# Patient Record
Sex: MALE | Race: WHITE | Employment: OTHER | URBAN - METROPOLITAN AREA
[De-identification: names, ages, dates, MRNs, and addresses within clinical notes are randomized per-mention and may not be internally consistent; named-entity substitution may affect disease eponyms.]

---

## 2017-07-26 ENCOUNTER — OFFICE VISIT (OUTPATIENT)
Dept: NEUROLOGY | Facility: CLINIC | Age: 62
End: 2017-07-26

## 2017-07-26 VITALS
BODY MASS INDEX: 28.88 KG/M2 | DIASTOLIC BLOOD PRESSURE: 74 MMHG | HEIGHT: 69 IN | WEIGHT: 195 LBS | SYSTOLIC BLOOD PRESSURE: 120 MMHG | HEART RATE: 59 BPM

## 2017-07-26 DIAGNOSIS — G40.309 GENERALIZED CONVULSIVE EPILEPSY (H): Primary | ICD-10-CM

## 2017-07-26 RX ORDER — LAMOTRIGINE 100 MG/1
TABLET ORAL
Qty: 360 TABLET | Refills: 3 | Status: SHIPPED | OUTPATIENT
Start: 2017-07-26 | End: 2018-07-18

## 2017-07-26 NOTE — PROGRESS NOTES
[facility]  Epilepsy Clinic:  RETURN VISIT     HISTORY:  Mr. Chad Olivares is a 62-year-old right-handed man who returned for followup of grand mal seizures.  He came to the visit today alone.       The patient reported that following his most recent visit to this clinic on 07/20/2016, he has not had any seizures.  After the last visit he completed a crossover from levetiracetam monotherapy to lamotrigine monotherapy.  He experienced complete resolution of chronic mild irritability and episodic imbalance after tapering off levetiracetam.  He feels quite well on lamotrigine.      The patient reported that he has continued to obtain a marijuana derivative, cannabidiol oil, from a supplier in [place].  This seems to have been quite useful in treating joint pains.  He uses 2 drops every night, which he thinks is a low dose.  He does not think that he had adverse effects of cannabidiol oil.     Ictal semiology-history:    The patient confirmed previously presented history in detail today. He again noted that he had the first seizure of his lifetime on 07/31/2014, and a second grand mal seizure on August 20, 2014.  He was asleep at home in bed at seizure onset; after going to bed the preceding evening, his next memory is of awakening in the [facility] Emergency Department.  He then was very tired and diffusely sore, with pain on the right side of his tongue which had been bitten during the seizure.  His significant other witnessed the seizure from onset.  He was lying stiffly in bed with legs and trunk extended with generalized jerking movements for what seemed to be several minutes.  Afterwards he was very lethargic with stertorous respirations and then he became agitated.  When paramedics arrived, they gave him 2 doses of midazolam which decreased the agitation.  Subsequently, he became gradually more responsive over several hours.  He did not feel that he had baseline alertness and cognitive function for nearly another 2  days, however.  He did not have urinary incontinence with the event.       The patient and his significant other reported that he has not been known to have any sudden brief staring spells with unresponsiveness, sudden brief periods of confusion or global memory deficits or involuntary movements, except for hypnic jerks.      Epilepsy-seizure predispositions:   The patient has no family history of epilepsy or seizures.  He has no history of gestational or  injury, febrile convulsions, developmental delay, stroke, meningitis, encephalitis, significant head injury, or other epileptic predispositions.  He denied a history of physical or sexual abuse by an adult during his childhood or adulthood.      Laboratory evaluations:   In the Emergency Department on the morning after the first seizure, he had a head CT scan which was normal.  A brain MRI scan was ordered at that time and was completed on 2014 and this also was normal.  He had a brief routine electroencephalogram on 2014 which showed normal waking and drowsy EEG activities, although sleep patterns were not recorded.     Epilepsy therapeutics:   The patient reported that he had never been treated with anti-seizure medications for any reason until the second seizure occurred, when levetiracetam was started.  He then crossed over to lamotrigine monotherapy, with resolution of levetiracetam-associated irritability, and later he added  OTC  cannabidiol oil to this.     PAST MEDICAL HISTORY:    1.  History of two generalized tonic-clonic seizures () of uncertain etiology.   2.  Mild obstructive sleep apnea.   3.  History of dyslexia.     PERSONAL AND SOCIAL HISTORY:  The patient grew up in [place].  He had difficulty with reading in school and was told that testing showed dyslexia, but he ultimately graduated from high school in regular classes.  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   He has 2 adult children.  He lives with his significant other.  He does not use illicit recreational drugs.  He drinks, at most, 1-2 alcoholic beverages, perhaps 3 times per month.  He quit smoking in 2005.     REVIEW OF SYSTEMS:  The patient has very chronic excessive daytime somnolence.  Last year this was more severe, in that he often felt forced to take a nap in the afternoon, despite sleeping about 8 hours per night.  He thinks that he lost 5-10 pounds over the last year and now he notes that he mainly naps on weekend days when he is not working but is consistently tired by the end of the work day.  His significant other notes that he often snores at night and sometimes seems to hold his breath.  He occasionally has had rapid leg jerks or movements in his sleep.  He occasionally has had vivid dreams which he seems to act out, rarely having dreams of pushing against something and then actually partially awakening subsequently and pushing on his significant other.         He also notes that he tends to forget details of things he reads or things people tell him and this has slowly worsened over many years.  Now he needs to keep lists of tasks and projects.         He denied history of weakness, tremors or other abnormal involuntary movements, numbness or tingling, incoordination, falling or difficulty in walking, urinary or fecal incontinence, headache and other pain, except as described above.  The patient denied any history of severe depression or suicidal ideation, severe anxiety, panic attacks, hallucinations, delusions, psychosis, substance abuse, or psychiatric hospitalization.  He denied rashes and easy bruising.  The remainder of a 14-system symptom review was negative.    MEDICATIONS:    1.  Lamotrigine 200 mg b.i.d.   2.  Aspirin 81 mg daily.   3.  Ibuprofen 200 mg as needed for joint pain.     ALLERGIES:  The patient denied any known medication allergies.      PHYSICAL EXAMINATION:    On physical  examination the patient appeared to be in no acute distress.  Vital signs were as per the electronic medical record.  Skull was normocephalic and atraumatic.  Neck was supple, without signs of meningeal irritation.      On neurological examination the patient appeared alert and was fully oriented to person, place, time, and reason for visit.  Speech showed grossly normal articulation, fluency, repetitions, naming, syntax and comprehension.  No apraxias or atavistic signs were elicited.  Cranial nerves III through XII were normal.  Muscle masses, tones and strengths were normal throughout.  There was no pronator drift.  No tremors, myoclonus, or other abnormal movements were observed.  Sensations of light touch, pin prick, vibration and proprioception were reportedly normal throughout.  The rapid alternating movements, and finger-nose-finger and heel-shin maneuvers were performed normally bilaterally.  Deep tendon reflexes were normal and symmetric throughout.  Toes were downgoing bilaterally.  Romberg maneuver was negative.  Regular, tandem and reverse tandem walking were normal.      IMPRESSION:    The patient continues to do very well with seizure control.  He crossed over to lamotrigine monotherapy with resolution of levetiracetam adverse effects and with no new problems.      The patient completed neuropsychological testing in September 2016.  This showed impairments consistent with the previously recognized diagnosis of dyslexia, with additional left frontoparietal dysfunction.  He did not have evidence of global deficits of dementia, which came as a relief to him, and he decided not to pursue other evaluations of left hemispheric dysfunction.    I again reviewed [place] regulations on seizures and driving with the patient.  He appeared to clearly understand that he would  prohibited from operating a motor vehicle within 3 months following any future seizure or other episode with sudden unconsciousness or  inability to sit up, and that he is required to report any future such seizure to the DMV within 30 days after the event.  I also recommended that he should review all of his other activities, and avoid any activities that might lead to self-injury or injury of others, within 3 months following any seizure with impaired awareness or impaired motor control.  Such activities include but are not limited to tub bathing, operating power cutting or other tools, handling firearms, exposure to heights from which she might fall, exposure to vessels with hot cooking oil or water, and swimming alone.      PLAN:    1.  Continue lamotrigine at 200 mg b.i.d.   2.  Check serum lamotrigine level today.   3.  Return visit in approximately 11 months.   I spent 25 minutes in this patient care, more than 50% of which consisted of counseling and coordinating care.       Dandre Martin M.D.   Professor of Neurology

## 2017-07-26 NOTE — LETTER
2017       RE: Chad Olivares  : 1955   MRN: [id number]      Dear Colleague,    Thank you for referring your patient, Chad Olivares, to the [facility] EPILEPSY CARE at [facility]. Please see a copy of my visit note below.        [facility]  Epilepsy Clinic: RETURN VISIT      I spent 25 minutes in this patient care, more than 50% of which consisted of counseling and coordinating care.   Dandre Martin M.D.

## 2017-07-26 NOTE — MR AVS SNAPSHOT
After Visit Summary   7/26/2017    Chad Olivares    MRN: [id number]           Patient Information     Date Of Birth          1955        Visit Information        Provider Department      7/26/2017 11:30 AM Dandre Martin MD [facility] Epilepsy Care        Today's Diagnoses     Generalized convulsive epilepsy (H)    -  1       Follow-ups after your visit        Follow-up notes from your care team     Return in about 11 months (around 6/26/2018).      Your next 10 appointments already scheduled     Aug 16, 2017  1:00 PM CDT   (Arrive by 12:45 PM)   Kelle Lee with Esther Glover MD PhD   [facility] ([facility])    [address]  [address] [facility] [address] [phone]              Who to contact     Please call your clinic at [phone] to:    Ask questions about your health    Make or cancel appointments    Discuss your medicines    Learn about your test results    Speak to your doctor   If you have compliments or concerns about an experience at your clinic, or if you wish to file a complaint, please contact [facility] Patient Relations at [phone] or email us at [email or web address]         Additional Information About Your Visit        MyCharmendez Information     [employer] gives you secure access to your electronic health record. If you see a primary care provider, you can also send messages to your care team and make appointments. If you have questions, please call your primary care clinic.  If you do not have a primary care provider, please call [phone] and they will assist you.      [employer] is an electronic gateway that provides easy, online access to your medical records. With [employer], you can request a clinic appointment, read your test results, renew a prescription or communicate with your care team.     To access your existing account, please contact your [facility] or call [phone] for assistance.        Care EveryWhere ID     This is your Care EveryWhere ID. This could be used by other organizations to access your [facility] medical records  [id number]        Your Vitals Were     Pulse Height BMI (Body Mass Index)             59 5' 8.5\" (174 cm) 29.22 kg/m2          Blood Pressure from Last 3 Encounters:   07/26/17 120/74   07/20/16 118/66   05/27/16 114/82    Weight from Last 3 Encounters:   07/26/17 195 lb (88.5 kg)   07/20/16 188 lb 3.2 oz (85.4 kg)   05/27/16 190 lb 14.4 oz (86.6 kg)              We Performed the Following     Lamotrigine Level          Where to get your medicines      These medications were sent to [facility] [address] [place] [address] AT [address] [address]    Hours:  24-hours Phone:  [phone]     lamoTRIgine 100 MG tablet          Primary Care Provider    None Specified       No primary provider on file.        Equal Access to Services     [facility]: Hadii tracey domingo Sodenae, waaxda lumiguel, qaybta kaalmada ricky, laly haq . So [facility] [phone].    ATENCIÓN: Si habla español, tiene a etienne disposición servicios gratuitos de asistencia lingüística. Llame al [phone].    We comply with applicable federal civil rights laws and [place] laws. We do not discriminate on the basis of race, color, national origin, age, disability sex, sexual orientation or gender identity.            Thank you!     Thank you for choosing [facility] EPILEPSY [facility]  for your care. Our goal is always to provide you with excellent care. Hearing back from our patients is one way we can continue to improve our services. Please take a few minutes to complete the written survey that you may receive in the mail after your visit with us. Thank you!             Your Updated Medication List - Protect others around you: Learn how to safely " use, store and throw away your medicines at www.disposemymeds.org.          This list is accurate as of: 7/26/17  3:42 PM.  Always use your most recent med list.                   Brand Name Dispense Instructions for use Diagnosis    lamoTRIgine 100 MG tablet    LaMICtal    360 tablet    Take 2 tabs (200mg) by mouth twice daily    Generalized convulsive epilepsy (H)       medical cannabis liquid      (This is NOT a prescription, and does not certify that the patient has a qualifying medical condition for medical cannabis.  The purpose of this order is  to document that the patient reports taking medical cannabis.)

## 2017-07-27 LAB — LAMOTRIGINE SERPL-MCNC: 2.7 UG/ML

## 2017-09-20 ENCOUNTER — OFFICE VISIT (OUTPATIENT)
Dept: FAMILY MEDICINE | Facility: CLINIC | Age: 62
End: 2017-09-20

## 2017-09-20 VITALS
DIASTOLIC BLOOD PRESSURE: 77 MMHG | WEIGHT: 193.7 LBS | HEIGHT: 69 IN | BODY MASS INDEX: 28.69 KG/M2 | SYSTOLIC BLOOD PRESSURE: 113 MMHG | RESPIRATION RATE: 16 BRPM | HEART RATE: 64 BPM

## 2017-09-20 DIAGNOSIS — G40.309 GENERALIZED CONVULSIVE EPILEPSY (H): ICD-10-CM

## 2017-09-20 DIAGNOSIS — Z00.00 ROUTINE GENERAL MEDICAL EXAMINATION AT A HEALTH CARE FACILITY: Primary | ICD-10-CM

## 2017-09-20 ASSESSMENT — PAIN SCALES - GENERAL: PAINLEVEL: NO PAIN (0)

## 2017-09-20 NOTE — PATIENT INSTRUCTIONS
Primary Care Center Phone Number [phone]  Primary Care Center Medication Refill Request Information:  * Please contact your pharmacy regarding ANY request for medication refills.  ** [facility] Prescription Fax = [phone]  * Please allow 3 business days for routine medication refills.  * Please allow 5 business days for controlled substance medication refills.     Primary Care Center Test Result notification information:  *You will be notified with in 7-10 days of your appointment day regarding the results of your test.  If you are on MyChart you will be notified as soon as the provider has reviewed the results and signed off on them.      Get a shingles shot at [facility]  Get a flu shot  Return fasting for blood work  Colonoscopy is due in 2020  Eye appt set up     Please schedule the following appointments:  Ophthalmology (Eye) [phone]  ([facility] 4th floor)      Nutritious diet  Eat 1200 mg calcium total every day.  Many people achieve this by a diet containing calcium (milk, yogurt, cheese, and other dairy products, supplemented food) and 1 calcium pill. If you don't eat dairy, you should take a calcium supplement 2 times a day.  Eat 1000 IU of vitamin D on daily basis.    Eat a variety of fruits and vegetables and eat whole grains.  Try to choose foods with a high NuVal score, if your grocery store shows this number. High numbers, like 80 or 90, are nutritious foods, and low scores, like 10 are less nutritious foods.          Men should drink no more than 2 alcoholic beverages daily on average; women should drink no more than 1 alcoholic beverage daily on average.    Everyone should avoid all tobacco and nicotine-containing products.    Exercise: Aim for:  Moderate activity (where you can still talk while doing it, such as walking about 100 steps per minute, or 3,000 steps in 30 minutes) for 30 minutes at least 5 days a week  Or  Vigorous exercise (you are working so hard you are breathing hard and fast  and your heart rate has gone up, such as playing basketball or soccer) at least 75 minutes weekly    If you have trouble doing 30 minutes of activity, all at once, try small bursts of activity. Go to www."[employer],".[employer] for suggestions on how you can do this at home or work.     All adults should try to do muscle strengthening exercise at least 2 days a week    Safety  Always wear bike/motorcycle helmet and seatbelt in a vehicle  Make sure your home has smoke and carbon monoxide detectors.  Wear broad spectrum sunscreen of at least SPF 15 on sun-exposed skin.    Preventing disease  See your dentist at least once a year  Have your eyes checked every 1-2 years.  Get a flu shot each year.

## 2017-09-20 NOTE — NURSING NOTE
Chief Complaint   Patient presents with     Physical     pt is here for a medication follow up        Lupe Teixeira CMA at 12:55 PM on 9/20/2017

## 2017-09-20 NOTE — PROGRESS NOTES
.SUBJECTIVE:  Chad Olivares is a 62 year old male with pmh of   Past Medical History:   Diagnosis Date     Cholesteatoma, unspecified      Colon polyps      Complex sleep apnea syndrome     does not use CPAP     Dyslexia      Generalized tonic-clonic seizure (H) 2014    uncertain etiology     who comes in for preventive care examination today.     He has the following concerns to address today    He  urinates frequently, has 1-5 nocturia, no blood - does drink a lot of water, drinking 1-4 cups coffee/day,  Minimal alcohol, nonsmoker.      Gets sleepy early afternoon - does tend to nap    Hx of seizures - on meds - last seizure was  3-4 yrs.      Con't to use marjuana and took it for arthritis - and has  helped inflammation       Current Outpatient Prescriptions   Medication Sig Dispense Refill     lamoTRIgine (LAMICTAL) 100 MG tablet Take 2 tabs (200mg) by mouth twice daily 360 tablet 3     medical cannabis oral liquid  (Patient's own supply.  Not a prescription) (This is NOT a prescription, and does not certify that the patient has a qualifying medical condition for medical cannabis.  The purpose of this order is  to document that the patient reports taking medical cannabis.)         Family History   Problem Relation Age of Onset     DIABETES Mother      diet controlled, Htn     Heart Murmur Mother      TAVR     DIABETES Maternal Grandmother      Coronary Artery Disease Brother        Social History   Substance Use Topics     Smoking status: Former Smoker     Quit date: 8/26/2005     Smokeless tobacco: Never Used     Alcohol use Yes      Comment: 1 drink every 2 wks     Social History     Social History     Marital status: Single     Spouse name: N/A     Number of children: 2     Years of education: 14     Occupational History           Self Employed     contractor Self     Social History Main Topics     Smoking status: Former Smoker     Quit date: 8/26/2005     Smokeless tobacco: Never Used      "Alcohol use Yes      Comment: 1 drink every 2 wks     Drug use: No      Comment: uses CBD      Sexual activity: Yes     Partners: Female     Other Topics Concern     Parent/Sibling W/ Cabg, Mi Or Angioplasty Before 65f 55m? No     Caffeine Concern Not Asked     3 cups of coffee a day     Exercise Not Asked     Exercise 2 to 3 times a week     Social History Narrative    Balanced Diet - Yes    Osteoporosis Preventative measures-  Dairy servings per day: no servings daily takes soy milk and almond milk - 2 glasses/day     Regular Exercise -  Yes Describe dance 3 times weekly (salsa), bike ride, and paula    Dental Exam up - YES - Date: 2016    Eye Exam - YES - Date: 2007    Self Testicular Exam -  sometimes    Do you have any concerns about STD's -  Partner has hx of genital herpes    Abuse: Current or Past (Physical, Sexual or Emotional)- No    Do you feel safe in your environment - Yes    Guns stored in the home - Yes, locked away    Sunscreen used - No using coconut     Seatbelts used - Yes    Lipids - YES - Date: 4-28-06    Glucose -  NO    Colon Cancer Screening - Colonoscopy 2002(date completed)    Hemoccults - NO    PSA - YES - Date: 4-28-06    Digital Rectal Exam - YES - Date: 4-28-06    Immunizations reviewed and up to date - Yes, last TD was 11-5-2001 2008, [place]    Review Of Systems  General: negative  Skin: negative  Eyes: negative  Ears/Nose/Throat: negative  Respiratory: No shortness of breath, dyspnea on exertion, cough, or hemoptysis  Cardiovascular: negative  Gastrointestinal: negative  Genitourinary: negative  Musculoskeletal: negative  Neurologic: negative  Psychiatric: negative  Hematologic/Lymphatic/Immunologic: negative  Endocrine: negative      OBJECTIVE:  /77  Pulse 64  Resp 16  Ht 1.74 m (5' 8.5\")  Wt 87.9 kg (193 lb 11.2 oz)  BMI 29.02 kg/m2  Gen: healthy older male  SKIN: tan - no lesions    HEENT:  PERRL - pupils small, arterioles narrow  " OP fair dentition, ears good light reflex,   Neck supple no LAD, no nodes, no palpable thyroid  CVS exam: S1, S2 normal, no murmur, click, rub or gallop, regular rate and rhythm, chest is clear without rales or wheezing, no pedal edema, no JVD, no hepatosplenomegaly.  Abd: soft NT/ND bs present  no masses or hepatosplenomegaly  Ext: warm.  no edema. Pulses 1+ and equal in feet  Genital: circumscribed, no testicular masses  Rectal: no hemorrhoids, prostate generous , no nodules  Neuro: cran nerves intact, motor 5/5 and = in UE and LE, reflexes 2/4 and equal; gait normal      ASSESSMENT/PLAN:  Assessment: Pt is a 62 year old male here for preventive examination    Plan:  1. Annual exam:  Colorectal screening indicated and in 2020    Osteoporosis screening  Discussed calcium - 1000mg/day .   Immunizations reviewed and recommended shingles vaccine at [facility] and also flu shot   Labs ordered PSA and lipid when fasting  Counseling was provided in the following areas:  regular exercise  weight management  healthy diet/nutrition  aspirin prophylaxis  osteoporosis prevention/bone health   Hep C screening was negative  Eye exam recommended       2. Seizures: stable on meds - continue lamictal     3. Urinary frequency:  Likely BPH.  He is not wanting to take any med; discussed cutting back on fluids after 6PM, cutting back on caffiene.  Follow     Dr Estehr Glover

## 2017-09-20 NOTE — MR AVS SNAPSHOT
After Visit Summary   9/20/2017    Chad Olivares    MRN: [id number]           Patient Information     Date Of Birth          1955        Visit Information        Provider Department      9/20/2017 1:00 PM Esther Glover MD PhD [facility]        Today's Diagnoses     Routine general medical examination at a health care facility    -  1    Generalized convulsive epilepsy (H)          Care Instructions    Primary Care Center Phone Number [phone]  Primary Care Center Medication Refill Request Information:  * Please contact your pharmacy regarding ANY request for medication refills.  ** [facility] Prescription Fax = [phone]  * Please allow 3 business days for routine medication refills.  * Please allow 5 business days for controlled substance medication refills.     Primary Care Center Test Result notification information:  *You will be notified with in 7-10 days of your appointment day regarding the results of your test.  If you are on MyChart you will be notified as soon as the provider has reviewed the results and signed off on them.      Get a shingles shot at [facility]  Get a flu shot  Return fasting for blood work  Colonoscopy is due in 2020  Eye appt set up     Please schedule the following appointments:  Ophthalmology (Eye) [phone]  ([facility] 4th floor)      Nutritious diet  Eat 1200 mg calcium total every day.  Many people achieve this by a diet containing calcium (milk, yogurt, cheese, and other dairy products, supplemented food) and 1 calcium pill. If you don't eat dairy, you should take a calcium supplement 2 times a day.  Eat 1000 IU of vitamin D on daily basis.    Eat a variety of fruits and vegetables and eat whole grains.  Try to choose foods with a high NuVal score, if your grocery store shows this number. High numbers, like 80 or 90, are nutritious foods, and low scores, like 10 are less nutritious foods.          Men should drink no more than 2 alcoholic  beverages daily on average; women should drink no more than 1 alcoholic beverage daily on average.    Everyone should avoid all tobacco and nicotine-containing products.    Exercise: Aim for:  Moderate activity (where you can still talk while doing it, such as walking about 100 steps per minute, or 3,000 steps in 30 minutes) for 30 minutes at least 5 days a week  Or  Vigorous exercise (you are working so hard you are breathing hard and fast and your heart rate has gone up, such as playing basketball or soccer) at least 75 minutes weekly    If you have trouble doing 30 minutes of activity, all at once, try small bursts of activity. Go to www.abefitness.com for suggestions on how you can do this at home or work.     All adults should try to do muscle strengthening exercise at least 2 days a week    Safety  Always wear bike/motorcycle helmet and seatbelt in a vehicle  Make sure your home has smoke and carbon monoxide detectors.  Wear broad spectrum sunscreen of at least SPF 15 on sun-exposed skin.    Preventing disease  See your dentist at least once a year  Have your eyes checked every 1-2 years.  Get a flu shot each year.              Follow-ups after your visit        Additional Services     OPHTHALMOLOGY ADULT REFERRAL       Your provider has referred you to: [facility]: [facility] [phone]   [email or web address]    Please be aware that coverage of these services is subject to the terms and limitations of your health insurance plan.  Call member services at your health plan with any benefit or coverage questions.      Please bring the following with you to your appointment:    (1) Any X-Rays, CTs or MRIs which have been performed.  Contact the facility where they were done to arrange for  prior to your scheduled appointment.    (2) List of current medications  (3) This referral request   (4) Any documents/labs given to you for this referral                  Follow-up  notes from your care team     Return in about 1 year (around 9/20/2018).      Your next 10 appointments already scheduled     Sep 28, 2017  8:15 AM CDT   LAB with UC LAB   [facility] ([facility])    [address] [place] [address]  [address] [facility] [address] [phone]           Patient must bring picture ID. Patient should be prepared to give a urine specimen  Please do not eat 10-12 hours before your appointment if you are coming in fasting for labs on lipids, cholesterol, or glucose (sugar). Pregnant women should follow their Care Team instructions. Water with medications is okay. Do not drink coffee or other fluids. If you have concerns about taking  your medications, please ask at office or if scheduling via [employer], send a message by clicking on Secure Messaging, Message Your Care Team.            Sep 28, 2017  9:00 AM CDT   (Arrive by 8:45 AM)   NEW GENERAL with Mihaela Denny OD   [facility] Ophthalmology ([facility])    [address] [place] [address] [phone]              Future tests that were ordered for you today     Open Future Orders        Priority Expected Expires Ordered    OPHTHALMOLOGY ADULT REFERRAL Routine 9/21/2017 9/20/2018 9/20/2017    PSA screen Routine 9/20/2017 9/20/2018 9/20/2017    Lipid Profile FASTING Routine 9/20/2017 9/20/2018 9/20/2017            Who to contact     Please call your clinic at [phone] to:    Ask questions about your health    Make or cancel appointments    Discuss your medicines    Learn about your test results    Speak to your doctor   If you have compliments or concerns about an experience at your clinic, or if you wish to file a complaint, please contact [facility] Patient Relations at [phone] or email us at [email or web address]         Additional Information About Your Visit        Everything ClubhariOmando Information     [employer] gives you  "secure access to your electronic health record. If you see a primary care provider, you can also send messages to your care team and make appointments. If you have questions, please call your primary care clinic.  If you do not have a primary care provider, please call [phone] and they will assist you.      [employer] is an electronic gateway that provides easy, online access to your medical records. With [employer], you can request a clinic appointment, read your test results, renew a prescription or communicate with your care team.     To access your existing account, please contact your [facility] or call [phone] for assistance.        Care EveryWhere ID     This is your Care EveryWhere ID. This could be used by other organizations to access your [place] medical records  [id number]        Your Vitals Were     Pulse Respirations Height BMI (Body Mass Index)          64 16 1.74 m (5' 8.5\") 29.02 kg/m2         Blood Pressure from Last 3 Encounters:   09/20/17 113/77   07/26/17 120/74   07/20/16 118/66    Weight from Last 3 Encounters:   09/20/17 87.9 kg (193 lb 11.2 oz)   07/26/17 88.5 kg (195 lb)   07/20/16 85.4 kg (188 lb 3.2 oz)               Primary Care Provider Office Phone # Fax #    Asaf Craig [phone]       [address] [place] [address]        Equal Access to Services     TEMI GALVAN : Hadii aad ku hadasho Sodanaali, waaxda luqadaha, qaybta kaalmada adeegyada, laly jensen. So [facility] [phone].    ATENCIÓN: Si habla español, tiene a etienne disposición servicios gratuitos de asistencia lingüística. Llame al [phone].    We comply with applicable federal civil rights laws and [place] laws. We do not discriminate on the basis of race, color, national origin, age, disability sex, sexual orientation or gender identity.            Thank you!     Thank you for choosing [facility] " CLINIC  for your care. Our goal is always to provide you with excellent care. Hearing back from our patients is one way we can continue to improve our services. Please take a few minutes to complete the written survey that you may receive in the mail after your visit with us. Thank you!             Your Updated Medication List - Protect others around you: Learn how to safely use, store and throw away your medicines at www.disposemymeds.org.          This list is accurate as of: 9/20/17  2:02 PM.  Always use your most recent med list.                   Brand Name Dispense Instructions for use Diagnosis    lamoTRIgine 100 MG tablet    LaMICtal    360 tablet    Take 2 tabs (200mg) by mouth twice daily    Generalized convulsive epilepsy (H)       medical cannabis liquid      (This is NOT a prescription, and does not certify that the patient has a qualifying medical condition for medical cannabis.  The purpose of this order is  to document that the patient reports taking medical cannabis.)        NONFORMULARY      Medication name: Breathe Again. Use prn.

## 2017-10-04 ENCOUNTER — TELEPHONE (OUTPATIENT)
Dept: NEUROLOGY | Facility: CLINIC | Age: 62
End: 2017-10-04

## 2017-10-04 NOTE — TELEPHONE ENCOUNTER
DMV form received, via fax on 10/04/2015. Form placed in DMV forms folder for RN to complete.  Melissa Gómez, CMA

## 2017-10-19 NOTE — TELEPHONE ENCOUNTER
DMV form signed, faxed to [employer] on 10/19/17, sent to scanning, and copy mailed to patient.     Savi Headley CMA

## 2018-04-27 ENCOUNTER — TELEPHONE (OUTPATIENT)
Dept: INTERNAL MEDICINE | Facility: CLINIC | Age: 63
End: 2018-04-27

## 2018-04-27 DIAGNOSIS — H91.93 DECREASED HEARING OF BOTH EARS: Primary | ICD-10-CM

## 2018-04-27 NOTE — TELEPHONE ENCOUNTER
[facility] Call Center    Phone Message    May a detailed message be left on voicemail: yes    Reason for Call: Other: Pt needs an order for Hearing Evaluation.  Please follow up with pt.     Action Taken: Other: Routed: P Adult Primary Care [facility]

## 2018-05-01 NOTE — TELEPHONE ENCOUNTER
I spoke to Chad today. He reported he has had gradual hearing loss over the years. Reports that he has worsened hearing in his left ear compared to right, though overall decreased in both ears. Patient does not use hearing aids currently. No trauma. 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 Normal TM appearance during last evaluations.    Madison Dutton RN

## 2018-05-17 NOTE — TELEPHONE ENCOUNTER
FUTURE VISIT INFORMATION      FUTURE VISIT INFORMATION:    Date: 05/25/2018    Time: 3:00    [place] [facility] [place] INFORMATION:    Referring provider:  SELF    Referring providers clinic:  N/A    Reason for visit/diagnosis  HEARING LOSS    RECORDS REQUESTED FROM:       Clinic name Comments Records Status Imaging Status   PRIMARY CARE TELEPHONE ENCOUNTER 04/27/2018 INTERNAL NONE                                   RECORDS STATUS

## 2018-05-21 ENCOUNTER — OFFICE VISIT (OUTPATIENT)
Dept: OPHTHALMOLOGY | Facility: CLINIC | Age: 63
End: 2018-05-21
Payer: COMMERCIAL

## 2018-05-21 ENCOUNTER — OFFICE VISIT (OUTPATIENT)
Dept: INTERNAL MEDICINE | Facility: CLINIC | Age: 63
End: 2018-05-21
Payer: COMMERCIAL

## 2018-05-21 VITALS
WEIGHT: 195.7 LBS | HEART RATE: 61 BPM | DIASTOLIC BLOOD PRESSURE: 78 MMHG | BODY MASS INDEX: 29.32 KG/M2 | RESPIRATION RATE: 12 BRPM | SYSTOLIC BLOOD PRESSURE: 129 MMHG

## 2018-05-21 DIAGNOSIS — G40.309 GENERALIZED CONVULSIVE EPILEPSY (H): ICD-10-CM

## 2018-05-21 DIAGNOSIS — R39.9 LOWER URINARY TRACT SYMPTOMS (LUTS): ICD-10-CM

## 2018-05-21 DIAGNOSIS — R48.0 DYSLEXIA: ICD-10-CM

## 2018-05-21 DIAGNOSIS — R41.3 MEMORY LOSS: Primary | ICD-10-CM

## 2018-05-21 DIAGNOSIS — H25.13 SENILE NUCLEAR SCLEROSIS, BILATERAL: Primary | ICD-10-CM

## 2018-05-21 DIAGNOSIS — H52.4 PRESBYOPIA: ICD-10-CM

## 2018-05-21 DIAGNOSIS — R41.3 MEMORY LOSS: ICD-10-CM

## 2018-05-21 LAB
ANION GAP SERPL CALCULATED.3IONS-SCNC: 7 MMOL/L (ref 3–14)
BUN SERPL-MCNC: 13 MG/DL (ref 7–30)
CALCIUM SERPL-MCNC: 9.1 MG/DL (ref 8.5–10.1)
CHLORIDE SERPL-SCNC: 105 MMOL/L (ref 94–109)
CHOLEST SERPL-MCNC: 227 MG/DL
CO2 SERPL-SCNC: 28 MMOL/L (ref 20–32)
CREAT SERPL-MCNC: 1.11 MG/DL (ref 0.66–1.25)
GFR SERPL CREATININE-BSD FRML MDRD: 67 ML/MIN/1.7M2
GLUCOSE SERPL-MCNC: 90 MG/DL (ref 70–99)
HDLC SERPL-MCNC: 59 MG/DL
HIV 1+2 AB+HIV1 P24 AG SERPL QL IA: NONREACTIVE
LDLC SERPL CALC-MCNC: 146 MG/DL
NONHDLC SERPL-MCNC: 168 MG/DL
POTASSIUM SERPL-SCNC: 3.9 MMOL/L (ref 3.4–5.3)
PSA SERPL-ACNC: 2.42 UG/L (ref 0–4)
SODIUM SERPL-SCNC: 139 MMOL/L (ref 133–144)
TRIGL SERPL-MCNC: 110 MG/DL
TSH SERPL DL<=0.005 MIU/L-ACNC: 2.03 MU/L (ref 0.4–4)

## 2018-05-21 ASSESSMENT — TONOMETRY
IOP_METHOD: ICARE
OD_IOP_MMHG: 13
OS_IOP_MMHG: 15

## 2018-05-21 ASSESSMENT — ENCOUNTER SYMPTOMS
RECTAL PAIN: 0
SKIN CHANGES: 0
HOARSE VOICE: 0
EYE WATERING: 0
TROUBLE SWALLOWING: 0
BOWEL INCONTINENCE: 0
EYE PAIN: 0
EYE REDNESS: 0
SMELL DISTURBANCE: 0
NAUSEA: 0
TASTE DISTURBANCE: 0
DIFFICULTY URINATING: 0
NECK MASS: 0
SINUS CONGESTION: 0
DIARRHEA: 0
DYSURIA: 0
DOUBLE VISION: 1
EYE IRRITATION: 1
BLOATING: 0
SINUS PAIN: 0
HEARTBURN: 1
HEMATURIA: 0
BLOOD IN STOOL: 0
VOMITING: 0
POOR WOUND HEALING: 0
NAIL CHANGES: 0
SORE THROAT: 0
ABDOMINAL PAIN: 0
JAUNDICE: 0
CONSTIPATION: 1
FLANK PAIN: 0

## 2018-05-21 ASSESSMENT — PAIN SCALES - GENERAL: PAINLEVEL: NO PAIN (0)

## 2018-05-21 ASSESSMENT — VISUAL ACUITY
METHOD: SNELLEN - LINEAR
METHOD_MR_RETINOSCOPY: 1
OD_CC+: -2
OD_CC: 20/30
OS_CC: 20/20
OS_CC+: -1

## 2018-05-21 ASSESSMENT — CUP TO DISC RATIO
OD_RATIO: 0.2
OS_RATIO: 0.2

## 2018-05-21 ASSESSMENT — REFRACTION_MANIFEST
OD_SPHERE: +1.00
OS_SPHERE: +0.25
OS_CYLINDER: SPHERE
OD_ADD: +2.00
OD_CYLINDER: SPHERE
OS_ADD: +2.00

## 2018-05-21 ASSESSMENT — EXTERNAL EXAM - LEFT EYE: OS_EXAM: NORMAL

## 2018-05-21 ASSESSMENT — CONF VISUAL FIELD
OS_NORMAL: 1
OD_NORMAL: 1
METHOD: COUNTING FINGERS

## 2018-05-21 ASSESSMENT — EXTERNAL EXAM - RIGHT EYE: OD_EXAM: NORMAL

## 2018-05-21 ASSESSMENT — SLIT LAMP EXAM - LIDS
COMMENTS: NORMAL
COMMENTS: NORMAL

## 2018-05-21 NOTE — PATIENT INSTRUCTIONS
[facility]: [phone]     [facility] Medication Refill Request Information:  * Please contact your pharmacy regarding ANY request for medication refills.  ** [facility] Prescription Fax = [phone]  * Please allow 3 business days for routine medication refills.  * Please allow 5 business days for controlled substance medication refills.     [facility] Test Result notification information:  *You will be notified with in 7-10 days of your appointment day regarding the results of your test.  If you are on MyChart you will be notified as soon as the provider has reviewed the results and signed off on them.

## 2018-05-21 NOTE — MR AVS SNAPSHOT
After Visit Summary   5/21/2018    Chad Olivares    MRN: [id number]           Patient Information     Date Of Birth          1955        Visit Information        Provider Department      5/21/2018 11:40 AM Kevin Odell OD [facility] Ophthalmology        Today's Diagnoses     Senile nuclear sclerosis, bilateral    -  1    Presbyopia           Follow-ups after your visit        Follow-up notes from your care team     Return in about 1 year (around 5/21/2019) for Comprehensive Exam.      Your next 10 appointments already scheduled     May 21, 2018  1:30 PM CDT   LAB with  LAB   [facility] ([facility])    [address] [place] [id number] [phone]           Please do not eat 10-12 hours before your appointment if you are coming in fasting for labs on lipids, cholesterol, or glucose (sugar). This does not apply to pregnant women. Water, hot tea and black coffee (with nothing added) are okay. Do not drink other fluids, diet soda or chew gum.            May 25, 2018  2:00 PM CDT   Walk In From ENT with Gretta Torres   [facility] Audiology ([facility])    [address] [place] [id number] [phone]            May 25, 2018  3:00 PM CDT   (Arrive by 2:45 PM)   New Patient Visit with Son Reynoso MD   [facility] Ear Nose and Throat ([facility])    [address] [place] [id number] [phone]            Jul 18, 2018  3:30 PM CDT   Return Visit with Dandre Martin MD   [facility] Epilepsy [facility] ([facility])    [address], [address]  [facility] [id number] [phone]              Future tests that were ordered for you today     Open Future Orders        Priority Expected Expires Ordered    PSA screen Routine 5/21/2018 5/21/2019 5/21/2018    HIV Antigen Antibody Combo Routine 5/21/2018 5/21/2019  5/21/2018    Lipid Profile Routine  5/21/2019 5/21/2018    Basic metabolic panel Routine  5/21/2019 5/21/2018    TSH with free T4 reflex Routine  5/21/2019 5/21/2018            Who to contact     Please call your clinic at [phone] to:    Ask questions about your health    Make or cancel appointments    Discuss your medicines    Learn about your test results    Speak to your doctor            Additional Information About Your Visit        CONEXANCE MDharGarena Information     Moglue gives you secure access to your electronic health record. If you see a primary care provider, you can also send messages to your care team and make appointments. If you have questions, please call your primary care clinic.  If you do not have a primary care provider, please call [phone] and they will assist you.      [employer] is an electronic gateway that provides easy, online access to your medical records. With [employer], you can request a clinic appointment, read your test results, renew a prescription or communicate with your care team.     To access your existing account, please contact your [facility] or call [phone] for assistance.        Care EveryWhere ID     This is your Care EveryWhere ID. This could be used by other organizations to access your [place] medical records  [id number]         Blood Pressure from Last 3 Encounters:   05/21/18 129/78   09/20/17 113/77   07/26/17 120/74    Weight from Last 3 Encounters:   05/21/18 88.8 kg (195 lb 11.2 oz)   09/20/17 87.9 kg (193 lb 11.2 oz)   07/26/17 88.5 kg (195 lb)              We Performed the Following     REFRACTION [72479]        Primary Care Provider Office Phone # Fax #    Asaf Craig [phone]       [address] [facility] [address]  [facility] [address]        Equal Access to Services     TEMI GALVAN : Damon Zheng, rhona england, laly holley.  So [facility] [phone].    ATENCIÓN: Si ronni simpson, tiene a etienne disposición servicios gratuitos de asistencia lingüística. Kaitlin curiel [phone].    We comply with applicable federal civil rights laws and [place] laws. We do not discriminate on the basis of race, color, national origin, age, disability, sex, sexual orientation, or gender identity.            Thank you!     Thank you for choosing [facility]  for your care. Our goal is always to provide you with excellent care. Hearing back from our patients is one way we can continue to improve our services. Please take a few minutes to complete the written survey that you may receive in the mail after your visit with us. Thank you!             Your Updated Medication List - Protect others around you: Learn how to safely use, store and throw away your medicines at www.disposemymeds.org.          This list is accurate as of 5/21/18 12:03 PM.  Always use your most recent med list.                   Brand Name Dispense Instructions for use Diagnosis    lamoTRIgine 100 MG tablet    LaMICtal    360 tablet    Take 2 tabs (200mg) by mouth twice daily    Generalized convulsive epilepsy (H)       medical cannabis liquid      (This is NOT a prescription, and does not certify that the patient has a qualifying medical condition for medical cannabis.  The purpose of this order is  to document that the patient reports taking medical cannabis.)        NONFORMULARY      Medication name: Breathe Again. Use prn.

## 2018-05-21 NOTE — PROGRESS NOTES
Assessment/Plan  (H25.13) Senile nuclear sclerosis, bilateral  (primary encounter diagnosis)  Comment: Minimal cataract. Still excellent BCVA  Plan: Monitor annually for changes. RTC sooner with new/worsening blurred vision.     (H52.4) Presbyopia  Plan: REFRACTION [67971]        SRx updated and released. Advised patient regarding adaptation to specs. Glasses should help with mid-range blur.       Complete documentation of historical and exam elements from today's encounter can  be found in the full encounter summary report (not reduplicated in this progress  note). I personally obtained the chief complaint(s) and history of present illness. I  confirmed and edited as necessary the review of systems, past medical/surgical  history, family history, social history, and examination findings as documented by  others; and I examined the patient myself. I personally reviewed the relevant tests,  images, and reports as documented above. I formulated and edited as necessary the  assessment and plan and discussed the findings and management plan with the  patient and family.    Kevin Odell, OD

## 2018-05-21 NOTE — MR AVS SNAPSHOT
After Visit Summary   5/21/2018    Chad Olivares    MRN: [id number]           Patient Information     Date Of Birth          1955        Visit Information        Provider Department      5/21/2018 7:00 AM Asaf Craig MD [facility]        Today's Diagnoses     Memory loss    -  1    Dyslexia        Generalized convulsive epilepsy (H)        Lower urinary tract symptoms (LUTS)          Care Instructions    Primary Care Center: [phone]     Primary Care Center Medication Refill Request Information:  * Please contact your pharmacy regarding ANY request for medication refills.  ** [facility] Prescription Fax = [phone]  * Please allow 3 business days for routine medication refills.  * Please allow 5 business days for controlled substance medication refills.     Primary Care Center Test Result notification information:  *You will be notified with in 7-10 days of your appointment day regarding the results of your test.  If you are on MyChart you will be notified as soon as the provider has reviewed the results and signed off on them.            Follow-ups after your visit        Follow-up notes from your care team     Return in about 1 year (around 5/21/2019).      Your next 10 appointments already scheduled     May 21, 2018 11:40 AM CDT   (Arrive by 11:25 AM)   NEW GENERAL with Kevin Odell OD   [facility] Ophthalmology ([facility])    [address] [place] [id number] [phone]            May 25, 2018  2:00 PM CDT   Walk In From ENT with Gretta Torres   [facility] Audiology ([facility])    [address] [place] [address] [id number] [phone]            May 25, 2018  3:00 PM CDT   (Arrive by 2:45 PM)   New Patient Visit with Son Reynoso MD   [facility] Ear Nose and Throat ([facility])    [address] [place] [address]  Floor  [facility] [address] [phone]            Jul 18, 2018  3:30 PM CDT   Return Visit with Dandre Martin MD   [facility] ([facility])    [address], [address]  [facility] [address] [phone]              Future tests that were ordered for you today     Open Future Orders        Priority Expected Expires Ordered    PSA screen Routine 5/21/2018 5/21/2019 5/21/2018    HIV Antigen Antibody Combo Routine 5/21/2018 5/21/2019 5/21/2018    Lipid Profile Routine  5/21/2019 5/21/2018    Basic metabolic panel Routine  5/21/2019 5/21/2018    TSH with free T4 reflex Routine  5/21/2019 5/21/2018            Who to contact     Please call your clinic at [phone] to:    Ask questions about your health    Make or cancel appointments    Discuss your medicines    Learn about your test results    Speak to your doctor            Additional Information About Your Visit        Zentila Information     Zentila gives you secure access to your electronic health record. If you see a primary care provider, you can also send messages to your care team and make appointments. If you have questions, please call your primary care clinic.  If you do not have a primary care provider, please call [phone] and they will assist you.      Zentila is an electronic gateway that provides easy, online access to your medical records. With Zentila, you can request a clinic appointment, read your test results, renew a prescription or communicate with your care team.     To access your existing account, please contact your [facility] or call [phone] for assistance.        Care EveryWhere ID     This is your Care EveryWhere ID. This could be used by other organizations to access your [facility] medical records  [id number]        Your Vitals Were     Pulse Respirations BMI (Body Mass Index)             61 12 29.32 kg/m2          Blood Pressure from Last 3  Encounters:   05/21/18 129/78   09/20/17 113/77   07/26/17 120/74    Weight from Last 3 Encounters:   05/21/18 88.8 kg (195 lb 11.2 oz)   09/20/17 87.9 kg (193 lb 11.2 oz)   07/26/17 88.5 kg (195 lb)              We Performed the Following     Neuropsychological testing        Primary Care Provider Office Phone # Fax #    Asaf Craig [phone]       [address] [place] [address]        Equal Access to Services     [facility]: Hadii aad ku hadasho Sodenae, waaxda luqadaha, qaybta kaalmada adeamieyalee, laly haq . So [facility] [phone].    ATENCIÓN: Si habla español, tiene a etienne disposición servicios gratuitos de asistencia lingüística. [facility] of [phone].    We comply with applicable federal civil rights laws and [place] laws. We do not discriminate on the basis of race, color, national origin, age, disability, sex, sexual orientation, or gender identity.            Thank you!     Thank you for choosing [facility]  for your care. Our goal is always to provide you with excellent care. Hearing back from our patients is one way we can continue to improve our services. Please take a few minutes to complete the written survey that you may receive in the mail after your visit with us. Thank you!             Your Updated Medication List - Protect others around you: Learn how to safely use, store and throw away your medicines at www.disposemymeds.org.          This list is accurate as of 5/21/18  7:42 AM.  Always use your most recent med list.                   Brand Name Dispense Instructions for use Diagnosis    lamoTRIgine 100 MG tablet    LaMICtal    360 tablet    Take 2 tabs (200mg) by mouth twice daily    Generalized convulsive epilepsy (H)       medical cannabis liquid      (This is NOT a prescription, and does not certify that the patient has a qualifying medical condition for medical cannabis.  The purpose of this  order is  to document that the patient reports taking medical cannabis.)        NONFORMULARY      Medication name: Breathe Again. Use prn.

## 2018-05-21 NOTE — NURSING NOTE
Chief Complaints and History of Present Illnesses   Patient presents with     COMPREHENSIVE EYE EXAM     HPI    Affected eye(s):  Both   Symptoms:     Blurred vision   Floaters   Flashes   No tearing   No Dryness         Do you have eye pain now?:  No      Comments:    Patient notes distance vision is getting worse gradually over time    Wendy Mclean May 21, 2018 11:26 AM

## 2018-05-21 NOTE — PROGRESS NOTES
HPI  62-year-old presents today for physical examination.  He has continued to notice some cognitive issues but has not really noticed any decline.  He has trouble with names predominantly but is not forgetting other issues getting lost or having more significant problems in this regard he did have neuropsych testing done in September 2016 which was consistent with his dyslexia but recommended follow-up evaluation.  He had a sleep study done he has sleep apnea but is not using CPAP.  We discussed how improved sleep could potentially improve his cognition and memory.  Otherwise he is exercising regularly doing a combination of salsa dancing, paula and yoga.  He is having no cardiac symptoms no chest pain dyspnea or exercise intolerance.  He is having more urinary frequency and nocturia but does not desire any treatment or intervention for this.  He is current on his colonoscopy.  He has had no recent seizures and follows with neurology regarding his seizure disorder which appears to be well-controlled on his present medications.  Past and Family hx reviewed and updated    Past Medical History:   Diagnosis Date     Cholesteatoma, unspecified      Colon polyps      Complex sleep apnea syndrome     does not use CPAP     Dyslexia      Generalized tonic-clonic seizure (H) 2014    uncertain etiology     Past Surgical History:   Procedure Laterality Date     cholesteatoma surgery Left      COLONOSCOPY  10/09/2002; 2015    polyps - needs f/u 5 yrs      HC REMOVAL OF TONSILS,<13 Y/O  1962     Family History   Problem Relation Age of Onset     DIABETES Mother      diet controlled, Htn     Heart Murmur Mother      TAVR     DIABETES Maternal Grandmother      Coronary Artery Disease Brother      Social History     Social History     Marital status: Single     Spouse name: N/A     Number of children: 2     Years of education: 14     Occupational History           Self Employed     contractor Self     Social  History Main Topics     Smoking status: Former Smoker     Quit date: 8/26/2005     Smokeless tobacco: Never Used     Alcohol use Yes      Comment: 1 drink every 2 wks     Drug use: No      Comment: uses CBD      Sexual activity: Yes     Partners: Female     Other Topics Concern     Parent/Sibling W/ Cabg, Mi Or Angioplasty Before 65f 55m? No     Caffeine Concern Not Asked     3 cups of coffee a day     Exercise Not Asked     Exercise 2 to 3 times a week     Social History Narrative    Balanced Diet - Yes    Osteoporosis Preventative measures-  Dairy servings per day: no servings daily takes soy milk and almond milk - 2 glasses/day     Regular Exercise -  Yes Describe dance 3 times weekly (salsa), bike ride, and paula    Dental Exam up - YES - Date: 2016    Eye Exam - YES - Date: 2007    Self Testicular Exam -  sometimes    Do you have any concerns about STD's -  Partner has hx of genital herpes    Abuse: Current or Past (Physical, Sexual or Emotional)- No    Do you feel safe in your environment - Yes    Guns stored in the home - Yes, locked away    Sunscreen used - No using coconut     Seatbelts used - Yes    Lipids - YES - Date: 4-28-06    Glucose -  NO    Colon Cancer Screening - Colonoscopy 2002(date completed)    Hemoccults - NO    PSA - YES - Date: 4-28-06    Digital Rectal Exam - YES - Date: 4-28-06    Immunizations reviewed and up to date - Yes, last TD was 11-5-2001 2008, Dania Miller MA               Answers for HPI/ROS submitted by the patient on 5/21/2018   General Symptoms: No  Skin Symptoms: Yes  HENT Symptoms: Yes  EYE SYMPTOMS: Yes  HEART SYMPTOMS: No  LUNG SYMPTOMS: No  INTESTINAL SYMPTOMS: Yes  URINARY SYMPTOMS: Yes  REPRODUCTIVE SYMPTOMS: No  SKELETAL SYMPTOMS: No  BLOOD SYMPTOMS: No  NERVOUS SYSTEM SYMPTOMS: No  MENTAL HEALTH SYMPTOMS: No  Changes in hair: No  Changes in moles/birth marks: No  Itching: No  Rashes: No  Changes in nails: No  Acne: No  Change in facial hair: No  Warts:  No  Non-healing sores: No  Scarring: No  Flaking of skin: No  Color changes of hands/feet in cold : No  Sun sensitivity: No  Skin thickening: No  Ear pain: No  Ear discharge: No  Hearing loss: Yes  Tinnitus: No  Nosebleeds: No  Congestion: No  Sinus pain: No  Trouble swallowing: No   Voice hoarseness: No  Mouth sores: No  Sore throat: No  Tooth pain: No  Gum tenderness: No  Bleeding gums: No  Change in taste: No  Change in sense of smell: No  Dry mouth: No  Hearing aid used: No  Neck lump: No  Eye pain: No  Vision loss: Yes  Dry eyes: No  Watery eyes: No  Eye bulging: No  Double vision: Yes  Flashing of lights: No  Spots: No  Floaters: No  Redness: No  Crossed eyes: No  Tunnel Vision: No  Yellowing of eyes: No  Eye irritation: Yes  Heart burn or indigestion: Yes  Nausea: No  Vomiting: No  Abdominal pain: No  Bloating: No  Constipation: Yes  Diarrhea: No  Blood in stool: No  Black stools: No  Rectal or Anal pain: No  Fecal incontinence: No  Yellowing of skin or eyes: No  Vomit with blood: No  Change in stools: No  Trouble holding urine or incontinence: No  Pain or burning: No  Trouble starting or stopping: No  Increased frequency of urination: Yes  Blood in urine: No  Decreased frequency of urination: No  Frequent nighttime urination: No  Flank pain: No  Difficulty emptying bladder: No  Complete review of symptoms negative except as noted above.    Exam:  /78  Pulse 61  Resp 12  Wt 88.8 kg (195 lb 11.2 oz)  BMI 29.32 kg/m2  195 lbs 11.2 oz  Physical Exam   The patient is alert, oriented with a clear sensorium.   Skin shows no lesions or rashes and good turgor.   Head is normocephalic and atraumatic.   Eyes show PERRLA with benign optic fundi.   Ears show normal rt TM and cerumen on lt.   Mouth shows clear oral mucosa.   Neck shows no nodes, thyromegaly or bruits.   Back is non tender.  Lungs are clear to percussion and auscultation.   Heart shows normal S1 and S2 without murmur or gallop.   Abdomen is soft  and nontender without masses or organomegaly.   Genitalia show tender testes. No evidence of inguinal hernia.  Rectal shows large irregular smooth prostate without nodules or masses.  Extremities show no edema and no evidence of active synovitis.   Neurologic examination shows cranial nerves II-XII intact. Motor shows 5/5 strength. Reflexes are symmetric. Cerebellar testing shows normal tandem gait.  Romberg negative.  The 10-year ASCVD risk score ([place] MARIE Jr, et al., 2013) is: 10.2%    Values used to calculate the score:      Age: 62 years      Sex: Male      Is Non- : No      Diabetic: No      Tobacco smoker: No      Systolic Blood Pressure: 129 mmHg      Is BP treated: No      HDL Cholesterol: 59 mg/dL      Total Cholesterol: 227 mg/dL    ASSESSMENT  1 seizure disorder well-controlled  2 history of colon polyps current on colonoscopy  3 history of dyslexia  4 subjective cognitive complaints will reevaluate with neuropsych testing  5 lower urinary tract symptoms with irregular BPH  6 Hyperlipidemia should be on atorvastatin 20 mg daily    Plan  He is here to schedule neuropsych testing later this year we will reevaluate his labs including his lipids which have not been done recently and will check his PSA because of his prostate exam have him reassessed routinely in a year otherwise sooner if any increased symptoms or problems    This note was completed using Dragon voice recognition software.  Although reviewed after completion, some word and grammatical errors may occur.    Asaf Craig MD  General Internal Medicine  Primary Care Center  [phone]

## 2018-05-21 NOTE — NURSING NOTE
Chief Complaint   Patient presents with     Physical     patient is here for a physical     JONATHAN PELAEZ at 7:00 AM on 5/21/2018.

## 2018-05-25 ENCOUNTER — OFFICE VISIT (OUTPATIENT)
Dept: AUDIOLOGY | Facility: CLINIC | Age: 63
End: 2018-05-25
Payer: COMMERCIAL

## 2018-05-25 ENCOUNTER — PRE VISIT (OUTPATIENT)
Dept: OTOLARYNGOLOGY | Facility: CLINIC | Age: 63
End: 2018-05-25

## 2018-05-25 ENCOUNTER — OFFICE VISIT (OUTPATIENT)
Dept: OTOLARYNGOLOGY | Facility: CLINIC | Age: 63
End: 2018-05-25
Payer: COMMERCIAL

## 2018-05-25 VITALS — HEIGHT: 68 IN | WEIGHT: 195 LBS | BODY MASS INDEX: 29.55 KG/M2

## 2018-05-25 DIAGNOSIS — H90.8 MIXED HEARING LOSS: Primary | ICD-10-CM

## 2018-05-25 DIAGNOSIS — H61.23 BILATERAL IMPACTED CERUMEN: Primary | ICD-10-CM

## 2018-05-25 DIAGNOSIS — H90.6 MIXED CONDUCTIVE AND SENSORINEURAL HEARING LOSS OF BOTH EARS: ICD-10-CM

## 2018-05-25 DIAGNOSIS — H90.12 CONDUCTIVE HEARING LOSS OF LEFT EAR WITH UNRESTRICTED HEARING OF RIGHT EAR: Primary | ICD-10-CM

## 2018-05-25 ASSESSMENT — PAIN SCALES - GENERAL: PAINLEVEL: NO PAIN (0)

## 2018-05-25 NOTE — LETTER
5/25/2018       RE: Chad Olivares  [address]  [facility] [address]     Dear Colleague,    Thank you for referring your patient, Chad Olivares, to the [facility] at [facility]. Please see a copy of my visit note below.    The patient presents with a history of cerumen impaction in the ears and hearing aids. He reports that he had a cholesteatoma removed many years ago in the left ear.  The patient denies sinusitis, rhinitis, facial pain, nasal obstruction or purulent nasal discharge. The patient denies chronic or recurrent tonsillitis, chronic or recurrent pharyngitis. The patient denies otalgia, otorrhea, eustachian tube dysfunction, ear infections, dizziness or tinnitus. He is having difficulty with his hearing. His Audiogram and Tympanogram are reviewed with him and they demonstrate very good hearing in the right ear with a moderate to severe conductive hearing loss in the left ear and 100% word recognition scores bilaterally. Her tympanogram on the right is normal and on the left is flat.      This patient is seen in consultation at the request of Dr. Asaf Craig.     All other systems were reviewed and they are either negative or they are not directly pertinent to this Otolaryngology examination.    Past Medical History:    Past Medical History:   Diagnosis Date     Cholesteatoma, unspecified      Colon polyps      Complex sleep apnea syndrome     does not use CPAP     Dyslexia      Generalized tonic-clonic seizure (H) 2014    uncertain etiology       Past Surgical History:    Past Surgical History:   Procedure Laterality Date     cholesteatoma surgery Left      COLONOSCOPY  10/09/2002; 2015    polyps - needs f/u 5 yrs      HC REMOVAL OF TONSILS,<11 Y/O  1962       Medications:      Current Outpatient Prescriptions:      lamoTRIgine (LAMICTAL) 100 MG tablet, Take 2 tabs (200mg) by mouth twice daily, Disp: 360 tablet, Rfl: 3     medical cannabis oral  liquid  (Patient's own supply.  Not a prescription), (This is NOT a prescription, and does not certify that the patient has a qualifying medical condition for medical cannabis.  The purpose of this order is  to document that the patient reports taking medical cannabis.), Disp: , Rfl:      NONFORMULARY, Medication name: Breathe Again. Use prn., Disp: , Rfl:     Allergies:    Seasonal allergies    Physical Examination:    The patient is a well developed, well nourished male in no apparent distress.  He is normocephalic, atraumatic with pupils equally round and reactive to light.    Oral Cavity Examination:  Normal mucosa with no masses or lesions  Nasal Examination: Normal mucosa with no masses or lesions  Ear Examination: Ear canals impacted with cerumen bilaterally.  The ear canals and the left canal wall down mastoid cavity are cleaned today using an alligator forceps, a currette, and a suction using a binocular microscope for visualization. The tympanic membranes and middle ear spaces normal bilaterally.  Neurological Examination: Facial nerve function intact and symmetric  Integumentary Examination: No lesions on the skin of the head and neck  Neck Examination: No masses or lesions, no lymphadenopathy  Endocrine Examination: Normal thyroid examination    Assessment and Plan:    The patient presents with a history of cerumen impaction and hearing loss. The patient's ears and left mastoid bowl are cleaned today. Based upon his Audiogram and Tympanogram, the patient will be referred to neurotologist Dr. Daniella Ramos for consideration for hearing amplification versus a Bone Anchored Hearing Aid.     CC: Dr. Asaf Craig    Again, thank you for allowing me to participate in the care of your patient.      Sincerely,    Son Reynoso MD

## 2018-05-25 NOTE — PROGRESS NOTES
The patient presents with a history of cerumen impaction in the ears and hearing aids. He reports that he had a cholesteatoma removed many years ago in the left ear.  The patient denies sinusitis, rhinitis, facial pain, nasal obstruction or purulent nasal discharge. The patient denies chronic or recurrent tonsillitis, chronic or recurrent pharyngitis. The patient denies otalgia, otorrhea, eustachian tube dysfunction, ear infections, dizziness or tinnitus. He is having difficulty with his hearing. His Audiogram and Tympanogram are reviewed with him and they demonstrate very good hearing in the right ear with a moderate to severe conductive hearing loss in the left ear and 100% word recognition scores bilaterally. Her tympanogram on the right is normal and on the left is flat.      This patient is seen in consultation at the request of Dr. Asaf Craig.     All other systems were reviewed and they are either negative or they are not directly pertinent to this Otolaryngology examination.    Past Medical History:    Past Medical History:   Diagnosis Date     Cholesteatoma, unspecified      Colon polyps      Complex sleep apnea syndrome     does not use CPAP     Dyslexia      Generalized tonic-clonic seizure (H) 2014    uncertain etiology       Past Surgical History:    Past Surgical History:   Procedure Laterality Date     cholesteatoma surgery Left      COLONOSCOPY  10/09/2002; 2015    polyps - needs f/u 5 yrs      HC REMOVAL OF TONSILS,<11 Y/O  1962       Medications:      Current Outpatient Prescriptions:      lamoTRIgine (LAMICTAL) 100 MG tablet, Take 2 tabs (200mg) by mouth twice daily, Disp: 360 tablet, Rfl: 3     medical cannabis oral liquid  (Patient's own supply.  Not a prescription), (This is NOT a prescription, and does not certify that the patient has a qualifying medical condition for medical cannabis.  The purpose of this order is  to document that the patient reports taking medical cannabis.), Disp: ,  Rfl:      NONFORMULARY, Medication name: Breathe Again. Use prn., Disp: , Rfl:     Allergies:    Seasonal allergies    Physical Examination:    The patient is a well developed, well nourished male in no apparent distress.  He is normocephalic, atraumatic with pupils equally round and reactive to light.    Oral Cavity Examination:  Normal mucosa with no masses or lesions  Nasal Examination: Normal mucosa with no masses or lesions  Ear Examination: Ear canals impacted with cerumen bilaterally.  The ear canals and the left canal wall down mastoid cavity are cleaned today using an alligator forceps, a currette, and a suction using a binocular microscope for visualization. The tympanic membranes and middle ear spaces normal bilaterally.  Neurological Examination: Facial nerve function intact and symmetric  Integumentary Examination: No lesions on the skin of the head and neck  Neck Examination: No masses or lesions, no lymphadenopathy  Endocrine Examination: Normal thyroid examination    Assessment and Plan:    The patient presents with a history of cerumen impaction and hearing loss. The patient's ears and left mastoid bowl are cleaned today. Based upon his Audiogram and Tympanogram, the patient will be referred to neurotologist Dr. Daniella Ramos for consideration for hearing amplification versus a Bone Anchored Hearing Aid.     CC: Dr. Asaf Craig

## 2018-05-25 NOTE — MR AVS SNAPSHOT
After Visit Summary   5/25/2018    Chad Olivares    MRN: [id number]           Patient Information     Date Of Birth          1955        Visit Information        Provider Department      5/25/2018 2:00 PM Aicha Tomlin AuD [facility] Audiology        Today's Diagnoses     Conductive hearing loss of left ear with unrestricted hearing of right ear    -  1       Follow-ups after your visit        Your next 10 appointments already scheduled     Jun 25, 2018  3:45 PM CDT   (Arrive by 3:30 PM)   NEW NEUROTOLOGY VISIT with Daniella Ramos MD   [facility] Ear Nose and Throat ([facility] [place])    [address]  4th Floor  [facility] [address] [phone]            Jul 18, 2018  3:30 PM CDT   Return Visit with Dandre Martin MD   [facility] Epilepsy [facility] ([facility])    [address], [address]  [place] [facility] [address] [phone]              Who to contact     Please call your clinic at [phone] to:    Ask questions about your health    Make or cancel appointments    Discuss your medicines    Learn about your test results    Speak to your doctor            Additional Information About Your Visit        [employer] Information     [facility] gives you secure access to your electronic health record. If you see a primary care provider, you can also send messages to your care team and make appointments. If you have questions, please call your primary care clinic.  If you do not have a primary care provider, please call [phone] and they will assist you.      [facility] is an electronic gateway that provides easy, online access to your medical records. With [facility], you can request a clinic appointment, read your test results, renew a prescription or communicate with your care team.     To access your existing account, please contact your [facility] or call [phone] for assistance.        Care EveryWhere ID     This  is your Care EveryWhere ID. This could be used by other organizations to access your [place] medical records  [id number]         Blood Pressure from Last 3 Encounters:   05/21/18 129/78   09/20/17 113/77   07/26/17 120/74    Weight from Last 3 Encounters:   05/25/18 88.5 kg (195 lb)   05/21/18 88.8 kg (195 lb 11.2 oz)   09/20/17 87.9 kg (193 lb 11.2 oz)              We Performed the Following     AUDIOGRAM/TYMPANOGRAM - INTERFACE     [facility] Audiometry Thrshld Eval & Speech Recog (01507)     Reduced 52 - Tymps / Reflex   (33372)        Primary Care Provider Office Phone # Fax #    Asaf Craig [phone]       [address] [place] [address]        Equal Access to Services     TOBIN [facility] : Hadii aad ku hadasho Soomaali, waaxda luqadaha, qaybta kaalmada adeegyada, waxay idiin hayaan jordon kharabrian haq . So [facility] [place] [phone].    ATENCIÓN: Si habla español, tiene a etienne disposición servicios gratuitos de asistencia lingüística. Llame al [phone].    We comply with applicable federal civil rights laws and [place] laws. We do not discriminate on the basis of race, color, national origin, age, disability, sex, sexual orientation, or gender identity.            Thank you!     Thank you for choosing [facility] AUDIOLOGY  for your care. Our goal is always to provide you with excellent care. Hearing back from our patients is one way we can continue to improve our services. Please take a few minutes to complete the written survey that you may receive in the mail after your visit with us. Thank you!             Your Updated Medication List - Protect others around you: Learn how to safely use, store and throw away your medicines at www.disposemymeds.org.          This list is accurate as of 5/25/18  3:32 PM.  Always use your most recent med list.                   Brand Name Dispense Instructions for use Diagnosis    lamoTRIgine 100 MG tablet    LaMICtal    360 tablet    Take  2 tabs (200mg) by mouth twice daily    Generalized convulsive epilepsy (H)       medical cannabis liquid      (This is NOT a prescription, and does not certify that the patient has a qualifying medical condition for medical cannabis.  The purpose of this order is  to document that the patient reports taking medical cannabis.)        NONFORMULARY      Medication name: Breathe Again. Use prn.

## 2018-05-25 NOTE — NURSING NOTE
"Chief Complaint   Patient presents with     Consult     hearing loss      Height 1.73 m (5' 8.11\"), weight 88.5 kg (195 lb).    Tushar Pavon LPN    "

## 2018-05-25 NOTE — PROGRESS NOTES
AUDIOLOGY REPORT    SUMMARY: Audiology visit completed. See audiogram for results.      RECOMMENDATIONS: Follow-up with ENT.      Gretta Torres  Audiologist  [place] License  #[id number]

## 2018-05-25 NOTE — MR AVS SNAPSHOT
After Visit Summary   5/25/2018    Chad Olivares    MRN: [id number]           Patient Information     Date Of Birth          1955        Visit Information        Provider Department      5/25/2018 3:00 PM Son Reynoso MD [facility] Ear Nose and Throat        Today's Diagnoses     Bilateral impacted cerumen    -  1    Mixed conductive and sensorineural hearing loss of both ears          Care Instructions    The patient presents with a history of cerumen impaction and hearing loss. The patient's ears and left mastoid bowl are cleaned today. Based upon his Audiogram and Tympanogram, the patient will be referred to neurotologist Dr. Daniella Ramos for consideration for hearing amplification versus a Bone Anchored Hearing Aid.           Follow-ups after your visit        Your next 10 appointments already scheduled     Jun 25, 2018  3:45 PM CDT   (Arrive by 3:30 PM)   NEW NEUROTOLOGY VISIT with Daniella Ramos MD   [facility] Ear Nose and Throat ([facility] [place])    [address]  4th Floor  [place] [address] [phone]            Jul 18, 2018  3:30 PM CDT   Return Visit with Dandre Martin MD   [facility]psy [facility] ([facility])    [address], [address]  [place] [facility] [address] [phone]              Who to contact     Please call your clinic at [phone] to:    Ask questions about your health    Make or cancel appointments    Discuss your medicines    Learn about your test results    Speak to your doctor            Additional Information About Your Visit        MyChart Information     [employer] gives you secure access to your electronic health record. If you see a primary care provider, you can also send messages to your care team and make appointments. If you have questions, please call your primary care clinic.  If you do not have a primary care provider, please call [phone] and they will assist you.      [facility] is an  "electronic gateway that provides easy, online access to your medical records. With [employer], you can request a clinic appointment, read your test results, renew a prescription or communicate with your care team.     To access your existing account, please contact your [facility] or call [phone] for assistance.        Care EveryWhere ID     This is your Care EveryWhere ID. This could be used by other organizations to access your [place] medical records  [id number]        Your Vitals Were     Height BMI (Body Mass Index)                1.73 m (5' 8.11\") 29.55 kg/m2           Blood Pressure from Last 3 Encounters:   05/21/18 129/78   09/20/17 113/77   07/26/17 120/74    Weight from Last 3 Encounters:   05/25/18 88.5 kg (195 lb)   05/21/18 88.8 kg (195 lb 11.2 oz)   09/20/17 87.9 kg (193 lb 11.2 oz)              We Performed the Following     DEBRIDMENT MASTOID CAVITY, SIMPLE     REMOVE IMPACTED CERUMEN        Primary Care Provider Office Phone # Fax #    Asaf Craig [phone]       [address] [place] [address]        Equal Access to Services     TEMI GALVAN AH: Hadii tracey ledbettero Sodenae, waaxda luqadaha, qaybta kaalmada adeegyada, laly jensen. So [facility] [phone].    ATENCIÓN: Si habla español, tiene a etienne disposición servicios gratuitos de asistencia lingüística. Llame al [phone].    We comply with applicable federal civil rights laws and [place] laws. We do not discriminate on the basis of race, color, national origin, age, disability, sex, sexual orientation, or gender identity.            Thank you!     [facility]  for your care. Our goal is always to provide you with excellent care. Hearing back from our patients is one way we can continue to improve our services. Please take a few minutes to complete the written survey that you may receive in the " mail after your visit with us. Thank you!             Your Updated Medication List - Protect others around you: Learn how to safely use, store and throw away your medicines at www.disposemymeds.org.          This list is accurate as of 5/25/18  3:18 PM.  Always use your most recent med list.                   Brand Name Dispense Instructions for use Diagnosis    lamoTRIgine 100 MG tablet    LaMICtal    360 tablet    Take 2 tabs (200mg) by mouth twice daily    Generalized convulsive epilepsy (H)       medical cannabis liquid      (This is NOT a prescription, and does not certify that the patient has a qualifying medical condition for medical cannabis.  The purpose of this order is  to document that the patient reports taking medical cannabis.)        NONFORMULARY      Medication name: Breathe Again. Use prn.

## 2018-05-25 NOTE — PATIENT INSTRUCTIONS
The patient presents with a history of cerumen impaction and hearing loss. The patient's ears and left mastoid bowl are cleaned today. Based upon his Audiogram and Tympanogram, the patient will be referred to neurotologist Dr. Daniella Ramos for consideration for hearing amplification versus a Bone Anchored Hearing Aid.

## 2018-06-13 ENCOUNTER — OFFICE VISIT (OUTPATIENT)
Dept: AUDIOLOGY | Facility: CLINIC | Age: 63
End: 2018-06-13
Payer: COMMERCIAL

## 2018-06-13 DIAGNOSIS — H90.12 CONDUCTIVE HEARING LOSS OF LEFT EAR WITH UNRESTRICTED HEARING OF RIGHT EAR: Primary | ICD-10-CM

## 2018-06-13 NOTE — PROGRESS NOTES
AUDIOLOGY REPORT    SUBJECTIVE: Chad Olivares is a 63 year old male who was seen  in Audiology at the [facility] [place] on June 13, 2018 for an osseointegrated hearing implant consultation and hearing aid consultation. Chad has been seen in this clinic for a hearing evaluation and results indicated a conductive hearing loss in the left ear and normal hearing in the right ear.  He has a history of a previous left ear surgery to remove a left cholesteatoma.  The patient was medically evaluated by Dr. Son Reynoso and determined to be tentatively cleared for an osseointegrated device or hearing aids, and so returns for a consultation today to be counseled on options to help aid in communication.     He reports difficulty listening in background noise environments. He reports he is very active, he salsa dances and reports difficulty hearing in that environment.  He is interested in learning both about he osseointegrated and traditional amplification options.      It was reviewed with him that the insurance team found no insurance coverage for both types of amplification options and so he would likely be self-pay.  It was discussed that he should also check with his insurance to verify the findings.  If he wanted to pursue the osseointegrated option he would follow-up with ENT and their team would help him with securing finances and be better suited to help him understand the costs of that option.  He expressed understanding.    OBJECTIVE: A consultation was conducted in which the patient was presented with osseointegrated device options from Cochlear and [employer]. The appointment was spent outlining and comparing the options available. Chad Olivares was provided a trial of the Cochlear BAHA 4 device on a headband.  The trial was accomplished by connecting the outer processor to a headband.  When the device is placed on the mastoid bone (located behind the ear) of the poorer  "ear, bone vibration is used to stimulate the better cochlea.      Warranty information, life expectancy, and billing of the Ponto/Cochlear BAHA were discussed.  For the O[employer]on Ponto/Cochlear BAHA, if approved by insurance, they will most likely cover the surgery and the cost of the processor.  This does not include Audiology charges.  Audiology will charge the patient a fitting fee and follow-up charges. The patient expressed understanding.  He expressed that he did not like the look of the osseointegrated device option as he felt it was \"rather large\" and \"very noticeable\".  He reported that he would like to talk to his significant other about the option, but he expressed that he preferred the look of the traditional hearing aid.  He had questions regarding the surgery aspect and it was discussed that he would follow-up with Dr. Ramos regarding that, an appointment is currently scheduled for 6/25/18.  If he did decide to pursue the osseointegrated option, he chose the Oticon Ponto SP3 in silver.    Patient is a also a traditional hearing aid candidate. Patient would like to move forward with a hearing aid evaluation today. Therefore, the patient was presented with different options for amplification to help aid in communication. Discussed styles, levels of technology and monaural vs. binaural fitting. He has an iPhone but stated that he does not necessarily need the hearing aid to connect to the phone as he \"doesn't need the tech stuff\".  He expressed that he would like options to be able to discuss with his significant other.    The hearing aid quoted were:  Left: Phonak Audeo B50  COLOR: P5  BATTERY SIZE: 312  EARMOLD/TIPS: Large closed dome  CANAL/ LENGTH: 2    Left: Widex Evoke 330  COLOR: 072  BATTERY SIZE: 312  EARMOLD/TIPS: Large closed dome  CANAL/ LENGTH: 2    It was reviewed with him that given his hearing loss, a non-custom earpiece would be tried first but that if target gain could not " be reached, a custom earmold will be considered.  He expressed understanding.      ASSESSMENT: An osseointegrated hearing implant consultation and traditional hearing aid evaluation was conducted today, pending medical clearance for surgery and use of the implant devices.  Options were presented from [employer], Cochlear, and hearing aid manufacturers.  At this point the patient is interested in both the implant and traditional device options. Patient will explore insurance coverage, to confirm what the coverage is for BAHA and hearing aids. The patient did not wish to trial the Cochlear BAHA 4 with headband at home. The patient will follow-up with Dr.Tina Ramos for medical management should he decide to pursue the osseointegrated option and return to this clinic for audiologic care.    Reviewed purchase information and warranty information with patient. The 45 day trial period was explained to patient. The patient was given a copy of the [place] Department of Health consumer brochure on purchasing hearing instruments. Patient risk factors have been provided to the patient in writing prior to the sale of the hearing aid per FDA regulation. The risk factors are also available in the User Instructional Booklet to be presented on the day of the hearing aid fitting. Patient will contact the clinic should he decide to pursue hearing aids. Hearing aid evaluation completed.      PLAN: Chad expressed that he would like to share information with his significant other before making a decision.  He was given options for both traditional hearing aids and osseointegrated hearing implant options.  Should he decide to pursue the osseointegrated implant option he will follow-up with Dr. Daniella Ramos to discuss surgical candidacy, he chose the [employer] Ponto SP 3 device. He will contact the audiology clinic should he decide to pursue a trial with the traditional hearing aid option first and let the clinic know which  hearing aid he would like to try, either the Phonak or the Widex.  Please contact the clinic with questions regarding today's appointment.     Gretta Torres  Audiologist  [place] License  #[id number]

## 2018-06-13 NOTE — MR AVS SNAPSHOT
After Visit Summary   6/13/2018    Chad Olivares    MRN: [id number]           Patient Information     Date Of Birth          1955        Visit Information        Provider Department      6/13/2018 8:30 AM Aicha Tomlin AuD [facility] Audiology        Today's Diagnoses     Conductive hearing loss of left ear with unrestricted hearing of right ear    -  1       Follow-ups after your visit        Your next 10 appointments already scheduled     Jun 25, 2018  1:00 PM CDT   (Arrive by 12:45 PM)   NEW NEUROTOLOGY VISIT with Daniella Ramos MD   [facility] Ear Nose and Throat ([facility] [place])    [address]  4th Floor  [facility] [address] [phone]            Jul 18, 2018  3:30 PM CDT   Return Visit with Dandre Martin MD   [facility] Epilepsy [facility] ([facility])    [address], [address]  [facility] [address] [phone]              Who to contact     Please call your clinic at [phone] to:    Ask questions about your health    Make or cancel appointments    Discuss your medicines    Learn about your test results    Speak to your doctor            Additional Information About Your Visit        Know[employer]Current Information     [employer] gives you secure access to your electronic health record. If you see a primary care provider, you can also send messages to your care team and make appointments. If you have questions, please call your primary care clinic.  If you do not have a primary care provider, please call [phone] and they will assist you.      [employer] is an electronic gateway that provides easy, online access to your medical records. With [employer], you can request a clinic appointment, read your test results, renew a prescription or communicate with your care team.     To access your existing account, please contact your [facility] or call [phone] for assistance.        Care EveryWhere ID     This  is your Care EveryWhere ID. This could be used by other organizations to access your [place] medical records  [id number]         Blood Pressure from Last 3 Encounters:   05/21/18 129/78   09/20/17 113/77   07/26/17 120/74    Weight from Last 3 Encounters:   05/25/18 88.5 kg (195 lb)   05/21/18 88.8 kg (195 lb 11.2 oz)   09/20/17 87.9 kg (193 lb 11.2 oz)              We Performed the Following     Hearing Aid Exam, Monaural ([address])        Primary Care Provider Office Phone # Fax #    Asaf Craig [phone]       [address] [place] [address]        Equal Access to Services     TEMI GALVAN : Hadii aad ku hadasho Soomaali, waaxda luqadaha, qaybta kaalmada adeegyada, waxcamilo jensen. So [facility] [phone].    ATENCIÓN: Si habla español, tiene a etienne disposición servicios gratuitos de asistencia lingüística. [facility] [phone].    We comply with applicable federal civil rights laws and [place] laws. We do not discriminate on the basis of race, color, national origin, age, disability, sex, sexual orientation, or gender identity.            Thank you!     Thank you for choosing [facility] AUDIOLOGY  for your care. Our goal is always to provide you with excellent care. Hearing back from our patients is one way we can continue to improve our services. Please take a few minutes to complete the written survey that you may receive in the mail after your visit with us. Thank you!             Your Updated Medication List - Protect others around you: Learn how to safely use, store and throw away your medicines at www.disposemymeds.org.          This list is accurate as of 6/13/18 10:35 AM.  Always use your most recent med list.                   Brand Name Dispense Instructions for use Diagnosis    lamoTRIgine 100 MG tablet    LaMICtal    360 tablet    Take 2 tabs (200mg) by mouth twice daily    Generalized convulsive epilepsy (H)       medical cannabis  liquid      (This is NOT a prescription, and does not certify that the patient has a qualifying medical condition for medical cannabis.  The purpose of this order is  to document that the patient reports taking medical cannabis.)        NONFORMULARY      Medication name: Breathe Again. Use prn.

## 2018-07-18 ENCOUNTER — OFFICE VISIT (OUTPATIENT)
Dept: NEUROLOGY | Facility: CLINIC | Age: 63
End: 2018-07-18
Payer: COMMERCIAL

## 2018-07-18 VITALS
WEIGHT: 195.2 LBS | SYSTOLIC BLOOD PRESSURE: 106 MMHG | TEMPERATURE: 97.1 F | BODY MASS INDEX: 29.58 KG/M2 | DIASTOLIC BLOOD PRESSURE: 61 MMHG | HEART RATE: 66 BPM

## 2018-07-18 DIAGNOSIS — R41.3 MEMORY LOSS: ICD-10-CM

## 2018-07-18 DIAGNOSIS — G40.309 GENERALIZED CONVULSIVE EPILEPSY (H): Primary | ICD-10-CM

## 2018-07-18 RX ORDER — LAMOTRIGINE 100 MG/1
TABLET ORAL
Qty: 360 TABLET | Refills: 3 | Status: SHIPPED | OUTPATIENT
Start: 2018-07-18 | End: 2019-08-14

## 2018-07-18 ASSESSMENT — PAIN SCALES - GENERAL: PAINLEVEL: NO PAIN (0)

## 2018-07-18 NOTE — PROGRESS NOTES
[facility]  Epilepsy Clinic:  RETURN VISIT     HISTORY:  Mr. Chad Olivares is a 63-year-old right-handed man who returned for follow-up of grand mal seizures.  He came alone to the visit today.       The patient reported that he has not had any seizures following his most recent visit to this clinic on 07/26/2017.  He completed a crossover from levetiracetam monotherapy to lamotrigine monotherapy in 2015.  He experienced complete resolution of chronic mild irritability and episodic imbalance after tapering off levetiracetam.  He feels quite well on lamotrigine.      The patient reported that he has continued to obtain a marijuana derivative, cannabidiol oil, from a supplier in [place].  This seems to have been quite useful in treating joint pains.  He uses 2 drops every night, which he thinks is a low dose.  He does not think that he had adverse effects of cannabidiol oil.     Ictal semiology-history:    The patient confirmed previously presented history in detail today. He again noted that he had the first seizure of his lifetime on 07/31/2014, and a second grand mal seizure on August 20, 2014.  He was asleep at home in bed at seizure onset; after going to bed the preceding evening, his next memory is of awakening in the [facility] Emergency Department.  He then was very tired and diffusely sore, with pain on the right side of his tongue which had been bitten during the seizure.  His significant other witnessed the seizure from onset.  He was lying stiffly in bed with legs and trunk extended with generalized jerking movements for what seemed to be several minutes.  Afterwards he was very lethargic with stertorous respirations and then he became agitated.  When paramedics arrived, they gave him 2 doses of midazolam which decreased the agitation.  Subsequently, he became gradually more responsive over several hours.  He did not feel that he had baseline alertness and cognitive function for nearly another 2 days, however.   He did not have urinary incontinence with the event.       The patient and his significant other reported that he has not been known to have any sudden brief staring spells with unresponsiveness, sudden brief periods of confusion or global memory deficits or involuntary movements, except for hypnic jerks.      Epilepsy-seizure predispositions:   The patient has no family history of epilepsy or seizures.  He has no history of gestational or  injury, febrile convulsions, developmental delay, stroke, meningitis, encephalitis, significant head injury, or other epileptic predispositions.  He denied a history of physical or sexual abuse by an adult during his childhood or adulthood.      Laboratory evaluations:   In the Emergency Department on the morning after the first seizure, he had a head CT scan which was normal.  A brain MRI scan was ordered at that time and was completed on 2014 and this also was normal.  He had a brief routine electroencephalogram on 2014 which showed normal waking and drowsy EEG activities, although sleep patterns were not recorded.     Epilepsy therapeutics:   The patient reported that he had never been treated with anti-seizure medications for any reason until the second seizure occurred, when levetiracetam was started.  He completed a crossover from levetiracetam monotherapy to lamotrigine monotherapy in .  He experienced complete resolution of chronic mild irritability and episodic imbalance after tapering off levetiracetam.  He later he added  OTC  cannabidiol oil to this.     PAST MEDICAL HISTORY:    1.  History of two generalized tonic-clonic seizures () of uncertain etiology.   2.  Mild obstructive sleep apnea.   3.  History of dyslexia.     PERSONAL AND SOCIAL HISTORY:  The patient grew up in [place].  He had difficulty with reading in school and was told that testing showed dyslexia, but he ultimately graduated from high school in regular classes.  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  He has 2 adult children.  He lives with his significant other.  He does not use illicit recreational drugs.  He drinks, at most, 1-2 alcoholic beverages, perhaps 3 times per month.  He quit smoking in 2005.     REVIEW OF SYSTEMS:  The patient has very chronic excessive daytime somnolence.  Last year this was more severe, in that he often felt forced to take a nap in the afternoon, despite sleeping about 8 hours per night.  He thinks that he lost 5-10 pounds over the last year and now he notes that he mainly naps on weekend days when he is not working but is consistently tired by the end of the work day.  His significant other notes that he often snores at night and sometimes seems to hold his breath.  He occasionally has had rapid leg jerks or movements in his sleep.  He occasionally has had vivid dreams which he seems to act out, rarely having dreams of pushing against something and then actually partially awakening subsequently and pushing on his significant other.         He also notes that he tends to forget details of things he reads or things people tell him and this has slowly worsened over many years.  Now he needs to keep lists of tasks and projects.         He denied history of weakness, tremors or other abnormal involuntary movements, numbness or tingling, incoordination, falling or difficulty in walking, urinary or fecal incontinence, headache and other pain, except as described above.  The patient denied any history of severe depression or suicidal ideation, severe anxiety, panic attacks, hallucinations, delusions, psychosis, substance abuse, or psychiatric hospitalization.  He denied rashes and easy bruising.  The remainder of a 14-system symptom review was negative.    MEDICATIONS:    1.  Lamotrigine 200 mg b.i.d.   2.  Aspirin 81 mg daily.   3.  Ibuprofen 200 mg as needed for joint pain.     ALLERGIES:  The  patient denied any known medication allergies.      PHYSICAL EXAMINATION:    On physical examination the patient appeared to be in no acute distress.  Vital signs were as per the electronic medical record.  Skull was normocephalic and atraumatic.  Neck was supple, without signs of meningeal irritation.      On neurological examination the patient appeared alert and was fully oriented to person, place, time, and reason for visit.  Speech showed grossly normal articulation, fluency, repetitions, naming, syntax and comprehension.  No apraxias or atavistic signs were elicited.  Cranial nerves III through XII were normal.  Muscle masses, tones and strengths were normal throughout.  There was no pronator drift.  No tremors, myoclonus, or other abnormal movements were observed.  Sensations of light touch, pin prick, vibration and proprioception were reportedly normal throughout.  The rapid alternating movements, and finger-nose-finger and heel-shin maneuvers were performed normally bilaterally.  Deep tendon reflexes were normal and symmetric throughout.  Toes were downgoing bilaterally.  Romberg maneuver was negative.  Regular, tandem and reverse tandem walking were normal.      IMPRESSION:    The patient continues to do very well with seizure control.  He crossed over to lamotrigine monotherapy with resolution of levetiracetam adverse effects and with no new problems.      The patient feels that his memory problems have definitely increased over the last 2 years.  He completed neuropsychological testing in September 2016.  This showed impairments consistent with the previously recognized diagnosis of dyslexia, with additional left frontoparietal dysfunction.  He did not have evidence of global deficits of dementia, which came as a relief to him, and he decided not to pursue other evaluations of left hemispheric dysfunction at that time.    I again reviewed [place] regulations on seizures and driving with the patient.   He appeared to clearly understand that he would  prohibited from operating a motor vehicle within 3 months following any future seizure or other episode with sudden unconsciousness or inability to sit up, and that he is required to report any future such seizure to the DMV within 30 days after the event.  I also recommended that he should review all of his other activities, and avoid any activities that might lead to self-injury or injury of others, within 3 months following any seizure with impaired awareness or impaired motor control.  Such activities include but are not limited to tub bathing, operating power cutting or other tools, handling firearms, exposure to heights from which she might fall, exposure to vessels with hot cooking oil or water, and swimming alone.      PLAN:    1.  Continue lamotrigine at 200 mg b.i.d.   2.  Repeat neuropsychological testing.   3.  Return visit in approximately 11 months.   I spent 25 minutes in this patient care, more than 50% of which consisted of counseling and coordinating care.       Dandre Martin M.D.   Professor of Neurology

## 2018-07-18 NOTE — LETTER
2018       RE: Chad Olivares  : 1955   MRN: [id number]      Dear Colleague,    Thank you for referring your patient, Chad Olivares, to the [facility] EPILEPSY CARE at [facility]. Please see a copy of my visit note below.      [facility] Epilepsy Clinic: RETURN VISIT      I spent 25 minutes in this patient care, more than 50% of which consisted of counseling and coordinating care.     Again, thank you for allowing me to participate in the care of your patient.      Sincerely,    Dandre Martin MD

## 2018-07-18 NOTE — MR AVS SNAPSHOT
After Visit Summary   7/18/2018    Chad Olivares    MRN: [id number]           Patient Information     Date Of Birth          1955        Visit Information        Provider Department      7/18/2018 3:30 PM Dandre Martin MD [facility] Epilepsy Care        Today's Diagnoses     Generalized convulsive epilepsy (H)    -  1    Memory loss           Follow-ups after your visit        Additional Services     Neuropsych Testing Referral - [facility]       Please schedule a visit to complete Neuropsychological Testing at [facility].                  Follow-up notes from your care team     Return in about 11 months (around 6/18/2019).      Your next 10 appointments already scheduled     Oct 31, 2018  1:30 PM CDT   New Patient Visit with Dandre Martni MD   [facility] Epilepsy Care ([facility])    [address], [address]  [facility] [address] [phone]              Who to contact     Please call your clinic at [phone] to:    Ask questions about your health    Make or cancel appointments    Discuss your medicines    Learn about your test results    Speak to your doctor            Additional Information About Your Visit        [employer] Information     [email or web address] gives you secure access to your electronic health record. If you see a primary care provider, you can also send messages to your care team and make appointments. If you have questions, please call your primary care clinic.  If you do not have a primary care provider, please call [phone] and they will assist you.      [email or web address] is an electronic gateway that provides easy, online access to your medical records. With [email or web address], you can request a clinic appointment, read your test results, renew a prescription or communicate with your care team.     To access your existing account, please contact your [facility] or call [phone] for assistance.        Care EveryWhere ID     This is your Care  EveryWhere ID. This could be used by other organizations to access your [place] medical records  [id number]        Your Vitals Were     Pulse Temperature BMI (Body Mass Index)             66 97.1  F (36.2  C) 29.58 kg/m2          Blood Pressure from Last 3 Encounters:   07/18/18 106/61   05/21/18 129/78   09/20/17 113/77    Weight from Last 3 Encounters:   07/18/18 88.5 kg (195 lb 3.2 oz)   05/25/18 88.5 kg (195 lb)   05/21/18 88.8 kg (195 lb 11.2 oz)              We Performed the Following     Neuropsych Testing Referral - [facility]          Where to get your medicines      These medications were sent to [facility] [address] [place] [address] AT [address] [address]    Hours:  24-hours Phone:  [phone]     lamoTRIgine 100 MG tablet          Primary Care Provider Office Phone # Fax #    Asaf Craig [phone]       [address] [place] [address]  [facility] [address]        Equal Access to Services     TEMI GALVAN AH: Hadii tracey ledbettero Sodenae, waaxda luqadaha, qaybta kaalmada adeegyada, laly jensen. So [facility] [phone].    ATENCIÓN: Si habla español, tiene a etienne disposición servicios gratuitos de asistencia lingüística. [facility] [phone].    We comply with applicable federal civil rights laws and [place] laws. We do not discriminate on the basis of race, color, national origin, age, disability, sex, sexual orientation, or gender identity.            Thank you!     Thank you for choosing [facility] EPILEPSY CARE  for your care. Our goal is always to provide you with excellent care. Hearing back from our patients is one way we can continue to improve our services. Please take a few minutes to complete the written survey that you may receive in the mail after your visit with us. Thank you!             Your Updated Medication List - Protect others around you: Learn how to  safely use, store and throw away your medicines at www.disposemymeds.org.          This list is accurate as of 7/18/18  5:17 PM.  Always use your most recent med list.                   Brand Name Dispense Instructions for use Diagnosis    lamoTRIgine 100 MG tablet    LaMICtal    360 tablet    Take 2 tabs (200mg) by mouth twice daily    Generalized convulsive epilepsy (H)       medical cannabis liquid      (This is NOT a prescription, and does not certify that the patient has a qualifying medical condition for medical cannabis.  The purpose of this order is  to document that the patient reports taking medical cannabis.)        NONFORMULARY      Medication name: Breathe Again. Use prn.

## 2018-10-31 ENCOUNTER — OFFICE VISIT (OUTPATIENT)
Dept: NEUROLOGY | Facility: CLINIC | Age: 63
End: 2018-10-31
Payer: COMMERCIAL

## 2018-10-31 DIAGNOSIS — R41.841 COGNITIVE COMMUNICATION DEFICIT: ICD-10-CM

## 2018-10-31 DIAGNOSIS — F09 MENTAL DISORDER DUE TO GENERAL MEDICAL CONDITION: ICD-10-CM

## 2018-10-31 DIAGNOSIS — G40.919 EPILEPSY WITH ALTERED CONSCIOUSNESS WITH INTRACTABLE EPILEPSY (H): Primary | ICD-10-CM

## 2018-10-31 NOTE — LETTER
10/31/2018     RE: Chad Olivares  : 1955   MRN: [id number]      Dear Colleague,    Thank you for referring your patient, Chad Olivares, to the [facility] EPILEPSY CARE at [facility]. Please see a copy of my visit note below.    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 He was unaccompanied to the evaluation.    BACKGROUND INFORMATION / INTERVIEW FINDINGS    Records indicate that Mr. Olivares suffered from two generalized tonic-clonic seizures in July and . Work up at that time, including outpatient EEG and MRI were unrevealing. His other medical history includes mild obstructive sleep apnea, plantar fascial fibromatosis, Achilles bursitis or tendinitis, and dyslexia. He had expressed concerns about cognition, and particularly so for attention and memory. I saw him in this context for a neuropsychology exam on 2016. My exam documented relative weaknesses that were felt to be consistent with the lateral left frontal and parietal regions. Some of the weaknesses were felt to be attributable to his diagnosis of dyslexia, although I was concerned that there may be additional dysfunction that is acquired. Weaknesses were identified in reading, reading speed, speeded letter coding, phonemic verbal fluency, auditory attention, working memory, some aspects of higher-level visual processing, working memory, and executive functioning. Please see my note for more details. More recent notes indicate that he has again expressed concerns about troubles with his cognition. The current follow-up exam was  requested by Dr. Dandre Martin, in this context.    On interview, Mr. Olivares confirmed the above history. He initially reported that nothing much is different with his thinking, but then noted that he has troubles remembering a lot of things from the past. He described difficulties with memory, such as forgetting events that take place, and forgetting names of people that he has recently met. He reported that his thinking is slow. He stated that his thinking has always been slow, but it seems to be slower now. He reported that he goes to [employer], and he always seems to be a step behind what others are doing. He also reported that dyslexia seems to be a driving force behind his cognitive difficulties. He reported that it seems like his dyslexia is worsening. He noted that he is not sure if there has been a change in his thinking in the last two years.    Regarding mental health, Mr. Olivares reported that his mood is good. He denied mental health diagnoses or treatments. He did note that he saw a therapist around the time of his divorce in the past. He denied suicidal ideation.    With respect to other medical background, Mr. Olivares denied interval TBI, stroke, or seizure. He stated that he has sleep apnea, but does not use a CPAP. He stated that he did not sleep well the night before the exam, as he was worried about the testing. He reported that he averages seven or eight hours of sleep per night, but got only six or seven hours of sleep the night before the exam. He denied pain. Per records, his current medications include lamotrigine and cannabidiol oil. He reported that he may consume an alcoholic drink twice per month, but denied other tobacco or illicit drug use. He reported that he takes CBD oil on a daily basis, and consumes 3 to 4 drops per day. He stated that he has been using CBD oil for the last three or four years. He noted that the CBD oil aids his arthritis pain, and he also takes this medicine for his  seizures.    Mr. Olivares lives at home with his girlfriend. He manages his own basic and instrumental daily activities. He drives. By way of background, he was  twice in the past. He has been in a relationship for 10 years with his girlfriend. He has two adult children. He denied a change in his living situation, educational background, or employment in the last two years.    BEHAVIORAL OBSERVATIONS  Mr. Olivares was polite and cooperative with the exam. He worked slowly, particularly on timed tasks. His speech was notable for subtle difficulties with word finding, but was otherwise normal. Comprehension was normal. His thought processes were notable for variability in focus. His mood was neutral with congruent affect. His effort was rated as adequate to good, as he appeared to work slowly on timed tasks. He was also noted to be quick to give up and hesitant to give guesses. The current results are felt to be a broadly accurate representation of his cognitive functioning.    RESULTS OF EXAM  His performances on measures of neuropsychological functioning were as follows:      He was unable to state the name of the clinic, but was otherwise oriented to place. He was oriented to time and various aspects of personal information. Performance on a measure of single word reading was low average. Auditory attention for digits was low average. Mental calculations were low average. Learning of words in a list format was normal. Short delayed recall of list words was average. 30 minute delayed recall of list words was performed in the average to high average range. Delayed recognition of list words was performed in the high average to superior range, with two false positive errors. Immediate memory for simple geometric figures was high average. His drawing of a complicated geometric figure was borderline impaired, and notable for a hurried approach with inattention to the figure s details. Short delayed recall of the figure  was low average. 30 minute delayed recall of the figure was low average. Delayed recognition of the figure s elements was impaired. Visuospatial judgments for variably oriented lines were superior. Visual perceptual matching of faces was performed within normal limits. Visual problem solving with blocks was average. Nonverbal reasoning for incomplete matrices was superior. Verbal associative fluency was impaired. Semantic verbal fluency was borderline impaired. Naming to confrontation was performed in the borderline impaired to low average range. Verbal abstract reasoning was average. Speeded reading comprehension of sentences was impaired. Speeded visual sequencing under focused attention was average. A similar measure with a divided attention component was average. Speeded word reading was impaired. Speeded color naming was borderline impaired. Speeded inhibition of an overlearned response was low average. Speeded visuomotor coding was low average. Speeded fine motor dexterity was low average, bilaterally.    He endorsed items consistent with minimal symptoms of depression, and minimal symptoms of anxiety on self-report measures.    IMPRESSIONS  Mr. Olivares again demonstrated deficits that are consistent with his history of dyslexia and dysfunction of left lateral frontal, temporal, and parietal brain regions. Relative to his neuropsychology exam in September, 2016, his cognition is largely stable to somewhat improved. It is worth noting that there are subtle declines on a few measures that require word retrieval. These lower test scores are of unclear clinical significance, and is not clear to me if this change reflects a true decline in brain function. There most certainly is not a decline in his memory, nor is there a decline in his cognitive speed or reading abilities. In this exam, weaknesses were again identified in word retrieval, reading, cognitive speed, and psychomotor speed. Other cognitive abilities were  normal and performed in keeping with his likely low average range cognitive baseline. He has considerable strengths in aspects of nonverbal reasoning, visuospatial judgments, and nonverbal working memory. He is not reporting elevated symptoms of depression or anxiety.    RECOMMENDATIONS  Preliminary results and feedback were provided to the patient on the date of the appointment, and all questions were answered.    1. If medically indicated, consideration could be given to an updated MRI of his brain, to aid in diagnostic clarification.    2. Given the declines on a few test measures, follow-up neuropsychological evaluation is recommended in one year in order to assess and update recommendations as appropriate. However, I would be pleased to evaluate sooner, if changes are noted or if clinically indicated.     Marco A Fontanez, Ph.D., L.P., ABPP-CN   / Licensed Psychologist [id number]  Department of Rehabilitation Medicine  Division of Adult Neuropsychology  [facility]    Time spent:  three hours professional time, including record review, data integration, and report writing (CPT 00037); two hours of testing administered by a psychometrist and interpreted by a neuropsychologist (CPT 40332). Diagnoses: G40.919, R41.841, F06.8.

## 2018-10-31 NOTE — MR AVS SNAPSHOT
After Visit Summary   10/31/2018    Chad Olivares    MRN: [id number]           Patient Information     Date Of Birth          1955        Visit Information        Provider Department      10/31/2018 8:00 AM Marco A Fontanez, PhD LP 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 If you see a primary care provider, you can also send messages to your care team and make appointments. If you have questions, please call your primary care clinic.  If you do not have a primary care provider, please call [phone] and they will assist you.      [employer] is an electronic gateway that provides easy, online access to your medical records. With [employer], you can request a clinic appointment, read your test results, renew a prescription or communicate with your care team.     To access your existing account, please contact your [facility] or call [phone] for assistance.        Care EveryWhere ID     This is your Care EveryWhere ID. This could be used by other organizations to access your [place] medical records  [id number]         Blood Pressure from Last 3 Encounters:   07/18/18 106/61   05/21/18 129/78   09/20/17 113/77    Weight from Last 3 Encounters:   07/18/18 88.5 kg (195 lb 3.2 oz)   05/25/18 88.5 kg (195 lb)   05/21/18 88.8 kg (195 lb 11.2 oz)              We Performed the Following     [id number]  TESTING, PER HR/PSYCHOLOGIST     NEUROPSYCH TESTING BY [facility]        Primary Care Provider Office Phone # Fax #    Asaf Craig [phone]       [address] [place] [address]        Equal Access to Services     TEMI GALVAN : Damon michelle anatolyo Soomaali, waaxda luqadaha, qaybta kaalmada adeegyada, laly jacquelinein hayaabertha stoveramie malone eun jensen. So [facility] [phone].    ATENCIÓN: Si habla español, tiene a etienne disposición servicios gratuitos de asistencia lingüística. [facility] [phone].    We comply with applicable federal civil rights laws and [place] laws. We do not discriminate on the basis of race, color, national origin, age, disability, sex, sexual orientation, or gender identity.            Thank you!     Thank you for choosing [facility]  for your care. Our goal is always to provide you with excellent care. Hearing back from our patients is one way we can continue to improve our services. Please take a few minutes to complete the written survey that you may receive in the mail after your visit with us. Thank you!             Your Updated Medication List - Protect others around you: Learn how to safely use, store and throw away your medicines at www.disposemymeds.org.          This list is accurate as of 10/31/18 11:59 PM.  Always use your most recent med list.                   Brand Name Dispense Instructions for use Diagnosis    lamoTRIgine 100 MG tablet    LaMICtal    360 tablet    Take 2 tabs (200mg) by mouth twice daily    Generalized convulsive epilepsy (H)       medical cannabis liquid      (This is NOT a prescription, and does not certify that the patient has a qualifying medical condition for medical cannabis.  The purpose of this order is  to document that the patient reports taking medical cannabis.)        NONFORMULARY      Medication name: Breathe Again. Use prn.

## 2018-11-02 NOTE — PROGRESS NOTES
__ Orientation            Time          __-1 ____        Place        ___1____        Personal Info.     ___4____    __WAIS-IV   FSIQ____VCI_____PRI_____ WMI___PSI___     Raw  Age SS  __Similarities  __22__               ___8__  __Vocabulary  _______ _______  __Information  ______          ______  __Comprehension _______ _______  __Block Design  _34___  _10___  __Matrix Reas.  _21___  _14___  __Visual Puzzles _______ _______  __Picture Comp. _______ _______  __Figure Weights _______ _______  __Digit Span  __19___ __6___  __Arithmetic  __10___             __7___  __L-N Sequencing _______ _______  __Symbol Search _____  _____  __Coding  __46___ __7_  __Cancellation  _______ _______    __AVLT  Trial   1         2          3           4           5          B         6            _7_    _8_     _12_     _14_    _12_   _5_     _9_      Raw  %ile  Learning   _53__      Short Delay   _9_   53-65  30 min. delayed recall  _10__  71-80  Recognition hits   _15__        Recognition false positives __2__              -    __Redean-Chrisrrnj/Grace Complex Figure Test    Raw  T-score   %ile  Copy   _29_         -                2-5  Imm. Recall _12_  ___40___ _16_  30  Delay _12.5  ___41___ _18__  Recognition 16_       ___25___ _1_  Time               137 __                                  >16__    __BVRT  (form D)  Copy E-10 rating ____/4     Raw    Zscore  Correct  ___8____ ___.76___  Incorrect ___2____ ___.99___    __WRAT-4   Reading SS = 81   %ile = 10.7    GE = 7.5    __COWAT   Raw__14__ Tscore__24___   __Semantic Fluency/Animals   Raw = 12     T-score = 34      [facility] Speed of Reading Test    Time= 150  Correct= 7 %ile= -3.56  __BNT   Raw = 51/60 %ile= 8-21    __Trail Making Test   A =   35         Errors = 0    %ile = 70-75   B =  91        Errors = 0    %ile = 50-55  __Stroop                       Raw     %ile        Word = _58_      __ <5__        Color =  _46       _    5__        C/W   =  _24_     _     15__    __JoLO   Raw = 28          %ile= 93      Galvan Facial Recognition   Raw= 50  Interp: WNL    __Grooved Pegboard   RH = 98          Tscore = 38     Drops = 1   LH =  98          TScore = 41      Drops = 0    __BDI-II   Raw_6_ Interp.__ Minimal___   __BAI     Raw_2_ Interp. __Minimal___

## 2018-11-02 NOTE — PROGRESS NOTES
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 He was unaccompanied to the evaluation.    BACKGROUND INFORMATION / INTERVIEW FINDINGS    Records indicate that Mr. Olivares suffered from two generalized tonic-clonic seizures in July and August, 2014. Work up at that time, including outpatient EEG and MRI were unrevealing. His other medical history includes mild obstructive sleep apnea, plantar fascial fibromatosis, Achilles bursitis or tendinitis, and dyslexia. He had expressed concerns about cognition, and particularly so for attention and memory. I saw him in this context for a neuropsychology exam on September 14, 2016. My exam documented relative weaknesses that were felt to be consistent with the lateral left frontal and parietal regions. Some of the weaknesses were felt to be attributable to his diagnosis of dyslexia, although I was concerned that there may be additional dysfunction that is acquired. Weaknesses were identified in reading, reading speed, speeded letter coding, phonemic verbal fluency, auditory attention, working memory, some aspects of higher-level visual processing, working memory, and executive functioning. Please see my note for more details. More recent notes indicate that he has again expressed concerns about troubles with his cognition. The current follow-up exam was requested by Dr. Dandre Martin, in this context.    On interview, Mr. Olivares confirmed the above history. He initially reported that nothing much is different with his thinking, but then noted that he has troubles remembering a lot of things from the past. He  described difficulties with memory, such as forgetting events that take place, and forgetting names of people that he has recently met. He reported that his thinking is slow. He stated that his thinking has always been slow, but it seems to be slower now. He reported that he goes to [employer] classes, and he always seems to be a step behind what others are doing. He also reported that dyslexia seems to be a driving force behind his cognitive difficulties. He reported that it seems like his dyslexia is worsening. He noted that he is not sure if there has been a change in his thinking in the last two years.    Regarding mental health, Mr. Olivares reported that his mood is good. He denied mental health diagnoses or treatments. He did note that he saw a therapist around the time of his divorce in the past. He denied suicidal ideation.    With respect to other medical background, Mr. Olivares denied interval TBI, stroke, or seizure. He stated that he has sleep apnea, but does not use a CPAP. He stated that he did not sleep well the night before the exam, as he was worried about the testing. He reported that he averages seven or eight hours of sleep per night, but got only six or seven hours of sleep the night before the exam. He denied pain. Per records, his current medications include lamotrigine and cannabidiol oil. He reported that he may consume an alcoholic drink twice per month, but denied other tobacco or illicit drug use. He reported that he takes CBD oil on a daily basis, and consumes 3 to 4 drops per day. He stated that he has been using CBD oil for the last three or four years. He noted that the CBD oil aids his arthritis pain, and he also takes this medicine for his seizures.    Mr. Olivares lives at home with his girlfriend. He manages his own basic and instrumental daily activities. He drives. By way of background, he was  twice in the past. He has been in a relationship for 10 years with his girlfriend. He has two  adult children. He denied a change in his living situation, educational background, or employment in the last two years.    BEHAVIORAL OBSERVATIONS  Mr. Olivares was polite and cooperative with the exam. He worked slowly, particularly on timed tasks. His speech was notable for subtle difficulties with word finding, but was otherwise normal. Comprehension was normal. His thought processes were notable for variability in focus. His mood was neutral with congruent affect. His effort was rated as adequate to good, as he appeared to work slowly on timed tasks. He was also noted to be quick to give up and hesitant to give guesses. The current results are felt to be a broadly accurate representation of his cognitive functioning.    RESULTS OF EXAM  His performances on measures of neuropsychological functioning were as follows:      He was unable to state the name of the clinic, but was otherwise oriented to place. He was oriented to time and various aspects of personal information. Performance on a measure of single word reading was low average. Auditory attention for digits was low average. Mental calculations were low average. Learning of words in a list format was normal. Short delayed recall of list words was average. 30 minute delayed recall of list words was performed in the average to high average range. Delayed recognition of list words was performed in the high average to superior range, with two false positive errors. Immediate memory for simple geometric figures was high average. His drawing of a complicated geometric figure was borderline impaired, and notable for a hurried approach with inattention to the figure s details. Short delayed recall of the figure was low average. 30 minute delayed recall of the figure was low average. Delayed recognition of the figure s elements was impaired. Visuospatial judgments for variably oriented lines were superior. Visual perceptual matching of faces was performed within normal  limits. Visual problem solving with blocks was average. Nonverbal reasoning for incomplete matrices was superior. Verbal associative fluency was impaired. Semantic verbal fluency was borderline impaired. Naming to confrontation was performed in the borderline impaired to low average range. Verbal abstract reasoning was average. Speeded reading comprehension of sentences was impaired. Speeded visual sequencing under focused attention was average. A similar measure with a divided attention component was average. Speeded word reading was impaired. Speeded color naming was borderline impaired. Speeded inhibition of an overlearned response was low average. Speeded visuomotor coding was low average. Speeded fine motor dexterity was low average, bilaterally.    He endorsed items consistent with minimal symptoms of depression, and minimal symptoms of anxiety on self-report measures.    IMPRESSIONS  Mr. Olivares again demonstrated deficits that are consistent with his history of dyslexia and dysfunction of left lateral frontal, temporal, and parietal brain regions. Relative to his neuropsychology exam in September, 2016, his cognition is largely stable to somewhat improved. It is worth noting that there are subtle declines on a few measures that require word retrieval. These lower test scores are of unclear clinical significance, and is not clear to me if this change reflects a true decline in brain function. There most certainly is not a decline in his memory, nor is there a decline in his cognitive speed or reading abilities. In this exam, weaknesses were again identified in word retrieval, reading, cognitive speed, and psychomotor speed. Other cognitive abilities were normal and performed in keeping with his likely low average range cognitive baseline. He has considerable strengths in aspects of nonverbal reasoning, visuospatial judgments, and nonverbal working memory. He is not reporting elevated symptoms of depression or  anxiety.    RECOMMENDATIONS  Preliminary results and feedback were provided to the patient on the date of the appointment, and all questions were answered.    1. If medically indicated, consideration could be given to an updated MRI of his brain, to aid in diagnostic clarification.    2. Given the declines on a few test measures, follow-up neuropsychological evaluation is recommended in one year in order to assess and update recommendations as appropriate. However, I would be pleased to evaluate sooner, if changes are noted or if clinically indicated.     Marco A Fontanez, Ph.D., L.P., ABPP-CN   / Licensed Psychologist [id number]  Department of Rehabilitation Medicine  Division of Adult Neuropsychology  [facility]    Time spent:  three hours professional time, including record review, data integration, and report writing (CPT 37320); two hours of testing administered by a psychometrist and interpreted by a neuropsychologist (CPT 23644). Diagnoses: G40.919, R41.841, F06.8.

## 2019-02-22 ENCOUNTER — TELEPHONE (OUTPATIENT)
Dept: NEUROLOGY | Facility: CLINIC | Age: 64
End: 2019-02-22

## 2019-08-14 ENCOUNTER — OFFICE VISIT (OUTPATIENT)
Dept: NEUROLOGY | Facility: CLINIC | Age: 64
End: 2019-08-14
Payer: COMMERCIAL

## 2019-08-14 VITALS
RESPIRATION RATE: 16 BRPM | DIASTOLIC BLOOD PRESSURE: 82 MMHG | BODY MASS INDEX: 29.52 KG/M2 | HEART RATE: 79 BPM | SYSTOLIC BLOOD PRESSURE: 123 MMHG | WEIGHT: 194.8 LBS

## 2019-08-14 DIAGNOSIS — G40.309 GENERALIZED CONVULSIVE EPILEPSY (H): Primary | ICD-10-CM

## 2019-08-14 RX ORDER — LAMOTRIGINE 100 MG/1
TABLET ORAL
Qty: 360 TABLET | Refills: 3 | Status: SHIPPED | OUTPATIENT
Start: 2019-08-14 | End: 2020-06-10

## 2019-08-14 ASSESSMENT — PAIN SCALES - GENERAL: PAINLEVEL: NO PAIN (0)

## 2019-08-14 NOTE — LETTER
RE: Chad Olivares  : 1955   MRN: [id number]      Dear Colleague,    Thank you for referring your patient, Chad Olivares, to the [facility] EPILEPSY CARE at [facility]. Please see a copy of my visit note below.    [facility] [place] [facility] [place]  [place] VIS[place]          Service Date: 2019     HISTORY:  Mr. Chad Olivares is a 64-year-old right-handed man who returned for follow-up of grand mal seizures.  He came with his significant other to the visit today.       The patient reported that he has not had any seizures following his most recent visit to this clinic on 2018.  He denied having adverse effects of lamotrigine.      He noted that he has continued to obtain a marijuana derivative, cannabidiol oil, from a supplier in [place].  This seems to have been quite useful in treating joint pains.  He uses 2 drops every night, which he thinks is a low dose.  He does not think that he had adverse effects of cannabidiol oil.     Ictal semiology-history:    The patient confirmed previously presented history in detail today. He again noted that he had the first seizure of his lifetime on 2014, and a second grand mal seizure on 2014.  He was asleep at home in bed at seizure onset; after going to bed the preceding evening, his next memory is of awakening in the [facility] Emergency Department.  He then was very tired and diffusely sore, with pain on the right side of his tongue which had been bitten during the seizure.  His significant other witnessed the seizure from onset.  He was lying stiffly in bed with legs and trunk extended with generalized jerking movements for what seemed to be several minutes.  Afterwards he was very lethargic with stertorous respirations and then he became agitated.  When paramedics arrived, they gave him 2 doses of midazolam which decreased the agitation.  Subsequently, he became gradually more responsive over several hours.  He did not  feel that he had baseline alertness and cognitive function for nearly another 2 days, however.  He did not have urinary incontinence with the event.       The patient and his significant other reported that he has not been known to have any sudden brief staring spells with unresponsiveness, sudden brief periods of confusion or global memory deficits or involuntary movements, except for hypnic jerks.      Epilepsy-seizure predispositions:   The patient has no family history of epilepsy or seizures.  He has no history of gestational or  injury, febrile convulsions, developmental delay, stroke, meningitis, encephalitis, significant head injury, or other epileptic predispositions.  He denied a history of physical or sexual abuse by an adult during his childhood or adulthood.      Laboratory evaluations:   In the Emergency Department on the morning after the first seizure, he had a head CT scan which was normal.  A brain MRI scan was ordered at that time and was completed on 2014 and this also was normal.  He had a brief routine electroencephalogram on 2014 which showed normal waking and drowsy EEG activities, although sleep patterns were not recorded.     Epilepsy therapeutics:   The patient reported that he had never been treated with anti-seizure medications for any reason until the second seizure occurred, when levetiracetam was started.  He completed a crossover from levetiracetam monotherapy to lamotrigine monotherapy in .  He experienced complete resolution of chronic mild irritability and episodic imbalance after tapering off levetiracetam.  He later he added  OTC  cannabidiol oil to this, mainly for treating joint pains.      PAST MEDICAL HISTORY:    1.  History of two generalized tonic-clonic seizures () of uncertain etiology.   2.  Mild obstructive sleep apnea.   3.  History of dyslexia.     PERSONAL AND SOCIAL HISTORY:  The patient grew up in [place].  He had difficulty with  reading in school and was told that testing showed dyslexia, but he ultimately graduated from high school in regular classes.  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  He has 2 adult children.  He lives with his significant other.  He does not use illicit recreational drugs.  He drinks, at most, 1-2 alcoholic beverages, perhaps 3 times per month.  He quit smoking in 2005.     REVIEW OF SYSTEMS:  The patient has very chronic excessive daytime somnolence.  Last year this was more severe, in that he often felt forced to take a nap in the afternoon, despite sleeping about 8 hours per night.  He thinks that he lost 5-10 pounds over the last year and now he notes that he mainly naps on weekend days when he is not working but is consistently tired by the end of the work day.  His significant other notes that he often snores at night and sometimes seems to hold his breath.  He occasionally has had rapid leg jerks or movements in his sleep.  He occasionally has had vivid dreams which he seems to act out, rarely having dreams of pushing against something and then actually partially awakening subsequently and pushing on his significant other.         He also notes that he tends to forget details of things he reads or things people tell him and this has slowly worsened over many years.  Now he needs to keep lists of tasks and projects.         He denied history of weakness, tremors or other abnormal involuntary movements, numbness or tingling, incoordination, falling or difficulty in walking, urinary or fecal incontinence, headache and other pain, except as described above.  The patient denied any history of severe depression or suicidal ideation, severe anxiety, panic attacks, hallucinations, delusions, psychosis, substance abuse, or psychiatric hospitalization.  He denied rashes and easy bruising.  The remainder of a 14-system symptom review was negative.    MEDICATIONS:     1.  Lamotrigine 200 mg b.i.d.   2.  Aspirin 81 mg daily.   3.  Ibuprofen 200 mg as needed for joint pain.     ALLERGIES:  The patient denied any known medication allergies.      PHYSICAL EXAMINATION:    On physical examination the patient appeared to be in no acute distress.  Vital signs were as per the electronic medical record.  Skull was normocephalic and atraumatic.  Neck was supple, without signs of meningeal irritation.      On neurological examination the patient appeared alert and was fully oriented to person, place, time, and reason for visit.  Speech showed grossly normal articulation, fluency, repetitions, naming, syntax and comprehension.  No apraxias or atavistic signs were elicited.  Cranial nerves III through XII were normal.  Muscle masses, tones and strengths were normal throughout.  There was no pronator drift.  No tremors, myoclonus, or other abnormal movements were observed.  Sensations of light touch, pin prick, vibration and proprioception were reportedly normal throughout.  The rapid alternating movements, and finger-nose-finger and heel-shin maneuvers were performed normally bilaterally.  Deep tendon reflexes were normal and symmetric throughout.  Toes were downgoing bilaterally.  Romberg maneuver was negative.  Regular, tandem and reverse tandem walking were normal.      IMPRESSION:    The patient continues to do very well with seizure control.  He crossed over to lamotrigine monotherapy with resolution of levetiracetam adverse effects and with no new problems.      The patient previously reported that his memory problems have definitely increased over the prior 2 years.  He completed neuropsychological testing in September 2016, and again on 10/31/2019.  Both examinations showed impairments consistent with the previously recognized diagnosis of dyslexia, with additional left frontoparietal dysfunction.  He clearly did not have evidence of global deficits of dementia, which was his major  concern.  I reviewed these findings with him in detail today.     I once again reviewed [place] regulations on seizures and driving with the patient.  He appeared to clearly understand that he would  prohibited from operating a motor vehicle within 3 months following any future seizure or other episode with sudden unconsciousness or inability to sit up, and that he is required to report any future such seizure to the DMV within 30 days after the event.  I also recommended that he should review all of his other activities, and avoid any activities that might lead to self-injury or injury of others, within 3 months following any seizure with impaired awareness or impaired motor control.  Such activities include but are not limited to tub bathing, operating power cutting or other tools, handling firearms, exposure to heights from which she might fall, exposure to vessels with hot cooking oil or water, and swimming alone.      PLAN:    1.  Continue lamotrigine at 200 mg b.i.d.   2.  Return visit in approximately 11 months.   I spent 25 minutes in this patient care, more than 50% of which consisted of counseling and coordinating care.     Dandre Martin M.D.   Professor of Neurology

## 2019-08-15 NOTE — PROGRESS NOTES
[facility]  Epilepsy Clinic:  RETURN VISIT          Service Date: 08/14/2019     HISTORY:  Mr. Chad Olivares is a 64-year-old right-handed man who returned for follow-up of grand mal seizures.  He came with his significant other to the visit today.       The patient reported that he has not had any seizures following his most recent visit to this clinic on 07/18/2018.  He denied having adverse effects of lamotrigine.      He noted that he has continued to obtain a marijuana derivative, cannabidiol oil, from a supplier in [place].  This seems to have been quite useful in treating joint pains.  He uses 2 drops every night, which he thinks is a low dose.  He does not think that he had adverse effects of cannabidiol oil.     Ictal semiology-history:    The patient confirmed previously presented history in detail today. He again noted that he had the first seizure of his lifetime on 07/31/2014, and a second grand mal seizure on August 20, 2014.  He was asleep at home in bed at seizure onset; after going to bed the preceding evening, his next memory is of awakening in the [facility] Emergency Department.  He then was very tired and diffusely sore, with pain on the right side of his tongue which had been bitten during the seizure.  His significant other witnessed the seizure from onset.  He was lying stiffly in bed with legs and trunk extended with generalized jerking movements for what seemed to be several minutes.  Afterwards he was very lethargic with stertorous respirations and then he became agitated.  When paramedics arrived, they gave him 2 doses of midazolam which decreased the agitation.  Subsequently, he became gradually more responsive over several hours.  He did not feel that he had baseline alertness and cognitive function for nearly another 2 days, however.  He did not have urinary incontinence with the event.       The patient and his significant other reported that he has not been known to have any sudden brief  staring spells with unresponsiveness, sudden brief periods of confusion or global memory deficits or involuntary movements, except for hypnic jerks.      Epilepsy-seizure predispositions:   The patient has no family history of epilepsy or seizures.  He has no history of gestational or  injury, febrile convulsions, developmental delay, stroke, meningitis, encephalitis, significant head injury, or other epileptic predispositions.  He denied a history of physical or sexual abuse by an adult during his childhood or adulthood.      Laboratory evaluations:   In the Emergency Department on the morning after the first seizure, he had a head CT scan which was normal.  A brain MRI scan was ordered at that time and was completed on 2014 and this also was normal.  He had a brief routine electroencephalogram on 2014 which showed normal waking and drowsy EEG activities, although sleep patterns were not recorded.     Epilepsy therapeutics:   The patient reported that he had never been treated with anti-seizure medications for any reason until the second seizure occurred, when levetiracetam was started.  He completed a crossover from levetiracetam monotherapy to lamotrigine monotherapy in .  He experienced complete resolution of chronic mild irritability and episodic imbalance after tapering off levetiracetam.  He later he added  OTC  cannabidiol oil to this, mainly for treating joint pains.      PAST MEDICAL HISTORY:    1.  History of two generalized tonic-clonic seizures () of uncertain etiology.   2.  Mild obstructive sleep apnea.   3.  History of dyslexia.     PERSONAL AND SOCIAL HISTORY:  The patient grew up in [place].  He had difficulty with reading in school and was told that testing showed dyslexia, but he ultimately graduated from high school in regular classes.  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  He has 2 adult children.  He  lives with his significant other.  He does not use illicit recreational drugs.  He drinks, at most, 1-2 alcoholic beverages, perhaps 3 times per month.  He quit smoking in 2005.     REVIEW OF SYSTEMS:  The patient has very chronic excessive daytime somnolence.  Last year this was more severe, in that he often felt forced to take a nap in the afternoon, despite sleeping about 8 hours per night.  He thinks that he lost 5-10 pounds over the last year and now he notes that he mainly naps on weekend days when he is not working but is consistently tired by the end of the work day.  His significant other notes that he often snores at night and sometimes seems to hold his breath.  He occasionally has had rapid leg jerks or movements in his sleep.  He occasionally has had vivid dreams which he seems to act out, rarely having dreams of pushing against something and then actually partially awakening subsequently and pushing on his significant other.         He also notes that he tends to forget details of things he reads or things people tell him and this has slowly worsened over many years.  Now he needs to keep lists of tasks and projects.         He denied history of weakness, tremors or other abnormal involuntary movements, numbness or tingling, incoordination, falling or difficulty in walking, urinary or fecal incontinence, headache and other pain, except as described above.  The patient denied any history of severe depression or suicidal ideation, severe anxiety, panic attacks, hallucinations, delusions, psychosis, substance abuse, or psychiatric hospitalization.  He denied rashes and easy bruising.  The remainder of a 14-system symptom review was negative.    MEDICATIONS:    1.  Lamotrigine 200 mg b.i.d.   2.  Aspirin 81 mg daily.   3.  Ibuprofen 200 mg as needed for joint pain.     ALLERGIES:  The patient denied any known medication allergies.      PHYSICAL EXAMINATION:    On physical examination the patient appeared  to be in no acute distress.  Vital signs were as per the electronic medical record.  Skull was normocephalic and atraumatic.  Neck was supple, without signs of meningeal irritation.      On neurological examination the patient appeared alert and was fully oriented to person, place, time, and reason for visit.  Speech showed grossly normal articulation, fluency, repetitions, naming, syntax and comprehension.  No apraxias or atavistic signs were elicited.  Cranial nerves III through XII were normal.  Muscle masses, tones and strengths were normal throughout.  There was no pronator drift.  No tremors, myoclonus, or other abnormal movements were observed.  Sensations of light touch, pin prick, vibration and proprioception were reportedly normal throughout.  The rapid alternating movements, and finger-nose-finger and heel-shin maneuvers were performed normally bilaterally.  Deep tendon reflexes were normal and symmetric throughout.  Toes were downgoing bilaterally.  Romberg maneuver was negative.  Regular, tandem and reverse tandem walking were normal.      IMPRESSION:    The patient continues to do very well with seizure control.  He crossed over to lamotrigine monotherapy with resolution of levetiracetam adverse effects and with no new problems.      The patient previously reported that his memory problems have definitely increased over the prior 2 years.  He completed neuropsychological testing in September 2016, and again on 10/31/2019.  Both examinations showed impairments consistent with the previously recognized diagnosis of dyslexia, with additional left frontoparietal dysfunction.  He clearly did not have evidence of global deficits of dementia, which was his major concern.  I reviewed these findings with him in detail today.     I once again reviewed [place] regulations on seizures and driving with the patient.  He appeared to clearly understand that he would  prohibited from operating a motor vehicle within  3 months following any future seizure or other episode with sudden unconsciousness or inability to sit up, and that he is required to report any future such seizure to the DMV within 30 days after the event.  I also recommended that he should review all of his other activities, and avoid any activities that might lead to self-injury or injury of others, within 3 months following any seizure with impaired awareness or impaired motor control.  Such activities include but are not limited to tub bathing, operating power cutting or other tools, handling firearms, exposure to heights from which she might fall, exposure to vessels with hot cooking oil or water, and swimming alone.      PLAN:    1.  Continue lamotrigine at 200 mg b.i.d.   2.  Return visit in approximately 11 months.   I spent 25 minutes in this patient care, more than 50% of which consisted of counseling and coordinating care.       Dandre Martin M.D.   Professor of Neurology

## 2019-11-05 ENCOUNTER — HEALTH MAINTENANCE LETTER (OUTPATIENT)
Age: 64
End: 2019-11-05

## 2019-11-22 ENCOUNTER — OFFICE VISIT (OUTPATIENT)
Dept: OPHTHALMOLOGY | Facility: CLINIC | Age: 64
End: 2019-11-22
Payer: COMMERCIAL

## 2019-11-22 ENCOUNTER — OFFICE VISIT (OUTPATIENT)
Dept: INTERNAL MEDICINE | Facility: CLINIC | Age: 64
End: 2019-11-22
Payer: COMMERCIAL

## 2019-11-22 VITALS
SYSTOLIC BLOOD PRESSURE: 117 MMHG | TEMPERATURE: 97.9 F | DIASTOLIC BLOOD PRESSURE: 77 MMHG | HEIGHT: 69 IN | BODY MASS INDEX: 29.03 KG/M2 | WEIGHT: 196 LBS | HEART RATE: 66 BPM | OXYGEN SATURATION: 94 % | RESPIRATION RATE: 18 BRPM

## 2019-11-22 DIAGNOSIS — R41.3 MEMORY LOSS: ICD-10-CM

## 2019-11-22 DIAGNOSIS — R48.0 DYSLEXIA: ICD-10-CM

## 2019-11-22 DIAGNOSIS — N28.9 RENAL INSUFFICIENCY: ICD-10-CM

## 2019-11-22 DIAGNOSIS — N40.0 NODULAR HYPERPLASIA OF PROSTATE GLAND: ICD-10-CM

## 2019-11-22 DIAGNOSIS — K63.5 POLYP OF COLON, UNSPECIFIED PART OF COLON, UNSPECIFIED TYPE: ICD-10-CM

## 2019-11-22 DIAGNOSIS — Z13.6 CARDIOVASCULAR SCREENING; LDL GOAL LESS THAN 160: ICD-10-CM

## 2019-11-22 DIAGNOSIS — G40.309 GENERALIZED CONVULSIVE EPILEPSY (H): ICD-10-CM

## 2019-11-22 DIAGNOSIS — H25.13 SENILE NUCLEAR SCLEROSIS, BILATERAL: Primary | ICD-10-CM

## 2019-11-22 DIAGNOSIS — H52.4 PRESBYOPIA: ICD-10-CM

## 2019-11-22 DIAGNOSIS — M72.0 DUPUYTREN CONTRACTURE: Primary | ICD-10-CM

## 2019-11-22 ASSESSMENT — VISUAL ACUITY
OD_CC: 20/20
METHOD: SNELLEN - LINEAR
CORRECTION_TYPE: GLASSES
OS_CC: 20/20

## 2019-11-22 ASSESSMENT — REFRACTION_WEARINGRX
OD_ADD: +2.00
OS_SPHERE: +0.25
OD_CYLINDER: SPHERE
OS_ADD: +2.00
OS_CYLINDER: SPHERE
SPECS_TYPE: LAST MRX
OD_SPHERE: +1.00

## 2019-11-22 ASSESSMENT — TONOMETRY
OS_IOP_MMHG: 13
OD_IOP_MMHG: 14
IOP_METHOD: ICARE

## 2019-11-22 ASSESSMENT — REFRACTION_MANIFEST
OD_CYLINDER: +0.25
OD_ADD: +2.25
OS_SPHERE: +0.50
OD_SPHERE: +1.00
OS_CYLINDER: SPHERE
OS_ADD: +2.25
OD_AXIS: 058

## 2019-11-22 ASSESSMENT — PAIN SCALES - GENERAL: PAINLEVEL: MILD PAIN (2)

## 2019-11-22 ASSESSMENT — CONF VISUAL FIELD
METHOD: COUNTING FINGERS
OS_NORMAL: 1
OD_NORMAL: 1

## 2019-11-22 ASSESSMENT — MIFFLIN-ST. JEOR: SCORE: 1669.43

## 2019-11-22 NOTE — NURSING NOTE
Cahd Marshall      1.  Has the patient received the information for the influenza vaccine? YES    2.  Does the patient have any of the following contraindications?     Allergy to eggs? No     Allergy to a component of the influenza vaccine? No     Serious reaction to previous influenza vaccines? No     Paralyzed by Guillain-Norfolk syndrome? No     Currently sick today? No         3.  Verified patient name and  prior to injection. Yes  4.  The patient was instructed to wait 15 minutes before leaving the building in the event of an allergic reaction: YES        Vaccination given by Huey Randhawa, TOM.      Recorded by Huey Randhawa CMA      Chad Olivares comes into clinic today at the request of Rafa, Ordering Provider for an immunization/s.    I gave the Flu immunization/s while the patient was in clinic. They received an informational sheet and I explained the common side effects of the injection. The patient tolerated the procedure well and had no complications while here in clinic.     This service provided today was under the supervising provider of the [facility] Rafa, who was available if needed.    Huey Randhawa CMA, EMT at 1:03 PM on 2019.

## 2019-11-22 NOTE — PATIENT INSTRUCTIONS
[facility] Medication Refill Request Information:  * Please contact your pharmacy regarding ANY request for medication refills.  ** [facility] Prescription Fax = [phone]  * Please allow 3 business days for routine medication refills.  * Please allow 5 business days for controlled substance medication refills.     [facility] Test Result notification information:  *You will be notified with in 7-10 days of your appointment day regarding the results of your test.  If you are on MyChart you will be notified as soon as the provider has reviewed the results and signed off on them.

## 2019-11-22 NOTE — NURSING NOTE
Chief Complaints and History of Present Illnesses   Patient presents with     Annual Eye Exam     Chief Complaint(s) and History of Present Illness(es)     Annual Eye Exam     Laterality: both eyes    Onset: gradual    Onset: years ago    Frequency: constantly    Course: stable    Associated symptoms: Negative for eye pain    Treatments tried: no treatments    Pain scale: 0/10              Comments     Lashes turned in and occasionally has to pull them out. Got progressive lenses last visit but did not like them. Did not wear all the time and was hard to get used to.     Alona Coreas COT 2:46 PM November 22, 2019

## 2019-11-22 NOTE — PROGRESS NOTES
HPI  64-year-old presents today for physical examination with a list of several concerns.  He had some trauma to his right palm several years ago and is developing a contracture in the right fifth finger.  Is interfering with his ability to do yoga.  He is exercising regularly through a combination of Soledad yoga dancing but is planning to do more walking now that his hand is bothering him.  He is also experience some occasional pain in the anterior medial knees.  This is worse with the Soledad and with impact.  Is not been doing any regular exercises or strengthening for this.  He had a colonoscopy in 2015 with removal of 3 polyps and is due for follow-up.  He has otherwise been having some occasional bilateral foot pain he has not had any associated red hot or swollen joints no rash or other abnormality.  He is stable in regards to his memory has not noted any change in this regard and does not feel that he is been more forgetful or having other symptoms.  No recent seizures on his present medications.  He occasionally will awake not well rested despite sleeping 8 hours.  Is a known history of sleep apnea has not been using CPAP.  He had a dental appliance which was helping him sleep much better however the dog ate it and he has not replaced it.  Past Medical History:   Diagnosis Date     Achilles bursitis or tendinitis 8/7/2008     Advanced directives, counseling/discussion 5/20/2015    Gave pt packet on 5/20/2015  Syeda Torres CMA       CARDIOVASCULAR SCREENING; LDL GOAL LESS THAN 160 10/12/2010     Cholesteatoma, unspecified      Colon polyps 04/2015    tubular villous and serrated adenoma     Complex sleep apnea syndrome     does not use CPAP     Dyslexia      Dyspnea and respiratory abnormality 9/3/2014     Problem list name updated by automated process. 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 Provider to review     Plantar fascial fibromatosis 2008     Past Surgical History:   Procedure Laterality Date     cholesteatoma surgery Left      COLONOSCOPY  10/09/2002;     polyps - needs f/u 5 yrs      HC REMOVAL OF TONSILS,<13 Y/O  1962     Family History   Problem Relation Age of Onset     Diabetes Mother         diet controlled, Htn     Heart Murmur Mother         TAVR     Diabetes Maternal Grandmother      Coronary Artery Disease Brother      Glaucoma No family hx of      Macular Degeneration No family hx of      Social History     Socioeconomic History     Marital status: Single     Spouse name: None     Number of children: 2     Years of education: 14     Highest education level: None   Occupational History     Occupation: real estate broker     Comment: Self Employed     Occupation: contractor     Employer: SELF   Social Needs     Financial resource strain: None     Food insecurity:     Worry: None     Inability: None     Transportation needs:     Medical: None     Non-medical: None   Tobacco Use     Smoking status: Former Smoker     Last attempt to quit: 2005     Years since quittin.2     Smokeless tobacco: Never Used   Substance and Sexual Activity     Alcohol use: Yes     Comment: 1 drink every 2 wks     Drug use: No     Comment: uses CBD      Sexual activity: Yes     Partners: Female   Lifestyle     Physical activity:     Days per week: None     Minutes per session: None     Stress: None   Relationships     Social connections:     Talks on phone: None     Gets together: None     Attends Rastafari service: None     Active member of club or organization: None     Attends meetings of clubs or organizations: None     Relationship status: None     Intimate partner violence:     Fear of current or ex partner: None     Emotionally  "abused: None     Physically abused: None     Forced sexual activity: None   Other Topics Concern     Parent/sibling w/ CABG, MI or angioplasty before 65F 55M? No      Service Not Asked     Blood Transfusions Not Asked     Caffeine Concern Not Asked     Comment: 3 cups of coffee a day     Occupational Exposure Not Asked     Hobby Hazards Not Asked     Sleep Concern Not Asked     Stress Concern Not Asked     Weight Concern Not Asked     Special Diet Not Asked     Back Care Not Asked     Exercise Not Asked     Comment: Exercise 2 to 3 times a week     Bike Helmet Not Asked     Seat Belt Not Asked     Self-Exams Not Asked   Social History Narrative    Balanced Diet - Yes    Osteoporosis Preventative measures-  Dairy servings per day: no servings daily takes soy milk and almond milk - 2 glasses/day     Regular Exercise -  Yes Describe dance 3 times weekly (salsa), bike ride, and paula    Dental Exam up - YES - Date: 2016    Eye Exam - YES - Date: 2007    Self Testicular Exam -  sometimes    Do you have any concerns about STD's -  Partner has hx of genital herpes    Abuse: Current or Past (Physical, Sexual or Emotional)- No    Do you feel safe in your environment - Yes    Guns stored in the home - Yes, locked away    Sunscreen used - No using coconut     Seatbelts used - Yes    Lipids - YES - Date: 4-28-06    Glucose -  NO    Colon Cancer Screening - Colonoscopy 2002(date completed)    Hemoccults - NO    PSA - YES - Date: 4-28-06    Digital Rectal Exam - YES - Date: 4-28-06    Immunizations reviewed and up to date - Yes, last TD was 11-5-2001 2008, Dania Miller MA               Exam:  /77 (BP Location: Right arm, Patient Position: Chair, Cuff Size: Adult Regular)   Pulse 66   Temp 97.9  F (36.6  C) (Oral)   Resp 18   Ht 1.753 m (5' 9\")   Wt 88.9 kg (196 lb)   SpO2 94%   BMI 28.94 kg/m    196 lbs 0 oz  Physical Exam   The patient is alert, oriented with a clear sensorium.   Skin shows no lesions " or rashes and good turgor.   Head is normocephalic and atraumatic.   Eyes show PERRLA with poorly seen optic fundi.   Ears show normal rt TM, cerumen on left  Mouth shows clear oral mucosa.   Neck shows no nodes, thyromegaly or bruits.   Back is non tender.  Lungs are clear to percussion and auscultation.   Heart shows normal S1 and S2 without murmur or gallop.   Abdomen is soft and nontender without masses or organomegaly.   Genitalia show normal testes. No evidence of inguinal hernia.  Rectal shows moderately large firm nodular prostate   Extremities show right 5th finger Dupuytren, no edema and no evidence of active synovitis.  Knees show anterior joint line tenderness bilaterally with pain on hyperflexion and with Marie's testing medially on the left.  No pain with bounce home testing  Neurologic examination shows cranial nerves II-XII intact. Motor shows 5/5 strength. Reflexes are symmetric. Cerebellar testing shows normal tandem gait.  Romberg negative.    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  5 Dupuytren's contracture right fifth finger  6 history of colon polyps due for colonoscopy  7 nodular hyperplasia prostate  8 Renal insufficiency needs F/U    Plan  Encouraged him to treat his sleep apnea with either replacing the dental appliance or using his CPAP.  We are going to follow-up with fasting labs including a PSA.  And update his immunizations with a flu shot.  We will have him scheduled for a colonoscopy.  Present time we will have him stretch regarding his Dupuytren's.  Also gave him neuromotor and isometric exercises to do to strengthen the quadriceps and help with the knees.    This note was completed using Dragon voice recognition software.  Although reviewed after completion, some word and grammatical errors may occur.    Asaf Craig,  MD  General Internal Medicine  [facility]  [phone]

## 2019-11-25 DIAGNOSIS — Z13.6 CARDIOVASCULAR SCREENING; LDL GOAL LESS THAN 160: ICD-10-CM

## 2019-11-25 DIAGNOSIS — N40.0 NODULAR HYPERPLASIA OF PROSTATE GLAND: ICD-10-CM

## 2019-11-25 DIAGNOSIS — R41.3 MEMORY LOSS: ICD-10-CM

## 2019-11-25 DIAGNOSIS — G40.309 GENERALIZED CONVULSIVE EPILEPSY (H): ICD-10-CM

## 2019-11-25 LAB
ALBUMIN SERPL-MCNC: 4 G/DL (ref 3.4–5)
ALP SERPL-CCNC: 112 U/L (ref 40–150)
ALT SERPL W P-5'-P-CCNC: 49 U/L (ref 0–70)
ANION GAP SERPL CALCULATED.3IONS-SCNC: 3 MMOL/L (ref 3–14)
AST SERPL W P-5'-P-CCNC: 24 U/L (ref 0–45)
BASOPHILS # BLD AUTO: 0 10E9/L (ref 0–0.2)
BASOPHILS NFR BLD AUTO: 0.3 %
BILIRUB SERPL-MCNC: 0.6 MG/DL (ref 0.2–1.3)
BUN SERPL-MCNC: 13 MG/DL (ref 7–30)
CALCIUM SERPL-MCNC: 8.9 MG/DL (ref 8.5–10.1)
CHLORIDE SERPL-SCNC: 107 MMOL/L (ref 94–109)
CHOLEST SERPL-MCNC: 214 MG/DL
CO2 SERPL-SCNC: 30 MMOL/L (ref 20–32)
CREAT SERPL-MCNC: 1.28 MG/DL (ref 0.66–1.25)
DIFFERENTIAL METHOD BLD: NORMAL
EOSINOPHIL # BLD AUTO: 0.2 10E9/L (ref 0–0.7)
EOSINOPHIL NFR BLD AUTO: 3.9 %
ERYTHROCYTE [DISTWIDTH] IN BLOOD BY AUTOMATED COUNT: 12 % (ref 10–15)
GFR SERPL CREATININE-BSD FRML MDRD: 59 ML/MIN/{1.73_M2}
GLUCOSE SERPL-MCNC: 100 MG/DL (ref 70–99)
HCT VFR BLD AUTO: 45 % (ref 40–53)
HDLC SERPL-MCNC: 58 MG/DL
HGB BLD-MCNC: 15.3 G/DL (ref 13.3–17.7)
IMM GRANULOCYTES # BLD: 0 10E9/L (ref 0–0.4)
IMM GRANULOCYTES NFR BLD: 0.2 %
LDLC SERPL CALC-MCNC: 134 MG/DL
LYMPHOCYTES # BLD AUTO: 1.8 10E9/L (ref 0.8–5.3)
LYMPHOCYTES NFR BLD AUTO: 30 %
MCH RBC QN AUTO: 32.2 PG (ref 26.5–33)
MCHC RBC AUTO-ENTMCNC: 34 G/DL (ref 31.5–36.5)
MCV RBC AUTO: 95 FL (ref 78–100)
MONOCYTES # BLD AUTO: 0.5 10E9/L (ref 0–1.3)
MONOCYTES NFR BLD AUTO: 8.2 %
NEUTROPHILS # BLD AUTO: 3.5 10E9/L (ref 1.6–8.3)
NEUTROPHILS NFR BLD AUTO: 57.4 %
NONHDLC SERPL-MCNC: 156 MG/DL
NRBC # BLD AUTO: 0 10*3/UL
NRBC BLD AUTO-RTO: 0 /100
PLATELET # BLD AUTO: 226 10E9/L (ref 150–450)
POTASSIUM SERPL-SCNC: 4.1 MMOL/L (ref 3.4–5.3)
PROT SERPL-MCNC: 7.4 G/DL (ref 6.8–8.8)
PSA SERPL-ACNC: 3.32 UG/L (ref 0–4)
RBC # BLD AUTO: 4.75 10E12/L (ref 4.4–5.9)
SODIUM SERPL-SCNC: 139 MMOL/L (ref 133–144)
TRIGL SERPL-MCNC: 108 MG/DL
WBC # BLD AUTO: 6.1 10E9/L (ref 4–11)

## 2019-11-27 ASSESSMENT — EXTERNAL EXAM - RIGHT EYE: OD_EXAM: NORMAL

## 2019-11-27 ASSESSMENT — CUP TO DISC RATIO
OS_RATIO: 0.2
OD_RATIO: 0.2

## 2019-11-27 ASSESSMENT — EXTERNAL EXAM - LEFT EYE: OS_EXAM: NORMAL

## 2019-11-27 ASSESSMENT — SLIT LAMP EXAM - LIDS
COMMENTS: NORMAL
COMMENTS: NORMAL

## 2019-11-27 NOTE — PROGRESS NOTES
Assessment/Plan  (H25.13) Senile nuclear sclerosis, bilateral  (primary encounter diagnosis)  Comment: Minimal, not visually significant  Plan: Monitor regularly for changes. RTC with any vision changes.     (H52.4) Presbyopia  Plan: REFRACTION [27466]        SRx updated and released. Flat-top bifocal may be a better option for patient given his inability to adapt to peripheral blur of PAL.     Complete documentation of historical and exam elements from today's encounter can  be found in the full encounter summary report (not reduplicated in this progress  note). I personally obtained the chief complaint(s) and history of present illness. I  confirmed and edited as necessary the review of systems, past medical/surgical  history, family history, social history, and examination findings as documented by  others; and I examined the patient myself. I personally reviewed the relevant tests,  images, and reports as documented above. I formulated and edited as necessary the  assessment and plan and discussed the findings and management plan with the  patient and family.    Kevin Odell, OD

## 2020-03-30 ENCOUNTER — VIRTUAL VISIT (OUTPATIENT)
Dept: INTERNAL MEDICINE | Facility: CLINIC | Age: 65
End: 2020-03-30

## 2020-03-30 DIAGNOSIS — N45.1 CHRONIC EPIDIDYMITIS: Primary | ICD-10-CM

## 2020-03-30 RX ORDER — SULFAMETHOXAZOLE/TRIMETHOPRIM 800-160 MG
1 TABLET ORAL 2 TIMES DAILY
Qty: 20 TABLET | Refills: 0 | Status: SHIPPED | OUTPATIENT
Start: 2020-03-30 | End: 2021-06-23

## 2020-03-30 ASSESSMENT — PAIN SCALES - GENERAL: PAINLEVEL: NO PAIN (0)

## 2020-03-30 NOTE — PROGRESS NOTES
Phone call; knees improved with exercise regimen.  Onset 2/15 of scrotal pain associated with lump or thickening above testicle worse when hangs loose better with support and advil.  Has persisted.  Was in [place] in January.  Discussed trial of increased anti-inflammatory followed by Septra if no relief  A:  Likely epididymitis  P: Ibuprofen 400 tid x 10 days then Septra DS bid  X 10 days if no relief, then see in clinic if persists.  Asaf Craig MD

## 2020-04-22 ENCOUNTER — VIRTUAL VISIT (OUTPATIENT)
Dept: INTERNAL MEDICINE | Facility: CLINIC | Age: 65
End: 2020-04-22

## 2020-04-22 DIAGNOSIS — N50.89 LUMP IN SCROTUM: ICD-10-CM

## 2020-04-22 DIAGNOSIS — N50.82 SCROTAL PAIN: Primary | ICD-10-CM

## 2020-04-22 ASSESSMENT — PAIN SCALES - GENERAL: PAINLEVEL: MILD PAIN (2)

## 2020-04-22 NOTE — PROGRESS NOTES
Lump is now gone but has pain in both testicles.  Intermittent and worse at times with laying on back, some urinary frequency related but no dysuria, fever, ejaculation sx or other complaints.  He has noted a fluctuating groin lump which resolves and improves with a compression belt.  Discussed US but he wishes to defer until June when he is on Medicare.  Will check CBC, UA and US then or immediately if he has increasing or new or different sx.  (11 minutes)  Asaf Craig MD

## 2020-04-22 NOTE — NURSING NOTE
Chief Complaint   Patient presents with     Recheck Medication     pt would like to follow up and discuss antibiotic       Isaias Alvarado CMA, EMT at 1:46 PM on 4/22/2020.

## 2020-06-03 ENCOUNTER — ANCILLARY PROCEDURE (OUTPATIENT)
Dept: ULTRASOUND IMAGING | Facility: CLINIC | Age: 65
End: 2020-06-03
Attending: INTERNAL MEDICINE
Payer: COMMERCIAL

## 2020-06-03 DIAGNOSIS — N50.89 LUMP IN SCROTUM: ICD-10-CM

## 2020-06-03 DIAGNOSIS — N50.82 SCROTAL PAIN: ICD-10-CM

## 2020-06-10 DIAGNOSIS — G40.309 GENERALIZED CONVULSIVE EPILEPSY (H): ICD-10-CM

## 2020-06-10 RX ORDER — LAMOTRIGINE 100 MG/1
TABLET ORAL
Qty: 360 TABLET | Refills: 0 | Status: SHIPPED | OUTPATIENT
Start: 2020-06-10 | End: 2020-06-17
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
## 2020-06-17 ENCOUNTER — VIRTUAL VISIT (OUTPATIENT)
Dept: NEUROLOGY | Facility: CLINIC | Age: 65
End: 2020-06-17
Payer: COMMERCIAL

## 2020-06-17 DIAGNOSIS — G40.309 GENERALIZED CONVULSIVE EPILEPSY (H): Primary | ICD-10-CM

## 2020-06-17 RX ORDER — LAMOTRIGINE 100 MG/1
TABLET ORAL
Qty: 360 TABLET | Refills: 3 | Status: SHIPPED | OUTPATIENT
Start: 2020-06-17 | End: 2020-11-18

## 2020-06-17 NOTE — PROGRESS NOTES
"Chad Olivares is a 65 year old male who is being evaluated via a billable video visit.      The patient has been notified of following:     \"This video visit will be conducted via a call between you and your physician/provider. We have found that certain health care needs can be provided without the need for an in-person physical exam.  This service lets us provide the care you need with a video conversation.  If a prescription is necessary we can send it directly to your pharmacy.  If lab work is needed we can place an order for that and you can then stop by our lab to have the test done at a later time.    Video visits are billed at different rates depending on your insurance coverage.  Please reach out to your insurance provider with any questions.    If during the course of the call the physician/provider feels a video visit is not appropriate, you will not be charged for this service.\"    Patient has given verbal consent for Video visit? Yes    Will anyone else be joining your video visit? No,send link via text to [phone]        EPILEPSY CLINIC VIDEO VISIT NOTE  The scheduled clinic visit was changed to a video visit to reduce the risk of COVID-19 transmission.           Service Date: 06/17/2020    HISTORY: I spoke with Mr. Chad Olivares, who is a 65-year-old right-handed man with grand mal seizures.      The patient denied having recent fever or cough, and exposure to anyone thought to have COVID-19.     Following the most recent visit to this clinic on 08/14/2019, the patient reportedly has had no seizures and no medication adverse effects.    He has continued to obtain a marijuana derivative, cannabidiol oil, from a supplier in [place], which seems to have been quite useful in treating joint pains.  He uses 2 drops every night, which he thinks is a low dose.  He denied adverse effects of cannabidiol oil.     He noted that he has retired and has no new health problems.      MEDICATIONS:    1.  Lamotrigine 200 " mg b.i.d.   2.  Aspirin 81 mg daily.   3.  Ibuprofen 200 mg as needed for joint pain.     VIDEO OBSERVATIONS:   The patient spoke with normal articulation, fluency and syntax, with normal comprehension of questions.    On command, the patient moved the direction of gaze into all 4 quadrants conjugately and without nystagmus.   Facial movements were normal.    Tongue protruded on the midline.    The patient did not display any resting tremors or action tremors of the outstretched arms.  Limb movements were normal.      IMPRESSION:   Seizures remain well controlled with no apparent adverse effects of lamotrigine.        PLAN:   1)  Continue the current dose of lamotrigine.   2)  Return in 6 months for in-person clinic visit.    Video Conference via [employer].   Video Start Time: 3:05 p.m.  Video Stop Time: 3:23 p.m.   Provider location: [facility]   Reported Patient Location: [place]     Dandre Martin M.D., Professor of Neurology

## 2020-06-17 NOTE — LETTER
"2020     RE: Chad Olivares  : 1955   MRN: [id number]      Dear Colleague,    Thank you for referring your patient, Chad Olivares, to the [facility] at [facility]. Please see a copy of my visit note below.    Chad Olivares is a 65 year old male who is being evaluated via a billable video visit.      The patient has been notified of following:     \"This video visit will be conducted via a call between you and your physician/provider. We have found that certain health care needs can be provided without the need for an in-person physical exam.  This service lets us provide the care you need with a video conversation.  If a prescription is necessary we can send it directly to your pharmacy.  If lab work is needed we can place an order for that and you can then stop by our lab to have the test done at a later time.    Video visits are billed at different rates depending on your insurance coverage.  Please reach out to your insurance provider with any questions.    If during the course of the call the physician/provider feels a video visit is not appropriate, you will not be charged for this service.\"    Patient has given verbal consent for Video visit? Yes    Will anyone else be joining your video visit? No,send link via text to [phone]        EPILEPSY CLINIC VIDEO VISIT NOTE  The scheduled clinic visit was changed to a video visit to reduce the risk of COVID-19 transmission.           Service Date: 2020    HISTORY: I spoke with Mr. Chad Olivares, who is a 65-year-old right-handed man with grand mal seizures.      The patient denied having recent fever or cough, and exposure to anyone thought to have COVID-19.     Following the most recent visit to this clinic on 2019, the patient reportedly has had no seizures and no medication adverse effects.    He has continued to obtain a marijuana derivative, cannabidiol oil, from a supplier in [place], which seems " to have been quite useful in treating joint pains.  He uses 2 drops every night, which he thinks is a low dose.  He denied adverse effects of cannabidiol oil.     He noted that he has retired and has no new health problems.      MEDICATIONS:    1.  Lamotrigine 200 mg b.i.d.   2.  Aspirin 81 mg daily.   3.  Ibuprofen 200 mg as needed for joint pain.     VIDEO OBSERVATIONS:   The patient spoke with normal articulation, fluency and syntax, with normal comprehension of questions.    On command, the patient moved the direction of gaze into all 4 quadrants conjugately and without nystagmus.   Facial movements were normal.    Tongue protruded on the midline.    The patient did not display any resting tremors or action tremors of the outstretched arms.  Limb movements were normal.      IMPRESSION:   Seizures remain well controlled with no apparent adverse effects of lamotrigine.        PLAN:   1)  Continue the current dose of lamotrigine.   2)  Return in 6 months for in-person clinic visit.    Video Conference via [employer].   Video Start Time: 3:05 p.m.  Video Stop Time: 3:23 p.m.   Provider location: [facility]   Reported Patient Location: Home     Dandre Martin M.D., Professor of Neurology

## 2020-07-10 ENCOUNTER — HOSPITAL ENCOUNTER (OUTPATIENT)
Dept: CARDIOLOGY | Facility: CLINIC | Age: 65
Discharge: HOME | End: 2020-07-10
Attending: INTERNAL MEDICINE | Admitting: INTERNAL MEDICINE
Payer: COMMERCIAL

## 2020-07-10 DIAGNOSIS — R07.9 CHEST PAIN, UNSPECIFIED TYPE: ICD-10-CM

## 2020-07-10 PROCEDURE — 94621 CARDIOPULM EXERCISE TESTING: CPT

## 2020-07-10 PROCEDURE — 94621 CARDIOPULM EXERCISE TESTING: CPT | Mod: 26 | Performed by: INTERNAL MEDICINE

## 2020-07-20 DIAGNOSIS — R68.89 EXERCISE INTOLERANCE: Primary | ICD-10-CM

## 2020-08-03 ENCOUNTER — MYC MEDICAL ADVICE (OUTPATIENT)
Dept: INTERNAL MEDICINE | Facility: CLINIC | Age: 65
End: 2020-08-03

## 2020-08-03 DIAGNOSIS — R53.83 OTHER FATIGUE: Primary | ICD-10-CM

## 2020-08-05 ENCOUNTER — ANCILLARY PROCEDURE (OUTPATIENT)
Dept: CARDIOLOGY | Facility: CLINIC | Age: 65
End: 2020-08-05
Attending: INTERNAL MEDICINE
Payer: COMMERCIAL

## 2020-08-05 DIAGNOSIS — R53.83 OTHER FATIGUE: ICD-10-CM

## 2020-08-05 DIAGNOSIS — N50.82 SCROTAL PAIN: ICD-10-CM

## 2020-08-05 DIAGNOSIS — R68.89 EXERCISE INTOLERANCE: ICD-10-CM

## 2020-08-05 DIAGNOSIS — N50.89 LUMP IN SCROTUM: ICD-10-CM

## 2020-08-05 LAB
ALBUMIN SERPL-MCNC: 3.7 G/DL (ref 3.4–5)
ALBUMIN UR-MCNC: NEGATIVE MG/DL
ALP SERPL-CCNC: 98 U/L (ref 40–150)
ALT SERPL W P-5'-P-CCNC: 40 U/L (ref 0–70)
ANION GAP SERPL CALCULATED.3IONS-SCNC: 7 MMOL/L (ref 3–14)
APPEARANCE UR: ABNORMAL
AST SERPL W P-5'-P-CCNC: 25 U/L (ref 0–45)
BASOPHILS # BLD AUTO: 0 10E9/L (ref 0–0.2)
BASOPHILS NFR BLD AUTO: 0.4 %
BILIRUB SERPL-MCNC: 0.6 MG/DL (ref 0.2–1.3)
BILIRUB UR QL STRIP: NEGATIVE
BUN SERPL-MCNC: 14 MG/DL (ref 7–30)
CALCIUM SERPL-MCNC: 8.5 MG/DL (ref 8.5–10.1)
CHLORIDE SERPL-SCNC: 108 MMOL/L (ref 94–109)
CO2 SERPL-SCNC: 25 MMOL/L (ref 20–32)
COLOR UR AUTO: YELLOW
CREAT SERPL-MCNC: 1.12 MG/DL (ref 0.66–1.25)
DIFFERENTIAL METHOD BLD: NORMAL
EOSINOPHIL # BLD AUTO: 0.2 10E9/L (ref 0–0.7)
EOSINOPHIL NFR BLD AUTO: 3 %
ERYTHROCYTE [DISTWIDTH] IN BLOOD BY AUTOMATED COUNT: 12.4 % (ref 10–15)
GFR SERPL CREATININE-BSD FRML MDRD: 68 ML/MIN/{1.73_M2}
GLUCOSE SERPL-MCNC: 105 MG/DL (ref 70–99)
GLUCOSE UR STRIP-MCNC: NEGATIVE MG/DL
HCT VFR BLD AUTO: 46.9 % (ref 40–53)
HGB BLD-MCNC: 15.6 G/DL (ref 13.3–17.7)
HGB UR QL STRIP: NEGATIVE
IMM GRANULOCYTES # BLD: 0 10E9/L (ref 0–0.4)
IMM GRANULOCYTES NFR BLD: 0.4 %
KETONES UR STRIP-MCNC: NEGATIVE MG/DL
LEUKOCYTE ESTERASE UR QL STRIP: NEGATIVE
LYMPHOCYTES # BLD AUTO: 1.6 10E9/L (ref 0.8–5.3)
LYMPHOCYTES NFR BLD AUTO: 28.1 %
MCH RBC QN AUTO: 31.5 PG (ref 26.5–33)
MCHC RBC AUTO-ENTMCNC: 33.3 G/DL (ref 31.5–36.5)
MCV RBC AUTO: 95 FL (ref 78–100)
MONOCYTES # BLD AUTO: 0.4 10E9/L (ref 0–1.3)
MONOCYTES NFR BLD AUTO: 7.3 %
MUCOUS THREADS #/AREA URNS LPF: PRESENT /LPF
NEUTROPHILS # BLD AUTO: 3.4 10E9/L (ref 1.6–8.3)
NEUTROPHILS NFR BLD AUTO: 60.8 %
NITRATE UR QL: NEGATIVE
NRBC # BLD AUTO: 0 10*3/UL
NRBC BLD AUTO-RTO: 0 /100
PH UR STRIP: 6 PH (ref 5–7)
PLATELET # BLD AUTO: 229 10E9/L (ref 150–450)
POTASSIUM SERPL-SCNC: 4 MMOL/L (ref 3.4–5.3)
PROT SERPL-MCNC: 7.1 G/DL (ref 6.8–8.8)
RBC # BLD AUTO: 4.95 10E12/L (ref 4.4–5.9)
RBC #/AREA URNS AUTO: 2 /HPF (ref 0–2)
SODIUM SERPL-SCNC: 140 MMOL/L (ref 133–144)
SOURCE: ABNORMAL
SP GR UR STRIP: 1.03 (ref 1–1.03)
TSH SERPL DL<=0.005 MIU/L-ACNC: 1.64 MU/L (ref 0.4–4)
UROBILINOGEN UR STRIP-MCNC: 0 MG/DL (ref 0–2)
WBC # BLD AUTO: 5.6 10E9/L (ref 4–11)
WBC #/AREA URNS AUTO: 1 /HPF (ref 0–5)

## 2020-08-06 ENCOUNTER — MYC MEDICAL ADVICE (OUTPATIENT)
Dept: INTERNAL MEDICINE | Facility: CLINIC | Age: 65
End: 2020-08-06

## 2020-08-06 DIAGNOSIS — R73.02 IGT (IMPAIRED GLUCOSE TOLERANCE): Primary | ICD-10-CM

## 2020-11-18 ENCOUNTER — OFFICE VISIT (OUTPATIENT)
Dept: NEUROLOGY | Facility: CLINIC | Age: 65
End: 2020-11-18
Payer: COMMERCIAL

## 2020-11-18 VITALS
SYSTOLIC BLOOD PRESSURE: 121 MMHG | HEART RATE: 72 BPM | BODY MASS INDEX: 28.5 KG/M2 | WEIGHT: 193 LBS | DIASTOLIC BLOOD PRESSURE: 82 MMHG | TEMPERATURE: 97.7 F

## 2020-11-18 DIAGNOSIS — G40.309 GENERALIZED CONVULSIVE EPILEPSY (H): Primary | ICD-10-CM

## 2020-11-18 RX ORDER — LAMOTRIGINE 100 MG/1
TABLET ORAL
Qty: 360 TABLET | Refills: 3 | Status: SHIPPED | OUTPATIENT
Start: 2020-11-18 | End: 2022-01-18

## 2020-11-18 ASSESSMENT — PAIN SCALES - GENERAL: PAINLEVEL: NO PAIN (0)

## 2020-11-18 NOTE — PROGRESS NOTES
[facility]  Epilepsy Clinic:  RETURN VISIT          Service Date: 11/18/2020    HISTORY:  Mr. Chad Olivares is a 64-year-old right-handed man who returned for follow-up of grand mal seizures.  He came with his significant other to the visit today.       The patient has had no further seizures since his last clinic visit. He continues to tolerate the lamotrigine without side effects. He is sleeping well. He does continue to have concerns regarding his cognition, saying that he feels his dyslexia has worsened, but he has been told by his wife that she hasn't noticed any changes recently. He misses medication doses 2-3 times per month. He states that he would consider stopping the lamotrigine in a couple years but since he is tolerating it so well he'd rather just keep things as they are for the now.    Ictal semiology-history:    The patient confirmed previously presented history in detail today. He again noted that he had the first seizure of his lifetime on 07/31/2014, and a second grand mal seizure on August 20, 2014.  He was asleep at home in bed at seizure onset; after going to bed the preceding evening, his next memory is of awakening in the [facility] Emergency Department.  He then was very tired and diffusely sore, with pain on the right side of his tongue which had been bitten during the seizure.  His significant other witnessed the seizure from onset.  He was lying stiffly in bed with legs and trunk extended with generalized jerking movements for what seemed to be several minutes.  Afterwards he was very lethargic with stertorous respirations and then he became agitated.  When paramedics arrived, they gave him 2 doses of midazolam which decreased the agitation.  Subsequently, he became gradually more responsive over several hours.  He did not feel that he had baseline alertness and cognitive function for nearly another 2 days, however.  He did not have urinary incontinence with the event.       The patient and his  significant other reported that he has not been known to have any sudden brief staring spells with unresponsiveness, sudden brief periods of confusion or global memory deficits or involuntary movements, except for hypnic jerks.      Epilepsy-seizure predispositions:   The patient has no family history of epilepsy or seizures.  He has no history of gestational or  injury, febrile convulsions, developmental delay, stroke, meningitis, encephalitis, significant head injury, or other epileptic predispositions.  He denied a history of physical or sexual abuse by an adult during his childhood or adulthood.      Laboratory evaluations:   In the Emergency Department on the morning after the first seizure, he had a head CT scan which was normal.  A brain MRI scan was ordered at that time and was completed on 2014 and this also was normal.  He had a brief routine electroencephalogram on 2014 which showed normal waking and drowsy EEG activities, although sleep patterns were not recorded.     Epilepsy therapeutics:   The patient reported that he had never been treated with anti-seizure medications for any reason until the second seizure occurred, when levetiracetam was started.  He completed a crossover from levetiracetam monotherapy to lamotrigine monotherapy in .  He experienced complete resolution of chronic mild irritability and episodic imbalance after tapering off levetiracetam.  He later he added  OTC  cannabidiol oil to this, mainly for treating joint pains.      PAST MEDICAL HISTORY:    1.  History of two generalized tonic-clonic seizures () of uncertain etiology.   2.  Mild obstructive sleep apnea.   3.  History of dyslexia.     PERSONAL AND SOCIAL HISTORY:  The patient grew up in [place].  He had difficulty with reading in school and was told that testing showed dyslexia, but he ultimately graduated from high school in regular classes.  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  He has 2 adult children.  He lives with his significant other.  He does not use illicit recreational drugs.  He drinks, at most, 1-2 alcoholic beverages, perhaps 3 times per month.  He quit smoking in 2005.     REVIEW OF SYSTEMS:    Positive for occasional dizziness when standing after bending over. He denies headache, chest pain, shortness of breath, cough, blurry vision, and double vision.    MEDICATIONS:    1.  Lamotrigine 200 mg b.i.d.   2.  Aspirin 81 mg daily.   3.  Ibuprofen 200 mg as needed for joint pain.     ALLERGIES:  The patient denied any known medication allergies.      PHYSICAL EXAMINATION:    On physical examination the patient appeared to be in no acute distress.  Vital signs were as per the electronic medical record.  Skull was normocephalic and atraumatic.      PERRL. EOMI. Face symmetric. Palate symmetric, tongue midline. Antigravity throughout. No tremor noted. Gait steady. Able to perform toe, heel, and tandem walk without difficulty.    IMPRESSION:    The patient continues to do very well with seizure control.  He previously crossed over to lamotrigine monotherapy with resolution of levetiracetam adverse effects and with no new problems. We discussed the possible benefits and risks of discontinuing lamotrigine. We made it clear that there is no way to know for certain whether he would have a seizure if he were to discontinue lamotrigine. He decided to continue lamotrigine for now, and possible discontinue it in a couple years. We also discussed that reducing the medication without cessation would not be appropriate as his level is already quite low.     The patient previously reported that his memory problems have definitely increased over the prior 2 years.  He completed neuropsychological testing in September 2016, and again on 10/31/2019.  Both examinations showed impairments consistent with the previously recognized diagnosis of  dyslexia, with additional left frontoparietal dysfunction.  He clearly did not have evidence of global deficits of dementia, which was his major concern.  These results have been reviewed with him.     I once again reviewed [place] regulations on seizures and driving with the patient.  He appeared to clearly understand that he would  prohibited from operating a motor vehicle within 3 months following any future seizure or other episode with sudden unconsciousness or inability to sit up, and that he is required to report any future such seizure to the [facility] within 30 days after the event.  I also recommended that he should review all of his other activities, and avoid any activities that might lead to self-injury or injury of others, within 3 months following any seizure with impaired awareness or impaired motor control.  Such activities include but are not limited to tub bathing, operating power cutting or other tools, handling firearms, exposure to heights from which she might fall, exposure to vessels with hot cooking oil or water, and swimming alone.      PLAN:    1.  Continue lamotrigine at 200 mg b.i.d.   2.  Return visit in approximately 11 months.     Roberto Freitas MD  Epilepsy Fellow    Report Prepared By: Roberto Freitas MD, Neurology-Epilepsy Fellow   I agree with the findings and plan of care as documented.  I personally examined the patient, and discussed our epilepsy diagnostic impressions and therapeutic recommendations with the patient.  He was agreeable to this plan.     I spent 15 minutes in this patient care, more than 50% of which consisted of counseling and coordinating care.        Dandre Martin M.D.   Professor of Neurology

## 2020-11-18 NOTE — LETTER
2020       RE: Chad Olivares  : 1955   MRN: [id number]      Dear Colleague,    Thank you for referring your patient, Chad Olivares, to the [facility] EPILEPSY CARE at [facility]. Please see a copy of my visit note below.      [facility] [place] [facility]  [place] VISIT          Service Date: 2020    HISTORY:  Mr. Chad Olivares is a 64-year-old right-handed man who returned for follow-up of grand mal seizures.  He came with his significant other to the visit today.       The patient has had no further seizures since his last clinic visit. He continues to tolerate the lamotrigine without side effects. He is sleeping well. He does continue to have concerns regarding his cognition, saying that he feels his dyslexia has worsened, but he has been told by his wife that she hasn't noticed any changes recently. He misses medication doses 2-3 times per month. He states that he would consider stopping the lamotrigine in a couple years but since he is tolerating it so well he'd rather just keep things as they are for the now.    Ictal semiology-history:    The patient confirmed previously presented history in detail today. He again noted that he had the first seizure of his lifetime on 2014, and a second grand mal seizure on 2014.  He was asleep at home in bed at seizure onset; after going to bed the preceding evening, his next memory is of awakening in the [facility] Emergency Department.  He then was very tired and diffusely sore, with pain on the right side of his tongue which had been bitten during the seizure.  His significant other witnessed the seizure from onset.  He was lying stiffly in bed with legs and trunk extended with generalized jerking movements for what seemed to be several minutes.  Afterwards he was very lethargic with stertorous respirations and then he became agitated.  When paramedics arrived, they gave him 2 doses of midazolam which decreased the  agitation.  Subsequently, he became gradually more responsive over several hours.  He did not feel that he had baseline alertness and cognitive function for nearly another 2 days, however.  He did not have urinary incontinence with the event.       The patient and his significant other reported that he has not been known to have any sudden brief staring spells with unresponsiveness, sudden brief periods of confusion or global memory deficits or involuntary movements, except for hypnic jerks.      Epilepsy-seizure predispositions:   The patient has no family history of epilepsy or seizures.  He has no history of gestational or  injury, febrile convulsions, developmental delay, stroke, meningitis, encephalitis, significant head injury, or other epileptic predispositions.  He denied a history of physical or sexual abuse by an adult during his childhood or adulthood.      Laboratory evaluations:   In the Emergency Department on the morning after the first seizure, he had a head CT scan which was normal.  A brain MRI scan was ordered at that time and was completed on 2014 and this also was normal.  He had a brief routine electroencephalogram on 2014 which showed normal waking and drowsy EEG activities, although sleep patterns were not recorded.     Epilepsy therapeutics:   The patient reported that he had never been treated with anti-seizure medications for any reason until the second seizure occurred, when levetiracetam was started.  He completed a crossover from levetiracetam monotherapy to lamotrigine monotherapy in .  He experienced complete resolution of chronic mild irritability and episodic imbalance after tapering off levetiracetam.  He later he added  OTC  cannabidiol oil to this, mainly for treating joint pains.      PAST MEDICAL HISTORY:    1.  History of two generalized tonic-clonic seizures () of uncertain etiology.   2.  Mild obstructive sleep apnea.   3.  History of dyslexia.      PERSONAL AND SOCIAL HISTORY:  The patient grew up in [place].  He had difficulty with reading in school and was told that testing showed dyslexia, but he ultimately graduated from high school in regular classes.  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  He has 2 adult children.  He lives with his significant other.  He does not use illicit recreational drugs.  He drinks, at most, 1-2 alcoholic beverages, perhaps 3 times per month.  He quit smoking in 2005.     REVIEW OF SYSTEMS:    Positive for occasional dizziness when standing after bending over. He denies headache, chest pain, shortness of breath, cough, blurry vision, and double vision.    MEDICATIONS:    1.  Lamotrigine 200 mg b.i.d.   2.  Aspirin 81 mg daily.   3.  Ibuprofen 200 mg as needed for joint pain.     ALLERGIES:  The patient denied any known medication allergies.      PHYSICAL EXAMINATION:    On physical examination the patient appeared to be in no acute distress.  Vital signs were as per the electronic medical record.  Skull was normocephalic and atraumatic.      PERRL. EOMI. Face symmetric. Palate symmetric, tongue midline. Antigravity throughout. No tremor noted. Gait steady. Able to perform toe, heel, and tandem walk without difficulty.    IMPRESSION:    The patient continues to do very well with seizure control.  He previously crossed over to lamotrigine monotherapy with resolution of levetiracetam adverse effects and with no new problems. We discussed the possible benefits and risks of discontinuing lamotrigine. We made it clear that there is no way to know for certain whether he would have a seizure if he were to discontinue lamotrigine. He decided to continue lamotrigine for now, and possible discontinue it in a couple years. We also discussed that reducing the medication without cessation would not be appropriate as his level is already quite low.     The patient previously  reported that his memory problems have definitely increased over the prior 2 years.  He completed neuropsychological testing in September 2016, and again on 10/31/2019.  Both examinations showed impairments consistent with the previously recognized diagnosis of dyslexia, with additional left frontoparietal dysfunction.  He clearly did not have evidence of global deficits of dementia, which was his major concern.  These results have been reviewed with him.     I once again reviewed [place] regulations on seizures and driving with the patient.  He appeared to clearly understand that he would  prohibited from operating a motor vehicle within 3 months following any future seizure or other episode with sudden unconsciousness or inability to sit up, and that he is required to report any future such seizure to the [facility] within 30 days after the event.  I also recommended that he should review all of his other activities, and avoid any activities that might lead to self-injury or injury of others, within 3 months following any seizure with impaired awareness or impaired motor control.  Such activities include but are not limited to tub bathing, operating power cutting or other tools, handling firearms, exposure to heights from which she might fall, exposure to vessels with hot cooking oil or water, and swimming alone.      PLAN:    1.  Continue lamotrigine at 200 mg b.i.d.   2.  Return visit in approximately 11 months.     Roberto Freitas MD  Epilepsy Fellow    Report Prepared By: Roberto Freitas MD, Neurology-Epilepsy Fellow   I agree with the findings and plan of care as documented.  I personally examined the patient, and discussed our epilepsy diagnostic impressions and therapeutic recommendations with the patient.  He was agreeable to this plan.     I spent 15 minutes in this patient care, more than 50% of which consisted of counseling and coordinating care.        Dandre Martin M.D.   Professor of Neurology

## 2020-11-22 ENCOUNTER — HEALTH MAINTENANCE LETTER (OUTPATIENT)
Age: 65
End: 2020-11-22

## 2021-01-15 ENCOUNTER — HEALTH MAINTENANCE LETTER (OUTPATIENT)
Age: 66
End: 2021-01-15

## 2021-02-10 ENCOUNTER — TELEPHONE (OUTPATIENT)
Dept: NEUROLOGY | Facility: CLINIC | Age: 66
End: 2021-02-10

## 2021-02-10 NOTE — TELEPHONE ENCOUNTER
Received DMV form to be completed.  Form saved to the [employer] drive, encounter routed.

## 2021-06-23 ENCOUNTER — OFFICE VISIT (OUTPATIENT)
Dept: INTERNAL MEDICINE | Facility: CLINIC | Age: 66
End: 2021-06-23
Payer: COMMERCIAL

## 2021-06-23 VITALS
SYSTOLIC BLOOD PRESSURE: 122 MMHG | BODY MASS INDEX: 27.47 KG/M2 | WEIGHT: 186 LBS | DIASTOLIC BLOOD PRESSURE: 77 MMHG | OXYGEN SATURATION: 95 % | HEART RATE: 65 BPM

## 2021-06-23 DIAGNOSIS — Z23 ENCOUNTER FOR IMMUNIZATION: ICD-10-CM

## 2021-06-23 DIAGNOSIS — R73.02 IGT (IMPAIRED GLUCOSE TOLERANCE): ICD-10-CM

## 2021-06-23 DIAGNOSIS — Z87.891 FORMER CIGARETTE SMOKER: ICD-10-CM

## 2021-06-23 DIAGNOSIS — K63.5 POLYP OF COLON, UNSPECIFIED PART OF COLON, UNSPECIFIED TYPE: ICD-10-CM

## 2021-06-23 DIAGNOSIS — G47.30 SLEEP APNEA, UNSPECIFIED TYPE: ICD-10-CM

## 2021-06-23 DIAGNOSIS — Z13.6 SCREENING FOR AAA (ABDOMINAL AORTIC ANEURYSM): ICD-10-CM

## 2021-06-23 DIAGNOSIS — N40.0 NODULAR HYPERPLASIA OF PROSTATE GLAND: ICD-10-CM

## 2021-06-23 DIAGNOSIS — Z12.5 ENCOUNTER FOR SCREENING FOR MALIGNANT NEOPLASM OF PROSTATE: ICD-10-CM

## 2021-06-23 DIAGNOSIS — G40.309 GENERALIZED CONVULSIVE EPILEPSY (H): ICD-10-CM

## 2021-06-23 DIAGNOSIS — Z13.6 CARDIOVASCULAR SCREENING; LDL GOAL LESS THAN 160: ICD-10-CM

## 2021-06-23 DIAGNOSIS — M72.0 DUPUYTREN CONTRACTURE: ICD-10-CM

## 2021-06-23 DIAGNOSIS — K63.5 POLYP OF COLON, UNSPECIFIED PART OF COLON, UNSPECIFIED TYPE: Primary | ICD-10-CM

## 2021-06-23 LAB
ALBUMIN SERPL-MCNC: 4.2 G/DL (ref 3.4–5)
ALP SERPL-CCNC: 116 U/L (ref 40–150)
ALT SERPL W P-5'-P-CCNC: 44 U/L (ref 0–70)
ANION GAP SERPL CALCULATED.3IONS-SCNC: 5 MMOL/L (ref 3–14)
AST SERPL W P-5'-P-CCNC: 22 U/L (ref 0–45)
BASOPHILS # BLD AUTO: 0 10E9/L (ref 0–0.2)
BASOPHILS NFR BLD AUTO: 0.5 %
BILIRUB SERPL-MCNC: 0.5 MG/DL (ref 0.2–1.3)
BUN SERPL-MCNC: 19 MG/DL (ref 7–30)
CALCIUM SERPL-MCNC: 9.1 MG/DL (ref 8.5–10.1)
CHLORIDE SERPL-SCNC: 104 MMOL/L (ref 94–109)
CHOLEST SERPL-MCNC: 216 MG/DL
CO2 SERPL-SCNC: 28 MMOL/L (ref 20–32)
CREAT SERPL-MCNC: 1.26 MG/DL (ref 0.66–1.25)
DIFFERENTIAL METHOD BLD: NORMAL
EOSINOPHIL # BLD AUTO: 0.2 10E9/L (ref 0–0.7)
EOSINOPHIL NFR BLD AUTO: 3.7 %
ERYTHROCYTE [DISTWIDTH] IN BLOOD BY AUTOMATED COUNT: 12.2 % (ref 10–15)
GFR SERPL CREATININE-BSD FRML MDRD: 59 ML/MIN/{1.73_M2}
GLUCOSE SERPL-MCNC: 96 MG/DL (ref 70–99)
HCT VFR BLD AUTO: 47.6 % (ref 40–53)
HDLC SERPL-MCNC: 59 MG/DL
HGB BLD-MCNC: 16 G/DL (ref 13.3–17.7)
IMM GRANULOCYTES # BLD: 0 10E9/L (ref 0–0.4)
IMM GRANULOCYTES NFR BLD: 0.2 %
LDLC SERPL CALC-MCNC: 145 MG/DL
LYMPHOCYTES # BLD AUTO: 1.7 10E9/L (ref 0.8–5.3)
LYMPHOCYTES NFR BLD AUTO: 29 %
MCH RBC QN AUTO: 31.4 PG (ref 26.5–33)
MCHC RBC AUTO-ENTMCNC: 33.6 G/DL (ref 31.5–36.5)
MCV RBC AUTO: 93 FL (ref 78–100)
MONOCYTES # BLD AUTO: 0.6 10E9/L (ref 0–1.3)
MONOCYTES NFR BLD AUTO: 9.8 %
NEUTROPHILS # BLD AUTO: 3.3 10E9/L (ref 1.6–8.3)
NEUTROPHILS NFR BLD AUTO: 56.8 %
NONHDLC SERPL-MCNC: 158 MG/DL
NRBC # BLD AUTO: 0 10*3/UL
NRBC BLD AUTO-RTO: 0 /100
PLATELET # BLD AUTO: 246 10E9/L (ref 150–450)
POTASSIUM SERPL-SCNC: 4 MMOL/L (ref 3.4–5.3)
PROT SERPL-MCNC: 7.6 G/DL (ref 6.8–8.8)
PSA SERPL-ACNC: 3.39 UG/L (ref 0–4)
RBC # BLD AUTO: 5.1 10E12/L (ref 4.4–5.9)
SODIUM SERPL-SCNC: 137 MMOL/L (ref 133–144)
TRIGL SERPL-MCNC: 60 MG/DL
WBC # BLD AUTO: 5.7 10E9/L (ref 4–11)

## 2021-06-23 PROCEDURE — 80053 COMPREHEN METABOLIC PANEL: CPT | Performed by: PATHOLOGY

## 2021-06-23 PROCEDURE — 85025 COMPLETE CBC W/AUTO DIFF WBC: CPT | Performed by: PATHOLOGY

## 2021-06-23 PROCEDURE — 80061 LIPID PANEL: CPT | Performed by: PATHOLOGY

## 2021-06-23 PROCEDURE — G0009 ADMIN PNEUMOCOCCAL VACCINE: HCPCS | Performed by: INTERNAL MEDICINE

## 2021-06-23 PROCEDURE — G0103 PSA SCREENING: HCPCS | Performed by: PATHOLOGY

## 2021-06-23 PROCEDURE — G0438 PPPS, INITIAL VISIT: HCPCS | Performed by: INTERNAL MEDICINE

## 2021-06-23 PROCEDURE — 90732 PPSV23 VACC 2 YRS+ SUBQ/IM: CPT | Performed by: INTERNAL MEDICINE

## 2021-06-23 PROCEDURE — 36415 COLL VENOUS BLD VENIPUNCTURE: CPT | Performed by: PATHOLOGY

## 2021-06-23 ASSESSMENT — PAIN SCALES - GENERAL: PAINLEVEL: NO PAIN (0)

## 2021-06-23 NOTE — NURSING NOTE
Chief Complaint   Patient presents with     Physical     pt here for physical       Isaias Alvarado CMA, EMT at 7:33 AM on 6/23/2021.

## 2021-06-23 NOTE — PROGRESS NOTES
HPI  66-year-old presents today for Medicare wellness examination.  He is symptomatically having more problems with his right fifth finger.  He had previously had tendon injury here is developed a progressive Dupuytren's contracture is very painful and tender with attempts to extend the finger which is generally fixed in flexion.  We discussed that this would likely require a surgical repair and he is interested in pursuing this.  He has a history of sleep apnea has been confirmed on prior sleep study he is currently not using CPAP.  He is napping and feeling sleepy during the day not always having good pep and energy.  Does exercise by walking his dog 30 to 40 minutes every other day.  Is asymptomatic in association with this.  He quit smoking at age 55 after about 30 years of smoking history.  Seizures are under good control.  Said history of colon polyps and is past due for colonoscopy.  Otherwise he has been experience occasional weak urinary stream but otherwise minimal prostate symptoms.  Past Medical History:   Diagnosis Date     Achilles bursitis or tendinitis 8/7/2008     Advanced directives, counseling/discussion 5/20/2015    Gave pt packet on 5/20/2015  Syeda Torres CMA       CARDIOVASCULAR SCREENING; LDL GOAL LESS THAN 160 10/12/2010     Cholesteatoma, unspecified      Colon polyps 04/2015    tubular villous and serrated adenoma     Complex sleep apnea syndrome     does not use CPAP     Dyslexia      Dyspnea and respiratory abnormality 9/3/2014     Problem list name updated by automated process. 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 Provider to review     Plantar fascial fibromatosis 8/7/2008     Past Surgical History:    Procedure Laterality Date     cholesteatoma surgery Left      COLONOSCOPY  10/09/2002; 2015    polyps - needs f/u 5 yrs      HC REMOVAL OF TONSILS,<11 Y/O  1962     Family History   Problem Relation Age of Onset     Diabetes Mother         diet controlled, Htn     Heart Murmur Mother         TAVR     Diabetes Maternal Grandmother      Coronary Artery Disease Brother      Glaucoma No family hx of      Macular Degeneration No family hx of      Social History     Socioeconomic History     Marital status: Single     Spouse name: Not on file     Number of children: 2     Years of education: 14     Highest education level: Not on file   Occupational History     Occupation: real estate broker     Comment: Self Employed     Occupation: contractor     Employer: SELF   Social Needs     Financial resource strain: Not on file     Food insecurity     Worry: Not on file     Inability: Not on file     Transportation needs     Medical: Not on file     Non-medical: Not on file   Tobacco Use     Smoking status: Former Smoker     Quit date: 8/26/2005     Years since quitting: 15.8     Smokeless tobacco: Never Used   Substance and Sexual Activity     Alcohol use: Yes     Comment: 1 drink every 2 wks     Drug use: No     Comment: uses CBD      Sexual activity: Yes     Partners: Female   Lifestyle     Physical activity     Days per week: Not on file     Minutes per session: Not on file     Stress: Not on file   Relationships     Social connections     Talks on phone: Not on file     Gets together: Not on file     Attends Gnosticism service: Not on file     Active member of club or organization: Not on file     Attends meetings of clubs or organizations: Not on file     Relationship status: Not on file     Intimate partner violence     Fear of current or ex partner: Not on file     Emotionally abused: Not on file     Physically abused: Not on file     Forced sexual activity: Not on file   Other Topics Concern     Parent/sibling w/ CABG,  MI or angioplasty before 65F 55M? No      Service Not Asked     Blood Transfusions Not Asked     Caffeine Concern Not Asked     Comment: 3 cups of coffee a day     Occupational Exposure Not Asked     Hobby Hazards Not Asked     Sleep Concern Not Asked     Stress Concern Not Asked     Weight Concern Not Asked     Special Diet Not Asked     Back Care Not Asked     Exercise Not Asked     Comment: Exercise 2 to 3 times a week     Bike Helmet Not Asked     Seat Belt Not Asked     Self-Exams Not Asked   Social History Narrative    Balanced Diet - Yes    Osteoporosis Preventative measures-  Dairy servings per day: no servings daily takes soy milk and almond milk - 2 glasses/day     Regular Exercise -  Yes Describe dance 3 times weekly (salsa), bike ride, and paula    Dental Exam up - YES - Date: 2016    Eye Exam - YES - Date: 2007    Self Testicular Exam -  sometimes    Do you have any concerns about STD's -  Partner has hx of genital herpes    Abuse: Current or Past (Physical, Sexual or Emotional)- No    Do you feel safe in your environment - Yes    Guns stored in the home - Yes, locked away    Sunscreen used - No using coconut     Seatbelts used - Yes    Lipids - YES - Date: 4-28-06    Glucose -  NO    Colon Cancer Screening - Colonoscopy 2002(date completed)    Hemoccults - NO    PSA - YES - Date: 4-28-06    Digital Rectal Exam - YES - Date: 4-28-06    Immunizations reviewed and up to date - Yes, last TD was 11-5-2001 2008, Dania Miller MA               Exam:  /77 (BP Location: Left arm, Patient Position: Sitting, Cuff Size: Adult Regular)   Pulse 65   Wt 84.4 kg (186 lb)   SpO2 95%   BMI 27.47 kg/m    186 lbs 0 oz  Physical Exam   The patient is alert, oriented with a clear sensorium.   Skin shows no lesions or rashes and good turgor.   Head is normocephalic and atraumatic.   Eyes show PERRLA   Ears show cerumen bilaterally.   Mouth shows clear oral mucosa.   Neck shows no nodes, thyromegaly or  bruits.   Back is non tender.  Lungs are clear to percussion and auscultation.   Heart shows normal S1 and S2 without murmur or gallop.   Abdomen is soft and nontender without masses or organomegaly.   Genitalia show very tender testes. No evidence of inguinal hernia.  Rectal shows large asymmetric smooth prostate without nodules or masses.  Extremities show no edema and no evidence of active synovitis.   Neurologic examination shows cranial nerves II-XII intact. Motor shows 5/5 strength. Reflexes are symmetric. Cerebellar testing shows normal tandem gait.  Romberg negative.    Results for orders placed or performed in visit on 06/23/21   PSA screen     Status: None   Result Value Ref Range    PSA 3.39 0 - 4 ug/L   Lipid Profile     Status: Abnormal   Result Value Ref Range    Cholesterol 216 (H) <200 mg/dL    Triglycerides 60 <150 mg/dL    HDL Cholesterol 59 >39 mg/dL    LDL Cholesterol Calculated 145 (H) <100 mg/dL    Non HDL Cholesterol 158 (H) <130 mg/dL   Comprehensive metabolic panel     Status: Abnormal   Result Value Ref Range    Sodium 137 133 - 144 mmol/L    Potassium 4.0 3.4 - 5.3 mmol/L    Chloride 104 94 - 109 mmol/L    Carbon Dioxide 28 20 - 32 mmol/L    Anion Gap 5 3 - 14 mmol/L    Glucose 96 70 - 99 mg/dL    Urea Nitrogen 19 7 - 30 mg/dL    Creatinine 1.26 (H) 0.66 - 1.25 mg/dL    GFR Estimate 59 (L) >60 mL/min/[1.73_m2]    GFR Estimate If Black 68 >60 mL/min/[1.73_m2]    Calcium 9.1 8.5 - 10.1 mg/dL    Bilirubin Total 0.5 0.2 - 1.3 mg/dL    Albumin 4.2 3.4 - 5.0 g/dL    Protein Total 7.6 6.8 - 8.8 g/dL    Alkaline Phosphatase 116 40 - 150 U/L    ALT 44 0 - 70 U/L    AST 22 0 - 45 U/L   CBC with platelets differential     Status: None   Result Value Ref Range    WBC 5.7 4.0 - 11.0 10e9/L    RBC Count 5.10 4.4 - 5.9 10e12/L    Hemoglobin 16.0 13.3 - 17.7 g/dL    Hematocrit 47.6 40.0 - 53.0 %    MCV 93 78 - 100 fl    MCH 31.4 26.5 - 33.0 pg    MCHC 33.6 31.5 - 36.5 g/dL    RDW 12.2 10.0 - 15.0 %     Platelet Count 246 150 - 450 10e9/L    Diff Method Automated Method     % Neutrophils 56.8 %    % Lymphocytes 29.0 %    % Monocytes 9.8 %    % Eosinophils 3.7 %    % Basophils 0.5 %    % Immature Granulocytes 0.2 %    Nucleated RBCs 0 0 /100    Absolute Neutrophil 3.3 1.6 - 8.3 10e9/L    Absolute Lymphocytes 1.7 0.8 - 5.3 10e9/L    Absolute Monocytes 0.6 0.0 - 1.3 10e9/L    Absolute Eosinophils 0.2 0.0 - 0.7 10e9/L    Absolute Basophils 0.0 0.0 - 0.2 10e9/L    Abs Immature Granulocytes 0.0 0 - 0.4 10e9/L    Absolute Nucleated RBC 0.0      The 10-year ASCVD risk score (Bharat MARIE JrSera, et al., 2013) is: 12.2%    Values used to calculate the score:      Age: 66 years      Sex: Male      Is Non- : No      Diabetic: No      Tobacco smoker: No      Systolic Blood Pressure: 122 mmHg      Is BP treated: No      HDL Cholesterol: 59 mg/dL      Total Cholesterol: 216 mg/dL    ASSESSMENT  1 IGT  2 seizures in remission  3 sleep apnea not on CPAP   4 hyperlipidemia needs reassessment   5 Dupuytren's contracture right fifth finger  6 history of colon polyps over-due for colonoscopy  7 hyperplasia prostate    Plan  Set him up for fasting labs to follow-up on his lipids and blood sugar.  He should revisit the sleep clinic about starting CPAP and I will make this referral.  He is past due for his colonoscopy and I placed this referral.  We will have him see hand surgeon regarding his Dupuytren's which will require surgical release.  We will update his immunizations with a Pneumovax.  Plan him follow-up for reassessment in a year  This note was completed using Dragon voice recognition software.      Asaf Craig MD  General Internal Medicine  [facility]  [phone]

## 2021-06-23 NOTE — PROGRESS NOTES
Medicare Annual Wellness Visit Previsit note    This 66 year old year old male presents for a  Medicare Wellness Exam.           Medical Care     Have you been to an ER or a hospital in the last year? No  What other specialists or organizations are involved in your medical care?  Not answered  Current providers sharing in care for this patient include:  Patient Care Team       Relationship Specialty Notifications Start End    Asaf Craig MD PCP - General Internal Medicine  9/20/17     Phone: [phone] Fax: [phone]         [address] [facility] [address]    Dandre Martin MD MD Neurology  8/12/15     Phone: [phone] Fax: [phone]         [address] [address]    Kevin Odell OD MD Optometry  4/27/18     Phone: [phone] Fax: [phone]         [address] [place] [address]    Dandre Martin MD Assigned Neuroscience Provider   10/23/20     Phone: [phone] Fax: [phone]         [address] [place] [address]    Asaf Craig MD Assigned PCP   5/27/21     Phone: [phone] Fax: [phone]         [address] [facility] [place] [address]                 Social History     Marital Status:Single  Who lives in your household? Partner Flora  Does your home have any of the following safety concerns? Loose rugs in the hallway, no grab bars in the bathroom, no handrails on the stairs or have poorly lit areas?  Yes   Do you feel threatened or controlled by a partner, ex-partner or anyone in your life? No  Has anyone hurt you physically, for example by pushing, hitting, slapping or kicking you   or forcing you to have sex? No  Do you need help with the phone, transportation, shopping, preparing meals, housework, laundry, medications or managing money? No   Have you noticed any hearing difficulties? Yes       Risk Behaviors and Healthy Habits     How many servings of fruits and vegetables do you eat a day?  4  How often do you exercise and what do you do? 54 minutes 3x a week  Do you frequently ride without a seatbelt? No  Do you use tobacco?  No  Do you use any other drugs? No       Do you use alcohol?Yes  Number of drinks per day 0  Number of drinking days a week? Half a day      Sexual Health     Are you sexually active? Yes    If yes, with men, women, or both? Female  If yes, do you more than one current partner?N/A  If yes, do you use condoms? N/A  Have you had any sexually transmitted infections? No  Any sexual concerns? No

## 2021-06-25 NOTE — TELEPHONE ENCOUNTER
DIAGNOSIS: Dupuytren contracture-right hand /Dr Craig/ no images   APPOINTMENT DATE: 6.28.21   NOTES STATUS DETAILS   OFFICE NOTE from referring provider Internal 6.23.21 Dr Asaf Craig, [facility]  11.22.19   OFFICE NOTE from other specialist N/A    DISCHARGE SUMMARY from hospital N/A    DISCHARGE REPORT from the ER N/A    OPERATIVE REPORT N/A    EMG report N/A    MEDICATION LIST Internal    MRI N/A    DEXA (osteoporosis/bone health) N/A    CT SCAN N/A    XRAYS (IMAGES & REPORTS) N/A

## 2021-06-28 ENCOUNTER — TELEPHONE (OUTPATIENT)
Dept: ORTHOPEDICS | Facility: CLINIC | Age: 66
End: 2021-06-28

## 2021-06-28 ENCOUNTER — PRE VISIT (OUTPATIENT)
Dept: ORTHOPEDICS | Facility: CLINIC | Age: 66
End: 2021-06-28

## 2021-06-28 ENCOUNTER — ANCILLARY PROCEDURE (OUTPATIENT)
Dept: ULTRASOUND IMAGING | Facility: CLINIC | Age: 66
End: 2021-06-28
Attending: INTERNAL MEDICINE
Payer: COMMERCIAL

## 2021-06-28 DIAGNOSIS — Z79.899 ENCOUNTER FOR LONG-TERM (CURRENT) USE OF MEDICATIONS: ICD-10-CM

## 2021-06-28 DIAGNOSIS — Z12.5 ENCOUNTER FOR SCREENING FOR MALIGNANT NEOPLASM OF PROSTATE: Primary | ICD-10-CM

## 2021-06-28 DIAGNOSIS — Z13.6 CARDIOVASCULAR SCREENING; LDL GOAL LESS THAN 160: ICD-10-CM

## 2021-06-28 DIAGNOSIS — Z13.6 SCREENING FOR AAA (ABDOMINAL AORTIC ANEURYSM): ICD-10-CM

## 2021-06-28 LAB
ALBUMIN SERPL-MCNC: 3.7 G/DL (ref 3.4–5)
ALP SERPL-CCNC: 138 U/L (ref 40–150)
ALT SERPL W P-5'-P-CCNC: 50 U/L (ref 0–70)
ANION GAP SERPL CALCULATED.3IONS-SCNC: 4 MMOL/L (ref 3–14)
AST SERPL W P-5'-P-CCNC: 35 U/L (ref 0–45)
BASOPHILS # BLD AUTO: 0 10E9/L (ref 0–0.2)
BASOPHILS NFR BLD AUTO: 0.5 %
BILIRUB SERPL-MCNC: 0.3 MG/DL (ref 0.2–1.3)
BUN SERPL-MCNC: 13 MG/DL (ref 7–30)
CALCIUM SERPL-MCNC: 8.8 MG/DL (ref 8.5–10.1)
CHLORIDE SERPL-SCNC: 108 MMOL/L (ref 94–109)
CHOLEST SERPL-MCNC: 183 MG/DL
CO2 SERPL-SCNC: 27 MMOL/L (ref 20–32)
CREAT SERPL-MCNC: 1.1 MG/DL (ref 0.66–1.25)
DIFFERENTIAL METHOD BLD: NORMAL
EOSINOPHIL # BLD AUTO: 0.3 10E9/L (ref 0–0.7)
EOSINOPHIL NFR BLD AUTO: 4.4 %
ERYTHROCYTE [DISTWIDTH] IN BLOOD BY AUTOMATED COUNT: 12.1 % (ref 10–15)
GFR SERPL CREATININE-BSD FRML MDRD: 70 ML/MIN/{1.73_M2}
GLUCOSE SERPL-MCNC: 105 MG/DL (ref 70–99)
HCT VFR BLD AUTO: 46 % (ref 40–53)
HDLC SERPL-MCNC: 54 MG/DL
HGB BLD-MCNC: 15.3 G/DL (ref 13.3–17.7)
IMM GRANULOCYTES # BLD: 0 10E9/L (ref 0–0.4)
IMM GRANULOCYTES NFR BLD: 0.3 %
LDLC SERPL CALC-MCNC: 113 MG/DL
LYMPHOCYTES # BLD AUTO: 1.3 10E9/L (ref 0.8–5.3)
LYMPHOCYTES NFR BLD AUTO: 20.2 %
MCH RBC QN AUTO: 31 PG (ref 26.5–33)
MCHC RBC AUTO-ENTMCNC: 33.3 G/DL (ref 31.5–36.5)
MCV RBC AUTO: 93 FL (ref 78–100)
MONOCYTES # BLD AUTO: 0.7 10E9/L (ref 0–1.3)
MONOCYTES NFR BLD AUTO: 10.8 %
NEUTROPHILS # BLD AUTO: 3.9 10E9/L (ref 1.6–8.3)
NEUTROPHILS NFR BLD AUTO: 63.8 %
NONHDLC SERPL-MCNC: 129 MG/DL
NRBC # BLD AUTO: 0 10*3/UL
NRBC BLD AUTO-RTO: 0 /100
PLATELET # BLD AUTO: 235 10E9/L (ref 150–450)
POTASSIUM SERPL-SCNC: 4.2 MMOL/L (ref 3.4–5.3)
PROT SERPL-MCNC: 7.4 G/DL (ref 6.8–8.8)
PSA SERPL-ACNC: 2.94 UG/L (ref 0–4)
RBC # BLD AUTO: 4.94 10E12/L (ref 4.4–5.9)
SODIUM SERPL-SCNC: 139 MMOL/L (ref 133–144)
TRIGL SERPL-MCNC: 81 MG/DL
WBC # BLD AUTO: 6.2 10E9/L (ref 4–11)

## 2021-06-28 PROCEDURE — 36415 COLL VENOUS BLD VENIPUNCTURE: CPT | Performed by: INTERNAL MEDICINE

## 2021-06-28 PROCEDURE — 80053 COMPREHEN METABOLIC PANEL: CPT | Performed by: INTERNAL MEDICINE

## 2021-06-28 PROCEDURE — 85025 COMPLETE CBC W/AUTO DIFF WBC: CPT | Performed by: INTERNAL MEDICINE

## 2021-06-28 PROCEDURE — 80061 LIPID PANEL: CPT | Performed by: INTERNAL MEDICINE

## 2021-06-28 PROCEDURE — 76706 US ABDL AORTA SCREEN AAA: CPT | Performed by: RADIOLOGY

## 2021-06-28 PROCEDURE — G0103 PSA SCREENING: HCPCS | Performed by: INTERNAL MEDICINE

## 2021-06-28 NOTE — TELEPHONE ENCOUNTER
RECORDS RECEIVED FROM: Dupuytren contracture-right hand , per pt ref by Dr Craig, no images,, ok'd to schedule  per chart notes by Harsha Santos, ATC   DATE RECEIVED: Jul 8, 2021     NOTES STATUS DETAILS   06/28/21   5:34 PM   SEE 6/28 PRE-VISIT WITH [facility]  Lucy Reynolds, CMA

## 2021-06-28 NOTE — TELEPHONE ENCOUNTER
Dr. Mijares let us know that this pt would be better suited seeing a hand specialist.    I called pt and provided him the ortho scheduling number and also let him know to reach out to [facility] in case our schedule is too full.    He thanked me for the call.     - Harsha Santos, MIGUEL A

## 2021-07-08 ENCOUNTER — PRE VISIT (OUTPATIENT)
Dept: ORTHOPEDICS | Facility: CLINIC | Age: 66
End: 2021-07-08

## 2021-07-08 ENCOUNTER — ANCILLARY PROCEDURE (OUTPATIENT)
Dept: GENERAL RADIOLOGY | Facility: CLINIC | Age: 66
End: 2021-07-08
Attending: ORTHOPAEDIC SURGERY
Payer: COMMERCIAL

## 2021-07-08 ENCOUNTER — OFFICE VISIT (OUTPATIENT)
Dept: ORTHOPEDICS | Facility: CLINIC | Age: 66
End: 2021-07-08
Payer: COMMERCIAL

## 2021-07-08 ENCOUNTER — TELEPHONE (OUTPATIENT)
Dept: GASTROENTEROLOGY | Facility: OUTPATIENT CENTER | Age: 66
End: 2021-07-08

## 2021-07-08 DIAGNOSIS — Z11.59 ENCOUNTER FOR SCREENING FOR OTHER VIRAL DISEASES: ICD-10-CM

## 2021-07-08 DIAGNOSIS — M72.0 DUPUYTREN CONTRACTURE: ICD-10-CM

## 2021-07-08 PROCEDURE — 99204 OFFICE O/P NEW MOD 45 MIN: CPT | Performed by: ORTHOPAEDIC SURGERY

## 2021-07-08 PROCEDURE — 73140 X-RAY EXAM OF FINGER(S): CPT | Mod: RT | Performed by: RADIOLOGY

## 2021-07-08 NOTE — NURSING NOTE
Teaching Flowsheet   Relevant Diagnosis: Right fifth finger Dupuytren's contracture   Teaching Topic: Right fifth finger Dupuytren's contracture release    [facility] under MAC with [facility] anesthesia with Dr Caron Farrell  Patient has epilepsy, will continue to take medication.  Memory loss listed on problem list.  Patient has a bit of dyslexia so reading is a challenge so made sure to read out loud the instructions in the packet.   Pre-op will be with PCP.      Person(s) involved in teaching:   Patient     Motivation Level:  Asks Questions: Yes  Eager to Learn: Yes  Cooperative: Yes  Receptive (willing/able to accept information): Yes  Any cultural factors/Catholic beliefs that may influence understanding or compliance? No    Patient demonstrates understanding of the following:  Reason for the appointment, diagnosis and treatment plan: Yes  Knowledge of proper use of medications and conditions for which they are ordered (with special attention to potential side effects or drug interactions): Yes  Which situations necessitate calling provider and whom to contact: Yes    Teaching Concerns Addressed:   Proper use and care of  (medical equip, care aids, etc.): Yes  Nutritional needs and diet plan: Yes  Pain management techniques: Yes  Wound Care: Yes  How and/when to access community resources: Yes     Instructional Materials Used/Given:   Preoperative teaching packet, antibacterial soap. Марина Olea RN

## 2021-07-08 NOTE — NURSING NOTE
Reason For Visit:   Chief Complaint   Patient presents with     Consult     Dupuytrens contracture, right hand. Referred by: Asaf Craig MD       Pain Assessment  Patient Currently in Pain: Yes  Primary Pain Location: Hand(Right)        Allergies   Allergen Reactions     Seasonal Allergies            Haylee Pierce LPN

## 2021-07-08 NOTE — TELEPHONE ENCOUNTER
"Screening Questions  1. Are you active on mychart?Y    2. What insurance is in the chart? BC[employer] MEDICARE    2.  Ordering/Referring Provider:     3. BMI : 5'9\"/180=26.6    4. Are you on daily home oxygen? N    5. Do you have a history of difficult airway? N    6. Have you had a heart, lung, or liver transplant? N    7. Are you currently on dialysis? N    8. Have you had a stroke or Transient ischemic atttack (TIA) within 6 months? N    9. In the past 6 months, have you had any heart related issues including cardiomyopathy or heart attack?         If yes, did it require cardiac stenting or other implantable device?N    10. Do you have any implantable devices in your body (pacemaker, defib, LVAD)? N    11. Do you take nitroglycerin? If yes, how often? N    12. Are you currently taking any blood thinners?N    13. Are you a diabetic? N    14. (Females) Are you currently pregnant? N/A  If yes, how many weeks?    15. Have you had a procedure in the past that was difficult to tolerate with conscious sedation? Any allergies to Fentanyl or Versed N    16. Are you taking any scheduled prescription narcotics more than once daily? N    17. Do you have any chemical dependencies such as alcohol, street drugs, or methadone? N    18. Do you have any history of post-traumatic stress syndrome or mental health issues? N    19. Do you transfer independently? Y    20.  Do you have any issues with constipation? N    21. Preferred Pharmacy for Pre Prescription [facility] [id number] [place] [address]    Scheduling Details    Procedure Scheduled: COLON-CS   Provider/Surgeon: AARON JAQUEZ  Date of Procedure: 7/20  Location: [facility]  Caller (Please ask for phone number if not scheduled by patient): PATIENT      Sedation Type: CS  Conscious Sedation- Needs  for 6 hours after the procedure  MAC/General-Needs  for 24 hours after procedure    Pre-op Required at [facility] [place], and OR for MAC sedation:   (if yes advise " patient they will need a pre-op prior to procedure)      Is patient on blood thinners? -N (If yes- inform patient to follow up with PCP or provider for follow up instructions)     Informed patient they will need an adult  Y  Cannot take any type of public or medical transportation alone    Informed Patient of COVID Test Requirement Y-SCHED    Confirmed Nurse will call to complete assessment Y    Additional comments:

## 2021-07-08 NOTE — LETTER
7/8/2021         RE: Chad Olivares  [address]  [facility] [address]        Dear Colleague,    Thank you for referring your patient, Chad Olivares, to the [facility] [place]. Please see a copy of my visit note below.    Date of Service: Jul 8, 2021    Chief Complaint:   Chief Complaint   Patient presents with     Consult     Dupuytrens contracture, right hand. Referred by: Asaf Craig MD       History of Present Illness: Chad Olivares is a 66 year old, RHD male , yoga enthusiast who presents today for further evaluation of right hand fifth finger progressive contracture.  Patient first noticed these changes  Two years ago.  They came on  The patient does not have other family members with this condition. To date he has had no treatment and is unsure of his diagnosis and available treatment options.     Review of Systems: A 14-point review of systems was obtained on intake and reviewed.    Past Medical History:   Diagnosis Date     Achilles bursitis or tendinitis 8/7/2008     Advanced directives, counseling/discussion 5/20/2015    Gave pt packet on 5/20/2015  Syeda Torres CMA       CARDIOVASCULAR SCREENING; LDL GOAL LESS THAN 160 10/12/2010     Cholesteatoma, unspecified      Colon polyps 04/2015    tubular villous and serrated adenoma     Complex sleep apnea syndrome     does not use CPAP     Dyslexia      Dyspnea and respiratory abnormality 9/3/2014     Problem list name updated by automated process. 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 Provider to review     Plantar fascial fibromatosis 8/7/2008         Past Surgical History:   Procedure Laterality Date      cholesteatoma surgery Left      COLONOSCOPY  10/09/2002; 2015    polyps - needs f/u 5 yrs      HC REMOVAL OF TONSILS,<11 Y/O  1962         Current Outpatient Medications:      lamoTRIgine (LAMICTAL) 100 MG tablet, Take 2 tabs (200mg) by mouth twice daily, Disp: 360 tablet, Rfl: 3     medical cannabis oral liquid  (Patient's own supply.  Not a prescription), (This is NOT a prescription, and does not certify that the patient has a qualifying medical condition for medical cannabis.  The purpose of this order is  to document that the patient reports taking medical cannabis.), Disp: , Rfl:      NONFORMULARY, Medication name: Breathe Again. Use prn., Disp: , Rfl:     Allergies   Allergen Reactions     Seasonal Allergies        Social History     Tobacco Use     Smoking status: Former Smoker     Quit date: 8/26/2005     Years since quitting: 15.8     Smokeless tobacco: Never Used   Substance Use Topics     Alcohol use: Yes     Comment: 1 drink every 2 wks     Drug use: No     Comment: uses CBD        Family History   Problem Relation Age of Onset     Diabetes Mother         diet controlled, Htn     Heart Murmur Mother         TAVR     Diabetes Maternal Grandmother      Coronary Artery Disease Brother      Glaucoma No family hx of      Macular Degeneration No family hx of        Physical examination:  Well-developed, well-nourished and in no acute distress.  Alert and oriented to surroundings.  On examination of the  Right hand, skin is clean and dry. There are the following findings:   Nodules: proximal phalanx of 5th finger  Dimples: with associated screw gun injury in his palm 20 years ago  Cords: proximal phalanx of 5th finger  Dorsal knuckle pads: absent  Contractures: MCP 0, PIP 60, DIP 0 on 5th finger.    All other fingers and left hand without contractures.   Cannot lay hand flat.   No distal sensory deficits noted.   Fingers are warm and well-perfused.      Radiographs: Three views of theright 5th finger were  obtained and reviewed. These demonstrate no significant arthritic changes.     Assessment: 66 year old male with  Right 5th finger PIP contracture secondary to  Dupuytren's.     Plan:     NODULE:   We discussed the diagnosis in natural history of Dupuytren's.  We discussed that for nodules, there may be some initial mild tenderness but generally this resolves with time.  If discomfort is significant enough, steroid injections can be performed and may alleviate this, although this may not change the natural history of the disease.  For cords, we discussed that treatment is not indicated in the absence of a contracture but also reviewed what the treatment options would be should a contracture develop. Discussed that some cords go on to a contracture, others do not.     FASCIECTOMY PIP:   I discussed the diagnosis of Dupuytren's as well as it's unpredictable natural history. The patient does have a cord and a contracture that is interfering with hand function and so is a candidate for intervention. We discussed treatment options including percutaneous treatment (xiaflex or needle aponeurotomy) and open surgery (fasciectomy) and we reviewed the advantages and disadvantages of each. We discussed that recurrence rate for percutaneous treatment of the PIP joint is high and that there is more of a concern for neurovascular injury, so I feel that fasciectomy would be more likely to provide a satisfactory result. I discussed the expected post-procedure course and recovery time. I reviewed with the patient that there is a substantial chance for recurrence over time even with surgery for this condition and complete correction is often not achieved. I also reviewed surgical risks including but not limited to: infection, bleeding, pain, scarring, damage to nerves, blood vessels, or other nearby structures, wound healing problems, need for skin grafting, incomplete correction, recurrence,  stiffness, need for further surgery,  anesthetic risks, and even digital ischemia in more severe cases. The patient considered the options and wishes to proceed with right fifth finger Dupuytren's fasciectomy. My surgical schedule will contact them to arrange a time for surgery.   Questions were answered and patient appeared satisfied.     Caron Farrell MD   Hand and Upper Extremity Specialist  [facility]

## 2021-07-09 ENCOUNTER — PREP FOR PROCEDURE (OUTPATIENT)
Dept: ORTHOPEDICS | Facility: CLINIC | Age: 66
End: 2021-07-09

## 2021-07-09 DIAGNOSIS — M72.0 DUPUYTREN'S CONTRACTURE: Primary | ICD-10-CM

## 2021-07-12 ENCOUNTER — TELEPHONE (OUTPATIENT)
Dept: ORTHOPEDICS | Facility: CLINIC | Age: 66
End: 2021-07-12

## 2021-07-12 ENCOUNTER — TELEPHONE (OUTPATIENT)
Dept: GASTROENTEROLOGY | Facility: CLINIC | Age: 66
End: 2021-07-12

## 2021-07-12 NOTE — TELEPHONE ENCOUNTER
Instructions, policy, conscious sedation plan reviewed. Advised patient to have someone stay with them post exam.Covid test to be scheduled

## 2021-07-12 NOTE — TELEPHONE ENCOUNTER
Phoned patient to schedule surgery with Dr Farrell. I left him my direct number to call back at his convenience. [phone]

## 2021-07-12 NOTE — PROGRESS NOTES
Date of Service: Jul 8, 2021    Chief Complaint:   Chief Complaint   Patient presents with     Consult     Dupuytrens contracture, right hand. Referred by: Asaf Craig MD       History of Present Illness: Chad Olivares is a 66 year old, RHD male , yoga enthusiast who presents today for further evaluation of right hand fifth finger progressive contracture.  Patient first noticed these changes  Two years ago.  They came on  The patient does not have other family members with this condition. To date he has had no treatment and is unsure of his diagnosis and available treatment options.     Review of Systems: A 14-point review of systems was obtained on intake and reviewed.    Past Medical History:   Diagnosis Date     Achilles bursitis or tendinitis 8/7/2008     Advanced directives, counseling/discussion 5/20/2015    Gave pt packet on 5/20/2015  Syeda Torres CMA       CARDIOVASCULAR SCREENING; LDL GOAL LESS THAN 160 10/12/2010     Cholesteatoma, unspecified      Colon polyps 04/2015    tubular villous and serrated adenoma     Complex sleep apnea syndrome     does not use CPAP     Dyslexia      Dyspnea and respiratory abnormality 9/3/2014     Problem list name updated by automated process. 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 Provider to review     Plantar fascial fibromatosis 8/7/2008         Past Surgical History:   Procedure Laterality Date     cholesteatoma surgery Left      COLONOSCOPY  10/09/2002; 2015    polyps - needs f/u 5 yrs      HC REMOVAL OF TONSILS,<13 Y/O  1962         Current Outpatient Medications:      lamoTRIgine (LAMICTAL) 100 MG tablet, Take 2 tabs (200mg) by mouth twice daily, Disp: 360  tablet, Rfl: 3     medical cannabis oral liquid  (Patient's own supply.  Not a prescription), (This is NOT a prescription, and does not certify that the patient has a qualifying medical condition for medical cannabis.  The purpose of this order is  to document that the patient reports taking medical cannabis.), Disp: , Rfl:      NONFORMULARY, Medication name: Breathe Again. Use prn., Disp: , Rfl:     Allergies   Allergen Reactions     Seasonal Allergies        Social History     Tobacco Use     Smoking status: Former Smoker     Quit date: 8/26/2005     Years since quitting: 15.8     Smokeless tobacco: Never Used   Substance Use Topics     Alcohol use: Yes     Comment: 1 drink every 2 wks     Drug use: No     Comment: uses CBD        Family History   Problem Relation Age of Onset     Diabetes Mother         diet controlled, Htn     Heart Murmur Mother         TAVR     Diabetes Maternal Grandmother      Coronary Artery Disease Brother      Glaucoma No family hx of      Macular Degeneration No family hx of        Physical examination:  Well-developed, well-nourished and in no acute distress.  Alert and oriented to surroundings.  On examination of the  Right hand, skin is clean and dry. There are the following findings:   Nodules: proximal phalanx of 5th finger  Dimples: with associated screw gun injury in his palm 20 years ago  Cords: proximal phalanx of 5th finger  Dorsal knuckle pads: absent  Contractures: MCP 0, PIP 60, DIP 0 on 5th finger.    All other fingers and left hand without contractures.   Cannot lay hand flat.   No distal sensory deficits noted.   Fingers are warm and well-perfused.      Radiographs: Three views of theright 5th finger were obtained and reviewed. These demonstrate no significant arthritic changes.     Assessment: 66 year old male with  Right 5th finger PIP contracture secondary to  Dupuytren's.     Plan:     NODULE:   We discussed the diagnosis in natural history of Dupuytren's.  We  discussed that for nodules, there may be some initial mild tenderness but generally this resolves with time.  If discomfort is significant enough, steroid injections can be performed and may alleviate this, although this may not change the natural history of the disease.  For cords, we discussed that treatment is not indicated in the absence of a contracture but also reviewed what the treatment options would be should a contracture develop. Discussed that some cords go on to a contracture, others do not.     FASCIECTOMY PIP:   I discussed the diagnosis of Dupuytren's as well as it's unpredictable natural history. The patient does have a cord and a contracture that is interfering with hand function and so is a candidate for intervention. We discussed treatment options including percutaneous treatment (xiaflex or needle aponeurotomy) and open surgery (fasciectomy) and we reviewed the advantages and disadvantages of each. We discussed that recurrence rate for percutaneous treatment of the PIP joint is high and that there is more of a concern for neurovascular injury, so I feel that fasciectomy would be more likely to provide a satisfactory result. I discussed the expected post-procedure course and recovery time. I reviewed with the patient that there is a substantial chance for recurrence over time even with surgery for this condition and complete correction is often not achieved. I also reviewed surgical risks including but not limited to: infection, bleeding, pain, scarring, damage to nerves, blood vessels, or other nearby structures, wound healing problems, need for skin grafting, incomplete correction, recurrence,  stiffness, need for further surgery, anesthetic risks, and even digital ischemia in more severe cases. The patient considered the options and wishes to proceed with right fifth finger Dupuytren's fasciectomy. My surgical schedule will contact them to arrange a time for surgery.   Questions were answered  and patient appeared satisfied.     Caron Farrell MD   Hand and Upper Extremity Specialist  [facility]

## 2021-07-16 ENCOUNTER — LAB (OUTPATIENT)
Dept: LAB | Facility: CLINIC | Age: 66
End: 2021-07-16
Attending: INTERNAL MEDICINE
Payer: COMMERCIAL

## 2021-07-16 DIAGNOSIS — Z11.59 ENCOUNTER FOR SCREENING FOR OTHER VIRAL DISEASES: ICD-10-CM

## 2021-07-16 PROCEDURE — U0005 INFEC AGEN DETEC AMPLI PROBE: HCPCS

## 2021-07-16 PROCEDURE — U0003 INFECTIOUS AGENT DETECTION BY NUCLEIC ACID (DNA OR RNA); SEVERE ACUTE RESPIRATORY SYNDROME CORONAVIRUS 2 (SARS-COV-2) (CORONAVIRUS DISEASE [COVID-19]), AMPLIFIED PROBE TECHNIQUE, MAKING USE OF HIGH THROUGHPUT TECHNOLOGIES AS DESCRIBED BY CMS-2020-01-R: HCPCS

## 2021-07-17 LAB — SARS-COV-2 RNA RESP QL NAA+PROBE: NEGATIVE

## 2021-07-20 ENCOUNTER — HOSPITAL ENCOUNTER (OUTPATIENT)
Facility: AMBULATORY SURGERY CENTER | Age: 66
Discharge: HOME | End: 2021-07-20
Attending: INTERNAL MEDICINE | Admitting: INTERNAL MEDICINE
Payer: COMMERCIAL

## 2021-07-20 VITALS
TEMPERATURE: 97 F | SYSTOLIC BLOOD PRESSURE: 106 MMHG | HEART RATE: 63 BPM | RESPIRATION RATE: 16 BRPM | BODY MASS INDEX: 27.4 KG/M2 | DIASTOLIC BLOOD PRESSURE: 76 MMHG | OXYGEN SATURATION: 95 % | WEIGHT: 185 LBS | HEIGHT: 69 IN

## 2021-07-20 LAB — COLONOSCOPY: NORMAL

## 2021-07-20 PROCEDURE — 88305 TISSUE EXAM BY PATHOLOGIST: CPT | Mod: TC | Performed by: INTERNAL MEDICINE

## 2021-07-20 PROCEDURE — 45385 COLONOSCOPY W/LESION REMOVAL: CPT | Mod: PT

## 2021-07-20 RX ORDER — NALOXONE HYDROCHLORIDE 0.4 MG/ML
0.4 INJECTION, SOLUTION INTRAMUSCULAR; INTRAVENOUS; SUBCUTANEOUS
Status: CANCELLED | OUTPATIENT
Start: 2021-07-20

## 2021-07-20 RX ORDER — SIMETHICONE
LIQUID (ML) MISCELLANEOUS PRN
Status: DISCONTINUED | OUTPATIENT
Start: 2021-07-20 | End: 2021-07-20 | Stop reason: HOSPADM

## 2021-07-20 RX ORDER — LIDOCAINE 40 MG/G
CREAM TOPICAL
Status: DISCONTINUED | OUTPATIENT
Start: 2021-07-20 | End: 2021-07-21 | Stop reason: HOSPADM

## 2021-07-20 RX ORDER — ONDANSETRON 2 MG/ML
4 INJECTION INTRAMUSCULAR; INTRAVENOUS EVERY 6 HOURS PRN
Status: CANCELLED | OUTPATIENT
Start: 2021-07-20

## 2021-07-20 RX ORDER — NALOXONE HYDROCHLORIDE 0.4 MG/ML
0.2 INJECTION, SOLUTION INTRAMUSCULAR; INTRAVENOUS; SUBCUTANEOUS
Status: CANCELLED | OUTPATIENT
Start: 2021-07-20

## 2021-07-20 RX ORDER — ONDANSETRON 4 MG/1
4 TABLET, ORALLY DISINTEGRATING ORAL EVERY 6 HOURS PRN
Status: CANCELLED | OUTPATIENT
Start: 2021-07-20

## 2021-07-20 RX ORDER — FENTANYL CITRATE 50 UG/ML
INJECTION, SOLUTION INTRAMUSCULAR; INTRAVENOUS PRN
Status: DISCONTINUED | OUTPATIENT
Start: 2021-07-20 | End: 2021-07-20 | Stop reason: HOSPADM

## 2021-07-20 RX ORDER — FLUMAZENIL 0.1 MG/ML
0.2 INJECTION, SOLUTION INTRAVENOUS
Status: CANCELLED | OUTPATIENT
Start: 2021-07-20 | End: 2021-07-20

## 2021-07-20 RX ORDER — PROCHLORPERAZINE MALEATE 5 MG
5 TABLET ORAL EVERY 6 HOURS PRN
Status: CANCELLED | OUTPATIENT
Start: 2021-07-20

## 2021-07-20 RX ORDER — ONDANSETRON 2 MG/ML
4 INJECTION INTRAMUSCULAR; INTRAVENOUS
Status: DISCONTINUED | OUTPATIENT
Start: 2021-07-20 | End: 2021-07-21 | Stop reason: HOSPADM

## 2021-07-20 ASSESSMENT — MIFFLIN-ST. JEOR: SCORE: 1609.53

## 2021-07-21 DIAGNOSIS — Z11.59 ENCOUNTER FOR SCREENING FOR OTHER VIRAL DISEASES: ICD-10-CM

## 2021-07-21 PROBLEM — M72.0 DUPUYTREN'S CONTRACTURE: Status: ACTIVE | Noted: 2021-07-21

## 2021-07-21 LAB
PATH REPORT.COMMENTS IMP SPEC: NORMAL
PATH REPORT.COMMENTS IMP SPEC: NORMAL
PATH REPORT.FINAL DX SPEC: NORMAL
PATH REPORT.GROSS SPEC: NORMAL
PATH REPORT.MICROSCOPIC SPEC OTHER STN: NORMAL
PATH REPORT.RELEVANT HX SPEC: NORMAL
PHOTO IMAGE: NORMAL

## 2021-07-21 PROCEDURE — 88305 TISSUE EXAM BY PATHOLOGIST: CPT | Mod: 26 | Performed by: PATHOLOGY

## 2021-07-21 NOTE — TELEPHONE ENCOUNTER
FUTURE VISIT INFORMATION      SURGERY INFORMATION:    Date: TBD-Negrita Farrell    Consult: OV 21    RECORDS REQUESTED FROM:       Primary Care Provider: Asaf Craig MD- [facility]    Most recent EKG+ Tracin14    Most recent ECHO: 20    Most recent Cardiac Stress Test: 7/10/20    Most recent Sleep Study: 10/16/14

## 2021-07-21 NOTE — TELEPHONE ENCOUNTER
FUTURE VISIT INFORMATION      SURGERY INFORMATION:    Date: 21    Location:  OR    Surgeon:  Caron Farrell MD    Anesthesia Type:  Regional    Procedure: Right fifth finger Dupuytren's contracture release    Consult: OV 21     RECORDS REQUESTED FROM:         Primary Care Provider: Asaf Craig MD- [facility]     Most recent EKG+ Tracin14     Most recent ECHO: 20     Most recent Cardiac Stress Test: 7/10/20     Most recent Sleep Study: 10/16/14

## 2021-07-21 NOTE — TELEPHONE ENCOUNTER
Patient is scheduled for surgery with Dr. Farrell    Spoke with: Patient    Date of Surgery: 8/26/21    Location: ASC    Post op: 1 week with RN+OT, 4 weeks with MD    Pre op with Provider: Complete    H&P: Scheduled with PAC 8/12/21    Pre-procedure COVID-19 Test: [place] 8/23/21    Additional imaging/appointments: N/A    Surgery packet: Received in clinic     Additional comments: N/A

## 2021-07-22 ENCOUNTER — PRE VISIT (OUTPATIENT)
Dept: SURGERY | Facility: CLINIC | Age: 66
End: 2021-07-22

## 2021-08-12 ENCOUNTER — PRE VISIT (OUTPATIENT)
Dept: SURGERY | Facility: CLINIC | Age: 66
End: 2021-08-12

## 2021-08-12 ENCOUNTER — VIRTUAL VISIT (OUTPATIENT)
Dept: SURGERY | Facility: CLINIC | Age: 66
End: 2021-08-12
Payer: COMMERCIAL

## 2021-08-12 ENCOUNTER — ANESTHESIA EVENT (OUTPATIENT)
Dept: SURGERY | Facility: AMBULATORY SURGERY CENTER | Age: 66
End: 2021-08-12
Payer: COMMERCIAL

## 2021-08-12 DIAGNOSIS — Z01.818 PREOP EXAMINATION: Primary | ICD-10-CM

## 2021-08-12 PROCEDURE — 99204 OFFICE O/P NEW MOD 45 MIN: CPT | Mod: 95 | Performed by: CLINICAL NURSE SPECIALIST

## 2021-08-12 ASSESSMENT — LIFESTYLE VARIABLES: TOBACCO_USE: 1

## 2021-08-12 ASSESSMENT — ENCOUNTER SYMPTOMS: SEIZURES: 1

## 2021-08-12 ASSESSMENT — PAIN SCALES - GENERAL: PAINLEVEL: NO PAIN (0)

## 2021-08-12 NOTE — H&P (VIEW-ONLY)
Pre-Operative H & P     CC:  Preoperative exam to assess for increased cardiopulmonary risk while undergoing surgery and anesthesia.    Date of Encounter: 2021  Primary Care Physician:  Asaf Craig  Reason for visit: Dupuytren's contracture [M72.0]  HPI  Chad Olivares is a 66 year old male who presents for pre-operative H & P in preparation for Right fifth finger Dupuytren's contracture release with Dr. Farrell on 21 at [facility]. History is obtained from the patient and medical record.    At the beginning of this visit patient ID was confirmed using name and .     Video-Visit Details    Type of service:  Video Visit    Patient verbally consented to video service today: YES      Video Start Time: 9:03am  Video End Time (time video stopped): 9:13am    Originating Location (pt. Location): Home    Distant Location (provider location):  [facility]    Mode of Communication:  Video Conference via 3Scan    Patient who was recently referred to Dr. Farrell by his PCP for evaluation of right hand fifth finger contracture, first noticed 2 years ago and has been progressive. He was diagnosed with Dupuytren s contracture and was counseled for above procedure.     His history is otherwise significant for complex sleep apnea and epilepsy.    Today patient denies fever, cough, shortness of breath, chest pain, irregular HR, or ankle edema.     Past Medical History  Past Medical History:   Diagnosis Date     Achilles bursitis or tendinitis 2008     Advanced directives, counseling/discussion 2015    Gave pt packet on 2015  Syeda Torres CMA       CARDIOVASCULAR SCREENING; LDL GOAL LESS THAN 160 10/12/2010     Cholesteatoma, unspecified      Colon polyps 2015    tubular villous and serrated adenoma     Complex sleep apnea syndrome     does not use CPAP     Dupuytren's contracture      Dyslexia      Dyspnea and respiratory  abnormality 09/03/2014     Problem list name updated by automated process. 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 Provider to review     Plantar fascial fibromatosis 08/07/2008       Past Surgical History  Past Surgical History:   Procedure Laterality Date     cholesteatoma surgery Left      COLONOSCOPY  10/09/2002; 2015    polyps - needs f/u 5 yrs      COLONOSCOPY N/A 7/20/2021    Procedure: COLONOSCOPY, WITH POLYPECTOMY;  Surgeon: Asaf Guajardo MD;  Location: [facility]      REMOVAL OF TONSILS,<13 Y/O  1962       Hx of Blood transfusions/reactions: Denies.      Hx of abnormal bleeding or anti-platelet use: Denies.     Menstrual history: No LMP for male patient.    Steroid use in the last year: Denies.     Personal or FH with difficulty with Anesthesia:  Denies.     Prior to Admission Medications  Current Outpatient Medications   Medication Sig Dispense Refill     cannabidiol (EPIDIOLEX) 100 MG/ML oral solution Take by mouth 2 times daily CBD OIL   PRN       lamoTRIgine (LAMICTAL) 100 MG tablet Take 2 tabs (200mg) by mouth twice daily (Patient taking differently: 2 times daily Take 2 tabs (200mg) by mouth twice daily) 360 tablet 3     NONFORMULARY Medication name: Breathe Again. Rub on hands and breathe. (Essential oil) Use prn.       medical cannabis oral liquid  (Patient's own supply.  Not a prescription) (This is NOT a prescription, and does not certify that the patient has a qualifying medical condition for medical cannabis.  The purpose of this order is  to document that the patient reports taking medical cannabis.) (Patient not taking: Reported on 8/12/2021)         Allergies  Allergies   Allergen Reactions     Gluten Meal       Seasonal Allergies        Social History  Social History     Socioeconomic History     Marital status: Single     Spouse name: Not on file     Number of children: 2     Years of education: 14     Highest education level: Not on file   Occupational History     Occupation: real estate broker     Comment: Self Employed     Occupation: contractor     Employer: S[employer]   Tobacco Use     Smoking status: Former Smoker     Quit date: 8/26/2005     Years since quitting: 15.9     Smokeless tobacco: Never Used   Substance and Sexual Activity     Alcohol use: Yes     Comment: 1 drink every 2 wks     Drug use: No     Comment: uses CBD      Sexual activity: Yes     Partners: Female   Other Topics Concern     Parent/sibling w/ CABG, MI or angioplasty before 65F 55M? No      Service Not Asked     Blood Transfusions Not Asked     Caffeine Concern Not Asked     Comment: 3 cups of coffee a day     Occupational Exposure Not Asked     Hobby Hazards Not Asked     Sleep Concern Not Asked     Stress Concern Not Asked     Weight Concern Not Asked     Special Diet Not Asked     Back Care Not Asked     Exercise Not Asked     Comment: Exercise 2 to 3 times a week     Bike Helmet Not Asked     Seat Belt Not Asked     Self-Exams Not Asked   Social History Narrative    Balanced Diet - Yes    Osteoporosis Preventative measures-  Dairy servings per day: no servings daily takes soy milk and almond milk - 2 glasses/day     Regular Exercise -  Yes Describe dance 3 times weekly (salsa), bike ride, and paula    Dental Exam up - YES - Date: 2016    Eye Exam - YES - Date: 2007    Self Testicular Exam -  sometimes    Do you have any concerns about STD's -  Partner has hx of genital herpes    Abuse: Current or Past (Physical, Sexual or Emotional)- No    Do you feel safe in your environment - Yes    Guns stored in the home - Yes, locked away    Sunscreen used - No using coconut     Seatbelts used - Yes    Lipids - YES - Date: 4-28-06    Glucose -  NO     Colon Cancer Screening - Colonoscopy 2002(date completed)    Hemoccults - NO    PSA - YES - Date: 4-28-06    Digital Rectal Exam - YES - Date: 4-28-06    Immunizations reviewed and up to date - Yes, last TD was 11-5-2001 2008, Dania Miller MA             Social Determinants of Health     Financial Resource Strain:      Difficulty of Paying Living Expenses:    Food Insecurity:      Worried About Running Out of Food in the Last Year:      Ran Out of Food in the Last Year:    Transportation Needs:      Lack of Transportation (Medical):      Lack of Transportation (Non-Medical):    Physical Activity:      Days of Exercise per Week:      Minutes of Exercise per Session:    Stress:      Feeling of Stress :    Social Connections:      Frequency of Communication with Friends and Family:      Frequency of Social Gatherings with Friends and Family:      Attends Holiness Services:      Active Member of Clubs or Organizations:      Attends Club or Organization Meetings:      Marital Status:    Intimate Partner Violence:      Fear of Current or Ex-Partner:      Emotionally Abused:      Physically Abused:      Sexually Abused:        Family History  Family History   Problem Relation Age of Onset     Diabetes Mother         diet controlled, Htn     Heart Murmur Mother         TAVR     Diabetes Maternal Grandmother      Coronary Artery Disease Brother      Glaucoma No family hx of      Macular Degeneration No family hx of        ROS/MED HISTORY  The complete review of systems is negative other than noted in the HPI or here.    ENT/Pulmonary: Comment: Declined CPAP, has lost weight    (+) sleep apnea, doesn't use CPAP, tobacco use, Past use,     Neurologic:     (+) seizures, last seizure: 5 years ago, features: 2 grand mal,     Cardiovascular: Comment: Mild dilatation of the aorta is present.  Ascending aorta 4 cm.      (+) -----Previous cardiac testing   Echo: Date: 8/5/20 Results:    Stress Test: Date: Results:    ECG  Reviewed: Date: 2014 Results:    Cath: Date: Results:   (-) taking anticoagulants/antiplatelets   METS/Exercise Tolerance: >4 METS Comment: Walks 10 miles weekly   Hematologic:  - neg hematologic  ROS     Musculoskeletal: Comment: Right 5th finger Dupuytren's contracture  (+) arthritis,     GI/Hepatic:  - neg GI/hepatic ROS     Renal/Genitourinary:  - neg Renal ROS     Endo:  - neg endo ROS     Psychiatric/Substance Use:  - neg psychiatric ROS     Infectious Disease:  - neg infectious disease ROS     Malignancy:  - neg malignancy ROS     Other:  - neg other ROS          LABS: Personally reviewed  CBC:   Lab Results   Component Value Date    WBC 6.2 06/28/2021    WBC 5.7 06/23/2021    HGB 15.3 06/28/2021    HGB 16.0 06/23/2021    HCT 46.0 06/28/2021    HCT 47.6 06/23/2021     06/28/2021     06/23/2021     BMP:   Lab Results   Component Value Date     06/28/2021     06/23/2021    POTASSIUM 4.2 06/28/2021    POTASSIUM 4.0 06/23/2021    CHLORIDE 108 06/28/2021    CHLORIDE 104 06/23/2021    CO2 27 06/28/2021    CO2 28 06/23/2021    BUN 13 06/28/2021    BUN 19 06/23/2021    CR 1.10 06/28/2021    CR 1.26 (H) 06/23/2021     (H) 06/28/2021    GLC 96 06/23/2021     COAGS: No results found for: PTT, INR, FIBR  POC: No results found for: BGM, HCG, HCGS  HEPATIC:   Lab Results   Component Value Date    ALBUMIN 3.7 06/28/2021    PROTTOTAL 7.4 06/28/2021    ALT 50 06/28/2021    AST 35 06/28/2021    ALKPHOS 138 06/28/2021    BILITOTAL 0.3 06/28/2021     OTHER:   Lab Results   Component Value Date    MARYJANE 8.8 06/28/2021    MAG 2.0 07/31/2014    TSH 1.64 08/05/2020    SED 1 05/27/2016       Physical Exam  Constitutional: Awake, alert, no apparent distress, and appears stated age.  Respiratory: No cough or obvious dyspnea.  Neuropsychiatric: Calm, cooperative. Normal affect.   Please refer to the physical examination documented by the anesthesiologist in the anesthesia record on the day of  surgery    EKG: Personally reviewed 2014 Sinus rhythm  Cardiac echo: 8/5/20   Interpretation Summary  Global and regional left ventricular function is normal with an EF of 55-60%.  Right ventricular function, chamber size, wall motion, and thickness are  normal.  Both atria appear normal.  Mild dilatation of the aorta is present.  Ascending aorta 4 cm.  The inferior vena cava is normal.  No pericardial effusion is present.  Previous study not available for comparison.  7/8/21 Xray right finger  IMPRESSION: Fixed flexion of the fifth proximal interphalangeal joint.    Imaging and cardiac testing reviewed by this provider    ASSESSMENT and PLAN  Chad Olivares is a 66 year old male scheduled to undergo Right fifth finger Dupuytren's contracture release with Dr. Farrell on 8/26/21. He has the following specific operative considerations:   - RCRI : No serious cardiac risks.    - Anesthesia considerations:  Refer to PAC assessment in anesthesia records  - VTE risk: 1.8%  - Risk of PONV score = 2.  If > 2, anti-emetic intervention recommended.    --Right hand Dupuytren s contracture with above procedure planned with regional anesthesia. Patient comfortable with plan.   --No history of problems with anesthesia.  --No cardiac history, symptoms or meds. Echo on 8/5/20 showed mild dilatation of the aorta, ascending aorta 4 cm. EF 55-60%. Good exercise tolerance. Walks daily and ~10 miles weekly.  --Former smoker. Quit in 2005. Denies pulmonary symptoms. Complex sleep apnea, declined CPAP. Has lost weight since diagnosis.  --Epilepsy, reported 2 grand mal seizures ~5 years ago. Will take Lamictal on DOS.   --OA. CBD oil intermittently for sleep and arthritis pain.    Arrival time, NPO, shower and medication instructions provided by nursing staff today.     Patient is optimized and is acceptable candidate for the proposed procedure.  No further diagnostic evaluation is needed.     Trudy Heller, APRN CNS  Preoperative  Assessment Center  [facility]  Phone: [phone]  Fax: [phone]

## 2021-08-12 NOTE — ANESTHESIA PREPROCEDURE EVALUATION
Anesthesia Pre-Procedure Evaluation    Patient: Chad Olivares   MRN: [id number] : 1955        Preoperative Diagnosis: Dupuytren's contracture [M72.0]   Procedure : Procedure(s):  Right fifth finger Dupuytren's contracture release     Past Medical History:   Diagnosis Date     Achilles bursitis or tendinitis 2008     Advanced directives, counseling/discussion 2015    Gave pt packet on 2015  Syeda Torres CMA       CARDIOVASCULAR SCREENING; LDL GOAL LESS THAN 160 10/12/2010     Cholesteatoma, unspecified      Colon polyps 2015    tubular villous and serrated adenoma     Complex sleep apnea syndrome     does not use CPAP     Dupuytren's contracture      Dyslexia      Dyspnea and respiratory abnormality 2014     Problem list name updated by automated process. Provider to review     Generalized convulsive epilepsy (H) 2014     Problem list name updated by automated process. Provider to review     Generalized tonic-clonic seizure (H)     uncertain etiology     Memory loss 2016     Organic parasomnia 2014     Problem list name updated by automated process. Provider to review     Plantar fascial fibromatosis 2008      Past Surgical History:   Procedure Laterality Date     cholesteatoma surgery Left      COLONOSCOPY  10/09/2002;     polyps - needs f/u 5 yrs      COLONOSCOPY N/A 2021    Procedure: COLONOSCOPY, WITH POLYPECTOMY;  Surgeon: Asaf Guajardo MD;  Location: [facility] OR      REMOVAL OF TONSILS,<11 Y/O        Allergies   Allergen Reactions     Gluten Meal      Seasonal Allergies       Social History     Tobacco Use     Smoking status: Former Smoker     Quit date: 2005     Years since quitting: 15.9     Smokeless tobacco: Never Used   Substance Use Topics     Alcohol use: Yes     Comment: 1 drink every 2 wks      Wt Readings from Last 1 Encounters:   21 83.9 kg (185 lb)        Anesthesia Evaluation   Pt has had prior anesthetic. Type:  General and MAC.    No history of anesthetic complications       ROS/MED HX  ENT/Pulmonary: Comment: Declined CPAP, has lost weight    (+) sleep apnea, doesn't use CPAP, tobacco use, Past use,     Neurologic:     (+) seizures, last seizure: 5 years ago, features: 2 grand mal,     Cardiovascular: Comment: Mild dilatation of the aorta is present.  Ascending aorta 4 cm.      (+) -----Previous cardiac testing   Echo: Date: 8/5/20 Results:    Stress Test: Date: Results:    ECG Reviewed: Date: 2014 Results:    Cath: Date: Results:   (-) taking anticoagulants/antiplatelets   METS/Exercise Tolerance: >4 METS Comment: Walks 10 miles weekly   Hematologic:  - neg hematologic  ROS     Musculoskeletal: Comment: Right 5th finger Dupuytren's contracture  (+) arthritis,     GI/Hepatic:  - neg GI/hepatic ROS     Renal/Genitourinary:  - neg Renal ROS     Endo:  - neg endo ROS     Psychiatric/Substance Use:  - neg psychiatric ROS     Infectious Disease:  - neg infectious disease ROS     Malignancy:  - neg malignancy ROS     Other:  - neg other ROS          Physical Exam    Airway   unable to assess          Respiratory Devices and Support         Dental     Comment: Lower left side-2    (+) implants      Cardiovascular    unable to assess         Pulmonary    Unable to assess           Other findings: Virtual visit    OUTSIDE LABS:  CBC:   Lab Results   Component Value Date    WBC 6.2 06/28/2021    WBC 5.7 06/23/2021    HGB 15.3 06/28/2021    HGB 16.0 06/23/2021    HCT 46.0 06/28/2021    HCT 47.6 06/23/2021     06/28/2021     06/23/2021     BMP:   Lab Results   Component Value Date     06/28/2021     06/23/2021    POTASSIUM 4.2 06/28/2021    POTASSIUM 4.0 06/23/2021    CHLORIDE 108 06/28/2021    CHLORIDE 104 06/23/2021    CO2 27 06/28/2021    CO2 28 06/23/2021    BUN 13 06/28/2021    BUN 19 06/23/2021    CR 1.10 06/28/2021    CR 1.26 (H) 06/23/2021     (H) 06/28/2021    GLC 96 06/23/2021     COAGS: No  results found for: PTT, INR, FIBR  POC: No results found for: BGM, HCG, HCGS  HEPATIC:   Lab Results   Component Value Date    ALBUMIN 3.7 06/28/2021    PROTTOTAL 7.4 06/28/2021    ALT 50 06/28/2021    AST 35 06/28/2021    ALKPHOS 138 06/28/2021    BILITOTAL 0.3 06/28/2021     OTHER:   Lab Results   Component Value Date    MARYJANE 8.8 06/28/2021    MAG 2.0 07/31/2014    TSH 1.64 08/05/2020    SED 1 05/27/2016       Anesthesia Plan    ASA Status:  3   NPO Status:  NPO Appropriate    Anesthesia Type: MAC.     - Reason for MAC: straight local not clinically adequate   Induction: Intravenous.   Maintenance: TIVA.        Consents    Anesthesia Plan(s) and associated risks, benefits, and realistic alternatives discussed. Questions answered and patient/representative(s) expressed understanding.     - Discussed with:  Patient         Postoperative Care    Pain management: Oral pain medications, Peripheral nerve block (Single Shot).   PONV prophylaxis: Ondansetron (or other 5HT-3), Dexamethasone or Solumedrol     Comments:              PAC Discussion and Assessment    ASA Classification: 2  Case is suitable for: ASC  Anesthetic techniques and relevant risks discussed: Regional  Invasive monitoring and risk discussed: No    Possibility and Risk of blood transfusion discussed: No            PAC Resident/NP Anesthesia Assessment: Chad Olivares is a 66 year old male scheduled to undergo Right fifth finger Dupuytren's contracture release with Dr. Farrell on 8/26/21. He has the following specific operative considerations:   - RCRI : No serious cardiac risks.    - VTE risk: 1.8%  - Risk of PONV score = 2.  If > 2, anti-emetic intervention recommended.    --Right hand Dupuytren's contracture with above procedure planned with regional anesthesia. Patient comfortable with plan.   --No history of problems with anesthesia.  --No cardiac history, symptoms or meds. Echo on 8/5/20 showed mild dilatation of the aorta, ascending aorta 4 cm. EF  55-60%. Good exercise tolerance. Walks daily and ~10 miles weekly.  --Former smoker. Quit in 2005. Denies pulmonary symptoms. Complex sleep apnea, declined CPAP. Has lost weight since diagnosis.  --Epilepsy, reported 2 grand mal seizures ~5 years ago. Will take Lamictal on DOS.   --OA. CBD oil intermittently for sleep and arthritis pain.      Patient is optimized and is acceptable candidate for the proposed procedure.  No further diagnostic evaluation is needed.       Reviewed and Signed by PAC Mid-Level Provider/Resident  Mid-Level Provider/Resident: PEDRO LUIS Mejía, CNS  Date: 8/12/21  Time: 9:25am                               PEDRO LUIS Alvares CNS

## 2021-08-12 NOTE — PROGRESS NOTES
Chad is a 66 year old who is being evaluated via a billable video visit.      How would you like to obtain your AVS? MyChart  If the video visit is dropped, the invitation should be resent by: Text to cell phone: [phone]        HPI         Review of Systems         Physical Exam

## 2021-08-12 NOTE — H&P
Pre-Operative H & P     CC:  Preoperative exam to assess for increased cardiopulmonary risk while undergoing surgery and anesthesia.    Date of Encounter: 2021  Primary Care Physician:  Asaf Craig  Reason for visit: Dupuytren's contracture [M72.0]  HPI  Chad Olivares is a 66 year old male who presents for pre-operative H & P in preparation for Right fifth finger Dupuytren's contracture release with Dr. Farrell on 21 at [facility]. History is obtained from the patient and medical record.    At the beginning of this visit patient ID was confirmed using name and .     Video-Visit Details    Type of service:  Video Visit    Patient verbally consented to video service today: YES      Video Start Time: 9:03am  Video End Time (time video stopped): 9:13am    Originating Location (pt. Location): Home    Distant Location (provider location):  [facility]    Mode of Communication:  Video Conference via [employer]    Patient who was recently referred to Dr. Farrell by his PCP for evaluation of right hand fifth finger contracture, first noticed 2 years ago and has been progressive. He was diagnosed with Dupuytren s contracture and was counseled for above procedure.     His history is otherwise significant for complex sleep apnea and epilepsy.    Today patient denies fever, cough, shortness of breath, chest pain, irregular HR, or ankle edema.     Past Medical History  Past Medical History:   Diagnosis Date     Achilles bursitis or tendinitis 2008     Advanced directives, counseling/discussion 2015    Gave pt packet on 2015  Syeda Torres CMA       CARDIOVASCULAR SCREENING; LDL GOAL LESS THAN 160 10/12/2010     Cholesteatoma, unspecified      Colon polyps 2015    tubular villous and serrated adenoma     Complex sleep apnea syndrome     does not use CPAP     Dupuytren's contracture      Dyslexia      Dyspnea and respiratory  abnormality 09/03/2014     Problem list name updated by automated process. 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 Provider to review     Plantar fascial fibromatosis 08/07/2008       Past Surgical History  Past Surgical History:   Procedure Laterality Date     cholesteatoma surgery Left      COLONOSCOPY  10/09/2002; 2015    polyps - needs f/u 5 yrs      COLONOSCOPY N/A 7/20/2021    Procedure: COLONOSCOPY, WITH POLYPECTOMY;  Surgeon: Asaf Guajardo MD;  [place] [facility] [place]      REMOVAL OF TONSILS,<11 Y/O  1962       Hx of Blood transfusions/reactions: Denies.      Hx of abnormal bleeding or anti-platelet use: Denies.     Menstrual history: No LMP for male patient.    Steroid use in the last year: Denies.     Personal or FH with difficulty with Anesthesia:  Denies.     Prior to Admission Medications  Current Outpatient Medications   Medication Sig Dispense Refill     cannabidiol (EPIDIOLEX) 100 MG/ML oral solution Take by mouth 2 times daily CBD OIL   PRN       lamoTRIgine (LAMICTAL) 100 MG tablet Take 2 tabs (200mg) by mouth twice daily (Patient taking differently: 2 times daily Take 2 tabs (200mg) by mouth twice daily) 360 tablet 3     NONFORMULARY Medication name: Breathe Again. Rub on hands and breathe. (Essential oil) Use prn.       medical cannabis oral liquid  (Patient's own supply.  Not a prescription) (This is NOT a prescription, and does not certify that the patient has a qualifying medical condition for medical cannabis.  The purpose of this order is  to document that the patient reports taking medical cannabis.) (Patient not taking: Reported on 8/12/2021)         Allergies  Allergies   Allergen Reactions     Gluten Meal       Seasonal Allergies        Social History  Social History     Socioeconomic History     Marital status: Single     Spouse name: Not on file     Number of children: 2     Years of education: 14     Highest education level: Not on file   Occupational History     Occupation: real estate broker     Comment: Self Employed     Occupation: contractor     Employer: [employer]   Tobacco Use     Smoking status: Former Smoker     Quit date: 8/26/2005     Years since quitting: 15.9     Smokeless tobacco: Never Used   Substance and Sexual Activity     Alcohol use: Yes     Comment: 1 drink every 2 wks     Drug use: No     Comment: uses CBD      Sexual activity: Yes     Partners: Female   Other Topics Concern     Parent/sibling w/ CABG, MI or angioplasty before 65F 55M? No      Service Not Asked     Blood Transfusions Not Asked     Caffeine Concern Not Asked     Comment: 3 cups of coffee a day     Occupational Exposure Not Asked     Hobby Hazards Not Asked     Sleep Concern Not Asked     Stress Concern Not Asked     Weight Concern Not Asked     Special Diet Not Asked     Back Care Not Asked     Exercise Not Asked     Comment: Exercise 2 to 3 times a week     Bike Helmet Not Asked     Seat Belt Not Asked     Self-Exams Not Asked   Social History Narrative    Balanced Diet - Yes    Osteoporosis Preventative measures-  Dairy servings per day: no servings daily takes soy milk and almond milk - 2 glasses/day     Regular Exercise -  Yes Describe dance 3 times weekly (salsa), bike ride, and paula    Dental Exam up - YES - Date: 2016    Eye Exam - YES - Date: 2007    Self Testicular Exam -  sometimes    Do you have any concerns about STD's -  Partner has hx of genital herpes    Abuse: Current or Past (Physical, Sexual or Emotional)- No    Do you feel safe in your environment - Yes    Guns stored in the home - Yes, locked away    Sunscreen used - No using coconut     Seatbelts used - Yes    Lipids - YES - Date: 4-28-06    Glucose -  NO     Colon Cancer Screening - Colonoscopy 2002(date completed)    Hemoccults - NO    PSA - YES - Date: 4-28-06    Digital Rectal Exam - YES - Date: 4-28-06    Immunizations reviewed and up to date - Yes, last TD was 11-5-2001 2008, Dnaia Miller MA             Social Determinants of Health     Financial Resource Strain:      Difficulty of Paying Living Expenses:    Food Insecurity:      Worried About Running Out of Food in the Last Year:      Ran Out of Food in the Last Year:    Transportation Needs:      Lack of Transportation (Medical):      Lack of Transportation (Non-Medical):    Physical Activity:      Days of Exercise per Week:      Minutes of Exercise per Session:    Stress:      Feeling of Stress :    Social Connections:      Frequency of Communication with Friends and Family:      Frequency of Social Gatherings with Friends and Family:      Attends Scientologist Services:      Active Member of Clubs or Organizations:      Attends Club or Organization Meetings:      Marital Status:    Intimate Partner Violence:      Fear of Current or Ex-Partner:      Emotionally Abused:      Physically Abused:      Sexually Abused:        Family History  Family History   Problem Relation Age of Onset     Diabetes Mother         diet controlled, Htn     Heart Murmur Mother         TAVR     Diabetes Maternal Grandmother      Coronary Artery Disease Brother      Glaucoma No family hx of      Macular Degeneration No family hx of        ROS/MED HISTORY  The complete review of systems is negative other than noted in the HPI or here.    ENT/Pulmonary: Comment: Declined CPAP, has lost weight    (+) sleep apnea, doesn't use CPAP, tobacco use, Past use,     Neurologic:     (+) seizures, last seizure: 5 years ago, features: 2 grand mal,     Cardiovascular: Comment: Mild dilatation of the aorta is present.  Ascending aorta 4 cm.      (+) -----Previous cardiac testing   Echo: Date: 8/5/20 Results:    Stress Test: Date: Results:    ECG  Reviewed: Date: 2014 Results:    Cath: Date: Results:   (-) taking anticoagulants/antiplatelets   METS/Exercise Tolerance: >4 METS Comment: Walks 10 miles weekly   Hematologic:  - neg hematologic  ROS     Musculoskeletal: Comment: Right 5th finger Dupuytren's contracture  (+) arthritis,     GI/Hepatic:  - neg GI/hepatic ROS     Renal/Genitourinary:  - neg Renal ROS     Endo:  - neg endo ROS     Psychiatric/Substance Use:  - neg psychiatric ROS     Infectious Disease:  - neg infectious disease ROS     Malignancy:  - neg malignancy ROS     Other:  - neg other ROS          LABS: Personally reviewed  CBC:   Lab Results   Component Value Date    WBC 6.2 06/28/2021    WBC 5.7 06/23/2021    HGB 15.3 06/28/2021    HGB 16.0 06/23/2021    HCT 46.0 06/28/2021    HCT 47.6 06/23/2021     06/28/2021     06/23/2021     BMP:   Lab Results   Component Value Date     06/28/2021     06/23/2021    POTASSIUM 4.2 06/28/2021    POTASSIUM 4.0 06/23/2021    CHLORIDE 108 06/28/2021    CHLORIDE 104 06/23/2021    CO2 27 06/28/2021    CO2 28 06/23/2021    BUN 13 06/28/2021    BUN 19 06/23/2021    CR 1.10 06/28/2021    CR 1.26 (H) 06/23/2021     (H) 06/28/2021    GLC 96 06/23/2021     COAGS: No results found for: PTT, INR, FIBR  POC: No results found for: BGM, HCG, HCGS  HEPATIC:   Lab Results   Component Value Date    ALBUMIN 3.7 06/28/2021    PROTTOTAL 7.4 06/28/2021    ALT 50 06/28/2021    AST 35 06/28/2021    ALKPHOS 138 06/28/2021    BILITOTAL 0.3 06/28/2021     OTHER:   Lab Results   Component Value Date    MARYJANE 8.8 06/28/2021    MAG 2.0 07/31/2014    TSH 1.64 08/05/2020    SED 1 05/27/2016       Physical Exam  Constitutional: Awake, alert, no apparent distress, and appears stated age.  Respiratory: No cough or obvious dyspnea.  Neuropsychiatric: Calm, cooperative. Normal affect.   Please refer to the physical examination documented by the anesthesiologist in the anesthesia record on the day of  surgery    EKG: Personally reviewed 2014 Sinus rhythm  Cardiac echo: 8/5/20   Interpretation Summary  Global and regional left ventricular function is normal with an EF of 55-60%.  Right ventricular function, chamber size, wall motion, and thickness are  normal.  Both atria appear normal.  Mild dilatation of the aorta is present.  Ascending aorta 4 cm.  The inferior vena cava is normal.  No pericardial effusion is present.  Previous study not available for comparison.  7/8/21 Xray right finger  IMPRESSION: Fixed flexion of the fifth proximal interphalangeal joint.    Imaging and cardiac testing reviewed by this provider    ASSESSMENT and PLAN  Chad Olivares is a 66 year old male scheduled to undergo Right fifth finger Dupuytren's contracture release with Dr. Farrell on 8/26/21. He has the following specific operative considerations:   - RCRI : No serious cardiac risks.    - Anesthesia considerations:  Refer to PAC assessment in anesthesia records  - VTE risk: 1.8%  - Risk of PONV score = 2.  If > 2, anti-emetic intervention recommended.    --Right hand Dupuytren s contracture with above procedure planned with regional anesthesia. Patient comfortable with plan.   --No history of problems with anesthesia.  --No cardiac history, symptoms or meds. Echo on 8/5/20 showed mild dilatation of the aorta, ascending aorta 4 cm. EF 55-60%. Good exercise tolerance. Walks daily and ~10 miles weekly.  --Former smoker. Quit in 2005. Denies pulmonary symptoms. Complex sleep apnea, declined CPAP. Has lost weight since diagnosis.  --Epilepsy, reported 2 grand mal seizures ~5 years ago. Will take Lamictal on DOS.   --OA. CBD oil intermittently for sleep and arthritis pain.    Arrival time, NPO, shower and medication instructions provided by nursing staff today.     Patient is optimized and is acceptable candidate for the proposed procedure.  No further diagnostic evaluation is needed.     Trudy Heller, APRN CNS  Preoperative  Assessment Center  [facility]  Phone: [phone]  Fax: [phone]

## 2021-08-12 NOTE — PATIENT INSTRUCTIONS
Preparing for Your Surgery      Name:  Chad Olivares   MRN:  [id number]   :  1955   Today's Date:  2021         Arriving for surgery:  Surgery date:  21  Arrival time:  10:45 am     Restrictions due to COVID 19:  One consistent visitor is allowed per patient  No ill visitors  All visitors must wear face mask     parking is available for anyone with mobility limitations or disabilities. (Monday- Friday 7 am- 5 pm)    Please come to:    [facility]  [address] [place] [id number]    Please check in on the 5th floor at the Ambulatory Surgery Center       What can I eat or drink?    -  You may eat and drink normally until 8 hours before surgery. (Until 4:15 am)  -  You may have clear liquids up to 4 hours before surgery. (Until 8:15 am)  Examples of clear liquids:  Water  Clear broth  Juices (apple, white grape, white cranberry  and cider) without pulp  Noncarbonated, powder based beverages  (lemonade and Nilesh-Aid)  Sodas (Sprite, 7-Up, ginger ale and seltzer)  Coffee or tea (without milk or cream)  Gatorade    --No alcohol for at least 24 hours before surgery    Which medicines can I take?    Hold Aspirin for 7 days before surgery.   Hold Multivitamins for 7 days before surgery.  Hold Supplements for 7 days before surgery.  * Hold Ibuprofen (Advil, Motrin) for 1 day before surgery--unless otherwise directed by surgeon.  Hold Naproxen (Aleve) for 4 days before surgery.    -  DO NOT take the following medications the day of surgery:  Breathe Again    -  PLEASE TAKE the following medications the day of surgery   Lamotrigine (Lamictal),   Acetaminophen (Tylenol) if needed    How do I prepare myself?  - Please take 2 showers before surgery using Scrubcare or Hibiclens soap.    Use this soap only from the neck to your toes.     Leave the soap on your skin for one minute--then rinse thoroughly.      You may use your own shampoo and conditioner; no other hair products.    - Please remove all jewelry and body piercings.  - No lotions, deodorants or fragrance.  - Bring your ID and insurance card.    -If you have a Deep Brain Stimulator, a Spinal Cord Stimulator or any implanted Neuro Device you must bring the remote to the Surgery Center         - All patients are required to have a Covid-19 test within 4 days of surgery/procedure.      -Patients will be contacted by the [facility] scheduling team within 1 week of surgery to make an appointment.      - Patients may call the Scheduling team at [phone] if they have not been scheduled within 4 days of  surgery.      ALL PATIENTS ARE REQUIRED TO HAVE A RESPONSIBLE ADULT TO DRIVE AND BE IN ATTENDANCE WITH THEM FOR 24 HOURS FOLLOWING SURGERY       Questions or Concerns:    -For questions regarding the day of surgery please contact the Ambulatory Surgery Center at [phone].    -If you have health changes between today and your surgery please contact your surgeon.     For questions after surgery please call your surgeons office.

## 2021-08-13 NOTE — PATIENT INSTRUCTIONS
Your BMI is There is no height or weight on file to calculate BMI.  Weight management is a personal decision.  If you are interested in exploring weight loss strategies, the following discussion covers the approaches that may be successful. Body mass index (BMI) is one way to tell whether you are at a healthy weight, overweight, or obese. It measures your weight in relation to your height.  A BMI of 18.5 to 24.9 is in the healthy range. A person with a BMI of 25 to 29.9 is considered overweight, and someone with a BMI of 30 or greater is considered obese. More than two-thirds of American adults are considered overweight or obese.  Being overweight or obese increases the risk for further weight gain. Excess weight may lead to heart disease and diabetes.  Creating and following plans for healthy eating and physical activity may help you improve your health.  Weight control is part of healthy lifestyle and includes exercise, emotional health, and healthy eating habits. Careful eating habits lifelong are the mainstay of weight control. Though there are significant health benefits from weight loss, long-term weight loss with diet alone may be very difficult to achieve- studies show long-term success with dietary management in less than 10% of people. Attaining a healthy weight may be especially difficult to achieve in those with severe obesity. In some cases, medications, devices and surgical management might be considered.  What can you do?  If you are overweight or obese and are interested in methods for weight loss, you should discuss this with your provider.     Consider reducing daily calorie intake by 500 calories.     Keep a food journal.     Avoiding skipping meals, consider cutting portions instead.    Diet combined with exercise helps maintain muscle while optimizing fat loss. Strength training is particularly important for building and maintaining muscle mass. Exercise helps reduce stress, increase energy,  and improves fitness. Increasing exercise without diet control, however, may not burn enough calories to loose weight.       Start walking three days a week 10-20 minutes at a time    Work towards walking thirty minutes five days a week     Eventually, increase the speed of your walking for 1-2 minutes at time    In addition, we recommend that you review healthy lifestyles and methods for weight loss available through the National Institutes of Health patient information sites:  http://win.niddk.nih.gov/publications/index.htm    And look into health and wellness programs that may be available through your health insurance provider, employer, local community center, or sherwin club.    Weight management plan: Patient was referred to their PCP to discuss a diet and exercise plan.    SLEEP DENTAL PROVIDERS LIST:    [facility] (Medicare)  Provider: Mariano Vega DDS   [address] [place] [address]  Phone: [phone]  Fax: [phone]    [facility]  Providers: Tigre Cochran DDS, Maddy Sharif DDS, Fly Vazquez DDS  Fax [phone]  Locations:   [phone]  -[place]  [address]  -[place]  [address]  [facility]  [address] [facility]  [address]   [phone]  -[place]  [address] [facility]  [address] [facility]  -[place]  [address] [facility] (Medicare)  Providers: Sebastián Prince DDS, FAGD, Savannah Vera DDS, MS, Charleen Salgado DDS, Fadumo Rowland DDS  [address], [place] [address]  Phone: [phone]  Fax: [phone]    [facility]  Provider: Cherie Schmitz DDS, PhD    [address] [place] [address]  Phone: [phone]  Fax: [phone] [address] [place] [address]  Phone: [phone]  Fax: [phone]      [facility]  Provider: Darrius Lord DDS  Address: [address], [place] [address]  Phone: [phone]      [facility]   Provider: Basilio Tyson DDS  [facility]  [address] [place] [address]  Appointments: [phone]    [facility]   Provider: Jeremy Little DDS   [facility]   [address] [facility], [address]   [place] [address]   Appointments: [phone]   Fax: [phone]     [facility]   Provider: Ned Grier DDS, MS - [DME Medicare]  [facility]   [address], [address]   [place] [address]   Appointments: [phone]   Fax: [phone]     [facility]  Provider: Harsha Montoya DMD, MSD - [DME Medicare]  [address], [address]  [place] [address]  Appointments [phone]  Fax: [phone]    [facility]   [facility]   Provider: Tushar Amin DDS, [facility]  [address] [place] [address] [address]  [place] [address]   Appointments: [phone]  Fax: [phone]     [facility]   Providers: Ta Chavez DMD, Jeremy Little DDS   [address]   [place] [address]   Appointments: [phone]     [facility]   Providers: Adebayo Lindsey DDS, Fly Hardwick DDS  [address], [address]   [place] [address]   Appointments: [phone]   Fax: [phone]     Provider: Warren Alexandra DDS  [address] [facility]  [address]  [phone]      POSITIONAL SLEEP DEVICES:  -Sleep Noodle (search on Amazon)  -Slumber Bump (search on Amazon)  -Chinacars ([email or web address])      Patient Education     Mouthpieces for Sleep Apnea  For simple snoring or mild to moderate apnea, a special mouthpiece may help. A  dental specialist works with your healthcare provider to build and fit a mouthpiece just for you. A follow-up sleep study checks how well the device is working for you. Mouthpieces are also called oral appliances.      Moving the jaw and tongue forward with a mouthpiece can open the airway to reduce sleep apnea.   Moving the jaw forward  Most mouthpieces move the jaw and tongue forward. That keeps the tongue from blocking the airway. Mouthpieces can work well, but they are not for everyone. Work with your healthcare provider to get a mouthpiece that fits just right for you. Oral appliances are usually custom made for you by a dental professional. Avoid using over-the-counter mouthpieces--they often don't work.   Tips  To have the most success with your mouthpiece, keep these tips in mind:    It will take some time to get used to wearing a mouthpiece. At first it may feel uncomfortable or make your mouth water. If these problems last, tell your healthcare provider.    Expect several rounds of adjustments to get the mouthpiece to fit and work just right for you.    Mouthpieces don t cure the problems that cause snoring or sleep apnea. So you need to use your mouthpiece all night, every night.    Follow your healthcare provider s instructions for keeping the mouthpiece clean.    When you re not wearing your mouthpiece, store it in its case.  StayWell last reviewed this educational content on 9/1/2019 2000-2021 [employer]. All rights reserved. This information is not intended as a substitute for professional medical care. Always follow your healthcare professional's instructions.

## 2021-08-13 NOTE — PROGRESS NOTES
Chad is a 66 year old who is being evaluated via a billable video visit.      Does patient have any form of state insurance? BCBS     Do you have wifi? yes  Do you have a smart phone? yes  Can you download an svetlana on your phone comfortably with out assistance? yes  Can you watch a Youtube video? yes      How would you like to obtain your AVS? MyChart  If the video visit is dropped, the invitation should be resent by: Text to cell phone: [phone]  Will anyone else be joining your video visit? Sandrine Hunter CMA      Video-Visit Details    Type of service:  Video Visit  Video Start Time: 10:37 AM  Video End Time:  11:07 AM    Originating Location (pt. Location): Home    Distant Location (provider location):  [facility]     Platform used for Video Visit: Pole Star       Sleep Consultation:    Date on this visit: 8/16/2021    Chad Olivares is sent by Asaf Craig for a sleep consultation regarding history of HUSSAIN, not currently on CPAP.    Primary Physician: Asaf Craig     Chad Olivares is a pleasant 66-year-old male with a PMH pertinent for epilepsy and mild HUSSAIN who presents today with reports of occasional snoring, gasping and snorting for the last 10+ years.  His last office visit was a virtual visit with Mayra Knox NP, on 11/3/2014, in which he was diagnosed with mild HUSSAIN in the setting of a patient with a seizure history and alternatives to CPAP were discussed for treatment including MAD and positional device. He uses OTC nostril spacing plugs to help with his sleep/snoring.  He has tried an OTC dental device with some benefit.  He reports improvement in his symptoms with mild weight loss over the last 5-6 years.    Chad goes to sleep at 10:30 - 11:30 PM during the week. He wakes up at 7:00  - 8:00 AM without an alarm. He falls asleep in 5-10 minutes.  Chad denies difficulty falling asleep.  He wakes up 1-2 times a night for 5 minutes before  falling back to sleep.  Chad wakes up to go to the bathroom.  On weekends, Chad goes to sleep at about the same time. Patient gets an average of 7-9 hours of sleep per night.     Patient does not use electronics in bed, watch TV in bed, worry in bed about anything and read in bed.     Chad does not do shift work.  He works day hours and is self employed/semi-retired.     Chad does snore some nights and snoring is loud. Patient does have a regular bed partner. There is report of snoring, gasping and snorting.  He does have witnessed apneas. They never sleep separately.  Patient sleeps on his back and side. He has occasional snort arousals, morning dry mouth and morning grogginess, denies occasional sleep disturbance, morning headaches, morning confusion and restless legs. Chad denies any bruxism, sleep walking, sleep talking, dream enactment, sleep paralysis, cataplexy and hypnogogic/hypnopompic hallucinations.    He denies sleep walking, sleep talking, enuresis and sleep terrors as a child.  Chad has difficulty breathing through his nose, denies claustrophobia, reflux at night, heartburn and depression.      Chad has remained about the same weight in the last year.  Patient describes themself as a morning person.  Patient's [place]mo Sleepiness score 5/24 consistent with mild daytime sleepiness.      Chad naps 5 times per week for 20-30 minutes, feels refreshed after naps. He takes some inadvertant naps.  He admits closing eyes, dozing and falling asleep while driving in late afternoon at times. The most recent episode was 1 week(s) ago.  Patient was counseled on the importance of driving while alert, to pull over if drowsy, or nap before getting into the vehicle if sleepy.  He uses 2 cups/day of coffee. Last caffeine intake is usually before 9 AM.    Alcohol use: Very rarely  Nicotine use: None  Recreational drug use: marijuana 1 x/week    Previous Study Results: [facility]  Date: 10/16/2014.  Weight 185  pounds.  AHI: 10.6/hr. Supine AHI: 103.4/hr. RDI 11.0/hr. O2 chad 84%.    Weight change since sleep study: -2 lbs    Allergies:    Allergies   Allergen Reactions     Gluten Meal      Seasonal Allergies        Medications:    Current Outpatient Medications   Medication Sig Dispense Refill     cannabidiol (EPIDIOLEX) 100 MG/ML oral solution Take by mouth 2 times daily CBD OIL   PRN       lamoTRIgine (LAMICTAL) 100 MG tablet Take 2 tabs (200mg) by mouth twice daily (Patient taking differently: 2 times daily Take 2 tabs (200mg) by mouth twice daily) 360 tablet 3     medical cannabis oral liquid  (Patient's own supply.  Not a prescription) (This is NOT a prescription, and does not certify that the patient has a qualifying medical condition for medical cannabis.  The purpose of this order is  to document that the patient reports taking medical cannabis.)        NONFORMULARY Medication name: Breathe Again. Rub on hands and breathe. (Essential oil) Use prn.         Problem List:  Patient Active Problem List    Diagnosis Date Noted     Dupuytren's contracture 07/21/2021     Priority: Medium     Added automatically from request for surgery [id number]       Memory loss 07/20/2016     Priority: Medium     Advanced directives, counseling/discussion 05/20/2015     Priority: Medium     Gave pt packet on 5/20/2015    Syeda Torres CMA         Colon polyps 04/01/2015     Priority: Medium     tubular villous and serrated adenoma       Dyspnea and respiratory abnormality 09/03/2014     Priority: Medium     Problem list name updated by automated process. 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 Provider to review       CARDIOVASCULAR SCREENING; LDL GOAL LESS THAN 160 10/12/2010     Priority: Medium      Plantar fascial fibromatosis 08/07/2008     Priority: Medium     Achilles bursitis or tendinitis 08/07/2008     Priority: Medium        Past Medical/Surgical History:  Past Medical History:   Diagnosis Date     Achilles bursitis or tendinitis 08/07/2008     Advanced directives, counseling/discussion 05/20/2015    Gave pt packet on 5/20/2015  Syeda Torres CMA       CARDIOVASCULAR SCREENING; LDL GOAL LESS THAN 160 10/12/2010     Cholesteatoma, unspecified      Colon polyps 04/2015    tubular villous and serrated adenoma     Complex sleep apnea syndrome     does not use CPAP     Dupuytren's contracture      Dyslexia      Dyspnea and respiratory abnormality 09/03/2014     Problem list name updated by automated process. 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 Provider to review     Plantar fascial fibromatosis 08/07/2008     Past Surgical History:   Procedure Laterality Date     cholesteatoma surgery Left      COLONOSCOPY  10/09/2002; 2015    polyps - needs f/u 5 yrs      COLONOSCOPY N/A 7/20/2021    Procedure: COLONOSCOPY, WITH POLYPECTOMY;  Surgeon: Asaf Guajardo MD;  Location: [facility] OR      REMOVAL OF TONSILS,<11 Y/O  1962       Social History:  Social History     Socioeconomic History     Marital status: Single     Spouse name: Not on file     Number of children: 2     Years of education: 14     Highest education level: Not on file   Occupational History     Occupation:      Comment: Self Employed     Occupation: contractor     Employer: SELF   Tobacco Use     Smoking status: Former Smoker     Quit date: 8/26/2005     Years since quitting: 15.9     Smokeless tobacco: Never Used   Substance and Sexual Activity      Alcohol use: Yes     Comment: 1 drink every 2 wks     Drug use: No     Comment: uses CBD      Sexual activity: Yes     Partners: Female   Other Topics Concern     Parent/sibling w/ CABG, MI or angioplasty before 65F 55M? No      Service Not Asked     Blood Transfusions Not Asked     Caffeine Concern Not Asked     Comment: 3 cups of coffee a day     Occupational Exposure Not Asked     Hobby Hazards Not Asked     Sleep Concern Not Asked     Stress Concern Not Asked     Weight Concern Not Asked     Special Diet Not Asked     Back Care Not Asked     Exercise Not Asked     Comment: Exercise 2 to 3 times a week     Bike Helmet Not Asked     Seat Belt Not Asked     Self-Exams Not Asked   Social History Narrative    Balanced Diet - Yes    Osteoporosis Preventative measures-  Dairy servings per day: no servings daily takes soy milk and almond milk - 2 glasses/day     Regular Exercise -  Yes Describe dance 3 times weekly (salsa), bike ride, and paula    Dental Exam up - YES - Date: 2016    Eye Exam - YES - Date: 2007    Self Testicular Exam -  sometimes    Do you have any concerns about STD's -  Partner has hx of genital herpes    Abuse: Current or Past (Physical, Sexual or Emotional)- No    Do you feel safe in your environment - Yes    Guns stored in the home - Yes, locked away    Sunscreen used - No using coconut     Seatbelts used - Yes    Lipids - YES - Date: 4-28-06    Glucose -  NO    Colon Cancer Screening - Colonoscopy 2002(date completed)    Hemoccults - NO    PSA - YES - Date: 4-28-06    Digital Rectal Exam - YES - Date: 4-28-06    Immunizations reviewed and up to date - Yes, last TD was 11-5-2001 2008, Dania Miller MA             Social Determinants of Health     Financial Resource Strain:      Difficulty of Paying Living Expenses:    Food Insecurity:      Worried About Running Out of Food in the Last Year:      Ran Out of Food in the Last Year:    Transportation Needs:      Lack of Transportation  (Medical):      Lack of Transportation (Non-Medical):    Physical Activity:      Days of Exercise per Week:      Minutes of Exercise per Session:    Stress:      Feeling of Stress :    Social Connections:      Frequency of Communication with Friends and Family:      Frequency of Social Gatherings with Friends and Family:      Attends Sikhism Services:      Active Member of Clubs or Organizations:      Attends Club or Organization Meetings:      Marital Status:    Intimate Partner Violence:      Fear of Current or Ex-Partner:      Emotionally Abused:      Physically Abused:      Sexually Abused:        Family History:  Family History   Problem Relation Age of Onset     Diabetes Mother         diet controlled, Htn     Heart Murmur Mother         TAVR     Diabetes Maternal Grandmother      Coronary Artery Disease Brother      Glaucoma No family hx of      Macular Degeneration No family hx of        Review of Systems:  CONSTITUTIONAL: NEGATIVE for weight gain/loss, fever, chills, sweats or night sweats, drug allergies.  EYES: NEGATIVE for changes in vision, blind spots, double vision.  ENT: NEGATIVE for ear pain, sore throat, sinus pain, post-nasal drip, bloody nose  ENT:  POSITIVE for  runny nose  CARDIAC: NEGATIVE for fast heartbeats or fluttering in chest, chest pain or pressure, breathlessness when lying flat, swollen legs or swollen feet.  NEUROLOGIC: NEGATIVE headaches, weakness or numbness in the arms or legs.  DERMATOLOGIC: NEGATIVE for rashes, new moles or change in mole(s)  PULMONARY: NEGATIVE SOB at rest, SOB with activity, dry cough, productive cough, coughing up blood, wheezing or whistling when breathing.    GASTROINTESTINAL: NEGATIVE for nausea or vomitting, loose or watery stools, fat or grease in stools, abdominal pain, bowel movements black in color or blood noted.  GASTROINTESTINAL:  POSITIVE for  constipation  GENITOURINARY: NEGATIVE for pain during urination, blood in urine, urinating more  frequently than usual, irregular menstrual periods.  MUSCULOSKELETAL: NEGATIVE for muscle pain, bone or joint pain, swollen joints.  ENDOCRINE: NEGATIVE for increased thirst or urination, diabetes.  LYMPHATIC: NEGATIVE for swollen lymph nodes, lumps or bumps in the breasts or nipple discharge.    Physical Examination:  Vitals: There were no vitals taken for this visit.  BMI= 27.0 kg/m   Height: 5 feet 9 inches  Weight: 183 pounds (stated)       [place] Total Score 8/13/2021   Total score - [place] 5       MERVIN Total Score: 8 (08/13/21 1300)    GENERAL APPEARANCE: healthy, alert, no distress and cooperative  EYES: Eyes grossly normal to inspection  RESP: Unlabored, easy breathing with normal conversational speech  CV: color normal  NEURO: Alert and oriented x3, mentation intact and speech normal  PSYCH: mentation appears normal and affect normal/bright  Mallampati Class: Did not examine  Tonsillar Stage: Did not examine    Impression/Plan:  1. Obstructive sleep apnea  - SLEEP DENTAL REFERRAL; Future    This is a pleasant 66-year-old male with a PMH pertinent for epilepsy and mild HUSSAIN who presents today with reports of occasional snoring, gasping and snorting for the last 10+ years.  Last seen via virtual visit with Mayra Knox NP, in November 2014 when they discussed his new diagnosis of mild HUSSAIN, significant supine HUSSAIN component, and treatment options which included mandibular advancement device (MAD) and positional sleep aid.  The patient prefers not to treat this with CPAP therapy as he does not want to wear a mask on his face at night.  He is approximately the same weight since his last sleep study.  He is [place] score is 5/24 consistent with very mild daytime somnolence.  Today, we did discuss treatment options for mild HUSSAIN including mandibular advancement device as well as positional sleep devices.  Patient has elected to treat his mild HUSSAIN with a Sleep Dental Referral for mandibular advancement device (MAD).   We discussed the risks and benefits associated with use of this device.  We also discussed the need for an efficacy HST upon adequate adjustment/titration of the dental device.  He will contact this office for an appointment once this has occurred.    Literature provided regarding sleep apnea with dental device, positional sleep devices.      He will follow up with me in approximately 3 months after obtaining his dental device for efficacy HST.    Efficacy HST with MAD reviewed.  Obstructive sleep apnea reviewed.  Complications of untreated sleep apnea were reviewed.    40 minutes were spent on the date of the encounter doing chart review, history and exam, documentation and further activities as noted above.     PEDRO LUIS Chan CNP  Sleep Medicine    CC: Asaf Craig    This note was written with the assistance of the Dragon voice-dictation technology software. The final document, although reviewed, may contain errors. For corrections, please contact the office.

## 2021-08-16 ENCOUNTER — VIRTUAL VISIT (OUTPATIENT)
Dept: SLEEP MEDICINE | Facility: CLINIC | Age: 66
End: 2021-08-16
Attending: INTERNAL MEDICINE
Payer: COMMERCIAL

## 2021-08-16 DIAGNOSIS — G47.33 OBSTRUCTIVE SLEEP APNEA: Primary | ICD-10-CM

## 2021-08-16 PROCEDURE — 99203 OFFICE O/P NEW LOW 30 MIN: CPT | Mod: 95 | Performed by: NURSE PRACTITIONER

## 2021-08-23 ENCOUNTER — LAB (OUTPATIENT)
Dept: LAB | Facility: CLINIC | Age: 66
End: 2021-08-23
Attending: ORTHOPAEDIC SURGERY

## 2021-08-23 DIAGNOSIS — Z11.59 ENCOUNTER FOR SCREENING FOR OTHER VIRAL DISEASES: ICD-10-CM

## 2021-08-23 LAB — SARS-COV-2 RNA RESP QL NAA+PROBE: NEGATIVE

## 2021-08-23 PROCEDURE — U0003 INFECTIOUS AGENT DETECTION BY NUCLEIC ACID (DNA OR RNA); SEVERE ACUTE RESPIRATORY SYNDROME CORONAVIRUS 2 (SARS-COV-2) (CORONAVIRUS DISEASE [COVID-19]), AMPLIFIED PROBE TECHNIQUE, MAKING USE OF HIGH THROUGHPUT TECHNOLOGIES AS DESCRIBED BY CMS-2020-01-R: HCPCS

## 2021-08-23 PROCEDURE — U0005 INFEC AGEN DETEC AMPLI PROBE: HCPCS

## 2021-08-26 ENCOUNTER — HOSPITAL ENCOUNTER (OUTPATIENT)
Facility: AMBULATORY SURGERY CENTER | Age: 66
End: 2021-08-26
Attending: ORTHOPAEDIC SURGERY
Payer: COMMERCIAL

## 2021-08-26 ENCOUNTER — ANESTHESIA (OUTPATIENT)
Dept: SURGERY | Facility: AMBULATORY SURGERY CENTER | Age: 66
End: 2021-08-26
Payer: COMMERCIAL

## 2021-08-26 VITALS
RESPIRATION RATE: 12 BRPM | DIASTOLIC BLOOD PRESSURE: 87 MMHG | OXYGEN SATURATION: 95 % | HEIGHT: 69 IN | TEMPERATURE: 97 F | BODY MASS INDEX: 27.11 KG/M2 | SYSTOLIC BLOOD PRESSURE: 140 MMHG | HEART RATE: 55 BPM | WEIGHT: 183 LBS

## 2021-08-26 DIAGNOSIS — M72.0 DUPUYTREN'S CONTRACTURE: ICD-10-CM

## 2021-08-26 PROCEDURE — 26123 RELEASE PALM CONTRACTURE: CPT | Mod: RT

## 2021-08-26 PROCEDURE — 26123 RELEASE PALM CONTRACTURE: CPT | Mod: F9 | Performed by: ORTHOPAEDIC SURGERY

## 2021-08-26 RX ORDER — MEPERIDINE HYDROCHLORIDE 25 MG/ML
12.5 INJECTION INTRAMUSCULAR; INTRAVENOUS; SUBCUTANEOUS
Status: DISCONTINUED | OUTPATIENT
Start: 2021-08-26 | End: 2021-08-27 | Stop reason: HOSPADM

## 2021-08-26 RX ORDER — METHOCARBAMOL 750 MG/1
750 TABLET, FILM COATED ORAL
Status: DISCONTINUED | OUTPATIENT
Start: 2021-08-26 | End: 2021-08-27 | Stop reason: HOSPADM

## 2021-08-26 RX ORDER — CEFAZOLIN SODIUM 2 G/50ML
2 SOLUTION INTRAVENOUS
Status: DISCONTINUED | OUTPATIENT
Start: 2021-08-26 | End: 2021-08-26 | Stop reason: HOSPADM

## 2021-08-26 RX ORDER — FENTANYL CITRATE 50 UG/ML
25-50 INJECTION, SOLUTION INTRAMUSCULAR; INTRAVENOUS EVERY 5 MIN PRN
Status: ACTIVE | OUTPATIENT
Start: 2021-08-26 | End: 2021-08-26

## 2021-08-26 RX ORDER — ONDANSETRON 4 MG/1
4 TABLET, ORALLY DISINTEGRATING ORAL EVERY 6 HOURS PRN
Status: DISCONTINUED | OUTPATIENT
Start: 2021-08-26 | End: 2021-08-27 | Stop reason: HOSPADM

## 2021-08-26 RX ORDER — KETOROLAC TROMETHAMINE 30 MG/ML
15 INJECTION, SOLUTION INTRAMUSCULAR; INTRAVENOUS
Status: DISCONTINUED | OUTPATIENT
Start: 2021-08-26 | End: 2021-08-27 | Stop reason: HOSPADM

## 2021-08-26 RX ORDER — NALOXONE HYDROCHLORIDE 0.4 MG/ML
0.2 INJECTION, SOLUTION INTRAMUSCULAR; INTRAVENOUS; SUBCUTANEOUS
Status: DISCONTINUED | OUTPATIENT
Start: 2021-08-26 | End: 2021-08-26 | Stop reason: HOSPADM

## 2021-08-26 RX ORDER — OXYCODONE HYDROCHLORIDE 5 MG/1
5 TABLET ORAL
Status: DISCONTINUED | OUTPATIENT
Start: 2021-08-26 | End: 2021-08-27 | Stop reason: HOSPADM

## 2021-08-26 RX ORDER — LIDOCAINE HYDROCHLORIDE 20 MG/ML
INJECTION, SOLUTION INFILTRATION; PERINEURAL PRN
Status: DISCONTINUED | OUTPATIENT
Start: 2021-08-26 | End: 2021-08-26

## 2021-08-26 RX ORDER — ACETAMINOPHEN 325 MG/1
650 TABLET ORAL
Status: DISCONTINUED | OUTPATIENT
Start: 2021-08-26 | End: 2021-08-27 | Stop reason: HOSPADM

## 2021-08-26 RX ORDER — FENTANYL CITRATE 50 UG/ML
25-50 INJECTION, SOLUTION INTRAMUSCULAR; INTRAVENOUS
Status: DISCONTINUED | OUTPATIENT
Start: 2021-08-26 | End: 2021-08-26 | Stop reason: HOSPADM

## 2021-08-26 RX ORDER — SODIUM CHLORIDE, SODIUM LACTATE, POTASSIUM CHLORIDE, CALCIUM CHLORIDE 600; 310; 30; 20 MG/100ML; MG/100ML; MG/100ML; MG/100ML
INJECTION, SOLUTION INTRAVENOUS CONTINUOUS
Status: DISCONTINUED | OUTPATIENT
Start: 2021-08-26 | End: 2021-08-27 | Stop reason: HOSPADM

## 2021-08-26 RX ORDER — ACETAMINOPHEN 325 MG/1
975 TABLET ORAL ONCE
Status: COMPLETED | OUTPATIENT
Start: 2021-08-26 | End: 2021-08-26

## 2021-08-26 RX ORDER — PROPOFOL 10 MG/ML
INJECTION, EMULSION INTRAVENOUS CONTINUOUS PRN
Status: DISCONTINUED | OUTPATIENT
Start: 2021-08-26 | End: 2021-08-26

## 2021-08-26 RX ORDER — ONDANSETRON 2 MG/ML
INJECTION INTRAMUSCULAR; INTRAVENOUS PRN
Status: DISCONTINUED | OUTPATIENT
Start: 2021-08-26 | End: 2021-08-26

## 2021-08-26 RX ORDER — NALOXONE HYDROCHLORIDE 0.4 MG/ML
0.2 INJECTION, SOLUTION INTRAMUSCULAR; INTRAVENOUS; SUBCUTANEOUS
Status: DISCONTINUED | OUTPATIENT
Start: 2021-08-26 | End: 2021-08-27 | Stop reason: HOSPADM

## 2021-08-26 RX ORDER — SODIUM CHLORIDE, SODIUM LACTATE, POTASSIUM CHLORIDE, CALCIUM CHLORIDE 600; 310; 30; 20 MG/100ML; MG/100ML; MG/100ML; MG/100ML
INJECTION, SOLUTION INTRAVENOUS CONTINUOUS
Status: DISCONTINUED | OUTPATIENT
Start: 2021-08-26 | End: 2021-08-26 | Stop reason: HOSPADM

## 2021-08-26 RX ORDER — OXYCODONE HYDROCHLORIDE 5 MG/1
5 TABLET ORAL EVERY 4 HOURS PRN
Status: DISCONTINUED | OUTPATIENT
Start: 2021-08-26 | End: 2021-08-27 | Stop reason: HOSPADM

## 2021-08-26 RX ORDER — ACETAMINOPHEN 325 MG/1
650 TABLET ORAL EVERY 4 HOURS PRN
Qty: 50 TABLET | Refills: 0 | Status: SHIPPED | OUTPATIENT
Start: 2021-08-26 | End: 2021-12-23

## 2021-08-26 RX ORDER — KETOROLAC TROMETHAMINE 30 MG/ML
INJECTION, SOLUTION INTRAMUSCULAR; INTRAVENOUS PRN
Status: DISCONTINUED | OUTPATIENT
Start: 2021-08-26 | End: 2021-08-26

## 2021-08-26 RX ORDER — NALOXONE HYDROCHLORIDE 0.4 MG/ML
0.4 INJECTION, SOLUTION INTRAMUSCULAR; INTRAVENOUS; SUBCUTANEOUS
Status: DISCONTINUED | OUTPATIENT
Start: 2021-08-26 | End: 2021-08-27 | Stop reason: HOSPADM

## 2021-08-26 RX ORDER — ONDANSETRON 2 MG/ML
4 INJECTION INTRAMUSCULAR; INTRAVENOUS EVERY 6 HOURS PRN
Status: DISCONTINUED | OUTPATIENT
Start: 2021-08-26 | End: 2021-08-27 | Stop reason: HOSPADM

## 2021-08-26 RX ORDER — ONDANSETRON 4 MG/1
4 TABLET, ORALLY DISINTEGRATING ORAL
Status: DISCONTINUED | OUTPATIENT
Start: 2021-08-26 | End: 2021-08-27 | Stop reason: HOSPADM

## 2021-08-26 RX ORDER — FENTANYL CITRATE 50 UG/ML
25 INJECTION, SOLUTION INTRAMUSCULAR; INTRAVENOUS EVERY 5 MIN PRN
Status: DISCONTINUED | OUTPATIENT
Start: 2021-08-26 | End: 2021-08-26 | Stop reason: HOSPADM

## 2021-08-26 RX ORDER — ONDANSETRON 4 MG/1
4 TABLET, ORALLY DISINTEGRATING ORAL EVERY 30 MIN PRN
Status: DISCONTINUED | OUTPATIENT
Start: 2021-08-26 | End: 2021-08-27 | Stop reason: HOSPADM

## 2021-08-26 RX ORDER — CEFAZOLIN SODIUM 2 G/50ML
2 SOLUTION INTRAVENOUS SEE ADMIN INSTRUCTIONS
Status: DISCONTINUED | OUTPATIENT
Start: 2021-08-26 | End: 2021-08-26 | Stop reason: HOSPADM

## 2021-08-26 RX ORDER — ONDANSETRON 2 MG/ML
4 INJECTION INTRAMUSCULAR; INTRAVENOUS EVERY 30 MIN PRN
Status: DISCONTINUED | OUTPATIENT
Start: 2021-08-26 | End: 2021-08-27 | Stop reason: HOSPADM

## 2021-08-26 RX ORDER — PROCHLORPERAZINE MALEATE 5 MG
5 TABLET ORAL EVERY 6 HOURS PRN
Status: DISCONTINUED | OUTPATIENT
Start: 2021-08-26 | End: 2021-08-27 | Stop reason: HOSPADM

## 2021-08-26 RX ORDER — HYDROMORPHONE HYDROCHLORIDE 1 MG/ML
0.2 INJECTION, SOLUTION INTRAMUSCULAR; INTRAVENOUS; SUBCUTANEOUS EVERY 5 MIN PRN
Status: DISCONTINUED | OUTPATIENT
Start: 2021-08-26 | End: 2021-08-26 | Stop reason: HOSPADM

## 2021-08-26 RX ORDER — BUPIVACAINE HYDROCHLORIDE 5 MG/ML
INJECTION, SOLUTION EPIDURAL; INTRACAUDAL
Status: COMPLETED | OUTPATIENT
Start: 2021-08-26 | End: 2021-08-26

## 2021-08-26 RX ORDER — DEXAMETHASONE SODIUM PHOSPHATE 4 MG/ML
INJECTION, SOLUTION INTRA-ARTICULAR; INTRALESIONAL; INTRAMUSCULAR; INTRAVENOUS; SOFT TISSUE PRN
Status: DISCONTINUED | OUTPATIENT
Start: 2021-08-26 | End: 2021-08-26

## 2021-08-26 RX ORDER — OXYCODONE HYDROCHLORIDE 5 MG/1
5-10 TABLET ORAL EVERY 4 HOURS PRN
Qty: 16 TABLET | Refills: 0 | Status: SHIPPED | OUTPATIENT
Start: 2021-08-26 | End: 2021-12-23

## 2021-08-26 RX ORDER — ALBUTEROL SULFATE 0.83 MG/ML
2.5 SOLUTION RESPIRATORY (INHALATION) EVERY 4 HOURS PRN
Status: DISCONTINUED | OUTPATIENT
Start: 2021-08-26 | End: 2021-08-26 | Stop reason: HOSPADM

## 2021-08-26 RX ORDER — LIDOCAINE 40 MG/G
CREAM TOPICAL
Status: DISCONTINUED | OUTPATIENT
Start: 2021-08-26 | End: 2021-08-26 | Stop reason: HOSPADM

## 2021-08-26 RX ORDER — FLUMAZENIL 0.1 MG/ML
0.2 INJECTION, SOLUTION INTRAVENOUS
Status: DISCONTINUED | OUTPATIENT
Start: 2021-08-26 | End: 2021-08-26 | Stop reason: HOSPADM

## 2021-08-26 RX ORDER — CEFAZOLIN SODIUM 1 G/3ML
INJECTION, POWDER, FOR SOLUTION INTRAMUSCULAR; INTRAVENOUS PRN
Status: DISCONTINUED | OUTPATIENT
Start: 2021-08-26 | End: 2021-08-26

## 2021-08-26 RX ORDER — NALOXONE HYDROCHLORIDE 0.4 MG/ML
0.4 INJECTION, SOLUTION INTRAMUSCULAR; INTRAVENOUS; SUBCUTANEOUS
Status: DISCONTINUED | OUTPATIENT
Start: 2021-08-26 | End: 2021-08-26 | Stop reason: HOSPADM

## 2021-08-26 RX ORDER — LABETALOL HYDROCHLORIDE 5 MG/ML
10 INJECTION, SOLUTION INTRAVENOUS
Status: DISCONTINUED | OUTPATIENT
Start: 2021-08-26 | End: 2021-08-26 | Stop reason: HOSPADM

## 2021-08-26 RX ORDER — FLUMAZENIL 0.1 MG/ML
0.2 INJECTION, SOLUTION INTRAVENOUS
Status: DISCONTINUED | OUTPATIENT
Start: 2021-08-26 | End: 2021-08-27 | Stop reason: HOSPADM

## 2021-08-26 RX ORDER — HYDRALAZINE HYDROCHLORIDE 20 MG/ML
2.5-5 INJECTION INTRAMUSCULAR; INTRAVENOUS EVERY 10 MIN PRN
Status: DISCONTINUED | OUTPATIENT
Start: 2021-08-26 | End: 2021-08-26 | Stop reason: HOSPADM

## 2021-08-26 RX ADMIN — KETOROLAC TROMETHAMINE 15 MG: 30 INJECTION, SOLUTION INTRAMUSCULAR; INTRAVENOUS at 13:49

## 2021-08-26 RX ADMIN — ACETAMINOPHEN 975 MG: 325 TABLET ORAL at 10:40

## 2021-08-26 RX ADMIN — LIDOCAINE HYDROCHLORIDE 60 MG: 20 INJECTION, SOLUTION INFILTRATION; PERINEURAL at 12:21

## 2021-08-26 RX ADMIN — ONDANSETRON 4 MG: 2 INJECTION INTRAMUSCULAR; INTRAVENOUS at 12:24

## 2021-08-26 RX ADMIN — DEXAMETHASONE SODIUM PHOSPHATE 4 MG: 4 INJECTION, SOLUTION INTRA-ARTICULAR; INTRALESIONAL; INTRAMUSCULAR; INTRAVENOUS; SOFT TISSUE at 12:24

## 2021-08-26 RX ADMIN — BUPIVACAINE HYDROCHLORIDE 15 ML: 5 INJECTION, SOLUTION EPIDURAL; INTRACAUDAL at 12:08

## 2021-08-26 RX ADMIN — PROPOFOL 100 MCG/KG/MIN: 10 INJECTION, EMULSION INTRAVENOUS at 12:21

## 2021-08-26 RX ADMIN — CEFAZOLIN SODIUM 2 G: 1 INJECTION, POWDER, FOR SOLUTION INTRAMUSCULAR; INTRAVENOUS at 12:16

## 2021-08-26 RX ADMIN — SODIUM CHLORIDE, SODIUM LACTATE, POTASSIUM CHLORIDE, CALCIUM CHLORIDE: 600; 310; 30; 20 INJECTION, SOLUTION INTRAVENOUS at 11:39

## 2021-08-26 ASSESSMENT — MIFFLIN-ST. JEOR: SCORE: 1600.46

## 2021-08-26 NOTE — OR NURSING
Pt had right supraclavicular block done w/out exparel. Sedation given tolerated well. Continue to monitor.

## 2021-08-26 NOTE — ANESTHESIA CARE TRANSFER NOTE
Patient: Chad Olivares    Procedure(s):  Right fifth finger and palm Dupuytren's contracture release    Diagnosis: Dupuytren's contracture [M72.0]  Diagnosis Additional Information: No value filed.    Anesthesia Type:   MAC     Note:    Oropharynx: spontaneously breathing  Level of Consciousness: awake  Oxygen Supplementation: room air    Independent Airway: airway patency satisfactory and stable  Dentition: dentition unchanged  Vital Signs Stable: post-procedure vital signs reviewed and stable  Report to RN Given: handoff report given  Patient transferred to: Phase II    Handoff Report: Identifed the Patient, Identified the Reponsible Provider, Reviewed the pertinent medical history, Discussed the surgical course, Reviewed Intra-OP anesthesia mangement and issues during anesthesia, Set expectations for post-procedure period and Allowed opportunity for questions and acknowledgement of understanding      Vitals:  Vitals Value Taken Time   /88 08/26/21 1401   Temp 36.1  C (97  F) 08/26/21 1401   Pulse 59    Resp 12 08/26/21 1401   SpO2 94 % 08/26/21 1401       Electronically Signed By: PEDRO LUIS Hudson CRNA  August 26, 2021  2:03 PM

## 2021-08-26 NOTE — BRIEF OP NOTE
[facility] [place]    Brief Operative Note    Pre-operative diagnosis: Dupuytren's contracture [M72.0]  Post-operative diagnosis Same as pre-operative diagnosis    Procedure: Procedure(s):  Right fifth finger and palm Dupuytren's contracture release  Surgeon: Surgeon(s) and Role:     * Caron Farrell MD - Primary     * Cordell Koo MD - Resident - Assisting  Anesthesia: Regional   Estimated blood loss: Less than 10 ml  Drains: None  Specimens:   ID Type Source Tests Collected by Time Destination   1 : Dupuytren's contracture Tissue Hand, Right SURGICAL PATHOLOGY EXAM Caron Farrell MD 8/26/2021  1:19 PM      Findings:   Spiral cord with displacement of the ulnar digital neurovascular bundle. Able to get PIP joint about 10 degrees shy of full extension  Complications: None.  Implants: * No implants in log *      Splint until follow up when begin hand therapy at that time    Cordell Koo MD  Orthopaedic Surgery, PGY4  [phone]

## 2021-08-26 NOTE — ANESTHESIA PROCEDURE NOTES
Supraclavicular Procedure Note  Pre-Procedure   Staff -        Anesthesiologist:  Oseas Ayers MD       Performed By: anesthesiologist       Location: pre-op       Procedure Start/Stop Times: 8/26/2021 12:00 PM and 8/26/2021 12:10 PM       Pre-Anesthestic Checklist: patient identified, IV checked, site marked, risks and benefits discussed, informed consent, monitors and equipment checked, pre-op evaluation, at physician/surgeon's request and post-op pain management  Timeout:       Correct Patient: Yes        Correct Procedure: Yes        Correct Site: Yes        Correct Position: Yes        Correct Laterality: Yes        Site Marked: Yes  Procedure Documentation  Procedure: Supraclavicular       Laterality: right       Patient Position: supine       Patient Prep/Sterile Barriers: sterile gloves, mask, patient draped       Skin prep: Chloraprep       Ultrasound guided       1. Ultrasound was used to identify targeted nerve, plexus, vascular marker, or fascial plane and place a needle adjacent to it in real-time.       2. Ultrasound was used to visualize the spread of anesthetic in close proximity to the above referenced structure.       3. A permanent image is entered into the patient's record.       4. The visualized anatomic structures appeared normal.       5. There were no apparent abnormal pathologic findings.    Assessment/Narrative         The placement was negative for: blood aspirated, painful injection and site bleeding       Paresthesias: No.     Bolus given via needle..        Secured via.        Insertion/Infusion Method: Single Shot    Medication(s) Administered   Bupivacaine 0.5% PF (Infiltration), 15 mL  Mepivacaine 2% PF (Perineural), 10 mL  Medication Administration Time: 8/26/2021 12:08 PM

## 2021-08-26 NOTE — OP NOTE
Procedure Date: 08/26/2021    PREOPERATIVE DIAGNOSIS:  Right hand palm and fifth finger Dupuytren's contracture.    POSTOPERATIVE DIAGNOSIS:  Right hand palm and fifth finger Dupuytren's contracture.    PROCEDURE:  Right hand palm and fifth finger Dupuytren's contracture release and excision.    SURGEON:  Caron Farrell MD    FIRST ASSISTANT:  Cordell Koo MD    ANESTHESIA:  Regional.      TOURNIQUET TIME:  56 minutes.    INDICATIONS FOR PROCEDURE:  This 66-year-old male presents with a 60-degree contracture of his PIP joint of his small finger with a large nodule in the spiral cord location on the ulnar side as well as extending into the palm with a pit in the palm present.  Risks, benefits and alternatives of surgical excision of the above-noted pathology were discussed with the patient, questions answered and consent obtained.  Site and side were signed and verified.  Specifically, it was discussed the risks of anesthesia, infection, scar formation, persistent stiffness and contracture despite excision, recurrence, and digital neurovascular injury, as well as the need for postoperative rehabilitation to maximize surgical results.  He appeared to be in full understanding.    DESCRIPTION OF PROCEDURE:  The patient was brought to the operating room suite after regional anesthesia had been administered, the right arm was sterilely prepped and draped in the usual manner.  After timeout verifying site and side, the limb was elevated and exsanguinated and the tourniquet inflated to 250 mmHg.  A José Luis incision was made from the mid palm to the DIP crease.  Each of the flaps was elevated in a full-thickness manner.  On the palmar part, there was a pit in the skin and this was elevated from around the pit.  The skin was peeled off the volar aspect of the large nodule around the proximal phalanx.    The digital nerve then identified.   It was identified over the hypothenar muscle, and then it was traced and came to  midline, and as the spiral cord wrapped around the mass towards the radial side and then back to the ulnar side.  The ulnar dorsal sensory branch was also identified and protected, as there was a small retrovascular component.  The radial digital branch was identified and protected, as it came back to the common digital, the palmar nodule was identified.    After each of the nerves was identified, the palmar fascia was transected.  The flexor tendon was identified underneath.  The nodule was then reflected distally.  There were some small branches that came directly into the nodule that were taken as the nodule was excised, but the entirety of the digital sensory branches were intact upon completion of the excision.  The palmar nodule was then excised as well.  Tourniquet was deflated, hemostasis obtained, and 4-0 nylon sutures were placed.  At the end of the procedure, the patient had approximately a 10-15 degree PIP contracture.  This was deemed satisfactory, as the deformity had been present for greater than 3 years and was not sufficient to require a joint capsular release, as this should respond to postoperative PIP extension splinting.    The patient was closed using 4-0 nylon suture.  A soft dressing was applied followed by a volar splint, splinting in full extension.  The patient was awoken and taken to PACU in satisfactory condition with no apparent intraoperative complications.    The patient will be seen back in clinic in 1 week for removal of his postoperative management, assessment for possible suture removal, and nighttime extension splinting, with daytime flexion and wound management consistent with a Dupuytren's protocol.      Caron Farrell MD        D: 2021   T: 2021   MT: court    Name:     ANGEL CARROLL  MRN:      [id number]        Account:        [id number]   :      1955           Procedure Date: 2021     Document: [id number]

## 2021-08-26 NOTE — DISCHARGE INSTRUCTIONS
"[facility]  Home Care Following Anesthesia  For 24 hours after surgery:  1. Get plenty of rest.  A responsible adult must stay with you for at least 24 hours after you leave the surgery center.  2. Do not drive or use heavy equipment.  If you have weakness or tingling, don't drive or use heavy equipment until this feeling goes away.   3. Do not drink alcohol.   4. Avoid strenuous or risky activities.  Ask for help when climbing stairs.  5. You may feel lightheaded.  IF so, sit for a few minutes before standing.  Have someone help you get up.   6. If you have nausea (feel sick to your stomach): Drink only clear liquids such as apple juice, ginger ale, broth or 7-Up.  Rest may also help.  Be sure to drink enough fluids.  Move to a regular diet as you feel able.   7. You may have a slight fever.  Call the doctor if your fever is over 100 F (37.7 C) (taken under the tongue) or lasts longer than 24 hours.  8. You may have a dry mouth, a sore throat, muscle aches or trouble sleeping. These should go away after 24 hours.  9. Do not make important or legal decisions.   10. It is recommended to avoid smoking.        Today you received a Marcaine or bupivacaine block to numb the nerves near your surgery site.  This is a block using local anesthetic or \"numbing\" medication injected around the nerves to anesthetize or \"numb\" the area supplied by those nerves.  This block is injected into the muscle layer near your surgical site.  The medication may numb the location where you had surgery for 6-18 hours, but may last up to 24 hours.  If your surgical site is an arm or leg you should be careful with your affected limb, since it is possible to injure your limb without being aware of it due to the numbing.  Until full feeling returns, you should guard against bumping or hitting your limb, and avoid extreme hot or cold temperatures on the skin.  As the block wears off, the feeling will return as " a tingling or prickly sensation near your surgical site.  You will experience more discomfort from your incision as the feeling returns.  You may want to take a pain pill (a narcotic or Tylenol if this was prescribed by your surgeon) when you start to experience mild pain before the pain beccomes more severe.  If your pain medications do not control your pain you should notifiy your surgeon.    Tips for taking pain medications  To get the best pain relief possible, remember these points:    Take pain medications as directed, before pain becomes severe.    Pain medication can upset your stomach: taking it with food may help.    Constipation is a common side effect of pain medication. Drink plenty of  fluids.    Eat foods high in fiber. Take a stool softener if recommended by your doctor or pharmacist.    Do not drink alcohol, drive or operate machinery while taking pain medications.    Ask about other ways to control pain, such as with heat, ice or relaxation.    Tylenol/Acetaminophen Consumption  To help encourage the safe use of acetaminophen, the makers of TYLENOL  have lowered the maximum daily dose for single-ingredient Extra Strength TYLENOL  (acetaminophen) products sold in the [place] from 8 pills per day (4,000 mg) to 6 pills per day (3,000 mg). The dosing interval has also changed from 2 pills every 4-6 hours to 2 pills every 6 hours.    If you feel your pain relief is insufficient, you may take Tylenol/Acetaminophen in addition to your narcotic pain medication.     Be careful not to exceed 3,000 mg of Tylenol/Acetaminophen in a 24 hour period from all sources.    If you are taking extra strength Tylenol/acetaminophen (500 mg), the maximum dose is 6 tablets in 24 hours.    If you are taking regular strength acetaminophen (325 mg), the maximum dose is 9 tablets in 24 hours.    Tylenol 975mg given at 10:40am. Next dose available after 4:40pm. Then follow package instructions.     Call a doctor for any of the  followin. Signs of infection (fever, growing tenderness at the surgery site, a large amount of drainage or bleeding, severe pain, foul-smelling drainage, redness, swelling).  2. It has been over 8 to 10 hours since surgery and you are still not able to urinate (pass water).  3. Headache for over 24 hours.  4. Numbness, tingling or weakness the day after surgery (if you had spinal anesthesia).  5. Signs of Covid-19 infection (temperature over 100 degrees, shortness of breath, cough, loss of taste/smell, generalized body aches, persistent headache, chills, sore throat, nausea/vomiting/diarrhea)  Your doctor is:       Dr. Caron Farrell, Orthopaedics: [phone]               Or dial [phone] and ask for the resident on call for:  Orthopaedics  For emergency care, call the:  [facility] [place] [facility]: [phone] (TTY for hearing impaired: [phone])

## 2021-08-26 NOTE — ANESTHESIA POSTPROCEDURE EVALUATION
Patient: Chad Olivares    Procedure(s):  Right fifth finger and palm Dupuytren's contracture release    Diagnosis:Dupuytren's contracture [M72.0]  Diagnosis Additional Information: No value filed.    Anesthesia Type:  MAC    Note:  Disposition: Outpatient   Postop Pain Control: Uneventful            Sign Out: Well controlled pain   PONV: No   Neuro/Psych: Uneventful            Sign Out: Acceptable/Baseline neuro status   Airway/Respiratory: Uneventful            Sign Out: Acceptable/Baseline resp. status   CV/Hemodynamics: Uneventful            Sign Out: Acceptable CV status; No obvious hypovolemia; No obvious fluid overload   Other NRE: NONE   DID A NON-ROUTINE EVENT OCCUR? No           Last vitals:  Vitals Value Taken Time   /87 08/26/21 1430   Temp 36.1  C (97  F) 08/26/21 1430   Pulse     Resp 12 08/26/21 1430   SpO2 95 % 08/26/21 1430       Electronically Signed By: Oseas Ayers MD, MD  August 26, 2021  3:06 PM

## 2021-09-03 ENCOUNTER — OFFICE VISIT (OUTPATIENT)
Dept: ORTHOPEDICS | Facility: CLINIC | Age: 66
End: 2021-09-03
Payer: COMMERCIAL

## 2021-09-03 ENCOUNTER — THERAPY VISIT (OUTPATIENT)
Dept: OCCUPATIONAL THERAPY | Facility: CLINIC | Age: 66
End: 2021-09-03
Payer: COMMERCIAL

## 2021-09-03 DIAGNOSIS — M79.644 PAIN OF FINGER OF RIGHT HAND: Primary | ICD-10-CM

## 2021-09-03 DIAGNOSIS — M25.641 FINGER STIFFNESS, RIGHT: ICD-10-CM

## 2021-09-03 DIAGNOSIS — M72.0 DUPUYTREN'S CONTRACTURE: ICD-10-CM

## 2021-09-03 DIAGNOSIS — M72.0 DUPUYTREN'S CONTRACTURE: Primary | ICD-10-CM

## 2021-09-03 PROCEDURE — 97110 THERAPEUTIC EXERCISES: CPT | Mod: GO | Performed by: OCCUPATIONAL THERAPIST

## 2021-09-03 PROCEDURE — 97760 ORTHOTIC MGMT&TRAING 1ST ENC: CPT | Mod: GO | Performed by: OCCUPATIONAL THERAPIST

## 2021-09-03 PROCEDURE — 97165 OT EVAL LOW COMPLEX 30 MIN: CPT | Mod: GO | Performed by: OCCUPATIONAL THERAPIST

## 2021-09-03 PROCEDURE — 97535 SELF CARE MNGMENT TRAINING: CPT | Mod: GO | Performed by: OCCUPATIONAL THERAPIST

## 2021-09-03 PROCEDURE — 99024 POSTOP FOLLOW-UP VISIT: CPT

## 2021-09-03 NOTE — PROGRESS NOTES
Hand Therapy Initial Evaluation    Current Date:  9/3/2021    Diagnosis:  Right hand palm and fifth finger Dupuytren's contracture.      PROCEDURE:  Right hand palm and fifth finger Dupuytren's contracture release and excision.     SURGEON:  Caron Farrell MD    DOI:  about 20 years ago, ran a screwgun into the small finger, then over the years kept moving  DOI: MD VISIT: 7/8/21   DOS:  8/26/21    Post:  1w 1d    Next MD visit: about 4 weeks    MD Orders: The patient will be seen back in clinic in 1 week for removal of his postoperative management, assessment for possible suture removal, and nighttime extension splinting, with daytime flexion and wound management consistent with a Dupuytren's protocol.      Initial Subjective:  Chad Olivares is a 66 year old  right  hand dominant male.    Patient reports symptoms of pain, stiffness/loss of motion, weakness/loss of strength, edema, numbness and tingling  of the right hand, ring finger and small finger which occurred due to dupuytren's contracture. Since onset symptoms are Gradually getting better.  Special tests:  x-ray.  Previous treatment: out of bulky dressing today.    General health as reported by patient is good  excellent.  Pertinent medical history includes:  Past Medical History:   Diagnosis Date     Achilles bursitis or tendinitis 08/07/2008     Advanced directives, counseling/discussion 05/20/2015    Gave pt packet on 5/20/2015  Syeda Torres CMA       CARDIOVASCULAR SCREENING; LDL GOAL LESS THAN 160 10/12/2010     Cholesteatoma, unspecified      Colon polyps 04/2015    tubular villous and serrated adenoma     Complex sleep apnea syndrome     does not use CPAP     Dupuytren's contracture      Dyslexia      Dyspnea and respiratory abnormality 09/03/2014     Problem list name updated by automated process. 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 Provider to review     Plantar fascial fibromatosis 08/07/2008        Medical allergies:   Allergies   Allergen Reactions     Gluten Meal      Seasonal Allergies       Surgical history:   Past Surgical History:   Procedure Laterality Date     cholesteatoma surgery Left      COLONOSCOPY  10/09/2002; 2015    polyps - needs f/u 5 yrs      COLONOSCOPY N/A 7/20/2021    Procedure: COLONOSCOPY, WITH POLYPECTOMY;  Surgeon: Asaf Guajardo MD;  [place] [facility] [place]      REMOVAL OF TONSILS,<13 Y/O  1962     RELEASE DUPUYTRENS CONTRACTURE Right 8/26/2021    Procedure: Right fifth finger and palm Dupuytren's contracture release;  Surgeon: Caron Farrell MD;  [place] [facility] [place]      Medication history:   Current Outpatient Medications   Medication     acetaminophen (TYLENOL) 325 MG tablet     cannabidiol (EPIDIOLEX) 100 MG/ML oral solution     lamoTRIgine (LAMICTAL) 100 MG tablet     medical cannabis oral liquid  (Patient's own supply.  Not a prescription)     NONFORMULARY     oxyCODONE (ROXICODONE) 5 MG tablet     No current facility-administered medications for this visit.       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, real estate investments and self employed  Currently working in normal job without restrictions  Job Tasks: Driving, Lifting, Carrying, Operating a Machine, Assembly, Pushing, Pulling, Repetitive Tasks  Prior functional level:  no limitations  Barriers include:none, 2 dogs, partner  Mobility: No difficulty  Transportation: drives and bicycles  Leisure activities/hobbies: yoga, hard to get hand into pocket, dancing,       Functional Outcome Measure:   9/3/2021  Upper Extremity Functional Index Score:  SCORE:   Column Totals: /80: 75   (A lower score indicates greater  disability.)      Objective:  Pain Level (Scale 0-10)   9/3/2021   At Rest 0   With Use 4-5     Pain Description  Date 9/3/2021   Location ring finger and small finger   Pain Quality Aching, Pressure, Sharp and Tender   Frequency intermittent     Pain is worst  daytime   Exacerbated by  exercise., moving the fingers     Relieved by cold, rest and RX tylenol   Progression improving     Edema (Circumference measured in cm)   9/3/2021 9/3/2021   Right  RIng Small   P1 7.5 8.3   PIP     P2         Wound/Scar: tender, hypersensitive and clean without drainage   Sensitivity: very sensitive at volar finger, small finger Quality:  Closed and stitches out today     Sensation  Decreased Ulnar Nerve distribution per pt report and of the small finger only    ROM  HAND 9/3/2021   AROM(PROM) right   Index MP WNL   PIP    DIP    RIZZO    Long MP WNL   PIP    DIP    RIZZO    Ring MP 0/63   PIP -5/72   DIP 0/55   RIZZO    Small MP He/42   PIP -35/55   DIP 0/20   RIZZO      Strength: Contraindicated    Assessment:  Patient presents with symptoms consistent with diagnosis of right hand, ring finger and small finger Dupuytren's contracture, with surgical  intervention.     Patient's limitations or Problem List includes:  Pain, Decreased ROM/motion, Increased edema, Sensory disturbance, Decreased stability and Decreased coordination of the right ring and small finger which interferes with the patient's ability to perform Self Care Tasks (hygiene/toileting), Work Tasks, Recreational Activities and Household Chores as compared to previous level of function.    Rehab Potential:  Excellent - Return to full activity, no limitations    Patient will benefit from skilled Occupational Therapy to increase ROM, flexibility,  strength, pinch strength, coordination and sensation and decrease pain, edema and adherence of scarring to return to previous activity level and resume normal daily tasks and to reach their rehab potential.    Barriers to  Learning:  No barrier    Communication Issues:  Patient appears to be able to clearly communicate and understand verbal and written communication and follow directions correctly.    Chart Review: Chart Review, Expanded review of medical and/or therapy records and additional review of physical, cognitive or psychosocial history related to current functional performance and Detailed history review with patient    Identified Performance Deficits: toileting, home establishment and management, work and leisure activities    Assessment of Occupational Performance:  5 or more Performance Deficits    Clinical Decision Making (Complexity): Low to moderate complexity    Treatment Explanation:  The following has been discussed with the patient:    RX ordered/plan of care  Anticipated outcomes  Possible risks and side effects    Plan:  Frequency:  1 X week, once daily  Duration:  for 8 weeks    Treatment Plan:    Modalities:    US, Fluidotherapy and Paraffin   Therapeutic Exercise:    AROM, AAROM, PROM, Tendon Gliding, Blocking, Reverse Blocking, Isotonics and Isometrics  Therapeutic Activities:   Functional activities   Using hand in all daily and work tasks  Neuromuscular re-ed:   Coordination/Dexterity, Sensory re-education, Desensitization and Kinesiotaping  Manual Techniques:   Scar mobilization, Myofascial release and Manual edema mobilization  Orthotic Fabrication:    Static, Hand based RIng and Small finger extension orthosis  Self Care:    Ergonomic Considerations    Discharge Plan:    Achieve all LTG.  Independent in home treatment program.  Reach maximal therapeutic benefit.    Home Exercise Program:  Exercise Name: Orthosis Wear and Care, Notes: Wear your splint full time, remove only for exercises and showers. Move your fingers when in the splint. Do your exercises 4-6-8x / day, also  move your fingers and thumb,  then put the splint back on. Wear the splint at night or naps  Exercise Name: Edema Control, Notes:  Swelling management:   Elevate the affected arm above heart level 10 minutes on the hour, every waking hour, open/close fingers 5-10x.   When you are resting (seated or lying down): Use pillows to keep the arm up above the heart  Keep elbow more straight while elevated, or raise your arm overhead and open/close your fingers, and you can straighten and bend your arm overhead, as well.   Fingers: Keep Cohesive bandage (coban) on fingers to reduce swelling  Exercise Name: Finger Active Range of Motion DIP Joint Blocking, Sets: 1-2 - Reps: 5-10 repetitions each set - Sessions: 3-5 , Notes: For your end joint of the your finger   Exercise Name: Finger Active Range of Motion PIP Joint Blocking, Sets: 1-2 - Reps: 10 - Sessions: 3-5, Notes: MIDDLE JOINT MOTION involved fingers  You can do this over the edge of a table, or you can.   Hold all fingers with your other palm, and then lift and straighten the middle joint of all finger each one individually  Exercise Name: Intrinsics Active Range of Motion Table Top Bending, Sets: 1-2 - Reps: 5 - Sessions: 3-4, Notes: This focuses work to the small hand/finger muscles  Exercise Name: Finger Active Range of Motion Table Top Fist Series - Reps: 10 - Sessions: Every hour, Notes: You can hold a ball or small cylinder in your hand to help your finger to move toward a full fist.  Exercise Name: Bottle Rotation for Finger Active Range of Motion Flexion and Extension - Reps: 15-20 each way, Notes: twirl a bottle, or glass to move fingers and thumb in functional manner; Slow and methodical    Next Visit:  Progress ROM and begin scar management, refit orthosis as needed, check on sensory progression

## 2021-09-03 NOTE — PROGRESS NOTES
Reason for visit:    Chad Olivares came in to the clinic for a one week post op check.    His surgery was done 8/26/21 by Dr Farrell.  He had  Right fifth finger and palm Dupuytren's contracture release .    Assessment:    Chad came into the clinic in post-op soft bandage  Partial WB    The Surgical wounds were exposed and found to be well-healed; so the sutures were removed.    Plan:     He was placed in steri-strips and sent to hand therapy for orthosis and to began exercises.  He was told to Partial WB.     He has an appointment to see Dr. Farrell at that time Dr. Farrell will determine further restrictions.    He has our phone number and will call with questions or problems.      Abbi Morales, ATC

## 2021-09-06 PROCEDURE — 88304 TISSUE EXAM BY PATHOLOGIST: CPT | Performed by: PATHOLOGY

## 2021-09-09 ENCOUNTER — THERAPY VISIT (OUTPATIENT)
Dept: OCCUPATIONAL THERAPY | Facility: CLINIC | Age: 66
End: 2021-09-09
Payer: COMMERCIAL

## 2021-09-09 DIAGNOSIS — M72.0 DUPUYTREN'S CONTRACTURE: ICD-10-CM

## 2021-09-09 DIAGNOSIS — M25.641 FINGER STIFFNESS, RIGHT: Primary | ICD-10-CM

## 2021-09-09 DIAGNOSIS — M79.644 PAIN OF FINGER OF RIGHT HAND: ICD-10-CM

## 2021-09-09 PROCEDURE — 97110 THERAPEUTIC EXERCISES: CPT | Mod: GO | Performed by: OCCUPATIONAL THERAPIST

## 2021-09-09 PROCEDURE — 97763 ORTHC/PROSTC MGMT SBSQ ENC: CPT | Mod: GO | Performed by: OCCUPATIONAL THERAPIST

## 2021-09-09 NOTE — PROGRESS NOTES
SOAP note objective information for 9/9/2021.  Please refer to the daily flowsheet for treatment today, total treatment time and time spent performing 1:1 timed codes.        Diagnosis:  Right hand palm and fifth finger Dupuytren's contracture.      PROCEDURE:  Right hand palm and fifth finger Dupuytren's contracture release and excision.     SURGEON:  Caron Farrell MD    DOI:  about 20 years ago, ran a screwgun into the small finger, then over the years kept moving  DOI: MD VISIT: 7/8/21   DOS:  8/26/21    Post:  2w 0d    Next MD visit: about 4 weeks    MD Orders: The patient will be seen back in clinic in 1 week for removal of his postoperative management, assessment for possible suture removal, and nighttime extension splinting, with daytime flexion and wound management consistent with a Dupuytren's protocol.      Initial Subjective:  Chad Olivares is a 66 year old  right  hand dominant male.    Patient reports symptoms of pain, stiffness/loss of motion, weakness/loss of strength, edema, numbness and tingling  of the right hand, ring finger and small finger which occurred due to dupuytren's contracture. Since onset symptoms are Gradually getting better.      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, real estate investments and self employed  Currently working in normal job without restrictions  Job Tasks: Driving, Lifting, Carrying, Operating a Machine, Assembly, Pushing, Pulling, Repetitive Tasks  Prior functional level:  no limitations  Barriers include:none, 2 dogs, partner  Mobility: No difficulty  Transportation: drives and bicycles  Leisure activities/hobbies: yoga, hard to get hand into pocket, dancing,       Functional Outcome Measure:   9/3/2021  Upper Extremity Functional Index Score:  SCORE:   Column Totals: /80: 75   (A lower score indicates greater disability.)      Objective:  Pain Level (Scale 0-10)   9/3/2021   At Rest 0   With Use  4-5     Pain Description  Date 9/3/2021   Location ring finger and small finger   Pain Quality Aching, Pressure, Sharp and Tender   Frequency intermittent     Pain is worst  daytime   Exacerbated by  exercise., moving the fingers     Relieved by cold, rest and RX tylenol   Progression improving     Edema (Circumference measured in cm)   9/3/2021 9/3/2021   Right  RIng Small   P1 7.5 8.3   PIP     P2         Wound/Scar: tender, hypersensitive and scabbed, and slightly ecchymotic  Sensitivity: quite sensitive at volar finger, small finger Quality:  Tender to touch, but improving     Sensation  Decreased Ulnar Nerve distribution per pt report and of the small finger only,   9/9/2021  Improved sensation at the tip per pt report    ROM  HAND 9/3/2021 9/9   AROM(PROM) right R   Index MP WNL    PIP     DIP     RIZZO     Long MP WNL    PIP     DIP     RIZZO     Ring MP 0/63 0/72   PIP -5/72 0/93   DIP 0/55 0/65   RIZZO 188 230   Small MP He/42 0/72   PIP -35/55 -15/70   DIP 0/20 0/55   RIZZO 82 120     Strength: Contraindicated    Assessment:  See flowsheet    Plan:  Frequency:  1 X week, once daily  Duration:  for 8 weeks    Treatment Plan:    Modalities:    US, Fluidotherapy and Paraffin   Therapeutic Exercise:    AROM, AAROM, PROM, Tendon Gliding, Blocking, Reverse Blocking, Isotonics and Isometrics  Therapeutic Activities:   Functional activities   Using hand in all daily and work tasks  Neuromuscular re-ed:   Coordination/Dexterity, Sensory re-education, Desensitization and Kinesiotaping  Manual Techniques:   Scar mobilization, Myofascial release and Manual edema mobilization  Orthotic Fabrication:    Static, Hand based RIng and Small finger extension orthosis  Self Care:    Ergonomic Considerations    Discharge Plan:    Achieve all LTG.  Independent in home treatment program.  Reach maximal therapeutic benefit.    Home Exercise Program:  Exercise Name: Orthosis Wear and Care, Notes: Wear your splint full time, remove only for  exercises and showers. Move your fingers when in the splint. Do your exercises 4-6-8x / day, also  move your fingers and thumb,  then put the splint back on. Wear the splint at night or naps  Exercise Name: Edema Control, Notes: Swelling management:   Elevate the affected arm above heart level 10 minutes on the hour, every waking hour, open/close fingers 5-10x.   When you are resting (seated or lying down): Use pillows to keep the arm up above the heart  Keep elbow more straight while elevated, or raise your arm overhead and open/close your fingers, and you can straighten and bend your arm overhead, as well.   Fingers: Keep Cohesive bandage (coban) on fingers to reduce swelling  Exercise Name: Finger Active Range of Motion DIP Joint Blocking, Sets: 1-2 - Reps: 5-10 repetitions each set - Sessions: 3-5 , Notes: For your end joint of the your finger   Exercise Name: Finger Active Range of Motion PIP Joint Blocking, Sets: 1-2 - Reps: 10 - Sessions: 3-5, Notes: MIDDLE JOINT MOTION involved fingers  You can do this over the edge of a table, or you can.   Hold all fingers with your other palm, and then lift and straighten the middle joint of all finger each one individually  Exercise Name: Intrinsics Active Range of Motion Table Top Bending, Sets: 1-2 - Reps: 5 - Sessions: 3-4, Notes: This focuses work to the small hand/finger muscles  Exercise Name: Finger Active Range of Motion Table Top Fist Series - Reps: 10 - Sessions: Every hour, Notes: You can hold a ball or small cylinder in your hand to help your finger to move toward a full fist.  Exercise Name: Bottle Rotation for Finger Active Range of Motion Flexion and Extension - Reps: 15-20 each way, Notes: twirl a bottle, or glass to move fingers and thumb in functional manner; Slow and methodical  9/9/2021  Finger tips together and ABD/ADD fingers, with attention to SR  More attention to PROM and blocking with more effort to flexion, and then easy AROM for joint  lubrication  Table tracking  Out of splint for eating and light use  OK for vitamin E on scar, pt reports use of CBD oil on dorsum of finger with no difficulties      Next Visit:  Progress ROM and begin scar management, refit orthosis as needed, check on sensory progression

## 2021-09-16 ENCOUNTER — THERAPY VISIT (OUTPATIENT)
Dept: OCCUPATIONAL THERAPY | Facility: CLINIC | Age: 66
End: 2021-09-16
Payer: COMMERCIAL

## 2021-09-16 DIAGNOSIS — M25.641 FINGER STIFFNESS, RIGHT: Primary | ICD-10-CM

## 2021-09-16 DIAGNOSIS — M72.0 DUPUYTREN'S CONTRACTURE: ICD-10-CM

## 2021-09-16 DIAGNOSIS — M79.644 PAIN OF FINGER OF RIGHT HAND: ICD-10-CM

## 2021-09-16 PROCEDURE — 97140 MANUAL THERAPY 1/> REGIONS: CPT | Mod: GO | Performed by: OCCUPATIONAL THERAPIST

## 2021-09-16 PROCEDURE — 97763 ORTHC/PROSTC MGMT SBSQ ENC: CPT | Mod: GO | Performed by: OCCUPATIONAL THERAPIST

## 2021-09-16 PROCEDURE — 97110 THERAPEUTIC EXERCISES: CPT | Mod: GO | Performed by: OCCUPATIONAL THERAPIST

## 2021-09-16 NOTE — PROGRESS NOTES
SOAP note objective information for 9/16/2021.  Please refer to the daily flowsheet for treatment today, total treatment time and time spent performing 1:1 timed codes.        Diagnosis:  Right hand palm and fifth finger Dupuytren's contracture.      PROCEDURE:  Right hand palm and fifth finger Dupuytren's contracture release and excision.     SURGEON:  Caron Farrell MD    DOI:  about 20 years ago, ran a screwgun into the small finger, then over the years kept moving  DOI: MD VISIT: 7/8/21   DOS:  8/26/21    Post:  3w 0d    Next MD visit: about 4 weeks    MD Orders: The patient will be seen back in clinic in 1 week for removal of his postoperative management, assessment for possible suture removal, and nighttime extension splinting, with daytime flexion and wound management consistent with a Dupuytren's protocol.      Initial Subjective:  Chad Olivares is a 66 year old  right  hand dominant male.    Patient reports symptoms of pain, stiffness/loss of motion, weakness/loss of strength, edema, numbness and tingling  of the right hand, ring finger and small finger which occurred due to dupuytren's contracture. Since onset symptoms are Gradually getting better.      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, real estate investments and self employed  Currently working in normal job without restrictions  Job Tasks: Driving, Lifting, Carrying, Operating a Machine, Assembly, Pushing, Pulling, Repetitive Tasks  Prior functional level:  no limitations  Barriers include:none, 2 dogs, partner  Mobility: No difficulty  Transportation: drives and bicycles  Leisure activities/hobbies: yoga, hard to get hand into pocket, dancing,       Functional Outcome Measure:   9/3/2021  Upper Extremity Functional Index Score:  SCORE:   Column Totals: /80: 75   (A lower score indicates greater disability.)      Objective:  Pain Level (Scale 0-10)   9/3/2021   At Rest 0   With Use  4-5     Pain Description  Date 9/3/2021   Location ring finger and small finger   Pain Quality Aching, Pressure, Sharp and Tender   Frequency intermittent     Pain is worst  daytime   Exacerbated by  exercise., moving the fingers     Relieved by cold, rest and RX tylenol   Progression improving     Edema (Circumference measured in cm)   9/3/2021 9/3/2021   Right  RIng Small   P1 7.5 8.3   PIP     P2         Wound/Scar: tender, hypersensitive and scabbed, and slightly ecchymotic  Sensitivity: quite sensitive at volar finger, small finger Quality:  Tender to touch, but improving     Sensation  Decreased Ulnar Nerve distribution per pt report and of the small finger only,   9/9/2021  Improved sensation at the tip per pt report    ROM  HAND 9/3/2021 9/9 9/16   AROM(PROM) right R    Index MP WNL     PIP      DIP      RIZZO      Long MP WNL     PIP      DIP      RIZZO      Ring MP 0/63 0/72    PIP -5/72 0/93    DIP 0/55 0/65    RIZZO 188 230    Small MP He/42 0/72 0/75  (x/82   PIP -35/55 -15/70 -15/72  (-12/x)   DIP 0/20 0/55 0/60   RIZZO 82 120      Strength: Contraindicated    Assessment:  See flowsheet    Plan:  Frequency:  1 X week, once daily  Duration:  for 8 weeks    Treatment Plan:    Modalities:    US, Fluidotherapy and Paraffin   Therapeutic Exercise:    AROM, AAROM, PROM, Tendon Gliding, Blocking, Reverse Blocking, Isotonics and Isometrics  Therapeutic Activities:   Functional activities   Using hand in all daily and work tasks  Neuromuscular re-ed:   Coordination/Dexterity, Sensory re-education, Desensitization and Kinesiotaping  Manual Techniques:   Scar mobilization, Myofascial release and Manual edema mobilization  Orthotic Fabrication:    Static, Hand based RIng and Small finger extension orthosis  Self Care:    Ergonomic Considerations    Discharge Plan:    Achieve all LTG.  Independent in home treatment program.  Reach maximal therapeutic benefit.    Home Exercise Program:  Exercise Name: Orthosis Wear and  Care, Notes: Wear your splint full time, remove only for exercises and showers. Move your fingers when in the splint. Do your exercises 4-6-8x / day, also  move your fingers and thumb,  then put the splint back on. Wear the splint at night or naps  Exercise Name: Edema Control, Notes: Swelling management:   Elevate the affected arm above heart level 10 minutes on the hour, every waking hour, open/close fingers 5-10x.   When you are resting (seated or lying down): Use pillows to keep the arm up above the heart  Keep elbow more straight while elevated, or raise your arm overhead and open/close your fingers, and you can straighten and bend your arm overhead, as well.   Fingers: Keep Cohesive bandage (coban) on fingers to reduce swelling  Exercise Name: Finger Active Range of Motion DIP Joint Blocking, Sets: 1-2 - Reps: 5-10 repetitions each set - Sessions: 3-5 , Notes: For your end joint of the your finger   Exercise Name: Finger Active Range of Motion PIP Joint Blocking, Sets: 1-2 - Reps: 10 - Sessions: 3-5, Notes: MIDDLE JOINT MOTION involved fingers  You can do this over the edge of a table, or you can.   Hold all fingers with your other palm, and then lift and straighten the middle joint of all finger each one individually  Exercise Name: Intrinsics Active Range of Motion Table Top Bending, Sets: 1-2 - Reps: 5 - Sessions: 3-4, Notes: This focuses work to the small hand/finger muscles  Exercise Name: Finger Active Range of Motion Table Top Fist Series - Reps: 10 - Sessions: Every hour, Notes: You can hold a ball or small cylinder in your hand to help your finger to move toward a full fist.  Exercise Name: Bottle Rotation for Finger Active Range of Motion Flexion and Extension - Reps: 15-20 each way, Notes: twirl a bottle, or glass to move fingers and thumb in functional manner; Slow and methodical  9/9/2021  Finger tips together and ABD/ADD fingers, with attention to SR  More attention to PROM and blocking with more  effort to flexion, and then easy AROM for joint lubrication  Table tracking  Out of splint for eating and light use  OK for vitamin E on scar, pt reports use of CBD oil on dorsum of finger with no difficulties      Next Visit:  Progress ROM and begin scar management, refit orthosis as needed, check on sensory progression

## 2021-09-19 ENCOUNTER — HEALTH MAINTENANCE LETTER (OUTPATIENT)
Age: 66
End: 2021-09-19

## 2021-09-23 ENCOUNTER — THERAPY VISIT (OUTPATIENT)
Dept: OCCUPATIONAL THERAPY | Facility: CLINIC | Age: 66
End: 2021-09-23
Payer: COMMERCIAL

## 2021-09-23 ENCOUNTER — OFFICE VISIT (OUTPATIENT)
Dept: ORTHOPEDICS | Facility: CLINIC | Age: 66
End: 2021-09-23
Payer: COMMERCIAL

## 2021-09-23 DIAGNOSIS — M72.0 DUPUYTREN'S CONTRACTURE: Primary | ICD-10-CM

## 2021-09-23 DIAGNOSIS — M25.641 FINGER STIFFNESS, RIGHT: ICD-10-CM

## 2021-09-23 DIAGNOSIS — M79.644 PAIN OF FINGER OF RIGHT HAND: ICD-10-CM

## 2021-09-23 DIAGNOSIS — M72.0 DUPUYTREN'S CONTRACTURE: ICD-10-CM

## 2021-09-23 PROCEDURE — 97535 SELF CARE MNGMENT TRAINING: CPT | Mod: GO | Performed by: OCCUPATIONAL THERAPIST

## 2021-09-23 PROCEDURE — 97530 THERAPEUTIC ACTIVITIES: CPT | Mod: GO | Performed by: OCCUPATIONAL THERAPIST

## 2021-09-23 PROCEDURE — 97110 THERAPEUTIC EXERCISES: CPT | Mod: GO | Performed by: OCCUPATIONAL THERAPIST

## 2021-09-23 PROCEDURE — 99024 POSTOP FOLLOW-UP VISIT: CPT | Performed by: ORTHOPAEDIC SURGERY

## 2021-09-23 NOTE — PROGRESS NOTES
SOAP note objective information for 9/23/2021.  Please refer to the daily flowsheet for treatment today, total treatment time and time spent performing 1:1 timed codes.     Diagnosis:  Right hand palm and fifth finger Dupuytren's contracture.      PROCEDURE:  Right hand palm and fifth finger Dupuytren's contracture release and excision.     SURGEON:  Caron Farrell MD    DOI:  about 20 years ago, ran a screwgun into the small finger, then over the years kept moving  DOI: MD VISIT: 7/8/21   DOS:  8/26/21    Post:  4w 0d    Next MD visit: about 4 weeks    MD Orders: The patient will be seen back in clinic in 1 week for removal of his postoperative management, assessment for possible suture removal, and nighttime extension splinting, with daytime flexion and wound management consistent with a Dupuytren's protocol.      Initial Subjective:  Chad Olivares is a 66 year old  right  hand dominant male.    Patient reports symptoms of pain, stiffness/loss of motion, weakness/loss of strength, edema, numbness and tingling  of the right hand, ring finger and small finger which occurred due to dupuytren's contracture. Since onset symptoms are Gradually getting better.      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, real estate investments and self employed  Currently working in normal job without restrictions  Job Tasks: Driving, Lifting, Carrying, Operating a Machine, Assembly, Pushing, Pulling, Repetitive Tasks  Prior functional level:  no limitations  Barriers include:none, 2 dogs, partner  Mobility: No difficulty  Transportation: drives and bicycles  Leisure activities/hobbies: yoga, hard to get hand into pocket, dancing,       Functional Outcome Measure:   9/3/2021  Upper Extremity Functional Index Score:  SCORE:   Column Totals: /80: 75   (A lower score indicates greater disability.)      Objective:  Pain Level (Scale 0-10)   9/3/2021   At Rest 0   With Use 4-5      Pain Description  Date 9/3/2021   Location ring finger and small finger   Pain Quality Aching, Pressure, Sharp and Tender   Frequency intermittent     Pain is worst  daytime   Exacerbated by  exercise., moving the fingers     Relieved by cold, rest and RX tylenol   Progression improving     Edema (Circumference measured in cm)   9/3/2021 9/3/2021   Right  RIng Small   P1 7.5 8.3   PIP     P2         Wound/Scar: tender, hypersensitive and scabbed, and slightly ecchymotic  Sensitivity: quite sensitive at volar finger, small finger Quality:  Tender to touch, but improving     Sensation  Decreased Ulnar Nerve distribution per pt report and of the small finger only,   9/9/2021  Improved sensation at the tip per pt report    ROM  HAND 9/3/2021 9/9 9/16 9/23   AROM(PROM) right R     Index MP WNL      PIP       DIP       RIZZO       Long MP WNL      PIP       DIP       RIZZO       Ring MP 0/63 0/72     PIP -5/72 0/93     DIP 0/55 0/65     RIZZO 188 230     Small MP He/42 0/72 0/75  (x/82 0/76   PIP -35/55 -15/70 -15/72  (-12/x) -25/80   DIP 0/20 0/55 0/60 0/60   RIZZO 82 120       Strength: Contraindicated    Assessment:  See flowsheet    Plan:  Frequency:  1 X week, once daily  Duration:  for 8 weeks    Treatment Plan:    Modalities:    US, Fluidotherapy and Paraffin   Therapeutic Exercise:    AROM, AAROM, PROM, Tendon Gliding, Blocking, Reverse Blocking, Isotonics and Isometrics  Therapeutic Activities:   Functional activities   Using hand in all daily and work tasks  Neuromuscular re-ed:   Coordination/Dexterity, Sensory re-education, Desensitization and Kinesiotaping  Manual Techniques:   Scar mobilization, Myofascial release and Manual edema mobilization  Orthotic Fabrication:    Static, Hand based RIng and Small finger extension orthosis  Self Care:    Ergonomic Considerations    Discharge Plan:    Achieve all LTG.  Independent in home treatment program.  Reach maximal therapeutic benefit.    Home Exercise  Program:  Exercise Name: Orthosis Wear and Care, Notes: Wear your splint full time, remove only for exercises and showers. Move your fingers when in the splint. Do your exercises 4-6-8x / day, also  move your fingers and thumb,  then put the splint back on. Wear the splint at night or naps  Exercise Name: Edema Control, Notes: Swelling management:   Elevate the affected arm above heart level 10 minutes on the hour, every waking hour, open/close fingers 5-10x.   When you are resting (seated or lying down): Use pillows to keep the arm up above the heart  Keep elbow more straight while elevated, or raise your arm overhead and open/close your fingers, and you can straighten and bend your arm overhead, as well.   Fingers: Keep Cohesive bandage (coban) on fingers to reduce swelling  Exercise Name: Finger Active Range of Motion DIP Joint Blocking, Sets: 1-2 - Reps: 5-10 repetitions each set - Sessions: 3-5 , Notes: For your end joint of the your finger   Exercise Name: Finger Active Range of Motion PIP Joint Blocking, Sets: 1-2 - Reps: 10 - Sessions: 3-5, Notes: MIDDLE JOINT MOTION involved fingers  You can do this over the edge of a table, or you can.   Hold all fingers with your other palm, and then lift and straighten the middle joint of all finger each one individually  Exercise Name: Intrinsics Active Range of Motion Table Top Bending, Sets: 1-2 - Reps: 5 - Sessions: 3-4, Notes: This focuses work to the small hand/finger muscles  Exercise Name: Finger Active Range of Motion Table Top Fist Series - Reps: 10 - Sessions: Every hour, Notes: You can hold a ball or small cylinder in your hand to help your finger to move toward a full fist.  Exercise Name: Bottle Rotation for Finger Active Range of Motion Flexion and Extension - Reps: 15-20 each way, Notes: twirl a bottle, or glass to move fingers and thumb in functional manner; Slow and methodical  9/9/2021  Finger tips together and ABD/ADD fingers, with attention to  SR  More attention to PROM and blocking with more effort to flexion, and then easy AROM for joint lubrication  Table tracking  Out of splint for eating and light use  OK for vitamin E on scar, pt reports use of CBD oil on dorsum of finger with no difficulties      Next Visit:  Progress ROM and begin scar management, refit orthosis as needed, check on sensory progression

## 2021-09-23 NOTE — LETTER
9/23/2021         RE: Chad Olivares  [address]  [facility] [address]        Dear Colleague,    Thank you for referring your patient, Chad Olivares, to the [facility] [place]. Please see a copy of my visit note below.    CHIEF COMPLAINT: Status post right hand palm and fifth finger Dupuytren's contracture release and excision   DATE OF SURGERY: 08/26/2021    HISTORY OF PRESENT ILLNESS: Mr. Olivares is an extremely pleasant 66 year-old male who is 1 months status post the above procedure. Patient reports doing well after surgery and had no pain in his hand, but a couple weeks ago developed pain over his little finger, which he attributed to use of his splint. He is no longer using his splint. He used CBD oil over this finger with some improvement of his symptoms. He does still have a contracture of the small finger.     EXAM:  Pleasant adult in no distress.  Respirations even and unlabored.  Right upper extremity: Incisions clean, dry, and intact. No erythema. No drainage. 30 degree flexion contracture of the PIP joint of the right thumb. Greater than 8 mm 2 point discrimination on the radial aspect of the thumb, 6 mm on the ulnar side. Tenderness to palpation at the tuft of the finger volarly. Radial pulse 2+.      force  R hand pincher force: 9.072 kg (20 lb)  R hand  level 2 force: 31.8 kg (70 lb)  L hand pincher force: 11.8 kg (26 lb)  L hand  level  2 force: 45.4 kg (100 lb)    ASSESSMENT:  1. 1 month status post above procedure    PLAN:  Suggested obtaining and wearing a finger splint at night under his brace to reduce irritation to the tuft of the thumb. Would like him to wear his brace at night for the 6 weeks following surgery. Educated patient on the natural course of recovery and that initial healing will take about 3 months with continued loosening and increased range of motion of the first 6 months. Educated patient that he is at the peak of his scar tissue and  encouraged him to continue rubbing the area to help break up the tissue. Patient should continue with therapy for at least the first 3 months following his surgery. I will see the patient back prn. .    Caron Farrell MD   Hand and Upper Extremity Specialist  [facility]      Scribe Disclosure:  Pato JESUS, am serving as a scribe to document services personally performed by Caron Farrell MD at this visit, based upon the provider's statements to me. All documentation has been reviewed by the aforementioned provider prior to being entered into the official medical record.     Pato JESUS, a scribe, prepared the chart for today's encounter.

## 2021-09-23 NOTE — NURSING NOTE
Reason For Visit:   Chief Complaint   Patient presents with     Surgical Followup     4 week POP DOS 8/26/21 5th Finger Dupuytrens release        Primary MD: Asaf Craig  Ref. MD: vahe     Age: 66 year old    ?  No      There were no vitals taken for this visit.      Pain Assessment  Patient Currently in Pain: Yes  0-10 Pain Scale: 1    Hand Dominance Evaluation  Hand Dominance: Right      force  R hand pincher force: 9.072 kg (20 lb)  R hand  level 2 force: 31.8 kg (70 lb)  L hand pincher force: 11.8 kg (26 lb)  L hand  level  2 force: 45.4 kg (100 lb)    QuickDASH Assessment  No flowsheet data found.       Current Outpatient Medications   Medication Sig Dispense Refill     acetaminophen (TYLENOL) 325 MG tablet Take 2 tablets (650 mg) by mouth every 4 hours as needed for mild pain 50 tablet 0     cannabidiol (EPIDIOLEX) 100 MG/ML oral solution Take by mouth 2 times daily CBD OIL   PRN       medical cannabis oral liquid  (Patient's own supply.  Not a prescription) (This is NOT a prescription, and does not certify that the patient has a qualifying medical condition for medical cannabis.  The purpose of this order is  to document that the patient reports taking medical cannabis.)        NONFORMULARY Medication name: Breathe Again. Rub on hands and breathe. (Essential oil) Use prn.       lamoTRIgine (LAMICTAL) 100 MG tablet Take 2 tabs (200mg) by mouth twice daily (Patient not taking: Reported on 9/23/2021) 360 tablet 3     oxyCODONE (ROXICODONE) 5 MG tablet Take 1-2 tablets (5-10 mg) by mouth every 4 hours as needed for moderate to severe pain (Patient not taking: Reported on 9/3/2021) 16 tablet 0       Allergies   Allergen Reactions     Gluten Meal      Seasonal Allergies        Abbi Morales, ATC

## 2021-09-23 NOTE — PROGRESS NOTES
CHIEF COMPLAINT: Status post right hand palm and fifth finger Dupuytren's contracture release and excision   DATE OF SURGERY: 08/26/2021    HISTORY OF PRESENT ILLNESS: Mr. Olivares is an extremely pleasant 66 year-old male who is 1 months status post the above procedure. Patient reports doing well after surgery and had no pain in his hand, but a couple weeks ago developed pain over his little finger, which he attributed to use of his splint. He is no longer using his splint. He used CBD oil over this finger with some improvement of his symptoms. He does still have a contracture of the small finger.     EXAM:  Pleasant adult in no distress.  Respirations even and unlabored.  Right upper extremity: Incisions clean, dry, and intact. No erythema. No drainage. 30 degree flexion contracture of the PIP joint of the right thumb. Greater than 8 mm 2 point discrimination on the radial aspect of the thumb, 6 mm on the ulnar side. Tenderness to palpation at the tuft of the finger volarly. Radial pulse 2+.      force  R hand pincher force: 9.072 kg (20 lb)  R hand  level 2 force: 31.8 kg (70 lb)  L hand pincher force: 11.8 kg (26 lb)  L hand  level  2 force: 45.4 kg (100 lb)    ASSESSMENT:  1. 1 month status post above procedure    PLAN:  Suggested obtaining and wearing a finger splint at night under his brace to reduce irritation to the tuft of the thumb. Would like him to wear his brace at night for the 6 weeks following surgery. Educated patient on the natural course of recovery and that initial healing will take about 3 months with continued loosening and increased range of motion of the first 6 months. Educated patient that he is at the peak of his scar tissue and encouraged him to continue rubbing the area to help break up the tissue. Patient should continue with therapy for at least the first 3 months following his surgery. I will see the patient back prn. .    Caron Farrell MD   Hand and Upper Extremity  Specialist  [facility]      Scribe Disclosure:  I, Pato Mas, am serving as a scribe to document services personally performed by Caron Farrell MD at this visit, based upon the provider's statements to me. All documentation has been reviewed by the aforementioned provider prior to being entered into the official medical record.     IPato, a scribe, prepared the chart for today's encounter.

## 2021-09-30 ENCOUNTER — THERAPY VISIT (OUTPATIENT)
Dept: OCCUPATIONAL THERAPY | Facility: CLINIC | Age: 66
End: 2021-09-30
Payer: COMMERCIAL

## 2021-09-30 DIAGNOSIS — M72.0 DUPUYTREN'S CONTRACTURE: ICD-10-CM

## 2021-09-30 DIAGNOSIS — M25.641 FINGER STIFFNESS, RIGHT: Primary | ICD-10-CM

## 2021-09-30 DIAGNOSIS — M79.644 PAIN OF FINGER OF RIGHT HAND: ICD-10-CM

## 2021-09-30 PROCEDURE — 97035 APP MDLTY 1+ULTRASOUND EA 15: CPT | Mod: GO | Performed by: OCCUPATIONAL THERAPIST

## 2021-09-30 PROCEDURE — 97140 MANUAL THERAPY 1/> REGIONS: CPT | Mod: GO | Performed by: OCCUPATIONAL THERAPIST

## 2021-09-30 PROCEDURE — 97110 THERAPEUTIC EXERCISES: CPT | Mod: GO | Performed by: OCCUPATIONAL THERAPIST

## 2021-09-30 NOTE — PROGRESS NOTES
SOAP note objective information for 9/30/2021.  Please refer to the daily flowsheet for treatment today, total treatment time and time spent performing 1:1 timed codes.     Diagnosis:  Right hand palm and fifth finger Dupuytren's contracture.      PROCEDURE:  Right hand palm and fifth finger Dupuytren's contracture release and excision.     SURGEON:  Caron Farrell MD    DOI:  about 20 years ago, ran a screwgun into the small finger, then over the years kept moving  DOI: MD VISIT: 7/8/21   DOS:  8/26/21    Post:  1 month +    Next MD visit: about 4 weeks    MD Orders: The patient will be seen back in clinic in 1 week for removal of his postoperative management, assessment for possible suture removal, and nighttime extension splinting, with daytime flexion and wound management consistent with a Dupuytren's protocol.      Initial Subjective:  Chad Olivares is a 66 year old  right  hand dominant male.    Patient reports symptoms of pain, stiffness/loss of motion, weakness/loss of strength, edema, numbness and tingling  of the right hand, ring finger and small finger which occurred due to dupuytren's contracture. Since onset symptoms are Gradually getting better.      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, real estate investments and self employed  Currently working in normal job without restrictions  Job Tasks: Driving, Lifting, Carrying, Operating a Machine, Assembly, Pushing, Pulling, Repetitive Tasks  Prior functional level:  no limitations  Barriers include:none, 2 dogs, partner  Mobility: No difficulty  Transportation: drives and bicycles  Leisure activities/hobbies: yoga, hard to get hand into pocket, dancing,       Functional Outcome Measure:   9/3/2021  Upper Extremity Functional Index Score:  SCORE:   Column Totals: /80: 75   (A lower score indicates greater disability.)      Objective:  Pain Level (Scale 0-10)   9/3/2021   At Rest 0   With Use  4-5     Pain Description  Date 9/3/2021   Location ring finger and small finger   Pain Quality Aching, Pressure, Sharp and Tender   Frequency intermittent     Pain is worst  daytime   Exacerbated by  exercise., moving the fingers     Relieved by cold, rest and RX tylenol   Progression improving     Edema (Circumference measured in cm)   9/3/2021 9/3/2021   Right  RIng Small   P1 7.5 8.3   PIP     P2         Wound/Scar: tender, hypersensitive and scabbed, and slightly ecchymotic  Sensitivity: quite sensitive at volar finger, small finger Quality:  Tender to touch, but improving     Sensation  Decreased Ulnar Nerve distribution per pt report and of the small finger only,   9/9/2021  Improved sensation at the tip per pt report    ROM  HAND 9/3/2021 9/9 9/16 9/23 9/30   AROM(PROM) right R      Index MP WNL       PIP        DIP        RIZZO        Long MP WNL       PIP        DIP        RIZZO        Ring MP 0/63 0/72      PIP -5/72 0/93      DIP 0/55 0/65      RIZZO 188 230      Small MP He/42 0/72 0/75  (x/82 0/76 0/85   PIP -35/55 -15/70 -15/72  (-12/x) -25/80 -25/83   DIP 0/20 0/55 0/60 0/60 0/60   RIZZO 82 120        Strength: Contraindicated    Assessment:  See flowsheet    Plan:  Frequency:  1 X week, once daily  Duration:  for 8 weeks    Treatment Plan:    Modalities:    US, Fluidotherapy and Paraffin   Therapeutic Exercise:    AROM, AAROM, PROM, Tendon Gliding, Blocking, Reverse Blocking, Isotonics and Isometrics  Therapeutic Activities:   Functional activities   Using hand in all daily and work tasks  Neuromuscular re-ed:   Coordination/Dexterity, Sensory re-education, Desensitization and Kinesiotaping  Manual Techniques:   Scar mobilization, Myofascial release and Manual edema mobilization  Orthotic Fabrication:    Static, Hand based RIng and Small finger extension orthosis  Self Care:    Ergonomic Considerations    Discharge Plan:    Achieve all LTG.  Independent in home treatment program.  Reach maximal therapeutic  benefit.    Home Exercise Program:  Exercise Name: Orthosis Wear and Care, Notes: Wear your splint full time, remove only for exercises and showers. Move your fingers when in the splint. Do your exercises 4-6-8x / day, also  move your fingers and thumb,  then put the splint back on. Wear the splint at night or naps  Exercise Name: Edema Control, Notes: Swelling management:   Elevate the affected arm above heart level 10 minutes on the hour, every waking hour, open/close fingers 5-10x.   When you are resting (seated or lying down): Use pillows to keep the arm up above the heart  Keep elbow more straight while elevated, or raise your arm overhead and open/close your fingers, and you can straighten and bend your arm overhead, as well.   Fingers: Keep Cohesive bandage (coban) on fingers to reduce swelling  Exercise Name: Finger Active Range of Motion DIP Joint Blocking, Sets: 1-2 - Reps: 5-10 repetitions each set - Sessions: 3-5 , Notes: For your end joint of the your finger   Exercise Name: Finger Active Range of Motion PIP Joint Blocking, Sets: 1-2 - Reps: 10 - Sessions: 3-5, Notes: MIDDLE JOINT MOTION involved fingers  You can do this over the edge of a table, or you can.   Hold all fingers with your other palm, and then lift and straighten the middle joint of all finger each one individually  Exercise Name: Intrinsics Active Range of Motion Table Top Bending, Sets: 1-2 - Reps: 5 - Sessions: 3-4, Notes: This focuses work to the small hand/finger muscles  Exercise Name: Finger Active Range of Motion Table Top Fist Series - Reps: 10 - Sessions: Every hour, Notes: You can hold a ball or small cylinder in your hand to help your finger to move toward a full fist.  Exercise Name: Bottle Rotation for Finger Active Range of Motion Flexion and Extension - Reps: 15-20 each way, Notes: twirl a bottle, or glass to move fingers and thumb in functional manner; Slow and methodical  9/9/2021  Finger tips together and ABD/ADD  fingers, with attention to SR  More attention to PROM and blocking with more effort to flexion, and then easy AROM for joint lubrication  Table tracking  Out of splint for eating and light use  OK for vitamin E on scar, pt reports use of CBD oil on dorsum of finger with no difficulties  9/30/2021  Tracking with pressure and gripping  Scar sleeve on finger      Next Visit:  Progress ROM and begin scar management, refit orthosis as needed, check on sensory progression  Progress strengthening and add scar pad to wear in splint

## 2021-10-14 ENCOUNTER — THERAPY VISIT (OUTPATIENT)
Dept: OCCUPATIONAL THERAPY | Facility: CLINIC | Age: 66
End: 2021-10-14
Payer: COMMERCIAL

## 2021-10-14 DIAGNOSIS — M79.644 PAIN OF FINGER OF RIGHT HAND: ICD-10-CM

## 2021-10-14 DIAGNOSIS — M25.641 FINGER STIFFNESS, RIGHT: ICD-10-CM

## 2021-10-14 DIAGNOSIS — M72.0 DUPUYTREN'S CONTRACTURE: ICD-10-CM

## 2021-10-14 PROCEDURE — 97140 MANUAL THERAPY 1/> REGIONS: CPT | Mod: GO | Performed by: OCCUPATIONAL THERAPIST

## 2021-10-14 PROCEDURE — 97763 ORTHC/PROSTC MGMT SBSQ ENC: CPT | Mod: GO | Performed by: OCCUPATIONAL THERAPIST

## 2021-10-14 PROCEDURE — 97110 THERAPEUTIC EXERCISES: CPT | Mod: GO | Performed by: OCCUPATIONAL THERAPIST

## 2021-10-14 NOTE — PROGRESS NOTES
SOAP note objective information for 10/14/2021.  Please refer to the daily flowsheet for treatment today, total treatment time and time spent performing 1:1 timed codes.     Diagnosis:  Right hand palm and fifth finger Dupuytren's contracture.  Procedure:  Right hand palm and fifth finger Dupuytren's contracture release and excision.  Surgeon:  Caron Farrell MD  DOS:  8/26/21  Post:  7 weeks      MD Orders: The patient will be seen back in clinic in 1 week for removal of his postoperative management, assessment for possible suture removal, and nighttime extension splinting, with daytime flexion and wound management consistent with a Dupuytren's protocol.      Objective:    Pain Level (Scale 0-10)   9/3/2021   At Rest 0   With Use 4-5     Pain Description  Date 9/3/2021   Location ring finger and small finger   Pain Quality Aching, Pressure, Sharp and Tender   Frequency intermittent     Pain is worst  daytime   Exacerbated by  exercise., moving the fingers     Relieved by cold, rest and RX tylenol   Progression improving     Edema (Circumference measured in cm)   9/3/2021 9/3/2021   Right  RIng Small   P1 7.5 8.3   PIP     P2         Wound/Scar: tender, hypersensitive and scabbed, and slightly ecchymotic  Sensitivity: quite sensitive at volar finger, small finger Quality:  Tender to touch, but improving     Sensation  Decreased Ulnar Nerve distribution per pt report and of the small finger only,   9/9/2021  Improved sensation at the tip per pt report    ROM  HAND 9/3/2021 9/9 9/16 9/23 9/30 10/14   AROM(PROM) right R       Index MP WNL        PIP         DIP         RIZZO         Long MP WNL        PIP         DIP         RIZZO         Ring MP 0/63 0/72       PIP -5/72 0/93       DIP 0/55 0/65       RIZZO 188 230       Small MP He/42 0/72 0/75  (x/82 0/76 0/85 0/75   PIP -35/55 -15/70 -15/72  (-12/x) -25/80 -25/83 -27/80   DIP 0/20 0/55 0/60 0/60 0/60 0/60   RIZZO 82 120             Home Exercise Program:  Exercise Name:  Orthosis Wear and Care, Notes: Wear your splint full time, remove only for exercises and showers. Move your fingers when in the splint. Do your exercises 4-6-8x / day, also  move your fingers and thumb,  then put the splint back on. Wear the splint at night or naps  Exercise Name: Edema Control, Notes: Swelling management:   Elevate the affected arm above heart level 10 minutes on the hour, every waking hour, open/close fingers 5-10x.   When you are resting (seated or lying down): Use pillows to keep the arm up above the heart  Keep elbow more straight while elevated, or raise your arm overhead and open/close your fingers, and you can straighten and bend your arm overhead, as well.   Fingers: Keep Cohesive bandage (coban) on fingers to reduce swelling  Exercise Name: Finger Active Range of Motion DIP Joint Blocking, Sets: 1-2 - Reps: 5-10 repetitions each set - Sessions: 3-5 , Notes: For your end joint of the your finger   Exercise Name: Finger Active Range of Motion PIP Joint Blocking, Sets: 1-2 - Reps: 10 - Sessions: 3-5, Notes: MIDDLE JOINT MOTION involved fingers  You can do this over the edge of a table, or you can.   Hold all fingers with your other palm, and then lift and straighten the middle joint of all finger each one individually  Exercise Name: Intrinsics Active Range of Motion Table Top Bending, Sets: 1-2 - Reps: 5 - Sessions: 3-4, Notes: This focuses work to the small hand/finger muscles  Exercise Name: Finger Active Range of Motion Table Top Fist Series - Reps: 10 - Sessions: Every hour, Notes: You can hold a ball or small cylinder in your hand to help your finger to move toward a full fist.  Exercise Name: Bottle Rotation for Finger Active Range of Motion Flexion and Extension - Reps: 15-20 each way, Notes: twirl a bottle, or glass to move fingers and thumb in functional manner; Slow and methodical  9/9/2021  Finger tips together and ABD/ADD fingers, with attention to SR  More attention to PROM and  blocking with more effort to flexion, and then easy AROM for joint lubrication  Table tracking  Out of splint for eating and light use  OK for vitamin E on scar, pt reports use of CBD oil on dorsum of finger with no difficulties  9/30/2021  Tracking with pressure and gripping  Scar sleeve on finger  10/14/2021  Dycem for scar massage. Added elastomer to orthosis for scar management      Next Visit:  Progress ROM and begin scar management, refit orthosis as needed, check on sensory progression  Progress strengthening and add scar pad to wear in splint

## 2021-10-20 NOTE — NURSING NOTE
"Patient called to follow-up on providers orders: \"Reestablish care visit.  Sleep Dental Referral was made for mandibular advancement device (MAD) for mild HUSSAIN.  Follow-up in approximately 3 months after obtaining dental device and adequate titration to discuss efficacy HST with dental device in place\". Patient states that he decided against a dental appliance and is just going to hold on moving forward with anything for now. Caridad Lindsay, CMA    "

## 2021-10-21 ENCOUNTER — THERAPY VISIT (OUTPATIENT)
Dept: OCCUPATIONAL THERAPY | Facility: CLINIC | Age: 66
End: 2021-10-21
Payer: COMMERCIAL

## 2021-10-21 DIAGNOSIS — M72.0 DUPUYTREN'S CONTRACTURE: ICD-10-CM

## 2021-10-21 DIAGNOSIS — M79.644 PAIN OF FINGER OF RIGHT HAND: ICD-10-CM

## 2021-10-21 DIAGNOSIS — M25.641 FINGER STIFFNESS, RIGHT: ICD-10-CM

## 2021-10-21 PROCEDURE — 29086 APPLICATION CAST FINGER: CPT | Mod: GO | Performed by: OCCUPATIONAL THERAPIST

## 2021-10-21 PROCEDURE — 97140 MANUAL THERAPY 1/> REGIONS: CPT | Mod: GO | Performed by: OCCUPATIONAL THERAPIST

## 2021-10-21 PROCEDURE — 97035 APP MDLTY 1+ULTRASOUND EA 15: CPT | Mod: GO | Performed by: OCCUPATIONAL THERAPIST

## 2021-10-21 NOTE — PROGRESS NOTES
SOAP note objective information for 10/21/2021.    Diagnosis:  Right hand palm and fifth finger Dupuytren's contracture  Procedure:  Right hand palm and fifth finger Dupuytren's contracture release and excision  Surgeon:  Caron Farrell MD  DOS:  8/26/21  Post:  8 weeks    Pain Level (Scale 0-10)   9/3/2021 10/21/2021   At Rest 0    With Use 4-5 2-3 at worst     Pain Description  Date 9/3/2021   Location ring finger and small finger   Pain Quality Aching, Pressure, Sharp and Tender   Frequency intermittent     Pain is worst  daytime   Exacerbated by  exercise., moving the fingers     Relieved by cold, rest and RX tylenol   Progression improving     Edema (Circumference measured in cm)   9/3/2021 9/3/2021   Right  Ring Small   P1 7.5 8.3   PIP     P2         Wound/Scar:  Sensitivity: Sensitive to pressure at distal aspect, but improving Quality:  Thickened at distal aspect, improving     Sensation  Small finger tip continues to tingle intermittently, improving    ROM  HAND 9/3/2021 9/9 9/16 9/23 9/30 10/14 10/21   AROM(PROM) right R        Index MP WNL         PIP          DIP          RIZZO          Long MP WNL         PIP          DIP          RIZZO          Ring MP 0/63 0/72        PIP -5/72 0/93        DIP 0/55 0/65        RIZZO 188 230        Small MP He/42 0/72 0/75  (x/82 0/76 0/85 0/75 0/75   PIP -35/55 -15/70 -15/72  (-12/x) -25/80 -25/83 -27/80 -25/80   DIP 0/20 0/55 0/60 0/60 0/60 0/60 0/60   RIZZO 82 120     190         Home Exercise Program:  Exercise Name: Orthosis Wear and Care, Notes: Wear your splint full time, remove only for exercises and showers. Move your fingers when in the splint. Do your exercises 4-6-8x / day, also  move your fingers and thumb,  then put the splint back on. Wear the splint at night or naps  Exercise Name: Edema Control, Notes: Swelling management:   Elevate the affected arm above heart level 10 minutes on the hour, every waking hour, open/close fingers 5-10x.   When you are resting  (seated or lying down): Use pillows to keep the arm up above the heart  Keep elbow more straight while elevated, or raise your arm overhead and open/close your fingers, and you can straighten and bend your arm overhead, as well.   Fingers: Keep Cohesive bandage (coban) on fingers to reduce swelling  Exercise Name: Finger Active Range of Motion DIP Joint Blocking, Sets: 1-2 - Reps: 5-10 repetitions each set - Sessions: 3-5 , Notes: For your end joint of the your finger   Exercise Name: Finger Active Range of Motion PIP Joint Blocking, Sets: 1-2 - Reps: 10 - Sessions: 3-5, Notes: MIDDLE JOINT MOTION involved fingers  You can do this over the edge of a table, or you can.   Hold all fingers with your other palm, and then lift and straighten the middle joint of all finger each one individually  Exercise Name: Intrinsics Active Range of Motion Table Top Bending, Sets: 1-2 - Reps: 5 - Sessions: 3-4, Notes: This focuses work to the small hand/finger muscles  Exercise Name: Finger Active Range of Motion Table Top Fist Series - Reps: 10 - Sessions: Every hour, Notes: You can hold a ball or small cylinder in your hand to help your finger to move toward a full fist.  Exercise Name: Bottle Rotation for Finger Active Range of Motion Flexion and Extension - Reps: 15-20 each way, Notes: twirl a bottle, or glass to move fingers and thumb in functional manner; Slow and methodical  9/9/2021  Finger tips together and ABD/ADD fingers, with attention to SR  More attention to PROM and blocking with more effort to flexion, and then easy AROM for joint lubrication  Table tracking  Out of splint for eating and light use  OK for vitamin E on scar, pt reports use of CBD oil on dorsum of finger with no difficulties  9/30/2021  Tracking with pressure and gripping  Scar sleeve on finger  10/14/2021  Dycem for scar massage. Added elastomer to orthosis for scar management  10/21/2021  Finger cast for PIP extension      Next Visit:  US and scar  massage  Progress ROM  Strengthening

## 2021-12-09 ENCOUNTER — MYC MEDICAL ADVICE (OUTPATIENT)
Dept: INTERNAL MEDICINE | Facility: CLINIC | Age: 66
End: 2021-12-09
Payer: COMMERCIAL

## 2021-12-09 DIAGNOSIS — Z00.00 ROUTINE HEALTH MAINTENANCE: Primary | ICD-10-CM

## 2021-12-16 NOTE — PROGRESS NOTES
HPI:  Patient presents for an eye exam. He complains of his glasses not working properly.     Social history: He has 5 grand kids.       Pertinent Medical History:    Colon polyps    Seasonal allergies    Obstructive sleep apnea    Epilepsy    Ocular History:    Cataract, both eyes    Presbyopia, both eyes.     Eye Medications:    None    Assessment and Plan:  1.   Presbyopia, both eyes.     Increase ADD. Dispensed new glasses.     2.   Cataract, both eyes.     Not visually significant. Monitor.           Patient consented to a dilated eye exam:    Yes. Side effects discussed.    Medical History:  Past Medical History:   Diagnosis Date     Achilles bursitis or tendinitis 08/07/2008     Advanced directives, counseling/discussion 05/20/2015    Gave pt packet on 5/20/2015  Syeda Torres CMA       CARDIOVASCULAR SCREENING; LDL GOAL LESS THAN 160 10/12/2010     Cholesteatoma, unspecified      Colon polyps 04/2015    tubular villous and serrated adenoma     Complex sleep apnea syndrome     does not use CPAP     Dupuytren's contracture      Dyslexia      Dyspnea and respiratory abnormality 09/03/2014     Problem list name updated by automated process. 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 Provider to review     Plantar fascial fibromatosis 08/07/2008       Medications:  Current Outpatient Medications   Medication Sig Dispense Refill     acetaminophen (TYLENOL) 325 MG tablet Take 2 tablets (650 mg) by mouth every 4 hours as needed for mild pain 50 tablet 0     cannabidiol (EPIDIOLEX) 100 MG/ML oral solution Take by mouth 2 times daily CBD OIL   PRN       lamoTRIgine (LAMICTAL) 100 MG tablet Take 2 tabs (200mg) by mouth twice daily (Patient  not taking: Reported on 9/23/2021) 360 tablet 3     medical cannabis oral liquid  (Patient's own supply.  Not a prescription) (This is NOT a prescription, and does not certify that the patient has a qualifying medical condition for medical cannabis.  The purpose of this order is  to document that the patient reports taking medical cannabis.)        NONFORMULARY Medication name: Breathe Again. Rub on hands and breathe. (Essential oil) Use prn.       oxyCODONE (ROXICODONE) 5 MG tablet Take 1-2 tablets (5-10 mg) by mouth every 4 hours as needed for moderate to severe pain (Patient not taking: Reported on 9/3/2021) 16 tablet 0   Complete documentation of historical and exam elements from today's encounter can be found in the full encounter summary report (not reduplicated in this progress note). I personally obtained the chief complaint(s) and history of present illness.  I confirmed and edited as necessary the review of systems, past medical/surgical history, family history, social history, and examination findings as documented by others; and I examined the patient myself. I personally reviewed the relevant tests, images, and reports as documented above. I formulated and edited as necessary the assessment and plan and discussed the findings and management plan with the patient and family. - Ana Gonzalez OD

## 2021-12-23 ENCOUNTER — OFFICE VISIT (OUTPATIENT)
Dept: OPHTHALMOLOGY | Facility: CLINIC | Age: 66
End: 2021-12-23
Attending: INTERNAL MEDICINE
Payer: COMMERCIAL

## 2021-12-23 ENCOUNTER — TELEPHONE (OUTPATIENT)
Dept: OPHTHALMOLOGY | Facility: CLINIC | Age: 66
End: 2021-12-23
Payer: COMMERCIAL

## 2021-12-23 DIAGNOSIS — Z00.00 ROUTINE HEALTH MAINTENANCE: ICD-10-CM

## 2021-12-23 DIAGNOSIS — H52.4 PRESBYOPIA OF BOTH EYES: Primary | ICD-10-CM

## 2021-12-23 DIAGNOSIS — H25.13 NUCLEAR SENILE CATARACT OF BOTH EYES: ICD-10-CM

## 2021-12-23 PROCEDURE — G0463 HOSPITAL OUTPT CLINIC VISIT: HCPCS | Mod: 25

## 2021-12-23 PROCEDURE — 92014 COMPRE OPH EXAM EST PT 1/>: CPT | Performed by: OPTOMETRIST

## 2021-12-23 PROCEDURE — 92015 DETERMINE REFRACTIVE STATE: CPT

## 2021-12-23 ASSESSMENT — TONOMETRY
OS_IOP_MMHG: 18
OD_IOP_MMHG: 18
IOP_METHOD: TONOPEN

## 2021-12-23 ASSESSMENT — EXTERNAL EXAM - RIGHT EYE: OD_EXAM: NORMAL

## 2021-12-23 ASSESSMENT — REFRACTION_WEARINGRX
OS_ADD: +2.00
SPECS_TYPE: LAST MRX
OD_AXIS: 058
OS_CYLINDER: SPHERE
OS_SPHERE: +0.50
OD_CYLINDER: +0.25
OD_ADD: +2.00
OD_SPHERE: +1.00

## 2021-12-23 ASSESSMENT — REFRACTION_MANIFEST
OS_CYLINDER: SPHERE
OS_ADD: +2.50
OD_ADD: +2.50
OD_SPHERE: +1.25
OD_CYLINDER: +0.50
OS_SPHERE: +0.75
OD_AXIS: 007

## 2021-12-23 ASSESSMENT — VISUAL ACUITY
OD_CC: 20/20
METHOD_MR: RED/GREEN BALANCE
OS_CC: 20/20
OD_CC+: -3
OS_CC+: -2
METHOD: SNELLEN - LINEAR

## 2021-12-23 ASSESSMENT — CONF VISUAL FIELD
METHOD: COUNTING FINGERS
OS_NORMAL: 1
OD_NORMAL: 1

## 2021-12-23 ASSESSMENT — SLIT LAMP EXAM - LIDS
COMMENTS: NORMAL
COMMENTS: NORMAL

## 2021-12-23 ASSESSMENT — EXTERNAL EXAM - LEFT EYE: OS_EXAM: NORMAL

## 2021-12-23 NOTE — TELEPHONE ENCOUNTER
Last glasses Rx faxed per request    Harsha Padilla RN 12:34 PM 12/23/21        [facility]    Phone Message    May a detailed message be left on voicemail: yes     Reason for Call: Other:   Pt left his glasses rx at the clinic. Please fax a copy over to pt's fax #: [phone].     Action Taken: Other:   eye     Travel Screening: Not Applicable

## 2021-12-23 NOTE — NURSING NOTE
Chief Complaints and History of Present Illnesses   Patient presents with     Annual Eye Exam     Chief Complaint(s) and History of Present Illness(es)     Annual Eye Exam     Laterality: both eyes    Onset: gradual    Severity: mild    Frequency: constantly    Timing: throughout the day    Associated symptoms: Negative for eye pain    Pain scale: 0/10              Comments     Chad is here for his annual eye exam. Last exam was 2019. He has had trouble with prescription glasses in the past working as he would like them to and usually uses readers he bought OTC.     Houston Hodge COT 9:27 AM December 23, 2021

## 2021-12-23 NOTE — TELEPHONE ENCOUNTER
Called and spoke to Chad.     We tried to fax Chad's eyeglass RX four times but unable to send. Chad was agreeable to me sending the rx in the mail.     Confirmed the address with Luis Felipe Monsivais Communication Facilitator on 12/23/2021 at 12:49 PM

## 2022-01-14 DIAGNOSIS — G40.309 GENERALIZED CONVULSIVE EPILEPSY (H): ICD-10-CM

## 2022-01-18 RX ORDER — LAMOTRIGINE 100 MG/1
TABLET ORAL
Qty: 120 TABLET | Refills: 0 | Status: SHIPPED | OUTPATIENT
Start: 2022-01-18 | End: 2022-02-09

## 2022-01-18 NOTE — TELEPHONE ENCOUNTER
LAMOTRIGINE 100 MG TABLET     Last Written Prescription Date:  11/18/20  Last Fill Quantity: 360,   # refills: 3  Last Office Visit : 11/18/20  Future Office visit:  none  Filling per [facility] medication refill protocols - seizure medications.  Not all labs required.       Appt due. Scheduling has been notified to contact the pt for appointment.  Refilled for 30 days per protocol.

## 2022-02-01 DIAGNOSIS — G40.309 GENERALIZED CONVULSIVE EPILEPSY (H): ICD-10-CM

## 2022-02-03 RX ORDER — LAMOTRIGINE 100 MG/1
TABLET ORAL
Qty: 360 TABLET | Refills: 1 | OUTPATIENT
Start: 2022-02-03

## 2022-02-09 ENCOUNTER — VIRTUAL VISIT (OUTPATIENT)
Dept: NEUROLOGY | Facility: CLINIC | Age: 67
End: 2022-02-09
Payer: COMMERCIAL

## 2022-02-09 DIAGNOSIS — G40.309 GENERALIZED CONVULSIVE EPILEPSY (H): ICD-10-CM

## 2022-02-09 RX ORDER — LAMOTRIGINE 100 MG/1
200 TABLET ORAL 2 TIMES DAILY
Qty: 360 TABLET | Refills: 3 | Status: SHIPPED | OUTPATIENT
Start: 2022-02-09 | End: 2023-04-26

## 2022-02-09 NOTE — PROGRESS NOTES
Name:  Chad Olivares  MRN:  [id number]    Chad is a 66 year old who is being evaluated via a billable video visit.       How would you like to obtain your AVS? Sinan  If the video visit is dropped, the invitation should be resent by: Send to e-mail at: [email or web address]  Will anyone else be joining your video visit? Yes: Flora. How would they like to receive their invitation? Other e-mail: [email or web address]       EPILEPSY CLINIC VIDEO VISIT NOTE  The scheduled clinic visit was changed to a video visit to reduce the risk of COVID-19 transmission.           Service Date: 02/09/2022    HISTORY: I spoke with Mr. Chad Olivares, who is a 66-year-old right-handed man with grand mal seizures.  His significant other participate in the visit today.    The patient denied having recent fever or cough, and exposure to anyone thought to have COVID-19.     Following the most recent visit to this clinic on 11/18/2020, the patient reportedly has had no seizures and no medication adverse effects.  The last (of two) grand mal seizures occurred in 2014.      He has continued to obtain a marijuana derivative, cannabidiol oil, from a supplier in [place], which seems to have been quite useful in treating joint pains.  He uses 2 drops every night, which he thinks is a low dose.  He denied adverse effects of cannabidiol oil.     He noted that he has retired and has no new health problems, although he feels that his longstanding memory deficits have worsened over the last year.  He also thinks that he walks more slowly than he should, and that he finds speech effortful for self-expression in terms of composing his thoughts and pronouncing the words.  He denied having a tremor.      PAST MEDICAL HISTORY:    1.  History of two generalized tonic-clonic seizures (2014) of uncertain etiology.   2.  Mild obstructive sleep apnea.   3.  History of dyslexia.    MEDICATIONS:    1.  Lamotrigine 200 mg b.i.d.   2.  Aspirin 81 mg daily.   3.  Ibuprofen 200 mg as  needed for joint pain.     VIDEO OBSERVATIONS:   The patient spoke with normal articulation, fluency and syntax, with normal comprehension of questions.    On command, the patient moved the direction of gaze into all 4 quadrants conjugately and without nystagmus.   Facial movements were normal.    Tongue protruded on the midline.    The patient did not display any resting tremors or action tremors of the outstretched arms.  Limb movements were normal.      IMPRESSION:   Seizures remain well controlled with no apparent adverse effects of lamotrigine.       He feels that his longstanding memory deficits have worsened over the last year.  He thinks that he walks more slowly than he should.  He finds speech effortful for self-expression in terms of composing his thoughts and pronouncing the words.     The patient has previously reported that his memory problems had increased over the last 5 years.  He completed neuropsychological testing in September 2016, and again on 10/31/2019.  Both examinations showed impairments consistent with the previously recognized diagnosis of dyslexia, with additional left frontoparietal dysfunction.  He clearly did not have evidence of global deficits of dementia, which was his major concern.      I told him that I would like to review a current EEG, to screen for evidence of electrographic encephalopathy, and then have him come back to the clinic in person, so that I can perform a full neurological examination, including screening for hypokinesia.  Depending on the results, there may be reason to perform a third neuropsychological evaluation.      PLAN:   1)  Continue the current dose of lamotrigine, and obtain lamotrigine level.   2)  Out-patient 3hr VEEG.   3)  Return in 2 months for in-person clinic visit.    Video Conference via [employer].   Video Start Time: 5:14 p.m.   Video Stop Time: 5:33 p.m.   Provider location: [facility]   Reported Patient Location: Home     I  personally spent 30 minutes in this patient care on the day of this visit, including time in Video contact with the patient, and time in other necessary patient care activities without direct patient contact including medical record, EEG and imaging review.      Dandre Martin M.D., Professor of Neurology

## 2022-02-09 NOTE — LETTER
2022     RE: Chad Olivares  : 1955   MRN: [id number]      Dear Colleague,    Thank you for referring your patient, Chad Olivares, to the [facility] EPILEPSY CARE at [facility]. Please see a copy of my visit note below.  Name:  Chad Olivares  MRN:  [id number]    Chad is a 66 year old who is being evaluated via a billable video visit.       How would you like to obtain your AVS? [employer]pac  If the video visit is dropped, the invitation should be resent by: Send to e-mail at: [email or web address]  Will anyone else be joining your video visit? Yes: Flora. How would they like to receive their invitation? Other e-mail: [email or web address]       EPILEPSY CLINIC VIDEO VISIT NOTE  The scheduled clinic visit was changed to a video visit to reduce the risk of COVID-19 transmission.           Service Date: 2022    HISTORY: I spoke with . Chad Olivares, who is a 66-year-old right-handed man with grand mal seizures.  His significant other participate in the visit today.    The patient denied having recent fever or cough, and exposure to anyone thought to have COVID-19.     Following the most recent visit to this clinic on 2020, the patient reportedly has had no seizures and no medication adverse effects.  The last (of two) grand mal seizures occurred in .      He has continued to obtain a marijuana derivative, cannabidiol oil, from a supplier in [place], which seems to have been quite useful in treating joint pains.  He uses 2 drops every night, which he thinks is a low dose.  He denied adverse effects of cannabidiol oil.     He noted that he has retired and has no new health problems, although he feels that his longstanding memory deficits have worsened over the last year.  He also thinks that he walks more slowly than he should, and that he finds speech effortful for self-expression in terms of composing his thoughts and pronouncing the words.  He denied having a tremor.       PAST MEDICAL HISTORY:    1.  History of two generalized tonic-clonic seizures (2014) of uncertain etiology.   2.  Mild obstructive sleep apnea.   3.  History of dyslexia.    MEDICATIONS:    1.  Lamotrigine 200 mg b.i.d.   2.  Aspirin 81 mg daily.   3.  Ibuprofen 200 mg as needed for joint pain.     VIDEO OBSERVATIONS:   The patient spoke with normal articulation, fluency and syntax, with normal comprehension of questions.    On command, the patient moved the direction of gaze into all 4 quadrants conjugately and without nystagmus.   Facial movements were normal.    Tongue protruded on the midline.    The patient did not display any resting tremors or action tremors of the outstretched arms.  Limb movements were normal.      IMPRESSION:   Seizures remain well controlled with no apparent adverse effects of lamotrigine.       He feels that his longstanding memory deficits have worsened over the last year.  He thinks that he walks more slowly than he should.  He finds speech effortful for self-expression in terms of composing his thoughts and pronouncing the words.     The patient has previously reported that his memory problems had increased over the last 5 years.  He completed neuropsychological testing in September 2016, and again on 10/31/2019.  Both examinations showed impairments consistent with the previously recognized diagnosis of dyslexia, with additional left frontoparietal dysfunction.  He clearly did not have evidence of global deficits of dementia, which was his major concern.      I told him that I would like to review a current EEG, to screen for evidence of electrographic encephalopathy, and then have him come back to the clinic in person, so that I can perform a full neurological examination, including screening for hypokinesia.  Depending on the results, there may be reason to perform a third neuropsychological evaluation.      PLAN:   1)  Continue the current dose of lamotrigine, and obtain  lamotrigine level.   2)  Out-patient 3hr VEEG.   3)  Return in 2 months for in-person clinic visit.    Video Conference via [employer].   Video Start Time: 5:14 p.m.   Video Stop Time: 5:33 p.m.   Provider location: [facility]   Reported Patient Location: Home     I personally spent 30 minutes in this patient care on the day of this visit, including time in Video contact with the patient, and time in other necessary patient care activities without direct patient contact including medical record, EEG and imaging review.      Dandre Martin M.D., Professor of Neurology

## 2022-02-10 ENCOUNTER — TELEPHONE (OUTPATIENT)
Dept: NEUROLOGY | Facility: CLINIC | Age: 67
End: 2022-02-10
Payer: COMMERCIAL

## 2022-02-10 NOTE — TELEPHONE ENCOUNTER
Patient thought Dr. Martin wanted him to have his Lamotrigine level checked and he called to set up visit at [facility] but there are no orders placed? Patient will reach out to [facility] again tomorrow to see if lab has been entered to get it scheduled

## 2022-02-15 ENCOUNTER — LAB (OUTPATIENT)
Dept: LAB | Facility: CLINIC | Age: 67
End: 2022-02-15
Payer: COMMERCIAL

## 2022-02-15 DIAGNOSIS — G40.309 GENERALIZED CONVULSIVE EPILEPSY (H): ICD-10-CM

## 2022-02-15 PROCEDURE — 80175 DRUG SCREEN QUAN LAMOTRIGINE: CPT | Mod: 90

## 2022-02-15 PROCEDURE — 36415 COLL VENOUS BLD VENIPUNCTURE: CPT

## 2022-02-15 PROCEDURE — 99000 SPECIMEN HANDLING OFFICE-LAB: CPT

## 2022-02-17 LAB — LAMOTRIGINE SERPL-MCNC: 7.1 UG/ML

## 2022-03-30 ENCOUNTER — OFFICE VISIT (OUTPATIENT)
Dept: NEUROLOGY | Facility: CLINIC | Age: 67
End: 2022-03-30
Payer: COMMERCIAL

## 2022-03-30 ENCOUNTER — ANCILLARY PROCEDURE (OUTPATIENT)
Dept: NEUROLOGY | Facility: CLINIC | Age: 67
End: 2022-03-30
Attending: PSYCHIATRY & NEUROLOGY
Payer: COMMERCIAL

## 2022-03-30 VITALS
SYSTOLIC BLOOD PRESSURE: 145 MMHG | HEART RATE: 61 BPM | WEIGHT: 190.2 LBS | HEIGHT: 69 IN | TEMPERATURE: 97.3 F | BODY MASS INDEX: 28.17 KG/M2 | DIASTOLIC BLOOD PRESSURE: 89 MMHG

## 2022-03-30 DIAGNOSIS — G40.309 GENERALIZED CONVULSIVE EPILEPSY (H): Primary | ICD-10-CM

## 2022-03-30 DIAGNOSIS — R41.3 MEMORY LOSS: ICD-10-CM

## 2022-03-30 DIAGNOSIS — G40.309 GENERALIZED CONVULSIVE EPILEPSY (H): ICD-10-CM

## 2022-03-30 ASSESSMENT — PAIN SCALES - GENERAL: PAINLEVEL: NO PAIN (0)

## 2022-03-30 NOTE — LETTER
"3/30/2022     RE: Chad Olivares  : 1955   MRN: [id number]      Dear Colleague,    Thank you for referring your patient, Chad Olivares, to the [facility] EPILEPSY CARE at [facility]. Please see a copy of my visit note below.    [facility]  Epilepsy Clinic:  RETURN VISIT          Service Date: 2022    HISTORY:  Mr. Chad Olivares is a 66-year-old right-handed man who returned for follow-up of grand mal seizures.      The patient has had no further seizures since his last virtual visit. He continues to tolerate the lamotrigine without side effects. He uses cannabidiol oil sparingly 1-2 times per week to treat joint pains. He is sleeping well.     He does continue to have concerns regarding his cognition, saying that both himself and loved ones have noticed that he has trouble with word finding, remembering names, and  verbalizing his thoughts. He states that he has had some slurred speech. 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 He feels that he has been steadily worsening over the last few years. He also mentions a right hand tremor that is subtle and intermittent. It happens more often when he is using his hand such as holding a pen or silverware.    He also endorses difficulty with walking and says that he has felt \"flat-footed.\" He describes this as difficulty with lifting his toes. He denies weakness and loss of feeling in his feet. However he says that after a recent trip to [place] which required lots of walking that he feels back to normal. He thinks the walking difficulty is attributable to being out of shape and not used to walking much over the winter.    Ictal semiology-history:    The patient confirmed previously presented history in detail today. He again noted that he had the first seizure of his lifetime on 2014, and a second grand " mal seizure on 2014.  He was asleep at home in bed at seizure onset; after going to bed the preceding evening, his next memory is of awakening in the [facility] Emergency Department.  He then was very tired and diffusely sore, with pain on the right side of his tongue which had been bitten during the seizure.  His significant other witnessed the seizure from onset.  He was lying stiffly in bed with legs and trunk extended with generalized jerking movements for what seemed to be several minutes.  Afterwards he was very lethargic with stertorous respirations and then he became agitated.  When paramedics arrived, they gave him 2 doses of midazolam which decreased the agitation.  Subsequently, he became gradually more responsive over several hours.  He did not feel that he had baseline alertness and cognitive function for nearly another 2 days, however.  He did not have urinary incontinence with the event.       The patient and his significant other reported that he has not been known to have any sudden brief staring spells with unresponsiveness, sudden brief periods of confusion or global memory deficits or involuntary movements, except for hypnic jerks.      Epilepsy-seizure predispositions:   The patient has no family history of epilepsy or seizures.  He has no history of gestational or  injury, febrile convulsions, developmental delay, stroke, meningitis, encephalitis, significant head injury, or other epileptic predispositions.  He denied a history of physical or sexual abuse by an adult during his childhood or adulthood.      Laboratory evaluations:   In the Emergency Department on the morning after the first seizure, he had a head CT scan which was normal.  A brain MRI scan was ordered at that time and was completed on 2014 and this also was normal.  He had a brief routine electroencephalogram on 2014 which showed normal waking and drowsy EEG activities, although sleep patterns were not  recorded. His EEG from today's visit 03/30/2022 showed no abnormalities.    Epilepsy therapeutics:   The patient reported that he had never been treated with anti-seizure medications for any reason until the second seizure occurred, when levetiracetam was started.  He completed a crossover from levetiracetam monotherapy to lamotrigine monotherapy in 2015.  He experienced complete resolution of chronic mild irritability and episodic imbalance after tapering off levetiracetam.  He later he added  OTC  cannabidiol oil to this, mainly for treating joint pains.      PAST MEDICAL HISTORY:    1.  History of two generalized tonic-clonic seizures (2014) of uncertain etiology.   2.  Mild obstructive sleep apnea.   3.  History of dyslexia.     PERSONAL AND SOCIAL HISTORY:  The patient grew up in [place].  He had difficulty with reading in school and was told that testing showed dyslexia, but he ultimately graduated from high school in regular classes.  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  He has 2 adult children.  He lives with his significant other.  He does not use illicit recreational drugs.  He drinks, at most, 1-2 alcoholic beverages, perhaps 3 times per month.  He quit smoking in 2005.     REVIEW OF SYSTEMS:    Positive for occasional dizziness when standing after bending over. He denies headache, chest pain, shortness of breath, cough, changes to bowel movements, numbness or tingling in his limbs, and rash.    MEDICATIONS:    1.  Lamotrigine 200 mg b.i.d.   2.  Aspirin 81 mg daily.   3.  Ibuprofen 200 mg as needed for joint pain.     ALLERGIES:  The patient denied any known medication allergies.      PHYSICAL EXAMINATION:    On physical examination the patient appeared to be in no acute distress.  Vital signs were as per the electronic medical record.  Skull was normocephalic and atraumatic. The patient was alert and oriented with appropriate affect.    CN  III-XII intact. Speech was fluent without slurring. No tremor noted. Finger-nose-finger and rapid alternating movements of the fingers were normal. No pronator drift. Gait steady. Able to perform toe, heel, and tandem walk without difficulty.    IMPRESSION:    The patient continues to do very well with seizure control.  His main concern today is regarding his cognitive functioning which he is worried might be declining. The patient previously reported that his memory problems have definitely increased over the prior 2 years and is currently noticing trouble with word finding, remembering names, and finishing tasks in a timely manner.  He completed neuropsychological testing in September 2016, and again on 10/31/2019.  Both examinations showed impairments consistent with the previously recognized diagnosis of dyslexia, with additional left frontoparietal dysfunction.  He clearly did not have evidence of global deficits of dementia, which was his major concern.  These results have been reviewed with him.     I believe that it would be reasonable to repeat a brain MRI as this has not been done since 2014.  I also will request neuropsychological testing, to assess his cognitive complaints.    PLAN:    1.  Continue lamotrigine at 200 mg b.i.d.  2.  Outpatient brain MRI to be completed prior to next visit.  3.  Repeat neuropsychological testing to be completed prior to next visit.  4.  Return visit in approximately 4 months.     Mariam Moore, MS3  [facility]     Report Prepared By: Mariam Moore, MS3   I agree with the findings and plan of care as documented.  I personally examined the patient, and discussed our epilepsy diagnostic impressions and therapeutic recommendations with the patient.  The patient was agreeable to this plan.    I spent 32 minutes in this patient care, more than 50% of which consisted of counseling and coordinating care.       Dandre Martin M.D., Professor of Neurology

## 2022-03-30 NOTE — PROGRESS NOTES
"  [facility]  Epilepsy Clinic:  RETURN VISIT          Service Date: 03/30/2022    HISTORY:  Mr. Chad Olivares is a 66-year-old right-handed man who returned for follow-up of grand mal seizures.      The patient has had no further seizures since his last virtual visit. He continues to tolerate the lamotrigine without side effects. He uses cannabidiol oil sparingly 1-2 times per week to treat joint pains. He is sleeping well.     He does continue to have concerns regarding his cognition, saying that both himself and loved ones have noticed that he has trouble with word finding, remembering names, and  verbalizing his thoughts. He states that he has had some slurred speech. 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 He feels that he has been steadily worsening over the last few years. He also mentions a right hand tremor that is subtle and intermittent. It happens more often when he is using his hand such as holding a pen or silverware.    He also endorses difficulty with walking and says that he has felt \"flat-footed.\" He describes this as difficulty with lifting his toes. He denies weakness and loss of feeling in his feet. However he says that after a recent trip to [place] which required lots of walking that he feels back to normal. He thinks the walking difficulty is attributable to being out of shape and not used to walking much over the winter.    Ictal semiology-history:    The patient confirmed previously presented history in detail today. He again noted that he had the first seizure of his lifetime on 07/31/2014, and a second grand mal seizure on August 20, 2014.  He was asleep at home in bed at seizure onset; after going to bed the preceding evening, his next memory is of awakening in the [facility] Emergency Department.  He then was very tired and diffusely sore, with pain on the right side of his tongue " which had been bitten during the seizure.  His significant other witnessed the seizure from onset.  He was lying stiffly in bed with legs and trunk extended with generalized jerking movements for what seemed to be several minutes.  Afterwards he was very lethargic with stertorous respirations and then he became agitated.  When paramedics arrived, they gave him 2 doses of midazolam which decreased the agitation.  Subsequently, he became gradually more responsive over several hours.  He did not feel that he had baseline alertness and cognitive function for nearly another 2 days, however.  He did not have urinary incontinence with the event.       The patient and his significant other reported that he has not been known to have any sudden brief staring spells with unresponsiveness, sudden brief periods of confusion or global memory deficits or involuntary movements, except for hypnic jerks.      Epilepsy-seizure predispositions:   The patient has no family history of epilepsy or seizures.  He has no history of gestational or  injury, febrile convulsions, developmental delay, stroke, meningitis, encephalitis, significant head injury, or other epileptic predispositions.  He denied a history of physical or sexual abuse by an adult during his childhood or adulthood.      Laboratory evaluations:   In the Emergency Department on the morning after the first seizure, he had a head CT scan which was normal.  A brain MRI scan was ordered at that time and was completed on 2014 and this also was normal.  He had a brief routine electroencephalogram on 2014 which showed normal waking and drowsy EEG activities, although sleep patterns were not recorded. His EEG from today's visit 2022 showed no abnormalities.    Epilepsy therapeutics:   The patient reported that he had never been treated with anti-seizure medications for any reason until the second seizure occurred, when levetiracetam was started.  He  completed a crossover from levetiracetam monotherapy to lamotrigine monotherapy in 2015.  He experienced complete resolution of chronic mild irritability and episodic imbalance after tapering off levetiracetam.  He later he added  OTC  cannabidiol oil to this, mainly for treating joint pains.      PAST MEDICAL HISTORY:    1.  History of two generalized tonic-clonic seizures (2014) of uncertain etiology.   2.  Mild obstructive sleep apnea.   3.  History of dyslexia.     PERSONAL AND SOCIAL HISTORY:  The patient grew up in [place].  He had difficulty with reading in school and was told that testing showed dyslexia, but he ultimately graduated from high school in regular classes.  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  He has 2 adult children.  He lives with his significant other.  He does not use illicit recreational drugs.  He drinks, at most, 1-2 alcoholic beverages, perhaps 3 times per month.  He quit smoking in 2005.     REVIEW OF SYSTEMS:    Positive for occasional dizziness when standing after bending over. He denies headache, chest pain, shortness of breath, cough, changes to bowel movements, numbness or tingling in his limbs, and rash.    MEDICATIONS:    1.  Lamotrigine 200 mg b.i.d.   2.  Aspirin 81 mg daily.   3.  Ibuprofen 200 mg as needed for joint pain.     ALLERGIES:  The patient denied any known medication allergies.      PHYSICAL EXAMINATION:    On physical examination the patient appeared to be in no acute distress.  Vital signs were as per the electronic medical record.  Skull was normocephalic and atraumatic. The patient was alert and oriented with appropriate affect.    CN III-XII intact. Speech was fluent without slurring. No tremor noted. Finger-nose-finger and rapid alternating movements of the fingers were normal. No pronator drift. Gait steady. Able to perform toe, heel, and tandem walk without difficulty.    IMPRESSION:    The  patient continues to do very well with seizure control.  His main concern today is regarding his cognitive functioning which he is worried might be declining. The patient previously reported that his memory problems have definitely increased over the prior 2 years and is currently noticing trouble with word finding, remembering names, and finishing tasks in a timely manner.  He completed neuropsychological testing in September 2016, and again on 10/31/2019.  Both examinations showed impairments consistent with the previously recognized diagnosis of dyslexia, with additional left frontoparietal dysfunction.  He clearly did not have evidence of global deficits of dementia, which was his major concern.  These results have been reviewed with him.     I believe that it would be reasonable to repeat a brain MRI as this has not been done since 2014.  I also will request neuropsychological testing, to assess his cognitive complaints.    PLAN:    1.  Continue lamotrigine at 200 mg b.i.d.  2.  Outpatient brain MRI to be completed prior to next visit.  3.  Repeat neuropsychological testing to be completed prior to next visit.  4.  Return visit in approximately 4 months.     Mariam Moore MS3  [facility]ilepsy [facility]     Report Prepared By: Mariam Moore MS3   I agree with the findings and plan of care as documented.  I personally examined the patient, and discussed our epilepsy diagnostic impressions and therapeutic recommendations with the patient.  The patient was agreeable to this plan.    I spent 32 minutes in this patient care, more than 50% of which consisted of counseling and coordinating care.       Dandre Martin M.D., Professor of Neurology

## 2022-04-04 ENCOUNTER — TRANSFERRED RECORDS (OUTPATIENT)
Dept: HEALTH INFORMATION MANAGEMENT | Facility: CLINIC | Age: 67
End: 2022-04-04
Payer: COMMERCIAL

## 2022-06-29 ENCOUNTER — NURSE TRIAGE (OUTPATIENT)
Dept: NURSING | Facility: CLINIC | Age: 67
End: 2022-06-29

## 2022-06-30 NOTE — TELEPHONE ENCOUNTER
"6/28 was given into left arm Prevnar 20 pneumonia and Shingrix shingles vaccinations. Symptoms started 4 hours after vaccinations.    Calling with the below symptoms-    Left arm swelling    \"Muscle pain\"    Left arm pain    Left shoulder pain    Middle to upper back pain    \"chest feels odd\"    Patient wondering if these symptoms could be signs of a heart issue.  Patient denies SOB, chest pressure and diaphoresis.     Writer paged on call provider Dr. Law.  She believes his symptoms are from getting both vaccines on the same day.  Have patient take Tylenol and Motrin to help with pain.      Encouraged patient to call back for any changes.  Verbalized understanding of advise.    Caron Roa RN, MA  [facility] Triage Nurse Advisor    Reason for Disposition    Shingles (Herpes zoster; Shingrix) vaccine reactions    Pneumococcal vaccine reactions    Additional Information    Negative: [1] Difficulty with breathing or swallowing AND [2] starts within 2 hours after injection    Negative: Difficult to awaken or acting confused (e.g., disoriented, slurred speech)    Negative: Unresponsive, passed out, or very weak    Negative: Sounds like a life-threatening emergency to the triager    Negative: Fever > 104 F (40 C)    Negative: [1] Fever > 101 F (38.3 C) AND [2] age > 60    Negative: [1] Fever > 100.0 F (37.8 C) AND [2] bedridden (e.g., nursing home patient, CVA, chronic illness, recovering from surgery)    Negative: [1] Fever > 100.0 F (37.8 C) AND [2] diabetes mellitus or weak immune system (e.g., HIV positive, cancer chemo, splenectomy, organ transplant, chronic steroids)    Negative: [1] Measles vaccine rash (onset day 6-12) AND [2] purple or blood-colored    Negative: Sounds like a severe, unusual reaction to the triager    Negative: [1] Redness or red streak around the injection site AND [2] begins > 48 hours after shot AND [3] fever    Negative: [1] Redness or red streak around the injection site AND [2] " begins > 48 hours after shot AND [3] no fever  (Exception: red area < 1 inch or 2.5 cm wide)    Negative: Fever present > 3 days (72 hours)    Negative: [1] Over 3 days (72 hours) since shot AND [2] redness, swelling or pain getting worse    Negative: [1] Smallpox vaccine and [2] eye pain, eye redness, or rash on eyelids    Negative: [1] Pain, tenderness, or swelling at the injection site AND [2] persists > 3 days    Negative: [1] Measles vaccine rash (onset day 6-12) AND [2] persists > 3 days    Negative: [1] Deep lump follows (in 2 to 8 weeks) Td or TDaP  shot AND [2] becomes tender to the touch    Negative: Immunization needed, questions about    Protocols used: IMMUNIZATION [id number]

## 2022-07-01 DIAGNOSIS — G47.33 OBSTRUCTIVE SLEEP APNEA: Primary | ICD-10-CM

## 2022-07-13 ENCOUNTER — MYC MEDICAL ADVICE (OUTPATIENT)
Dept: NEUROLOGY | Facility: CLINIC | Age: 67
End: 2022-07-13

## 2022-07-14 NOTE — TELEPHONE ENCOUNTER
"Message from   \"If the lamotrigine dose has not changed, it would be unlikely that all of these effects would be caused by lamotrigine.  He should be certain that he is using CPAP if his sleep specialist thinks that he currently has sleep apnea.   Assuming that he has not had a seizure since 2014, he could reduce lamotrigine to 100/200 mg daily as a trial to see whether any of these symptoms improve. \"    Message sent to patient  "

## 2022-07-20 PROBLEM — M72.0 DUPUYTREN'S CONTRACTURE: Status: RESOLVED | Noted: 2021-07-21 | Resolved: 2022-07-20

## 2022-07-20 PROBLEM — M25.641 FINGER STIFFNESS, RIGHT: Status: RESOLVED | Noted: 2021-09-03 | Resolved: 2022-07-20

## 2022-07-20 PROBLEM — M79.644 PAIN OF FINGER OF RIGHT HAND: Status: RESOLVED | Noted: 2021-09-03 | Resolved: 2022-07-20

## 2022-07-25 ENCOUNTER — OFFICE VISIT (OUTPATIENT)
Dept: INTERNAL MEDICINE | Facility: CLINIC | Age: 67
End: 2022-07-25
Payer: COMMERCIAL

## 2022-07-25 ENCOUNTER — TELEPHONE (OUTPATIENT)
Dept: SLEEP MEDICINE | Facility: CLINIC | Age: 67
End: 2022-07-25

## 2022-07-25 VITALS
SYSTOLIC BLOOD PRESSURE: 122 MMHG | DIASTOLIC BLOOD PRESSURE: 85 MMHG | OXYGEN SATURATION: 97 % | WEIGHT: 192.4 LBS | HEIGHT: 69 IN | RESPIRATION RATE: 16 BRPM | BODY MASS INDEX: 28.5 KG/M2 | HEART RATE: 67 BPM

## 2022-07-25 DIAGNOSIS — R10.31 RLQ ABDOMINAL PAIN: ICD-10-CM

## 2022-07-25 DIAGNOSIS — E78.5 HYPERLIPIDEMIA, UNSPECIFIED HYPERLIPIDEMIA TYPE: ICD-10-CM

## 2022-07-25 DIAGNOSIS — H53.9 VISION CHANGES: ICD-10-CM

## 2022-07-25 DIAGNOSIS — Z12.5 ENCOUNTER FOR SCREENING FOR MALIGNANT NEOPLASM OF PROSTATE: ICD-10-CM

## 2022-07-25 DIAGNOSIS — M89.9 DISORDER OF BONE AND CARTILAGE: ICD-10-CM

## 2022-07-25 DIAGNOSIS — S39.012S STRAIN OF LUMBAR REGION, SEQUELA: ICD-10-CM

## 2022-07-25 DIAGNOSIS — G47.30 SLEEP APNEA, UNSPECIFIED TYPE: ICD-10-CM

## 2022-07-25 DIAGNOSIS — R39.9 LOWER URINARY TRACT SYMPTOMS (LUTS): ICD-10-CM

## 2022-07-25 DIAGNOSIS — G40.309 GENERALIZED CONVULSIVE EPILEPSY (H): Primary | ICD-10-CM

## 2022-07-25 DIAGNOSIS — Z87.891 FORMER SMOKER: ICD-10-CM

## 2022-07-25 DIAGNOSIS — I95.1 ORTHOSTASIS: ICD-10-CM

## 2022-07-25 DIAGNOSIS — R73.02 IGT (IMPAIRED GLUCOSE TOLERANCE): ICD-10-CM

## 2022-07-25 DIAGNOSIS — M94.9 DISORDER OF BONE AND CARTILAGE: ICD-10-CM

## 2022-07-25 PROCEDURE — 99397 PER PM REEVAL EST PAT 65+ YR: CPT | Performed by: INTERNAL MEDICINE

## 2022-07-25 RX ORDER — TAMSULOSIN HYDROCHLORIDE 0.4 MG/1
0.4 CAPSULE ORAL DAILY
Qty: 90 CAPSULE | Refills: 3 | Status: SHIPPED | OUTPATIENT
Start: 2022-07-25 | End: 2023-06-14 | Stop reason: SINTOL

## 2022-07-25 NOTE — PROGRESS NOTES
HPI  67-year-old Nicholastz today for physical examination as well as to several concerns.  He has had episodes of orthostasis and some dizziness or lightheadedness when he does yoga and stands up or raising his head suddenly.  He has not had any syncope but he feels at times as if he could pass out.  Otherwise he has had some intermittent pain and discomfort in the right lumbar paraspinous muscles as well as in the rhomboid and the tip of the scapula.  He has not had any injury or trauma in association with this he is also not been doing any regular exercise for the back.  He also has a history of sleep apnea he is not on CPAP his wife is complaining regarding this because of the snoring and he is complaining because of sleep fragmentation.  In part due to sleep fragmentation is related to nocturia and his AUA score is 16.  He is not bothered significantly by urinary urgency and has had a few accidents related to the urgency.  Said no problems on his Lamictal.  Has not had any recent updated lipids.  Past Medical History:   Diagnosis Date     Achilles bursitis or tendinitis 08/07/2008     Advanced directives, counseling/discussion 05/20/2015    Gave pt packet on 5/20/2015  Syeda Torres CMA       CARDIOVASCULAR SCREENING; LDL GOAL LESS THAN 160 10/12/2010     Cholesteatoma, unspecified      Colon polyps 04/2015    tubular villous and serrated adenoma     Complex sleep apnea syndrome     does not use CPAP     Dupuytren's contracture      Dyslexia      Dyspnea and respiratory abnormality 09/03/2014     Problem list name updated by automated process. 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 Provider to review     Plantar fascial fibromatosis  "08/07/2008     Past Surgical History:   Procedure Laterality Date     cholesteatoma surgery Left      COLONOSCOPY  10/09/2002; 2015    polyps - needs f/u 5 yrs      COLONOSCOPY N/A 7/20/2021    Procedure: COLONOSCOPY, WITH POLYPECTOMY;  Surgeon: Asaf Guajardo MD;  [facility]      REMOVAL OF TONSILS,<13 Y/O  1962     RELEASE DUPUYTRENS CONTRACTURE Right 8/26/2021    Procedure: Right fifth finger and palm Dupuytren's contracture release;  Surgeon: Caron Farrell MD;  Location: [facility]     Family History   Problem Relation Age of Onset     Diabetes Mother         diet controlled, Htn     Heart Murmur Mother         TAVR     Diabetes Maternal Grandmother      Coronary Artery Disease Brother      Glaucoma No family hx of      Macular Degeneration No family hx of          Exam:  /85 (BP Location: Right arm, Patient Position: Sitting, Cuff Size: Adult Regular)   Pulse 67   Resp 16   Ht 1.753 m (5' 9\")   Wt 87.3 kg (192 lb 6.4 oz)   SpO2 97%   BMI 28.41 kg/m    192 lbs 6.4 oz  Physical Exam   The patient is alert, oriented with a clear sensorium.   Skin shows no lesions or rashes and good turgor.   Head is normocephalic and atraumatic.   Eyes show PERRLA with benign optic fundi.   Ears show cerumen bilaterally.   Mouth shows clear oral mucosa.   Neck shows no nodes, thyromegaly or bruits.   Back is non tender.  Lungs are clear to percussion and auscultation.   Heart shows normal S1 and S2 without murmur or gallop.   Abdomen is soft and nontender without masses or organomegaly.   Genitalia show very tender testes. No evidence of inguinal hernia.  Rectal shows very large smooth prostate without nodules or masses.  Extremities show no edema and no evidence of active synovitis.   Neurologic examination shows cranial nerves II-XII intact. Motor shows 5/5 strength. Reflexes are symmetric. Cerebellar testing shows normal tandem gait.  Romberg negative.  AUA score equals 6  Labs reviewed:  The " 10-year ASCVD risk score (Bharat MARIE Jr., et al., 2013) is: 12.5%    Values used to calculate the score:      Age: 67 years      Sex: Male      Is Non- : No      Diabetic: No      Tobacco smoker: No      Systolic Blood Pressure: 122 mmHg      Is BP treated: No      HDL Cholesterol: 60 mg/dL      Total Cholesterol: 204 mg/dL    ASSESSMENT  1 IGT  2 seizures in remission  3 sleep apnea not on CPAP   4 hyperlipidemia ?atorvastatin   5 orthostasis  6 Colon polyps due for colonoscopy 2025  7 hyperplasia prostate with lower urinary tract symptoms  8 lumbar paraspinous strain  9 history of smoking    Plan  Follow-up for fasting lab work.  With a smoking history organ to do CT lung cancer screening.  We will have him do Valsalva before changing positions.  We discussed the side effects and benefits of tamsulosin and he agreed to try this but may use it just on a as needed basis before trips and travel.  He is current on his immunizations  Over 40 minutes on the day of service spent in chart review, patient contact, record completion and review and notification of lab reports    This note was completed using Dragon voice recognition software.      Asaf Craig MD  General Internal Medicine  [facility]  [phone]

## 2022-07-25 NOTE — NURSING NOTE
"Chad Olivares is a 67 year old male patient that presents today in clinic for the following:    Chief Complaint   Patient presents with     Physical     Annual Visit     Back Pain     The patient's allergies and medications were reviewed as noted. A set of vitals were recorded as noted without incident: /85 (BP Location: Right arm, Patient Position: Sitting, Cuff Size: Adult Regular)   Pulse 67   Resp 16   Ht 1.753 m (5' 9\")   Wt 87.3 kg (192 lb 6.4 oz)   SpO2 97%   BMI 28.41 kg/m  . The patient does not have any other questions for the provider.    Jarrett Mancilla, EMT at 3:46 PM on 7/25/2022  "

## 2022-07-27 ENCOUNTER — LAB (OUTPATIENT)
Dept: LAB | Facility: CLINIC | Age: 67
End: 2022-07-27
Payer: COMMERCIAL

## 2022-07-27 DIAGNOSIS — S39.012S STRAIN OF LUMBAR REGION, SEQUELA: ICD-10-CM

## 2022-07-27 DIAGNOSIS — E78.5 HYPERLIPIDEMIA, UNSPECIFIED HYPERLIPIDEMIA TYPE: ICD-10-CM

## 2022-07-27 DIAGNOSIS — G40.309 GENERALIZED CONVULSIVE EPILEPSY (H): ICD-10-CM

## 2022-07-27 DIAGNOSIS — M94.9 DISORDER OF BONE AND CARTILAGE: ICD-10-CM

## 2022-07-27 DIAGNOSIS — M89.9 DISORDER OF BONE AND CARTILAGE: ICD-10-CM

## 2022-07-27 DIAGNOSIS — Z87.891 FORMER SMOKER: ICD-10-CM

## 2022-07-27 DIAGNOSIS — Z12.5 ENCOUNTER FOR SCREENING FOR MALIGNANT NEOPLASM OF PROSTATE: ICD-10-CM

## 2022-07-27 DIAGNOSIS — R10.31 RLQ ABDOMINAL PAIN: ICD-10-CM

## 2022-07-27 DIAGNOSIS — H53.9 VISION CHANGES: ICD-10-CM

## 2022-07-27 DIAGNOSIS — R39.9 LOWER URINARY TRACT SYMPTOMS (LUTS): ICD-10-CM

## 2022-07-27 DIAGNOSIS — I95.1 ORTHOSTASIS: ICD-10-CM

## 2022-07-27 DIAGNOSIS — G47.30 SLEEP APNEA, UNSPECIFIED TYPE: ICD-10-CM

## 2022-07-27 DIAGNOSIS — R73.02 IGT (IMPAIRED GLUCOSE TOLERANCE): ICD-10-CM

## 2022-07-27 LAB
ALBUMIN SERPL-MCNC: 4 G/DL (ref 3.4–5)
ALP SERPL-CCNC: 103 U/L (ref 40–150)
ALT SERPL W P-5'-P-CCNC: 35 U/L (ref 0–70)
ANION GAP SERPL CALCULATED.3IONS-SCNC: <1 MMOL/L (ref 3–14)
AST SERPL W P-5'-P-CCNC: 22 U/L (ref 0–45)
BASOPHILS # BLD AUTO: 0 10E3/UL (ref 0–0.2)
BASOPHILS NFR BLD AUTO: 1 %
BILIRUB SERPL-MCNC: 0.6 MG/DL (ref 0.2–1.3)
BUN SERPL-MCNC: 15 MG/DL (ref 7–30)
CALCIUM SERPL-MCNC: 8.8 MG/DL (ref 8.5–10.1)
CHLORIDE BLD-SCNC: 112 MMOL/L (ref 94–109)
CHOLEST SERPL-MCNC: 204 MG/DL
CO2 SERPL-SCNC: 26 MMOL/L (ref 20–32)
CREAT SERPL-MCNC: 1.13 MG/DL (ref 0.66–1.25)
DEPRECATED CALCIDIOL+CALCIFEROL SERPL-MC: 50 UG/L (ref 20–75)
EOSINOPHIL # BLD AUTO: 0.2 10E3/UL (ref 0–0.7)
EOSINOPHIL NFR BLD AUTO: 3 %
ERYTHROCYTE [DISTWIDTH] IN BLOOD BY AUTOMATED COUNT: 12.5 % (ref 10–15)
FASTING STATUS PATIENT QL REPORTED: YES
GFR SERPL CREATININE-BSD FRML MDRD: 71 ML/MIN/1.73M2
GLUCOSE BLD-MCNC: 100 MG/DL (ref 70–99)
HBA1C MFR BLD: 5.6 % (ref 0–5.6)
HCT VFR BLD AUTO: 45.4 % (ref 40–53)
HDLC SERPL-MCNC: 60 MG/DL
HGB BLD-MCNC: 15.4 G/DL (ref 13.3–17.7)
IMM GRANULOCYTES # BLD: 0 10E3/UL
IMM GRANULOCYTES NFR BLD: 0 %
LDLC SERPL CALC-MCNC: 130 MG/DL
LYMPHOCYTES # BLD AUTO: 1.8 10E3/UL (ref 0.8–5.3)
LYMPHOCYTES NFR BLD AUTO: 33 %
MCH RBC QN AUTO: 30.9 PG (ref 26.5–33)
MCHC RBC AUTO-ENTMCNC: 33.9 G/DL (ref 31.5–36.5)
MCV RBC AUTO: 91 FL (ref 78–100)
MONOCYTES # BLD AUTO: 0.4 10E3/UL (ref 0–1.3)
MONOCYTES NFR BLD AUTO: 8 %
NEUTROPHILS # BLD AUTO: 3.1 10E3/UL (ref 1.6–8.3)
NEUTROPHILS NFR BLD AUTO: 55 %
NONHDLC SERPL-MCNC: 144 MG/DL
NRBC # BLD AUTO: 0 10E3/UL
NRBC BLD AUTO-RTO: 0 /100
PLATELET # BLD AUTO: 219 10E3/UL (ref 150–450)
POTASSIUM BLD-SCNC: 3.9 MMOL/L (ref 3.4–5.3)
PROT SERPL-MCNC: 7.2 G/DL (ref 6.8–8.8)
PSA SERPL-MCNC: 3.21 UG/L (ref 0–4)
RBC # BLD AUTO: 4.98 10E6/UL (ref 4.4–5.9)
SODIUM SERPL-SCNC: 136 MMOL/L (ref 133–144)
TRIGL SERPL-MCNC: 71 MG/DL
TSH SERPL DL<=0.005 MIU/L-ACNC: 2.34 MU/L (ref 0.4–4)
WBC # BLD AUTO: 5.5 10E3/UL (ref 4–11)

## 2022-07-27 PROCEDURE — 36415 COLL VENOUS BLD VENIPUNCTURE: CPT | Performed by: PATHOLOGY

## 2022-07-27 PROCEDURE — 83036 HEMOGLOBIN GLYCOSYLATED A1C: CPT | Performed by: PATHOLOGY

## 2022-07-27 PROCEDURE — G0103 PSA SCREENING: HCPCS | Performed by: PATHOLOGY

## 2022-07-27 PROCEDURE — 82306 VITAMIN D 25 HYDROXY: CPT | Performed by: INTERNAL MEDICINE

## 2022-07-27 PROCEDURE — 80053 COMPREHEN METABOLIC PANEL: CPT | Performed by: PATHOLOGY

## 2022-07-27 PROCEDURE — 84443 ASSAY THYROID STIM HORMONE: CPT | Performed by: PATHOLOGY

## 2022-07-27 PROCEDURE — 85027 COMPLETE CBC AUTOMATED: CPT | Performed by: PATHOLOGY

## 2022-07-27 PROCEDURE — 80061 LIPID PANEL: CPT | Performed by: PATHOLOGY

## 2022-08-10 ENCOUNTER — ANCILLARY PROCEDURE (OUTPATIENT)
Dept: CT IMAGING | Facility: CLINIC | Age: 67
End: 2022-08-10
Attending: INTERNAL MEDICINE
Payer: COMMERCIAL

## 2022-08-10 DIAGNOSIS — Z87.891 FORMER SMOKER: ICD-10-CM

## 2022-08-10 PROCEDURE — 71271 CT THORAX LUNG CANCER SCR C-: CPT | Performed by: RADIOLOGY

## 2022-08-19 ENCOUNTER — OFFICE VISIT (OUTPATIENT)
Dept: OPHTHALMOLOGY | Facility: CLINIC | Age: 67
End: 2022-08-19
Attending: OPHTHALMOLOGY
Payer: COMMERCIAL

## 2022-08-19 DIAGNOSIS — H52.4 PRESBYOPIA OF BOTH EYES: ICD-10-CM

## 2022-08-19 DIAGNOSIS — H25.13 NUCLEAR SENILE CATARACT OF BOTH EYES: Primary | ICD-10-CM

## 2022-08-19 PROCEDURE — 99203 OFFICE O/P NEW LOW 30 MIN: CPT | Performed by: OPHTHALMOLOGY

## 2022-08-19 PROCEDURE — 92015 DETERMINE REFRACTIVE STATE: CPT

## 2022-08-19 PROCEDURE — G0463 HOSPITAL OUTPT CLINIC VISIT: HCPCS | Mod: 25

## 2022-08-19 ASSESSMENT — VISUAL ACUITY
OS_CC+: -1
OD_CC+: -1
CORRECTION_TYPE: GLASSES
METHOD: SNELLEN - LINEAR
OD_CC: 20/20
OS_CC: 20/25

## 2022-08-19 ASSESSMENT — TONOMETRY
IOP_METHOD: TONOPEN
OD_IOP_MMHG: 12
OS_IOP_MMHG: 11

## 2022-08-19 ASSESSMENT — CONF VISUAL FIELD
METHOD: COUNTING FINGERS
OD_NORMAL: 1
OS_NORMAL: 1

## 2022-08-19 ASSESSMENT — REFRACTION_WEARINGRX
OD_ADD: +2.50
SPECS_TYPE: PAL
OD_SPHERE: +1.25
OS_ADD: +2.50
OS_SPHERE: +0.75
OD_AXIS: 178
OD_CYLINDER: +0.50
OS_CYLINDER: SPHERE

## 2022-08-19 ASSESSMENT — SLIT LAMP EXAM - LIDS
COMMENTS: NORMAL
COMMENTS: NORMAL

## 2022-08-19 ASSESSMENT — REFRACTION_MANIFEST
OD_SPHERE: +1.25
OS_CYLINDER: SPHERE
OD_ADD: +2.50
OD_AXIS: 015
OS_ADD: +2.50
OS_SPHERE: +0.75
OD_CYLINDER: +0.50

## 2022-08-19 ASSESSMENT — EXTERNAL EXAM - LEFT EYE: OS_EXAM: NORMAL

## 2022-08-19 ASSESSMENT — EXTERNAL EXAM - RIGHT EYE: OD_EXAM: NORMAL

## 2022-08-19 NOTE — PROGRESS NOTES
"CC    Blurry vision OU  HPI:  Patient presents for an eye exam. He complains of his glasses not working properly.     Social history: He has 5 grand kids.     Interval hx. Blurry vision In both eyes. This started months ago. Associated symptoms include headache (a few, but not from the glasses, more from dehydration). Negative for glare, haloes, double vision, dryness, eye pain, flashes, and floaters. Treatments tried include glasses. Pain was noted as 0/10. Patient reports it's difficult when wearing glasses. \"It's hard to read his computer. The ground is moving. My side vision is blurry. My eyes seem to be worse since I started wearing glasses.\" Patient's last annual exam was 12/23/21. Patient notes every once and a while he sees something out of the corner of his eye.   Pertinent Medical History:    Colon polyps    Seasonal allergies    Obstructive sleep apnea    Epilepsy    Ocular History:    Cataract, both eyes    Presbyopia, both eyes.     Eye Medications:    None        Patient consented to a dilated eye exam:    Yes. Side effects discussed.    Medical History:  Past Medical History:   Diagnosis Date     Achilles bursitis or tendinitis 08/07/2008     Advanced directives, counseling/discussion 05/20/2015    Gave pt packet on 5/20/2015  Syeda Torres CMA       CARDIOVASCULAR SCREENING; LDL GOAL LESS THAN 160 10/12/2010     Cholesteatoma, unspecified      Colon polyps 04/2015    tubular villous and serrated adenoma     Complex sleep apnea syndrome     does not use CPAP     Dupuytren's contracture      Dyslexia      Dyspnea and respiratory abnormality 09/03/2014     Problem list name updated by automated process. 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 Provider to review     Plantar fascial fibromatosis 08/07/2008       Medications:  Current Outpatient Medications   Medication Sig Dispense Refill     lamoTRIgine (LAMICTAL) 100 MG tablet Take 2 tablets (200 mg) by mouth 2 times daily 360 tablet 3     medical cannabis oral liquid  (Patient's own supply.  Not a prescription) (This is NOT a prescription, and does not certify that the patient has a qualifying medical condition for medical cannabis.  The purpose of this order is  to document that the patient reports taking medical cannabis.)        NONFORMULARY Medication name: Breathe Again. Rub on hands and breathe. (Essential oil) Use prn.       tamsulosin (FLOMAX) 0.4 MG capsule Take 1 capsule (0.4 mg) by mouth daily 90 capsule 3       Assessment and Plan:  1.   Nuclear sclerosis -Trace OU    Mild. Not affecting the vision for now. Will watch yearly    2.Presbyopia    -Keep same eye glasses    F/U in 1 year with VTD      Attending Physician Attestation:  Complete documentation of historical and exam elements from today's encounter can be found in the full encounter summary report (not reduplicated in this progress note).  I personally obtained the chief complaint(s) and history of present illness.  I confirmed and edited as necessary the review of systems, past medical/surgical history, family history, social history, and examination findings as documented by others; and I examined the patient myself.  I personally reviewed the relevant tests, images, and reports as documented above.  I formulated and edited as necessary the assessment and plan and discussed the findings and management plan with the patient and family. - Jasper Brand MD

## 2022-08-19 NOTE — NURSING NOTE
"Chief Complaints and History of Present Illnesses   Patient presents with     COMPREHENSIVE EYE EXAM     Patient reports it's difficult when wearing glasses. \"It's hard to read his computer. The ground is moving. My side vision is blurry. My eyes seem to be worse since I started wearing glasses.\" Patient's last annual exam was 12/23/21. Patient notes every once and a while he sees something out of the corner of his eye.     NAGI Weiner 8:24 AM 08/19/2022       Chief Complaint(s) and History of Present Illness(es)     COMPREHENSIVE EYE EXAM     Laterality: both eyes    Onset: months ago    Associated symptoms: headache (a few, but not from the glasses, more from dehydration).  Negative for glare, haloes, double vision, dryness, eye pain, flashes and floaters    Treatments tried: glasses    Pain scale: 0/10    Comments: Patient reports it's difficult when wearing glasses. \"It's hard to read his computer. The ground is moving. My side vision is blurry. My eyes seem to be worse since I started wearing glasses.\" Patient's last annual exam was 12/23/21. Patient notes every once and a while he sees something out of the corner of his eye.     NAGI Weiner 8:24 AM 08/19/2022                  "

## 2022-08-20 ENCOUNTER — HEALTH MAINTENANCE LETTER (OUTPATIENT)
Age: 67
End: 2022-08-20

## 2022-08-24 ENCOUNTER — OFFICE VISIT (OUTPATIENT)
Dept: NEUROLOGY | Facility: CLINIC | Age: 67
End: 2022-08-24
Attending: PSYCHIATRY & NEUROLOGY
Payer: COMMERCIAL

## 2022-08-24 DIAGNOSIS — G40.909 NONINTRACTABLE EPILEPSY WITHOUT STATUS EPILEPTICUS, UNSPECIFIED EPILEPSY TYPE (H): Primary | ICD-10-CM

## 2022-08-24 DIAGNOSIS — F41.9 ANXIETY: ICD-10-CM

## 2022-08-24 DIAGNOSIS — F06.8 OTHER SPECIFIED MENTAL DISORDERS DUE TO KNOWN PHYSIOLOGICAL CONDITION: ICD-10-CM

## 2022-08-24 DIAGNOSIS — F33.0 MAJOR DEPRESSIVE DISORDER, RECURRENT EPISODE, MILD (H): ICD-10-CM

## 2022-08-24 DIAGNOSIS — R41.844 FRONTAL LOBE AND EXECUTIVE FUNCTION DEFICIT: ICD-10-CM

## 2022-08-24 DIAGNOSIS — R41.3 MEMORY LOSS: ICD-10-CM

## 2022-08-24 NOTE — PROGRESS NOTES
Name: Chad Olivares  MR#: [id number]  YOB: 1955  Date of Exam: Aug 24, 2022    NEUROPSYCHOLOGICAL EVALUATION    IDENTIFYING INFORMATION  Chad Olivares is a 67 year old year old, right handed, semi-retired real estate worker and , with 13 years of formal education. He was unaccompanied to the evaluation.      BACKGROUND INFORMATION / INTERVIEW FINDINGS    Records indicate that Mr. Olivares suffered from two generalized tonic-clonic seizures in July and August, 2014. Work up at that time, including outpatient EEG and MRI were unrevealing. His other medical history includes mild obstructive sleep apnea, plantar fascial fibromatosis, Achilles bursitis or tendinitis, and dyslexia. I saw him for a neuropsychology exam on September 14, 2016. My exam documented relative weaknesses that were felt to be consistent with the lateral left frontal and parietal regions. Some of the weaknesses were felt to be attributable to his diagnosis of dyslexia, although I was concerned that there was additional acquired neuropsychological dysfunction. Weaknesses were identified in reading, reading speed, speeded letter coding, phonemic verbal fluency, auditory attention, working memory, some aspects of higher-level visual processing, working memory, and executive functioning.  I saw him for a follow-up neuropsychological evaluation on October 31, 2018.  This evaluation documented deficits that were consistent with his history of dyslexia and dysfunction of left lateral frontal, temporal, and parietal brain regions.  Relative to the results from the September, 2016 exam, his cognition was largely stable to somewhat improved.  There were subtle declines in a few measures that required word retrieval.  In this exam, weaknesses were identified and word retrieval, reading, cognitive speed, and psychomotor speed.  Strengths were identified in nonverbal reasoning, visual-spatial judgments, and nonverbal working memory.  He  was not reporting elevated symptoms of depression or anxiety.  An MRI of his brain on April 4, 2022 documented a few nonspecific T2/T2 FLAIR punctate white matter hyperintensities predominantly in the left parietal lobe.  It was felt that the findings could be reflective of mild chronic small vessel disease, but could also be seen as normal in individuals of his age range.  No acute findings were noted.  The scan also documented no evidence for hippocampal sclerosis or focal cortical dysplasia.  More recent notes indicate that he has expressed ongoing concerns about his cognition, and memory loss in particular.  The current evaluation was requested by Dr. Dandre Martin, in this context.    On interview, Mr. Olivares confirmed the above history.  He stated that he has not had any seizures or other neurologic events since his last visit with me in October, 2018.  He did note that he has developed tremors in his hands.  He reported that his tremors are bilateral.  He stated that he notices these tremors when he is doing yoga.  He also noted troubles with walking such that his feet do not  off the ground as easily as they did in the past.  He stated that he tends to drag his feet when walking.  He reported that he walks slowly and has to think about walking faster.  He stated that he trips over objects in his environment, although he noted that he now has bifocals and his vision is blurry toward the ground.  He stated that he gets dizzy or lightheaded when he stands up and when he bends over.  He denied troubles standing up from a chair.  He stated that others have not commented on masked facies.    Regarding cognition, Mr. Olivares reported that he has continued to have a slow decline in his thinking since the time of his prior exam.  He reported that he forgets appointments.  Perhaps relatedly, he had been scheduled to see me on a Tuesday for this appointment.  He did not show up for that appointment.  He came to the  clinic on the day after thinking that his appointment had been on Wednesday.  Due to a late cancellation, I was able to see him when he came to the clinic.  He reported that he uses compensatory strategies to aid his memory such as a chalkboard and notebook.  He stated that he forgets names.  He noted that he has more difficulties with verbal memory than nonverbal memory.  He also described more difficulties with multitasking and focus.  He stated that he has slowed mental processing and has a hard time putting sentences together.  He denied that changes in his personality have been identified.    With respect to mental health, Mr. Olivares reported that his mood is good.  He did note that he is a little bit depressed at times.  He stated that he has largely retired and becomes bored.  He denied interval of mental health diagnoses or treatments.  He denied hallucinations, psychiatric hospitalization, symptoms consistent with severe mental illness, suicidal ideation, or interval suicide attempts.    With regard to other medical background, Mr. Olivares reported that his sleep is terrible.  He stated that he wakes up tired every day.  He indicated that he typically sleeps from 10:30 PM until 7 or 7:30 AM.  He slept normally the night before the exam.  He woke up once to use the restroom.  He stated that he underwent testing for sleep apnea in the past, and now uses a dental device.  He stated that he is thinking about talking to his doctor about a CPAP.  He denied pain.  He did note that he had surgery on his right hand in the last year.  Per records, his current medications include lamotrigine, medical cannabis, and tamsulosin.  He stated that he will rarely consume an alcoholic drink.  He stated that he will occasionally take a puff of marijuana and uses CBD oil as well.  He last used marijuana 1 or 1.5 weeks before this appointment.    Mr. Olivares continues to live with his partner.  He manages his own basic and instrumental  daily activities.  He drives.    By way of background, Mr. Olivares denied significant changes in his family status.  He has been with his partner for 15 years.  He denied changes in his educational background.  Professionally, he stated that he is now semiretired.  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    BEHAVIORAL OBSERVATIONS  Mr. Olivares was polite and cooperative with the exam.  He engaged in limited spontaneous conversation with the examiner.  His speech was notable for mild dysfluency. His comprehension was normal. His thought processes were notable for slowing and distractibility.  He was inattentive at times.  He was also noted to be mildly careless and impulsive. His mood was mildly depressed and anxious with congruent affect. His effort was rated as adequate, although it should be noted that he obtained a marginal score on a stand-alone measure of cognitive performance validity.  I cannot rule out the possibility that the current results are an underestimate of his cognitive capacity in some cases.    RESULTS OF EXAM  His performances on standardized measures of neuropsychological functioning were as follows:    He was fully oriented to time, place, and various aspects of personal information.  As noted, his score on a stand-alone measure of cognitive performance validity was marginal.  His scores on embedded metrics of performance validity were within normal limits.  Auditory attention for digits was average.  Mental calculations were low average.  Learning of words in a list format was within normal limits.  Short delayed recall of list words were average.  30-minute delayed recall of list words were performed in the average to high average range.  Delayed recognition of list words was average.  Immediate memory for simple geometric shapes was low average.  His drawing of a complicated geometric figure was impaired, and notable  for a disorganized approach with poor appreciation of the figure s spatial relations and poor planning.  Short delayed recall of the figure was average.  30-minute delayed recall of the figure was average.  Delayed recognition of the figure s elements was borderline impaired.  Visual problem-solving with blocks was high average.  Visual perceptual matching of faces was performed within normal limits.  Comprehension of phrases and short stories was borderline impaired.  Verbal associative fluency was impaired.  Animal fluency was average.  Naming to confrontation was average.  Verbal abstract reasoning was average.  Speeded visual sequencing under focused attention was average.  A similar measure with a divided attention component was performed in the low average to average range.  Speeded word reading was impaired.  Speeded color naming was low average.  Speeded inhibition of an overlearned response was performed in the low average to average range.  Speeded visual motor coding was average.  Speeded fine motor dexterity was borderline impaired for the dominant, right hand (where he had surgery in the last year), and low average for the left hand.    He endorsed items consistent with mild symptoms of depression, and moderate symptoms of anxiety on self-report measures.    IMPRESSIONS  Mr. Olivares demonstrated weaknesses that are again consistent with relative dysfunction of left hemispheric brain networks.  There is now, however, some suggestion of greater compromise of frontal lobe networks as well, and potentially the right frontal region in particular.  The etiology of these weaknesses is not entirely clear.  It is important to point out that he is reporting more symptoms of depression and anxiety than in past exams. Perhaps relatedly, there was some objective suggestion of inconsistent engagement with testing in today's exam.  However, he is also describing tremor, and some difficulties with his gait.  At face  value, these motor symptoms and the apparent cognitive changes could raise some question about parkinsonism or possibly contributions from cerebrovascular disease.  In this exam, weaknesses were identified in verbal fluency, working memory, aspects of visual problem-solving, and bilateral psychomotor speed.  Memory is relatively intact, as are other cognitive abilities.  As noted above, he is reporting mild symptoms of depression and moderate symptoms of anxiety.  I do suspect that these psychological factors may be contributing, although it is not clear to me whether psychological factors are fully responsible for his cognitive deficits.    RECOMMENDATIONS  Preliminary results and recommendations were provided to the patient on the date of the evaluation, and all questions were answered.     1.  If medically indicated, consideration might be given to treatment of his mental health.    2.  Along similar lines, referral for psychotherapy services is recommended.  One possible referral option will be [facility], with locations throughout the [facility] area.  They can be reached by calling [phone].    3.  If clinically indicated, consideration might be given to a consultation with a movement disorders neurologist, given his reported tremor, gait problems, and potential increased compromise in this evaluation.    4.  If he continues to have difficulties with memory, routine use of a memory notebook or other assistive device could be of benefit.    5.  He is encouraged to exercise caution when driving.    6. Follow-up neuropsychological evaluation is recommended in 1 to 2 years in order to assess and update recommendations as appropriate.  The current results can be used as an updated baseline at that time.    Marco A Fontanez, Ph.D., L.P., ABPP  Board Certified in Clinical Neuropsychology   / Licensed Psychologist [id number]    Time spent: One unit (35 minutes) psychiatric diagnostic  interview including interview, clinical assessment of thinking, reasoning, and judgment by licensed and board-certified neuropsychologist (CPT 73857). One unit (60 minutes) neuropsychological testing evaluation by licensed and board-certified neuropsychologist, including integration of patient data, interpretation of standardized test results and clinical data, clinical decision-making, treatment planning, report, and interactive feedback to the patient, first hour (CPT 00697). One units (35 minutes) of neuropsychological testing evaluation by licensed and board-certified neuropsychologist, including integration of patient data, interpretation of standardized test results and clinical data, clinical decision-making, consulting with colleagues, treatment planning, report, and interactive feedback to the patient, subsequent hours (CPT 01303). One unit (30 minutes) of psychological and neuropsychological test administration and scoring by technician, first 30 minutes (CPT 20393). Three units (98 minutes) psychological or neuropsychological test administration and scoring by technician, subsequent 30 minutes (CPT 80506). Diagnoses: G40.909, R41.844, F06.8, F33.0, F41.9.

## 2022-08-24 NOTE — LETTER
2022     RE: Chad Olivares  : 1955   MRN: [id number]      Dear Colleague,    Thank you for referring your patient, Chad Olivares, to the [facility] EPILEPSY CARE at [facility]. Please see a copy of my visit note below.    Patient was seen for neuropsychological evaluation at the request of Dr. Dandre Martin, for the purposes of diagnostic clarification and treatment planning.  2 hrs 8 min of test administration and scoring were provided by this writer, Sonia Lindsey.  Please see Dr. Marco A Fontanez's report for a full interpretation of the findings.      Name: Chad Olivares  MR#: [id number]  YOB: 1955  Date of Exam: Aug 24, 2022    NEUROPSYCHOLOGICAL EVALUATION    IDENTIFYING INFORMATION  Chad Olivares is a 67 year old year old, right handed, semi-retired real estate worker and , with 13 years of formal education. He was unaccompanied to the evaluation.      BACKGROUND INFORMATION / INTERVIEW FINDINGS    Records indicate that Mr. Olivares suffered from two generalized tonic-clonic seizures in July and . Work up at that time, including outpatient EEG and MRI were unrevealing. His other medical history includes mild obstructive sleep apnea, plantar fascial fibromatosis, Achilles bursitis or tendinitis, and dyslexia. I saw him for a neuropsychology exam on 2016. My exam documented relative weaknesses that were felt to be consistent with the lateral left frontal and parietal regions. Some of the weaknesses were felt to be attributable to his diagnosis of dyslexia, although I was concerned that there was additional acquired neuropsychological dysfunction. Weaknesses were identified in reading, reading speed, speeded letter coding, phonemic verbal fluency, auditory attention, working memory, some aspects of higher-level visual processing, working memory, and executive functioning.  I saw him for a follow-up  neuropsychological evaluation on October 31, 2018.  This evaluation documented deficits that were consistent with his history of dyslexia and dysfunction of left lateral frontal, temporal, and parietal brain regions.  Relative to the results from the September, 2016 exam, his cognition was largely stable to somewhat improved.  There were subtle declines in a few measures that required word retrieval.  In this exam, weaknesses were identified and word retrieval, reading, cognitive speed, and psychomotor speed.  Strengths were identified in nonverbal reasoning, visual-spatial judgments, and nonverbal working memory.  He was not reporting elevated symptoms of depression or anxiety.  An MRI of his brain on April 4, 2022 documented a few nonspecific T2/T2 FLAIR punctate white matter hyperintensities predominantly in the left parietal lobe.  It was felt that the findings could be reflective of mild chronic small vessel disease, but could also be seen as normal in individuals of his age range.  No acute findings were noted.  The scan also documented no evidence for hippocampal sclerosis or focal cortical dysplasia.  More recent notes indicate that he has expressed ongoing concerns about his cognition, and memory loss in particular.  The current evaluation was requested by Dr. Dandre Martin, in this context.    On interview, Mr. Olivares confirmed the above history.  He stated that he has not had any seizures or other neurologic events since his last visit with me in October, 2018.  He did note that he has developed tremors in his hands.  He reported that his tremors are bilateral.  He stated that he notices these tremors when he is doing yoga.  He also noted troubles with walking such that his feet do not  off the ground as easily as they did in the past.  He stated that he tends to drag his feet when walking.  He reported that he walks slowly and has to think about walking faster.  He stated that he trips over objects  in his environment, although he noted that he now has bifocals and his vision is blurry toward the ground.  He stated that he gets dizzy or lightheaded when he stands up and when he bends over.  He denied troubles standing up from a chair.  He stated that others have not commented on masked facies.    Regarding cognition, Mr. Olivares reported that he has continued to have a slow decline in his thinking since the time of his prior exam.  He reported that he forgets appointments.  Perhaps relatedly, he had been scheduled to see me on a Tuesday for this appointment.  He did not show up for that appointment.  He came to the clinic on the day after thinking that his appointment had been on Wednesday.  Due to a late cancellation, I was able to see him when he came to the clinic.  He reported that he uses compensatory strategies to aid his memory such as a chalkboard and notebook.  He stated that he forgets names.  He noted that he has more difficulties with verbal memory than nonverbal memory.  He also described more difficulties with multitasking and focus.  He stated that he has slowed mental processing and has a hard time putting sentences together.  He denied that changes in his personality have been identified.    With respect to mental health, Mr. Olivares reported that his mood is good.  He did note that he is a little bit depressed at times.  He stated that he has largely retired and becomes bored.  He denied interval of mental health diagnoses or treatments.  He denied hallucinations, psychiatric hospitalization, symptoms consistent with severe mental illness, suicidal ideation, or interval suicide attempts.    With regard to other medical background, Mr. Olivares reported that his sleep is terrible.  He stated that he wakes up tired every day.  He indicated that he typically sleeps from 10:30 PM until 7 or 7:30 AM.  He slept normally the night before the exam.  He woke up once to use the restroom.  He stated that he  underwent testing for sleep apnea in the past, and now uses a dental device.  He stated that he is thinking about talking to his doctor about a CPAP.  He denied pain.  He did note that he had surgery on his right hand in the last year.  Per records, his current medications include lamotrigine, medical cannabis, and tamsulosin.  He stated that he will rarely consume an alcoholic drink.  He stated that he will occasionally take a puff of marijuana and uses CBD oil as well.  He last used marijuana 1 or 1.5 weeks before this appointment.    Mr. Olivares continues to live with his partner.  He manages his own basic and instrumental daily activities.  He drives.    By way of background, Mr. Olivares denied significant changes in his family status.  He has been with his partner for 15 years.  He denied changes in his educational background.  Professionally, he stated that he is now semiretired.  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    BEHAVIORAL OBSERVATIONS  Mr. Olivares was polite and cooperative with the exam.  He engaged in limited spontaneous conversation with the examiner.  His speech was notable for mild dysfluency. His comprehension was normal. His thought processes were notable for slowing and distractibility.  He was inattentive at times.  He was also noted to be mildly careless and impulsive. His mood was mildly depressed and anxious with congruent affect. His effort was rated as adequate, although it should be noted that he obtained a marginal score on a stand-alone measure of cognitive performance validity.  I cannot rule out the possibility that the current results are an underestimate of his cognitive capacity in some cases.    RESULTS OF EXAM  His performances on standardized measures of neuropsychological functioning were as follows:    He was fully oriented to time, place, and various aspects of personal information.  As noted, his  score on a stand-alone measure of cognitive performance validity was marginal.  His scores on embedded metrics of performance validity were within normal limits.  Auditory attention for digits was average.  Mental calculations were low average.  Learning of words in a list format was within normal limits.  Short delayed recall of list words were average.  30-minute delayed recall of list words were performed in the average to high average range.  Delayed recognition of list words was average.  Immediate memory for simple geometric shapes was low average.  His drawing of a complicated geometric figure was impaired, and notable for a disorganized approach with poor appreciation of the figure s spatial relations and poor planning.  Short delayed recall of the figure was average.  30-minute delayed recall of the figure was average.  Delayed recognition of the figure s elements was borderline impaired.  Visual problem-solving with blocks was high average.  Visual perceptual matching of faces was performed within normal limits.  Comprehension of phrases and short stories was borderline impaired.  Verbal associative fluency was impaired.  Animal fluency was average.  Naming to confrontation was average.  Verbal abstract reasoning was average.  Speeded visual sequencing under focused attention was average.  A similar measure with a divided attention component was performed in the low average to average range.  Speeded word reading was impaired.  Speeded color naming was low average.  Speeded inhibition of an overlearned response was performed in the low average to average range.  Speeded visual motor coding was average.  Speeded fine motor dexterity was borderline impaired for the dominant, right hand (where he had surgery in the last year), and low average for the left hand.    He endorsed items consistent with mild symptoms of depression, and moderate symptoms of anxiety on self-report measures.    IMPRESSIONS  Mr. Olivares  demonstrated weaknesses that are again consistent with relative dysfunction of left hemispheric brain networks.  There is now, however, some suggestion of greater compromise of frontal lobe networks as well, and potentially the right frontal region in particular.  The etiology of these weaknesses is not entirely clear.  It is important to point out that he is reporting more symptoms of depression and anxiety than in past exams. Perhaps relatedly, there was some objective suggestion of inconsistent engagement with testing in today's exam.  However, he is also describing tremor, and some difficulties with his gait.  At face value, these motor symptoms and the apparent cognitive changes could raise some question about parkinsonism or possibly contributions from cerebrovascular disease.  In this exam, weaknesses were identified in verbal fluency, working memory, aspects of visual problem-solving, and bilateral psychomotor speed.  Memory is relatively intact, as are other cognitive abilities.  As noted above, he is reporting mild symptoms of depression and moderate symptoms of anxiety.  I do suspect that these psychological factors may be contributing, although it is not clear to me whether psychological factors are fully responsible for his cognitive deficits.    RECOMMENDATIONS  Preliminary results and recommendations were provided to the patient on the date of the evaluation, and all questions were answered.     1.  If medically indicated, consideration might be given to treatment of his mental health.    2.  Along similar lines, referral for psychotherapy services is recommended.  One possible referral option will be [facility], with locations throughout the [facility] area.  They can be reached by calling [phone].    3.  If clinically indicated, consideration might be given to a consultation with a movement disorders neurologist, given his reported tremor, gait problems, and potential increased  compromise in this evaluation.    4.  If he continues to have difficulties with memory, routine use of a memory notebook or other assistive device could be of benefit.    5.  He is encouraged to exercise caution when driving.    6. Follow-up neuropsychological evaluation is recommended in 1 to 2 years in order to assess and update recommendations as appropriate.  The current results can be used as an updated baseline at that time.    Marco A Fontanez, Ph.D., L.P., ABPP  Board Certified in Clinical Neuropsychology   / Licensed Psychologist [id number]    Time spent: One unit (35 minutes) psychiatric diagnostic interview including interview, clinical assessment of thinking, reasoning, and judgment by licensed and board-certified neuropsychologist (CPT 49928). One unit (60 minutes) neuropsychological testing evaluation by licensed and board-certified neuropsychologist, including integration of patient data, interpretation of standardized test results and clinical data, clinical decision-making, treatment planning, report, and interactive feedback to the patient, first hour (CPT 15423). One units (35 minutes) of neuropsychological testing evaluation by licensed and board-certified neuropsychologist, including integration of patient data, interpretation of standardized test results and clinical data, clinical decision-making, consulting with colleagues, treatment planning, report, and interactive feedback to the patient, subsequent hours (CPT 08418). One unit (30 minutes) of psychological and neuropsychological test administration and scoring by technician, first 30 minutes (CPT 27561). Three units (98 minutes) psychological or neuropsychological test administration and scoring by technician, subsequent 30 minutes (CPT 72860). Diagnoses: G40.909, R41.844, F06.8, F33.0, F41.9.

## 2022-08-24 NOTE — PROGRESS NOTES
Name: Chad Olivares  MRN: [id number]  : 1955 CROWDER: 2022  Staff: SÁNCHEZ Tech:  Age: 67  Sex: Male Hand: Right Educ: 13  Vision: 20/20 ?with correction / ?without correction    ORIENTATION     Time  -0     Place       Personal info         WAIS-IV     WMI: ~83         Raw SSa     Similarities  21 8     Block Design  44 13     Digit Span  22 8 RDS= 7     Arithmetic  9 6     Coding  43 8    AVLT      Trial 1 2 3 4 5 B 6             6 7 9 12 10 5 7      Raw %ile     Learning Over Trials (1-5) 44     Short Delay Recall  7 32-36     30  Recall   9 72-84     30  Recognition Hits  14 48-52     30  False Positives  0     SAIRA-O    Raw  T %ile     Copy    21.0     ?1     Short Delay Recall 17.0 55 69     Long Delay Recall 15.0 51 54     Recognition Total 17 32 4     Time to Copy  145        BVRT     Form E      Raw Z-Score   Correct  5 -0.96   Incorrect  7 -0.79    COWAT (FAS)   Raw: 16   T: 28    Animals   Raw:  15  T-score:  44    BOSTON NAMING TEST   Raw: 52   %ile 54-61    COMPLEX IDEATIONAL MATERIAL   Raw: 10/12 T: 34    TRAILS  Raw  Err %ile    A 41  0 45-54   B 114  0 18-36    STROOP Raw %ile   Word 53 <9   Color  41 9       Color/Word  22 18-27    CRESPO FACIAL RECOGNITION   Raw: 47  Interp: WNL    GROOVED PEGBOARD    Raw  T Drops   RH  127  32 0     40 0    BDI-II  Raw:  16  Interpretation: Mild    ALFREDO  Raw:  16  Interpretation: Moderate    DCT E-score: 17

## 2022-08-24 NOTE — PROGRESS NOTES
Patient was seen for neuropsychological evaluation at the request of Dr. Dandre Martin, for the purposes of diagnostic clarification and treatment planning.  2 hrs 8 min of test administration and scoring were provided by this writer, Sonia Lindsey.  Please see Dr. Marco A Fontanez's report for a full interpretation of the findings.

## 2022-09-20 ENCOUNTER — VIRTUAL VISIT (OUTPATIENT)
Dept: SLEEP MEDICINE | Facility: CLINIC | Age: 67
End: 2022-09-20
Payer: COMMERCIAL

## 2022-09-20 VITALS — BODY MASS INDEX: 27.7 KG/M2 | WEIGHT: 187 LBS | HEIGHT: 69 IN

## 2022-09-20 DIAGNOSIS — G47.00 INSOMNIA, UNSPECIFIED TYPE: ICD-10-CM

## 2022-09-20 DIAGNOSIS — G40.309 GENERALIZED CONVULSIVE EPILEPSY (H): ICD-10-CM

## 2022-09-20 DIAGNOSIS — G47.33 OSA (OBSTRUCTIVE SLEEP APNEA): Primary | ICD-10-CM

## 2022-09-20 DIAGNOSIS — G47.19 EXCESSIVE DAYTIME SLEEPINESS: ICD-10-CM

## 2022-09-20 PROCEDURE — 99213 OFFICE O/P EST LOW 20 MIN: CPT | Mod: 95 | Performed by: NURSE PRACTITIONER

## 2022-09-20 ASSESSMENT — SLEEP AND FATIGUE QUESTIONNAIRES
HOW LIKELY ARE YOU TO NOD OFF OR FALL ASLEEP WHILE WATCHING TV: MODERATE CHANCE OF DOZING
HOW LIKELY ARE YOU TO NOD OFF OR FALL ASLEEP WHILE SITTING QUIETLY AFTER LUNCH WITHOUT ALCOHOL: HIGH CHANCE OF DOZING
HOW LIKELY ARE YOU TO NOD OFF OR FALL ASLEEP WHILE SITTING AND TALKING TO SOMEONE: SLIGHT CHANCE OF DOZING
HOW LIKELY ARE YOU TO NOD OFF OR FALL ASLEEP WHEN YOU ARE A PASSENGER IN A CAR FOR AN HOUR WITHOUT A BREAK: WOULD NEVER DOZE
HOW LIKELY ARE YOU TO NOD OFF OR FALL ASLEEP WHILE SITTING INACTIVE IN A PUBLIC PLACE: SLIGHT CHANCE OF DOZING
HOW LIKELY ARE YOU TO NOD OFF OR FALL ASLEEP WHILE SITTING AND READING: MODERATE CHANCE OF DOZING
HOW LIKELY ARE YOU TO NOD OFF OR FALL ASLEEP WHILE LYING DOWN TO REST IN THE AFTERNOON WHEN CIRCUMSTANCES PERMIT: HIGH CHANCE OF DOZING
HOW LIKELY ARE YOU TO NOD OFF OR FALL ASLEEP IN A CAR, WHILE STOPPED FOR A FEW MINUTES IN TRAFFIC: WOULD NEVER DOZE

## 2022-09-20 ASSESSMENT — PAIN SCALES - GENERAL: PAINLEVEL: NO PAIN (0)

## 2022-09-20 NOTE — PROGRESS NOTES
Chad is a 67 year old who is being evaluated via a billable video visit.      How would you like to obtain your AVS? MyChart  If the video visit is dropped, the invitation should be resent by: Send to e-mail at: [email or web address]  Will anyone else be joining your video visit? No      Lay Emir      Video-Visit Details    Video Start Time: 2:27 PM    Type of service:  Video Visit    Video End Time:  2:55 PM    Originating Location (pt. Location): Home    Distant Location (provider location):  [facility] SLEEP [facility]     Platform used for Video Visit: [employer]      Sleep Medicine - Follow-Up Visit:    Chief Complaint   Patient presents with     Video Visit     Follow Up        Chad Olivares is a pleasant 67-year-old male with a PMH pertinent for epilepsy and mild HUSSAIN who presents today for follow-up of mild HUSSAIN.  He was previously seen on 8/16/2021 by me for reestablish care visit and plan at that time was to treat his mild HUSSAIN with a mandibular advancement device (MAD).  Upon staff trying to schedule patient for follow-up visit, patient stated that he has been using a MAD and nasal plugs. He reports worsening of symptoms recently. He orthostatic hypotension symptoms, slow thinking processes, hard time reading, developing a few tremors and feels a bit foggy sometimes.  He now presents for the purpose of obtaining a CPAP device.  Of note, the patient's previous sleep study (PSG) was scored using a 3% rule as opposed to the 4% rule which is the scoring needed for Medicare to cover a new CPAP machine.    Previous Study Results: [facility] - PSG (1A 3% rule)  Date: 10/16/2014.  Weight 185 pounds.  AHI: 10.6/hr. Supine AHI: 103.4/hr. RDI 11.0/hr. O2 chad 84%.    *Weight change since last sleep study: + 2 lbs      **UPDATED 9/23/2022:  Previous Study Results: [facility] - PSG (Rescored using 1B 4% rule)  Date: 10/16/2014.  Weight 185 pounds.  AHI: 9.3/hr. RDI 9.7/hr. O2 chad 84%.      [place] sleepiness scale score: 12  (9/20/2022 12:00 PM)    Insomnia Severity Index (9/20/2022 11:57 AM)    Difficulty falling asleep 0    Difficulty staying asleep 2    Problems waking up too early 1    How SATISFIED/DISSATISFIED are you with your CURRENT sleep pattern? 4    How NOTICEABLE to others do you think your sleep problem is in terms of impairing the quality of your life? 3    How WORRIED/DISTRESSED are you about your current sleep problem? 3    To what extent do you consider your sleep problem to INTERFERE with your daily functioning (e.g. daytime fatigue, mood, ability to function at work/daily chores, concentration, memory, mood, etc.) CURRENTLY? 3    Total Score 16            Past medical/surgical history, family history, social history, medications and allergies were reviewed.      Problem List:  Patient Active Problem List    Diagnosis Date Noted     Nuclear senile cataract of both eyes 08/19/2022     Priority: Medium     Memory loss 07/20/2016     Priority: Medium     Advanced directives, counseling/discussion 05/20/2015     Priority: Medium     Gave pt packet on 5/20/2015    Syeda Torres CMA         Colon polyps 04/01/2015     Priority: Medium     tubular villous and serrated adenoma       Dyspnea and respiratory abnormality 09/03/2014     Priority: Medium     Problem list name updated by automated process. 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 Provider to review       CARDIOVASCULAR SCREENING; LDL GOAL LESS THAN 160 10/12/2010     Priority: Medium     Plantar fascial fibromatosis 08/07/2008     Priority: Medium     Achilles bursitis or tendinitis 08/07/2008     Priority: Medium      Current Outpatient Medications   Medication     lamoTRIgine (LAMICTAL) 100 MG tablet     medical cannabis oral liquid  " (Patient's own supply.  Not a prescription)     NONFORMULARY     tamsulosin (FLOMAX) 0.4 MG capsule     No current facility-administered medications for this visit.     Ht 1.753 m (5' 9\")   Wt 84.8 kg (187 lb)   BMI 27.62 kg/m      Impression/Plan:  1. HUSSAIN (obstructive sleep apnea)  - Will have the sleep lab rescore patient's previous PSG to the 4% rule - see above.  - Once the updated PSG is available, pending the results, a comprehensive DME order will be placed for new APAP device, mask and supplies to be sent to [facility] at that time.  The patient is aware of the nationwide CPAP supply shortage and that his therapy may be delayed due to this shortage.  - Recommend patient follow-up with his neurologist with regard to symptoms suggestive of neurologic disorder such as Parkinson's disease.  - We will discuss follow-up plans with regard to APAP device compliance per Medicare requirements once he obtains the machine.     2. Insomnia, unspecified type  3. Excessive daytime sleepiness  4. Generalized convulsive epilepsy (H)  - Comprehensive DME    Forty minutes spent with patient, all of which were spent face-to-face counseling, consulting, chart review/documentation, and coordinating plan of care on the date of the encounter.      PEDRO LUIS Chan CNP  Sleep Medicine      CC:  Asaf Craig,     This note was written with the assistance of the Dragon voice-dictation technology software. The final document, although reviewed, may contain errors. For corrections, please contact the office.      "

## 2022-09-20 NOTE — PATIENT INSTRUCTIONS

## 2022-09-20 NOTE — PROGRESS NOTES
"kyle is a 67 year old who is being evaluated via a billable video visit.      How would you like to obtain your AVS? MyChart  If the video visit is dropped, the invitation should be resent by: Send to e-mail at: [email or web address]  Will anyone else be joining your video visit? No  {If patient encounters technical issues they should call [phone] :[phone]}      Lay Field    Video-Visit Details    Video Start Time: {video visit start/end time for provider to select:474094}    Type of service:  Video Visit    Video End Time:{video visit start/end time for provider to select:298313}    Originating Location (pt. Location): {video visit patient location:590783::\"Home\"}    Distant Location (provider location):  [facility]     Platform used for Video Visit: {Virtual Visit Platforms:589536::\"Prescription Eyewear\"}  "

## 2022-10-12 ENCOUNTER — MYC MEDICAL ADVICE (OUTPATIENT)
Dept: INTERNAL MEDICINE | Facility: CLINIC | Age: 67
End: 2022-10-12

## 2022-10-17 ENCOUNTER — OFFICE VISIT (OUTPATIENT)
Dept: INTERNAL MEDICINE | Facility: CLINIC | Age: 67
End: 2022-10-17
Payer: COMMERCIAL

## 2022-10-17 VITALS
BODY MASS INDEX: 28.43 KG/M2 | WEIGHT: 192.5 LBS | OXYGEN SATURATION: 98 % | SYSTOLIC BLOOD PRESSURE: 125 MMHG | DIASTOLIC BLOOD PRESSURE: 84 MMHG | HEART RATE: 68 BPM

## 2022-10-17 DIAGNOSIS — R10.11 RUQ ABDOMINAL PAIN: Primary | ICD-10-CM

## 2022-10-17 PROCEDURE — 99213 OFFICE O/P EST LOW 20 MIN: CPT | Performed by: HOSPITALIST

## 2022-10-17 ASSESSMENT — ENCOUNTER SYMPTOMS
FEVER: 0
ABDOMINAL PAIN: 1
CHILLS: 0
DYSURIA: 0
SHORTNESS OF BREATH: 0
CONSTIPATION: 0
BACK PAIN: 1
DIARRHEA: 0

## 2022-10-17 ASSESSMENT — PATIENT HEALTH QUESTIONNAIRE - PHQ9: 5. POOR APPETITE OR OVEREATING: NOT AT ALL

## 2022-10-17 ASSESSMENT — ANXIETY QUESTIONNAIRES
1. FEELING NERVOUS, ANXIOUS, OR ON EDGE: NOT AT ALL
3. WORRYING TOO MUCH ABOUT DIFFERENT THINGS: NOT AT ALL
7. FEELING AFRAID AS IF SOMETHING AWFUL MIGHT HAPPEN: NOT AT ALL
GAD7 TOTAL SCORE: 0
5. BEING SO RESTLESS THAT IT IS HARD TO SIT STILL: NOT AT ALL
6. BECOMING EASILY ANNOYED OR IRRITABLE: NOT AT ALL
2. NOT BEING ABLE TO STOP OR CONTROL WORRYING: NOT AT ALL
GAD7 TOTAL SCORE: 0

## 2022-10-17 NOTE — ASSESSMENT & PLAN NOTE
Suspect cholelithiasis or cholecystitis. Pains after eating for several months. RUQ tenderness with deep palpation on exam.   - Obtain lab for CMP.   - Obtain US abdomen of RUQ.   - Keep on a low fat diet for now.

## 2022-10-17 NOTE — PATIENT INSTRUCTIONS
- Obtain lab for CMP.   - Obtain US abdomen of RUQ.   - Keep on a low fat diet for now.     Follow up as needed. To schedule your Ultrasound appointment with imaging, please call [phone].

## 2022-10-17 NOTE — PROGRESS NOTES
Assessment/Plan  Problem List Items Addressed This Visit        Nervous and Auditory    RUQ abdominal pain - Primary     Suspect cholelithiasis or cholecystitis. Pains after eating for several months. RUQ tenderness with deep palpation on exam.   - Obtain lab for CMP.   - Obtain US abdomen of RUQ.   - Keep on a low fat diet for now.          Relevant Orders    Comprehensive metabolic panel    US Abdomen Limited       No results found for any visits on 10/17/22.    Health Maintenance Due   Topic Date Due     DEPRESSION ACTION PLAN  Never done     PHQ-9  Never done     HEPATITIS B IMMUNIZATION (2 of 3 - Hep B Twinrix 3-dose series) 11/21/2001     ADVANCE CARE PLANNING  05/20/2020     COVID-19 Vaccine (5 - Booster for Pfizer series) 06/09/2022     MEDICARE ANNUAL WELLNESS VISIT  06/23/2022     INFLUENZA VACCINE (1) 09/01/2022         Subjective  Patient mentions for a couple months having evening right sided abdominal pains usually after eating. Mentions has been eating less meats lately. No nausea. No fevers or chills. No changes in urination. No dysuria. Pains have a dull quality. Does feels some aching of his back at times as well.      Review of Systems   Constitutional: Negative for chills and fever.   Respiratory: Negative for shortness of breath.    Cardiovascular: Negative for chest pain and peripheral edema.   Gastrointestinal: Positive for abdominal pain. Negative for constipation and diarrhea.   Genitourinary: Negative for dysuria.   Musculoskeletal: Positive for back pain.   Psychiatric/Behavioral: Negative for mood changes.       History  Past Medical History:   Diagnosis Date     Achilles bursitis or tendinitis 08/07/2008     Advanced directives, counseling/discussion 05/20/2015    Gave pt packet on 5/20/2015  Syeda Torres CMA       CARDIOVASCULAR SCREENING; LDL GOAL LESS THAN 160 10/12/2010     Cholesteatoma, unspecified      Colon polyps 04/2015    tubular villous and serrated adenoma     Complex  sleep apnea syndrome     does not use CPAP     Dupuytren's contracture      Dyslexia      Dyspnea and respiratory abnormality 2014     Problem list name updated by automated process. Provider to review     Generalized convulsive epilepsy (H) 2014     Problem list name updated by automated process. Provider to review     Generalized tonic-clonic seizure (H)     uncertain etiology     Memory loss 2016     Organic parasomnia 2014     Problem list name updated by automated process. Provider to review     Plantar fascial fibromatosis 2008       Past Surgical History:   Procedure Laterality Date     cholesteatoma surgery Left      COLONOSCOPY  10/09/2002; 2015    polyps - needs f/u 5 yrs      COLONOSCOPY N/A 2021    Procedure: COLONOSCOPY, WITH POLYPECTOMY;  Surgeon: Asaf Guajardo MD;  [place] [facility] [place]      REMOVAL OF TONSILS,<13 Y/O       RELEASE DUPUYTRENS CONTRACTURE Right 2021    Procedure: Right fifth finger and palm Dupuytren's contracture release;  Surgeon: Caron Farrell MD;  [place] [facility] [place]       Family History   Problem Relation Age of Onset     Diabetes Mother         diet controlled, Htn     Heart Murmur Mother         TAVR     Diabetes Maternal Grandmother      Coronary Artery Disease Brother      Glaucoma No family hx of      Macular Degeneration No family hx of        Social History     Tobacco Use     Smoking status: Former     Types: Cigarettes     Quit date: 2005     Years since quittin.1     Smokeless tobacco: Never   Substance Use Topics     Alcohol use: Yes     Comment: 1 drink every 2 wks        Objective  /84 (BP Location: Right arm, Patient Position: Sitting, Cuff Size: Adult Regular)   Pulse 68   Wt 87.3 kg (192 lb 8 oz)   SpO2 98%   BMI 28.43 kg/m    Vitals taken by Roderick Veloz MD    Physical Exam  Constitutional:       General: He is not in acute distress.     Appearance: He is not ill-appearing or  toxic-appearing.   HENT:      Head: Normocephalic.   Eyes:      Conjunctiva/sclera: Conjunctivae normal.   Cardiovascular:      Rate and Rhythm: Regular rhythm.      Heart sounds: Normal heart sounds. No murmur heard.    No friction rub. No gallop.   Pulmonary:      Effort: Pulmonary effort is normal. No respiratory distress.      Breath sounds: Normal breath sounds. No wheezing, rhonchi or rales.   Abdominal:      General: Bowel sounds are normal. There is no distension.      Palpations: Abdomen is soft.      Tenderness: There is abdominal tenderness. There is no guarding or rebound.      Comments: Mild tenderness along RUQ with deep palpation. Taylor's sign negative. No enlarged liver.   Musculoskeletal:      Right lower leg: No edema.      Left lower leg: No edema.   Skin:     General: Skin is warm and dry.   Neurological:      Mental Status: He is alert.   Psychiatric:         Mood and Affect: Mood normal.         Thought Content: Thought content normal.         I spent greater than 50% of the 15 minutes in the visit coordinating care regarding patient's recommendations towards abdominal pain    Return if symptoms worsen or fail to improve.      Roderick Veloz MD  [facility] INTERNAL MEDICINE [place]

## 2022-10-17 NOTE — NURSING NOTE
Chad Olivares is a 67 year old male that presents in clinic today for the following:     Chief Complaint   Patient presents with     Pain     Pt has been having dull pain on the right side of his body, under his ribs and mostly in the evenings       The patient's allergies and medications were reviewed. The patient's vitals were obtained without incident. The patient does not have any other questions for the provider.     Deya De Los Santos, EMT at 9:10 AM on 10/17/2022.  Primary Care Clinic: [phone]

## 2022-10-18 ENCOUNTER — LAB (OUTPATIENT)
Dept: LAB | Facility: CLINIC | Age: 67
End: 2022-10-18
Payer: COMMERCIAL

## 2022-10-18 DIAGNOSIS — R10.11 RUQ ABDOMINAL PAIN: ICD-10-CM

## 2022-10-18 LAB
ALBUMIN SERPL BCG-MCNC: 4.3 G/DL (ref 3.5–5.2)
ALP SERPL-CCNC: 124 U/L (ref 40–129)
ALT SERPL W P-5'-P-CCNC: 32 U/L (ref 10–50)
ANION GAP SERPL CALCULATED.3IONS-SCNC: 11 MMOL/L (ref 7–15)
AST SERPL W P-5'-P-CCNC: 29 U/L (ref 10–50)
BILIRUB SERPL-MCNC: 0.3 MG/DL
BUN SERPL-MCNC: 15.8 MG/DL (ref 8–23)
CALCIUM SERPL-MCNC: 9.5 MG/DL (ref 8.8–10.2)
CHLORIDE SERPL-SCNC: 106 MMOL/L (ref 98–107)
CREAT SERPL-MCNC: 1 MG/DL (ref 0.67–1.17)
DEPRECATED HCO3 PLAS-SCNC: 23 MMOL/L (ref 22–29)
GFR SERPL CREATININE-BSD FRML MDRD: 82 ML/MIN/1.73M2
GLUCOSE SERPL-MCNC: 103 MG/DL (ref 70–99)
POTASSIUM SERPL-SCNC: 4.2 MMOL/L (ref 3.4–5.3)
PROT SERPL-MCNC: 6.8 G/DL (ref 6.4–8.3)
SODIUM SERPL-SCNC: 140 MMOL/L (ref 136–145)

## 2022-10-18 PROCEDURE — 80053 COMPREHEN METABOLIC PANEL: CPT | Performed by: PATHOLOGY

## 2022-10-18 PROCEDURE — 36415 COLL VENOUS BLD VENIPUNCTURE: CPT | Performed by: PATHOLOGY

## 2022-10-24 ENCOUNTER — ANCILLARY PROCEDURE (OUTPATIENT)
Dept: ULTRASOUND IMAGING | Facility: CLINIC | Age: 67
End: 2022-10-24
Attending: HOSPITALIST
Payer: COMMERCIAL

## 2022-10-24 DIAGNOSIS — R10.11 RUQ ABDOMINAL PAIN: ICD-10-CM

## 2022-10-24 PROCEDURE — 76705 ECHO EXAM OF ABDOMEN: CPT | Mod: GC | Performed by: RADIOLOGY

## 2022-11-22 ENCOUNTER — DOCUMENTATION ONLY (OUTPATIENT)
Dept: SLEEP MEDICINE | Facility: CLINIC | Age: 67
End: 2022-11-22
Payer: COMMERCIAL

## 2022-11-22 DIAGNOSIS — G47.33 OBSTRUCTIVE SLEEP APNEA (ADULT) (PEDIATRIC): Primary | ICD-10-CM

## 2022-11-22 DIAGNOSIS — R41.3 AMNESIA MEMORY LOSS: ICD-10-CM

## 2022-11-22 NOTE — PROGRESS NOTES
[facility]  Patient Chad Olivares was set up at [facility] on November 22, 2022. Patient received a [employer] Airsense 11 Pressures were set at 5-15 cm H2O.   Patient s ramp is 4 cm H2O for Auto and FLEX/EPR is 2.  Patient received a [employer] Mask name: Vitera  Full Face mask size Medium, heated tubing and heated humidifier.  Patient does need to meet compliance. Patient has a follow up on 2/28/23 with PEDRO LUIS Chan, CNP.    Lucina Mckeon

## 2022-12-01 ENCOUNTER — DOCUMENTATION ONLY (OUTPATIENT)
Dept: SLEEP MEDICINE | Facility: CLINIC | Age: 67
End: 2022-12-01
Payer: COMMERCIAL

## 2022-12-01 DIAGNOSIS — G47.33 OSA (OBSTRUCTIVE SLEEP APNEA): Primary | ICD-10-CM

## 2022-12-01 NOTE — PROGRESS NOTES
3 day Sleep therapy management telephone visit    Diagnostic AHI: 9.3  PSG    Confirmed with patient at time of call- Yes Patient is still interested in STM service       Subjective measures:  Patient reports that CPAP is going okay. He feels the pressure is good at the beginning of the night but then he wakes up gasping for air and needs to suck in more air. Will submit for pressure change 7-15 cm H2O but keep ramp at 4.       Order settings:  CPAP MIN CPAP MAX   5 cm H2O 15 cm H2O       Device settings:  CPAP MIN CPAP MAX EPR RESMED SOFT RESPONSE SETTING   5.0 cm  H20 15.0 cm  H20 TWO OFF       Compliance 33 %    Assessment: Nightly usage over four hours and Nighty usage most nights over four hours      Action plan: Patient to have 14 day STM visit. Patient has a follow up visit scheduled:   yes within 31-90 days of set up    Replacement device: No  STM ordered by provider: Yes     Total time spent on accessing and  interpreting remote patient PAP therapy data  10 minutes    Total time spent counseling, coaching  and reviewing PAP therapy data with patient  4 minutes    [id number] no

## 2022-12-05 ENCOUNTER — DOCUMENTATION ONLY (OUTPATIENT)
Dept: SLEEP MEDICINE | Facility: CLINIC | Age: 67
End: 2022-12-05
Payer: COMMERCIAL

## 2022-12-05 NOTE — PROGRESS NOTES
[facility] recheck. Patient called in with concern that he has had significant swelling underneath his eyes- fluid filled bags. Patient denies any mask leaking. Patient recently had pressure increase for air hunger and feels like CPAP is much more tolerable that way and does not want to decrease pressure. Told patient I would do some research and call him back. Spoke with Dr. Roscoe Ferguson who advised the patient sleep with his head of bed elevated. Called patient back and shared advice. Patient will try that.

## 2022-12-07 ENCOUNTER — DOCUMENTATION ONLY (OUTPATIENT)
Dept: SLEEP MEDICINE | Facility: CLINIC | Age: 67
End: 2022-12-07
Payer: COMMERCIAL

## 2022-12-07 ENCOUNTER — DOCUMENTATION ONLY (OUTPATIENT)
Dept: SLEEP MEDICINE | Facility: CLINIC | Age: 67
End: 2022-12-07

## 2022-12-07 NOTE — PROGRESS NOTES
DATE: 12/7/2022  LOCATION OF MASK FITTING: [facility] SHOWROOM  REASON FOR MASK FITTING: SKIN IRITATION AND BAGS UNDER EYES  DISCUSSED/VIEWED THE FOLLOWING MASKS:  PT STOPPED BY [facility] SHOWROOM, HE HAS A RED MARKS ON HIS FACE FROM HIS MASK AND LARGE BAGS UNDER HIS EYES BOTH FROM HIS VITERA MASK.  I SHOWED PT THE  AIRTOUCH F20  AIRFIT F30I MED  HE LIKED THE F30I OPTION, NOT HAVING ANYTHING OVER THE BRIDGE OF HIS NOSE.  WE ALSO DISCUSSED NASAL MASKS AS AN OPTION IN THE FUTURE.    MASK AND SIZE SELECTED: AIRFIT F30I MED

## 2022-12-07 NOTE — PROGRESS NOTES
14  DAY STM VISIT    Diagnostic AHI: 9.3  PSG    Data only recheck     Assessment: Pt not meeting objective benchmarks for leak and compliance       Action plan: pt to have 30 day STM visit.      Device type: Auto-CPAP    PAP settings: CPAP min 7.0 cm  H20       CPAP max 15.0 cm  H20      95th% pressure 7.7 cm  H20        RESMED EPR level Setting: TWO    RESMED Soft response setting:  OFF    Mask type:  Per patient choice    Objective measures: 14 day rolling measures      Compliance  50 %      Leak  54  lpm  last  upload      AHI 4.66   last  upload      Average number of minutes 199      Objective measure goal  Compliance   Goal >70%  Leak   Goal < 24 lpm  AHI  Goal < 5  Usage  Goal >240        Total time spent on accessing and interpreting remote patient PAP therapy data  10 minutes    Total time spent counseling, coaching  and reviewing PAP therapy data with patient  1 minutes    [id number]  [id number]  No   (3 day STM)

## 2022-12-09 ENCOUNTER — TELEPHONE (OUTPATIENT)
Dept: SLEEP MEDICINE | Facility: CLINIC | Age: 67
End: 2022-12-09

## 2022-12-09 NOTE — TELEPHONE ENCOUNTER
Patient called STM and LVM that he is wanting to turn in his CPAP because it is not working well for him. Called patient back and he is wanting to go back to using his mandibular device because he feels like he sleeps well only for a few hours with the CPAP. I called him back and assured him that he is doing well when he is using CPAP, that it is treating his HUSSAIN well and that he has a lot of time left to try CPAP. Encouraged patient to take a break from CPAP and that we would try again next week and keep trying to find ways to make it more comfortable. Jackson in agreement with plan and says he will call me next week. I will make note to follow up with him as needed.

## 2022-12-15 ENCOUNTER — DOCUMENTATION ONLY (OUTPATIENT)
Dept: SLEEP MEDICINE | Facility: CLINIC | Age: 67
End: 2022-12-15
Payer: COMMERCIAL

## 2022-12-28 ENCOUNTER — DOCUMENTATION ONLY (OUTPATIENT)
Dept: SLEEP MEDICINE | Facility: CLINIC | Age: 67
End: 2022-12-28
Payer: COMMERCIAL

## 2022-12-28 NOTE — PROGRESS NOTES
30 DAY [facility] VISIT    Diagnostic AHI: 9.3  PSG    Subjective measures:   Patient states he is going to return his machine.      Assessment: Pt not meeting objective benchmarks for compliance  Patient failing following subjective benchmarks: not feeling benefit from therapy  Action plan: Patient taken out of the STM.  Patient has scheduled a follow up visit with PEDRO LUIS Chan CNP on 1/5/2022.   Device type: Auto-CPAP  PAP settings: CPAP min 7.0 cm  H20     CPAP max 15.0 cm  H20    95th% pressure 7.4 cm  H20      RESMED EPR level Setting: TWO    RESMED Soft response setting:  OFF  Mask type:  Per patient choice  Objective measures: 14 day rolling measures      Compliance  0 %      Leak  68.62 lpm  last  upload      AHI 3.64   last  upload      Average number of minutes 40      Objective measure goal  Compliance   Goal >70%  Leak   Goal < 24 lpm  AHI  Goal < 5  Usage  Goal >240        Total time spent on accessing and interpreting remote patient PAP therapy data  10 minutes    Total time spent counseling, coaching  and reviewing PAP therapy data with patient  3 minutes     [id number] this call  [id number] no  at 3 or 14 day [facility]

## 2023-01-02 ENCOUNTER — TELEPHONE (OUTPATIENT)
Dept: SLEEP MEDICINE | Facility: CLINIC | Age: 68
End: 2023-01-02

## 2023-01-02 NOTE — TELEPHONE ENCOUNTER
Pt LVM for STM wondering where he can return his CPAP machine that he is no longer using. Called pt back and LVM [facility] and [facility] number.

## 2023-01-03 ENCOUNTER — DOCUMENTATION ONLY (OUTPATIENT)
Dept: SLEEP MEDICINE | Facility: CLINIC | Age: 68
End: 2023-01-03

## 2023-01-03 NOTE — PROGRESS NOTES
"Pt dropped off cpap in [facility] today as he is not using it.  He stated \"I cannot sleep with it on.  It's just not working for me.\"  "

## 2023-01-04 ENCOUNTER — TELEPHONE (OUTPATIENT)
Dept: SLEEP MEDICINE | Facility: CLINIC | Age: 68
End: 2023-01-04

## 2023-01-04 NOTE — TELEPHONE ENCOUNTER
Patient called [facility] wanting to cancel CPAP F/U appt with PEDRO LUIS Chan CNP tomorrow. He has returned his CPAP machine. Recommended patient keep appt to discuss alternative treatments for HUSSAIN but patient declined as he has a mouth piece he will use for sleep apnea. Cancelled appt per pt request.

## 2023-03-12 ENCOUNTER — MYC MEDICAL ADVICE (OUTPATIENT)
Dept: INTERNAL MEDICINE | Facility: CLINIC | Age: 68
End: 2023-03-12
Payer: COMMERCIAL

## 2023-03-21 ENCOUNTER — OFFICE VISIT (OUTPATIENT)
Dept: INTERNAL MEDICINE | Facility: CLINIC | Age: 68
End: 2023-03-21
Payer: COMMERCIAL

## 2023-03-21 VITALS
DIASTOLIC BLOOD PRESSURE: 89 MMHG | OXYGEN SATURATION: 97 % | BODY MASS INDEX: 28.13 KG/M2 | SYSTOLIC BLOOD PRESSURE: 131 MMHG | WEIGHT: 190.5 LBS | HEART RATE: 63 BPM

## 2023-03-21 DIAGNOSIS — H66.002 NON-RECURRENT ACUTE SUPPURATIVE OTITIS MEDIA OF LEFT EAR WITHOUT SPONTANEOUS RUPTURE OF TYMPANIC MEMBRANE: Primary | ICD-10-CM

## 2023-03-21 DIAGNOSIS — I95.1 ORTHOSTATIC HYPOTENSION: ICD-10-CM

## 2023-03-21 PROCEDURE — 99214 OFFICE O/P EST MOD 30 MIN: CPT | Mod: GC

## 2023-03-21 NOTE — NURSING NOTE
Chad Olivares is a 67 year old male that presents in clinic today for the following:     Chief Complaint   Patient presents with     Ear Problem     Pt here due to pain/ dizziness/ headaches in left ear for a few days; reports they couldn't hear for a few weeks but can today     Dizziness       Pt feels extremely dizzy when standing up quickly       The patient's allergies and medications were reviewed. The patient's vitals were obtained without incident. The patient does not have any other questions for the provider.     Deya De Los Santos, EMT at 7:38 AM on 3/21/2023.  Primary Care Clinic: [phone]

## 2023-03-21 NOTE — PROGRESS NOTES
PRIMARY CARE CENTER         HPI:      HPI:  Chad Olivares is a 67 year old male who presents for Left ear pain and difficulty hearing x 3 weeks, starting at beginning of March. He flushed it with debrox drops multiple times for 1 week. Hearing is a little better. 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 Not curently having pain, but he was last week. He occasionally hars throbbing. He was also feeling dizzy, that is new for him. Episode lasts for 10 seconds. He does yoga 3x/week. He almost passes out when he stands up. Sometimes he forgets to drink water, doesn't stay hydrated. He was in [place] for the past 5 weeks and [place] the [place] on [place]. He did not swim or do any water activities. No recent sick contacts or recent viral sxs. Denies tinnitus, vertigo.     Denies fatigue,  fevers, chills, sore throat, CP, SOB, abd pain, N/V/D and BLE edema      Family History:  I have reviewed this patient's family history and updated it with pertinent information if needed.  Family History   Problem Relation Age of Onset     Diabetes Mother         diet controlled, Htn     Heart Murmur Mother         TAVR     Diabetes Maternal Grandmother      Coronary Artery Disease Brother      Glaucoma No family hx of      Macular Degeneration No family hx of           Medications:  Current Outpatient Medications   Medication     amoxicillin-clavulanate (AUGMENTIN) 875-125 MG tablet     lamoTRIgine (LAMICTAL) 100 MG tablet     medical cannabis oral liquid  (Patient's own supply.  Not a prescription)     tamsulosin (FLOMAX) 0.4 MG capsule     NONFORMULARY     No current facility-administered medications for this visit.        Allergies:  Allergies   Allergen Reactions     Gluten Meal      Seasonal Allergies        Medical History:  Past Medical History:   Diagnosis Date     Achilles bursitis or tendinitis 08/07/2008     Advanced directives, counseling/discussion 05/20/2015    Gave pt packet on 5/20/2015  Syeda  GENNARO Torres       CARDIOVASCULAR SCREENING; LDL GOAL LESS THAN 160 10/12/2010     Cholesteatoma, unspecified      Colon polyps 04/2015    tubular villous and serrated adenoma     Complex sleep apnea syndrome     does not use CPAP     Dupuytren's contracture      Dyslexia      Dyspnea and respiratory abnormality 09/03/2014     Problem list name updated by automated process. 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 Provider to review     Plantar fascial fibromatosis 08/07/2008       Surgical History:  Past Surgical History:   Procedure Laterality Date     cholesteatoma surgery Left      COLONOSCOPY  10/09/2002; 2015    polyps - needs f/u 5 yrs      COLONOSCOPY N/A 7/20/2021    Procedure: COLONOSCOPY, WITH POLYPECTOMY;  Surgeon: Asaf Guajardo MD;  Location: [facility] [place]      REMOVAL OF TONSILS,<13 Y/O  1962     RELEASE DUPUYTRENS CONTRACTURE Right 8/26/2021    Procedure: Right fifth finger and palm Dupuytren's contracture release;  Surgeon: Caron Farrell MD;  Location: [facility] [place]              Review of Systems:     See HPI for pertinent ROS         Physical Exam:   /89 (BP Location: Right arm, Patient Position: Sitting, Cuff Size: Adult Regular)   Pulse 63   Wt 86.4 kg (190 lb 8 oz)   SpO2 97%   BMI 28.13 kg/m    Body mass index is 28.13 kg/m .  Vitals were reviewed    Physical Exam:   General: Sitting upright, talking in complete sentences, non-distressed  HEENT: Normocephalic, atraumatic, EOMI, no conjunctival pallor, anicteric, nares patent, oropharynx clear and moist. R ear TM and canal normal. Left TM erythematous with purulent material surrounding canal. L TM also bulging and Dull. L Canal also erythematous. No  mastoid tenderness or external ear tenderness.   CVS: S1/S2 WNL, RRR, no murmur, no LE edema, cap refill 2 seconds  Pulm: Non-labored breathing, clear to auscultation b/l, no crackles or wheeze  Abdo: Non-distended, non-tender to palpation, no rebound or guarding, BS present   MSK: No obvious bony deformities, no clubbing  Skin: Warm and dry, no obvious rashes  Neuro: Alert and oriented x3, non-focal, moves all extremities spontaneously  Psych: Normal mood and affect       Assessment and Plan     Chad was seen today for ear problem and dizziness.      Non-recurrent acute suppurative otitis media of left ear without spontaneous rupture of tympanic membrane  Discussed middle ear infection with Chad today. L TM erythematous and bulging on exam, no mastoid tenderness or external ear tenderness. No recent viral illness or prior issues with infections, no fevers or chills. He does sometimes allow water to flow inside his ear to help with wax. Recommended he fully allow the ear to dry. Unclear cause of his middle ear infection at this time. Will treat with abx x 1 week. He should RTC if sxs persist after abx course.   -     amoxicillin-clavulanate (AUGMENTIN) 875-125 MG tablet; Take 1 tablet by mouth 2 times daily for 7 days    Orthostatic Hypotension  Positive orthoistatics on exam today (148 lying flat to 121 standing systolics). He does endorse dizziness when standing while doping yoga. Recommended he stay hydrates as he states he sometimes does not drink enough water.       Options for treatment and follow-up care were reviewed with the patient. Chad Olivares engaged in the decision making process and verbalized understanding of the options discussed and agreed with the final plan.        Pt was seen and plan of care discussed with Dr Roderick Veloz.     Latisha Clark MD  Internal Medicine-PGY2  [facility]  Pager: [phone]  Mar 21, 2023

## 2023-04-26 ENCOUNTER — OFFICE VISIT (OUTPATIENT)
Dept: NEUROLOGY | Facility: CLINIC | Age: 68
End: 2023-04-26
Payer: COMMERCIAL

## 2023-04-26 VITALS
DIASTOLIC BLOOD PRESSURE: 78 MMHG | WEIGHT: 187 LBS | SYSTOLIC BLOOD PRESSURE: 130 MMHG | HEART RATE: 67 BPM | HEIGHT: 69 IN | BODY MASS INDEX: 27.7 KG/M2 | TEMPERATURE: 98.1 F

## 2023-04-26 DIAGNOSIS — G40.309 GENERALIZED CONVULSIVE EPILEPSY (H): Primary | ICD-10-CM

## 2023-04-26 RX ORDER — LAMOTRIGINE 200 MG/1
200 TABLET, EXTENDED RELEASE ORAL
Qty: 90 TABLET | Refills: 3 | Status: SHIPPED | OUTPATIENT
Start: 2023-04-26 | End: 2023-05-03 | Stop reason: DRUGHIGH

## 2023-04-26 RX ORDER — LAMOTRIGINE 100 MG/1
200 TABLET ORAL 2 TIMES DAILY
Qty: 360 TABLET | Refills: 3 | Status: SHIPPED | OUTPATIENT
Start: 2023-04-26 | End: 2023-04-26 | Stop reason: DRUGHIGH

## 2023-04-26 ASSESSMENT — PATIENT HEALTH QUESTIONNAIRE - PHQ9: SUM OF ALL RESPONSES TO PHQ QUESTIONS 1-9: 0

## 2023-04-26 ASSESSMENT — PAIN SCALES - GENERAL: PAINLEVEL: NO PAIN (0)

## 2023-04-26 NOTE — LETTER
2023       RE: Chad Olivares  : 1955   MRN: [id number]        Dear Colleague,    Thank you for referring your patient, Chad Olivares, to the [facility] EPILEPS[facility]ARE at [facility]. Please see a copy of my visit note below.      [facility]:  RETURN VISIT           Service Date: 2023     HISTORY:  Mr. Chad Olivares is a 67-year-old right-handed man who returned for follow-up of grand mal seizures.                             The patient has had no further seizures since his last  visit to this clinic on 2022.     After the last visit, he became concerned that the evening dose of lamotrigine was causing mild but bothersome insomnia, with difficulty falling asleep (but no difficulty staying asleep after sleep onset).  On his own, he decided to discontinue the evening dose of lamotrigine, but he continued to take 200 mg every morning.  He thinks that sleep latency improved with this dosing alteration.      Ictal semiology-history:             The patient confirmed previously presented history in detail today. He again noted that he had the first seizure of his lifetime on 2014, and a second grand mal seizure on 2014.  He was asleep at home in bed at seizure onset; after going to bed the preceding evening, his next memory is of awakening in the [facility] Emergency Department.  He then was very tired and diffusely sore, with pain on the right side of his tongue which had been bitten during the seizure.  His significant other witnessed the seizure from onset.  He was lying stiffly in bed with legs and trunk extended with generalized jerking movements for what seemed to be several minutes.  Afterwards he was very lethargic with stertorous respirations and then he became agitated.  When paramedics arrived, they gave him 2 doses of midazolam which decreased the agitation.  Subsequently, he became gradually more responsive over several  hours.  He did not feel that he had baseline alertness and cognitive function for nearly another 2 days, however.  He did not have urinary incontinence with the event.        The patient and his significant other reported that he has not been known to have any sudden brief staring spells with unresponsiveness, sudden brief periods of confusion or global memory deficits or involuntary movements, except for hypnic jerks.      Epilepsy-seizure predispositions:   The patient has no family history of epilepsy or seizures.  He has no history of gestational or  injury, febrile convulsions, developmental delay, stroke, meningitis, encephalitis, significant head injury, or other epileptic predispositions.  He denied a history of physical or sexual abuse by an adult during his childhood or adulthood.       Laboratory evaluations:   In the Emergency Department on the morning after the first seizure, he had a head CT scan which was normal.    A brain MRI scan was ordered at that time and was completed on 2014 and this also was normal.    We reviewed the results of the brain MRI of 2022, which showed no specific lesions, but evidence of SVID.    He had a brief routine electroencephalogram on 2014 which showed normal waking and drowsy EEG activities, although sleep patterns were not recorded. His EEG on 2022 showed no abnormalities.     Epilepsy therapeutics:   The patient reported that he had never been treated with anti-seizure medications for any reason until the second seizure occurred, when levetiracetam was started.  He completed a crossover from levetiracetam monotherapy to lamotrigine monotherapy in .  He experienced complete resolution of chronic mild irritability and episodic imbalance after tapering off levetiracetam.  He later he added  OTC  cannabidiol oil to this, mainly for treating joint pains.      PAST MEDICAL HISTORY:    1.  History of two generalized tonic-clonic seizures  (2014) of uncertain etiology.   2.  Mild obstructive sleep apnea.   3.  History of dyslexia.      PERSONAL AND SOCIAL HISTORY:  The patient grew up in [place].  He had difficulty with reading in school and was told that testing showed dyslexia, but he ultimately graduated from high school in regular classes.  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  He lives with his significant other.  He does not use illicit recreational drugs.  He drinks, at most, 1-2 alcoholic beverages, perhaps 3 times per month.  He quit smoking in 2005.      REVIEW OF SYSTEMS:    Positive for occasional dizziness when standing after bending over. He denies headache, chest pain, shortness of breath, cough, changes to bowel movements, numbness or tingling in his limbs, and rash.     MEDICATIONS:  Lamotrigine 200 mg b.i.d. (prescribed, but taken as 200 mg q.AM), and other medications as per the electronic medical record.  He also uses cannabidiol oil 1-2 times per week to treat joint pains.     PHYSICAL EXAMINATION:    On physical examination the patient appeared to be in no acute distress.  Vital signs were as per the electronic medical record.  Skull was normocephalic and atraumatic.  Neck was supple, without signs of meningeal irritation.       On neurological examination the patient appeared alert and was fully oriented to person, place, time, and reason for visit.  Speech showed grossly normal articulation, fluency, repetitions, naming, syntax and comprehension.  No apraxias or atavistic signs were elicited.  Cranial nerves III through XII were normal.  Muscle masses, tones and strengths were normal throughout.  There was no pronator drift.    There was a low-ampltiude action termor of the outstretched arms, slightly greater on the right.  No myoclonus, or other abnormal  movements were observed.  Sensations of light touch, pin prick, vibration and proprioception were reportedly normal throughout.  The rapid alternating movements, and finger-nose-finger and heel-shin maneuvers were performed normally bilaterally.  Deep tendon reflexes were normal and symmetric throughout.  Toes were downgoing bilaterally.  Romberg maneuver was negative.  Regular, tandem and reverse tandem walking were normal.       IMPRESSION:    The patient continues to do very well with full control of seizures.      After the last visit, he became concerned that the evening dose of lamotrigine was causing mild but bothersome insomnia, with difficulty falling asleep (but no difficulty staying asleep after sleep onset).  On his own, he decided to discontinue the evening dose of lamotrigine, but he continued to take 200 mg every morning.  He thinks that sleep latency improved with this dosing alteration.      In the absence of recant seizure, I recommended changing the once daily dose of lamotrigine to the extended release form.      We reviewed the results of the brain MRI of 04/04/2022, which showed no specific lesions, but evidence of SVID.    We also reviewed the results of the neuropsychological evaluation of 08/24/2022, which showed no major new findings, but possibly mild progression in frontal lobe functions compared with testing in 2016.  He did not have evidence of global deficits of dementia, which was his major concern.      On examination today, he did not have a resting termor, rigidity, bradykinesia, upgaze deficits or other evidence of a hypokinetic movement disorder.  He continues to exhibit a rather low-ampltiude action termor.      I again reviewed [place] regulations on seizures and driving with the patient.  He appeared to clearly understand that he would  prohibited from operating a motor vehicle within 3 months following any future seizure or other episode with sudden unconsciousness or  inability to sit up, and that he is required to report any future such seizure to the DMV within 30 days after the event.       PLAN:    1.  Change lamotrigine to lamotrigine-XR at 200 mg every morning.  2.  Check lamotrigine level.  3.  Return visit in approximately 11 months.     I spent 49 minutes in this patient care, with 33 minutes in direct patient contact and 16 minutes in chart review and document preparation.             Again, thank you for allowing me to participate in the care of your patient.      Sincerely,    Dandre Martin MD

## 2023-04-26 NOTE — PROGRESS NOTES
[facility] Epilepsy [facility]:  RETURN VISIT           Service Date: 04/26/2023     HISTORY:  Mr. Chad Olivares is a 67-year-old right-handed man who returned for follow-up of grand mal seizures.                             The patient has had no further seizures since his last  visit to this clinic on 03/30/2022.     After the last visit, he became concerned that the evening dose of lamotrigine was causing mild but bothersome insomnia, with difficulty falling asleep (but no difficulty staying asleep after sleep onset).  On his own, he decided to discontinue the evening dose of lamotrigine, but he continued to take 200 mg every morning.  He thinks that sleep latency improved with this dosing alteration.      Ictal semiology-history:             The patient confirmed previously presented history in detail today. He again noted that he had the first seizure of his lifetime on 07/31/2014, and a second grand mal seizure on August 20, 2014.  He was asleep at home in bed at seizure onset; after going to bed the preceding evening, his next memory is of awakening in the [facility] Emergency Department.  He then was very tired and diffusely sore, with pain on the right side of his tongue which had been bitten during the seizure.  His significant other witnessed the seizure from onset.  He was lying stiffly in bed with legs and trunk extended with generalized jerking movements for what seemed to be several minutes.  Afterwards he was very lethargic with stertorous respirations and then he became agitated.  When paramedics arrived, they gave him 2 doses of midazolam which decreased the agitation.  Subsequently, he became gradually more responsive over several hours.  He did not feel that he had baseline alertness and cognitive function for nearly another 2 days, however.  He did not have urinary incontinence with the event.        The patient and his significant other reported that he has not been known to have any sudden brief staring  spells with unresponsiveness, sudden brief periods of confusion or global memory deficits or involuntary movements, except for hypnic jerks.      Epilepsy-seizure predispositions:   The patient has no family history of epilepsy or seizures.  He has no history of gestational or  injury, febrile convulsions, developmental delay, stroke, meningitis, encephalitis, significant head injury, or other epileptic predispositions.  He denied a history of physical or sexual abuse by an adult during his childhood or adulthood.       Laboratory evaluations:   In the Emergency Department on the morning after the first seizure, he had a head CT scan which was normal.    A brain MRI scan was ordered at that time and was completed on 2014 and this also was normal.    We reviewed the results of the brain MRI of 2022, which showed no specific lesions, but evidence of SVID.    He had a brief routine electroencephalogram on 2014 which showed normal waking and drowsy EEG activities, although sleep patterns were not recorded. His EEG on 2022 showed no abnormalities.     Epilepsy therapeutics:   The patient reported that he had never been treated with anti-seizure medications for any reason until the second seizure occurred, when levetiracetam was started.  He completed a crossover from levetiracetam monotherapy to lamotrigine monotherapy in .  He experienced complete resolution of chronic mild irritability and episodic imbalance after tapering off levetiracetam.  He later he added  OTC  cannabidiol oil to this, mainly for treating joint pains.      PAST MEDICAL HISTORY:    1.  History of two generalized tonic-clonic seizures () of uncertain etiology.   2.  Mild obstructive sleep apnea.   3.  History of dyslexia.      PERSONAL AND SOCIAL HISTORY:  The patient grew up in [place].  He had difficulty with reading in school and was told that testing showed dyslexia, but he ultimately graduated from  high school in regular classes.  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  He lives with his significant other.  He does not use illicit recreational drugs.  He drinks, at most, 1-2 alcoholic beverages, perhaps 3 times per month.  He quit smoking in 2005.      REVIEW OF SYSTEMS:    Positive for occasional dizziness when standing after bending over. He denies headache, chest pain, shortness of breath, cough, changes to bowel movements, numbness or tingling in his limbs, and rash.     MEDICATIONS:  Lamotrigine 200 mg b.i.d. (prescribed, but taken as 200 mg q.AM), and other medications as per the electronic medical record.  He also uses cannabidiol oil 1-2 times per week to treat joint pains.     PHYSICAL EXAMINATION:    On physical examination the patient appeared to be in no acute distress.  Vital signs were as per the electronic medical record.  Skull was normocephalic and atraumatic.  Neck was supple, without signs of meningeal irritation.       On neurological examination the patient appeared alert and was fully oriented to person, place, time, and reason for visit.  Speech showed grossly normal articulation, fluency, repetitions, naming, syntax and comprehension.  No apraxias or atavistic signs were elicited.  Cranial nerves III through XII were normal.  Muscle masses, tones and strengths were normal throughout.  There was no pronator drift.    There was a low-ampltiude action termor of the outstretched arms, slightly greater on the right.  No myoclonus, or other abnormal movements were observed.  Sensations of light touch, pin prick, vibration and proprioception were reportedly normal throughout.  The rapid alternating movements, and finger-nose-finger and heel-shin maneuvers were performed normally bilaterally.  Deep tendon reflexes were normal and  symmetric throughout.  Toes were downgoing bilaterally.  Romberg maneuver was negative.  Regular, tandem and reverse tandem walking were normal.       IMPRESSION:    The patient continues to do very well with full control of seizures.      After the last visit, he became concerned that the evening dose of lamotrigine was causing mild but bothersome insomnia, with difficulty falling asleep (but no difficulty staying asleep after sleep onset).  On his own, he decided to discontinue the evening dose of lamotrigine, but he continued to take 200 mg every morning.  He thinks that sleep latency improved with this dosing alteration.      In the absence of recant seizure, I recommended changing the once daily dose of lamotrigine to the extended release form.      We reviewed the results of the brain MRI of 04/04/2022, which showed no specific lesions, but evidence of SVID.    We also reviewed the results of the neuropsychological evaluation of 08/24/2022, which showed no major new findings, but possibly mild progression in frontal lobe functions compared with testing in 2016.  He did not have evidence of global deficits of dementia, which was his major concern.      On examination today, he did not have a resting termor, rigidity, bradykinesia, upgaze deficits or other evidence of a hypokinetic movement disorder.  He continues to exhibit a rather low-ampltiude action termor.      I again reviewed [place] regulations on seizures and driving with the patient.  He appeared to clearly understand that he would  prohibited from operating a motor vehicle within 3 months following any future seizure or other episode with sudden unconsciousness or inability to sit up, and that he is required to report any future such seizure to the [facility] within 30 days after the event.       PLAN:    1.  Change lamotrigine to lamotrigine-XR at 200 mg every morning.  2.  Check lamotrigine level.  3.  Return visit in approximately 11 months.     I  spent 49 minutes in this patient care, with 33 minutes in direct patient contact and 16 minutes in chart review and document preparation.         Dandre Martin M.D.  Professor of Neurology

## 2023-04-28 DIAGNOSIS — G40.309 GENERALIZED CONVULSIVE EPILEPSY (H): ICD-10-CM

## 2023-04-28 LAB — LAMOTRIGINE SERPL-MCNC: 7.8 UG/ML

## 2023-04-29 DIAGNOSIS — G40.309 GENERALIZED CONVULSIVE EPILEPSY (H): ICD-10-CM

## 2023-04-29 RX ORDER — LAMOTRIGINE 200 MG/1
1 TABLET, EXTENDED RELEASE ORAL
Qty: 90 TABLET | OUTPATIENT
Start: 2023-04-29

## 2023-04-29 NOTE — TELEPHONE ENCOUNTER
LAMOTRIGINE  MG TABLET  Pharmacy comment: Alternative Requested:LAMOTRIGINE ER 200MG FOR 90 DAY SUPPLY $501.48, PLEAS SEND AN ALTERNATIVE. THANKS.    Radha Gagnon RN  Central Triage Red Flags/Med Refills

## 2023-05-01 ENCOUNTER — MYC MEDICAL ADVICE (OUTPATIENT)
Dept: NEUROLOGY | Facility: CLINIC | Age: 68
End: 2023-05-01

## 2023-05-01 DIAGNOSIS — G40.309 GENERALIZED CONVULSIVE EPILEPSY (H): ICD-10-CM

## 2023-05-01 NOTE — TELEPHONE ENCOUNTER
Pharmacy comment: Alternative Requested:IS THERE AN ALT?  THIS IS VERY EXPENSIVE ($500) FOR PT. THANKS!

## 2023-05-03 DIAGNOSIS — G40.309 GENERALIZED CONVULSIVE EPILEPSY (H): ICD-10-CM

## 2023-05-03 RX ORDER — LAMOTRIGINE 100 MG/1
200 TABLET ORAL 2 TIMES DAILY
Qty: 360 TABLET | Refills: 3 | Status: SHIPPED | OUTPATIENT
Start: 2023-05-03 | End: 2024-03-27

## 2023-05-03 NOTE — TELEPHONE ENCOUNTER
What is the concern that needs to be addressed by a nurse?    Patient no longer wants the time release medication. He would like to go back to his origanal medicine due to cost. Please return patients call he states he is out of medication     May a detailed message be left on voicemail? yes    Date of last office visit:     Message routed to: angela

## 2023-05-04 RX ORDER — LAMOTRIGINE 100 MG/1
200 TABLET ORAL 2 TIMES DAILY
Qty: 360 TABLET | Refills: 3 | OUTPATIENT
Start: 2023-05-04

## 2023-05-04 RX ORDER — LAMOTRIGINE 200 MG/1
TABLET, EXTENDED RELEASE ORAL
Qty: 90 TABLET | Refills: 3 | OUTPATIENT
Start: 2023-05-04

## 2023-05-04 NOTE — TELEPHONE ENCOUNTER
Prescription was sent yesterday. Patient was contacted and he states he was able to  the prescription.

## 2023-05-04 NOTE — TELEPHONE ENCOUNTER
What is the concern that needs to be addressed by a nurse? Patient called in and says he's out of medication, and that he's stating to feel weird, he would like for medication to be filled.     May a detailed message be left on voicemail? yes    Date of last office visit:     Message routed to: [email or web address]

## 2023-06-04 ENCOUNTER — APPOINTMENT (OUTPATIENT)
Dept: CT IMAGING | Facility: CLINIC | Age: 68
End: 2023-06-04
Attending: EMERGENCY MEDICINE
Payer: COMMERCIAL

## 2023-06-04 ENCOUNTER — APPOINTMENT (OUTPATIENT)
Dept: CARDIOLOGY | Facility: CLINIC | Age: 68
End: 2023-06-04
Attending: EMERGENCY MEDICINE
Payer: COMMERCIAL

## 2023-06-04 ENCOUNTER — HOSPITAL ENCOUNTER (EMERGENCY)
Facility: CLINIC | Age: 68
Discharge: HOME | End: 2023-06-04
Attending: EMERGENCY MEDICINE | Admitting: EMERGENCY MEDICINE
Payer: COMMERCIAL

## 2023-06-04 ENCOUNTER — MYC MEDICAL ADVICE (OUTPATIENT)
Dept: INTERNAL MEDICINE | Facility: CLINIC | Age: 68
End: 2023-06-04

## 2023-06-04 VITALS
SYSTOLIC BLOOD PRESSURE: 130 MMHG | BODY MASS INDEX: 27.62 KG/M2 | WEIGHT: 187 LBS | DIASTOLIC BLOOD PRESSURE: 77 MMHG | RESPIRATION RATE: 16 BRPM | OXYGEN SATURATION: 94 % | TEMPERATURE: 97.6 F | HEART RATE: 55 BPM

## 2023-06-04 DIAGNOSIS — R42 LIGHTHEADEDNESS: ICD-10-CM

## 2023-06-04 DIAGNOSIS — R06.00 DYSPNEA, UNSPECIFIED TYPE: ICD-10-CM

## 2023-06-04 DIAGNOSIS — N17.9 AKI (ACUTE KIDNEY INJURY) (H): ICD-10-CM

## 2023-06-04 LAB
ALBUMIN SERPL BCG-MCNC: 4.4 G/DL (ref 3.5–5.2)
ALBUMIN UR-MCNC: NEGATIVE MG/DL
ALP SERPL-CCNC: 100 U/L (ref 40–129)
ALT SERPL W P-5'-P-CCNC: 29 U/L (ref 10–50)
ANION GAP SERPL CALCULATED.3IONS-SCNC: 12 MMOL/L (ref 7–15)
APPEARANCE UR: CLEAR
AST SERPL W P-5'-P-CCNC: 26 U/L (ref 10–50)
BASOPHILS # BLD AUTO: 0 10E3/UL (ref 0–0.2)
BASOPHILS NFR BLD AUTO: 0 %
BILIRUB SERPL-MCNC: 0.4 MG/DL
BILIRUB UR QL STRIP: NEGATIVE
BUN SERPL-MCNC: 18.2 MG/DL (ref 8–23)
CALCIUM SERPL-MCNC: 8.9 MG/DL (ref 8.8–10.2)
CHLORIDE SERPL-SCNC: 106 MMOL/L (ref 98–107)
COLOR UR AUTO: NORMAL
CREAT SERPL-MCNC: 1.23 MG/DL (ref 0.67–1.17)
D DIMER PPP FEU-MCNC: <0.27 UG/ML FEU (ref 0–0.5)
DEPRECATED HCO3 PLAS-SCNC: 24 MMOL/L (ref 22–29)
EOSINOPHIL # BLD AUTO: 0.2 10E3/UL (ref 0–0.7)
EOSINOPHIL NFR BLD AUTO: 3 %
ERYTHROCYTE [DISTWIDTH] IN BLOOD BY AUTOMATED COUNT: 12.4 % (ref 10–15)
GFR SERPL CREATININE-BSD FRML MDRD: 64 ML/MIN/1.73M2
GLUCOSE SERPL-MCNC: 97 MG/DL (ref 70–99)
GLUCOSE UR STRIP-MCNC: NEGATIVE MG/DL
HCT VFR BLD AUTO: 45.2 % (ref 40–53)
HGB BLD-MCNC: 15 G/DL (ref 13.3–17.7)
HGB UR QL STRIP: NEGATIVE
IMM GRANULOCYTES # BLD: 0 10E3/UL
IMM GRANULOCYTES NFR BLD: 0 %
KETONES UR STRIP-MCNC: NEGATIVE MG/DL
LEUKOCYTE ESTERASE UR QL STRIP: NEGATIVE
LIPASE SERPL-CCNC: 30 U/L (ref 13–60)
LYMPHOCYTES # BLD AUTO: 2 10E3/UL (ref 0.8–5.3)
LYMPHOCYTES NFR BLD AUTO: 33 %
MAGNESIUM SERPL-MCNC: 1.9 MG/DL (ref 1.7–2.3)
MCH RBC QN AUTO: 31.4 PG (ref 26.5–33)
MCHC RBC AUTO-ENTMCNC: 33.2 G/DL (ref 31.5–36.5)
MCV RBC AUTO: 95 FL (ref 78–100)
MONOCYTES # BLD AUTO: 0.5 10E3/UL (ref 0–1.3)
MONOCYTES NFR BLD AUTO: 8 %
NEUTROPHILS # BLD AUTO: 3.4 10E3/UL (ref 1.6–8.3)
NEUTROPHILS NFR BLD AUTO: 56 %
NITRATE UR QL: NEGATIVE
NRBC # BLD AUTO: 0 10E3/UL
NRBC BLD AUTO-RTO: 0 /100
NT-PROBNP SERPL-MCNC: 58 PG/ML (ref 0–900)
PH UR STRIP: 7 [PH] (ref 5–7)
PLATELET # BLD AUTO: 209 10E3/UL (ref 150–450)
POTASSIUM SERPL-SCNC: 4.4 MMOL/L (ref 3.4–5.3)
PROT SERPL-MCNC: 6.7 G/DL (ref 6.4–8.3)
RBC # BLD AUTO: 4.77 10E6/UL (ref 4.4–5.9)
RBC URINE: <1 /HPF
SODIUM SERPL-SCNC: 142 MMOL/L (ref 136–145)
SP GR UR STRIP: 1.01 (ref 1–1.03)
TROPONIN T SERPL HS-MCNC: 7 NG/L
TSH SERPL DL<=0.005 MIU/L-ACNC: 2.44 UIU/ML (ref 0.3–4.2)
UROBILINOGEN UR STRIP-MCNC: NORMAL MG/DL
WBC # BLD AUTO: 6.1 10E3/UL (ref 4–11)
WBC URINE: 1 /HPF

## 2023-06-04 PROCEDURE — 99285 EMERGENCY DEPT VISIT HI MDM: CPT | Mod: 25 | Performed by: EMERGENCY MEDICINE

## 2023-06-04 PROCEDURE — 93005 ELECTROCARDIOGRAM TRACING: CPT | Mod: XU | Performed by: EMERGENCY MEDICINE

## 2023-06-04 PROCEDURE — 83880 ASSAY OF NATRIURETIC PEPTIDE: CPT | Performed by: EMERGENCY MEDICINE

## 2023-06-04 PROCEDURE — 93010 ELECTROCARDIOGRAM REPORT: CPT | Performed by: EMERGENCY MEDICINE

## 2023-06-04 PROCEDURE — 93242 EXT ECG>48HR<7D RECORDING: CPT

## 2023-06-04 PROCEDURE — 258N000003 HC RX IP 258 OP 636: Performed by: EMERGENCY MEDICINE

## 2023-06-04 PROCEDURE — 83690 ASSAY OF LIPASE: CPT | Performed by: EMERGENCY MEDICINE

## 2023-06-04 PROCEDURE — 74177 CT ABD & PELVIS W/CONTRAST: CPT | Mod: 26 | Performed by: RADIOLOGY

## 2023-06-04 PROCEDURE — 36415 COLL VENOUS BLD VENIPUNCTURE: CPT | Performed by: EMERGENCY MEDICINE

## 2023-06-04 PROCEDURE — 85025 COMPLETE CBC W/AUTO DIFF WBC: CPT | Performed by: EMERGENCY MEDICINE

## 2023-06-04 PROCEDURE — 250N000013 HC RX MED GY IP 250 OP 250 PS 637: Performed by: EMERGENCY MEDICINE

## 2023-06-04 PROCEDURE — 71275 CT ANGIOGRAPHY CHEST: CPT

## 2023-06-04 PROCEDURE — 96360 HYDRATION IV INFUSION INIT: CPT | Mod: 59 | Performed by: EMERGENCY MEDICINE

## 2023-06-04 PROCEDURE — 250N000011 HC RX IP 250 OP 636: Performed by: EMERGENCY MEDICINE

## 2023-06-04 PROCEDURE — 74177 CT ABD & PELVIS W/CONTRAST: CPT

## 2023-06-04 PROCEDURE — 85379 FIBRIN DEGRADATION QUANT: CPT | Performed by: EMERGENCY MEDICINE

## 2023-06-04 PROCEDURE — 84443 ASSAY THYROID STIM HORMONE: CPT | Performed by: EMERGENCY MEDICINE

## 2023-06-04 PROCEDURE — 93248 EXT ECG>7D<15D REV&INTERPJ: CPT | Performed by: INTERNAL MEDICINE

## 2023-06-04 PROCEDURE — 83735 ASSAY OF MAGNESIUM: CPT | Performed by: EMERGENCY MEDICINE

## 2023-06-04 PROCEDURE — 80053 COMPREHEN METABOLIC PANEL: CPT | Performed by: EMERGENCY MEDICINE

## 2023-06-04 PROCEDURE — 84484 ASSAY OF TROPONIN QUANT: CPT | Performed by: EMERGENCY MEDICINE

## 2023-06-04 PROCEDURE — 70450 CT HEAD/BRAIN W/O DYE: CPT

## 2023-06-04 PROCEDURE — 70450 CT HEAD/BRAIN W/O DYE: CPT | Mod: 26 | Performed by: RADIOLOGY

## 2023-06-04 PROCEDURE — 71275 CT ANGIOGRAPHY CHEST: CPT | Mod: 26 | Performed by: RADIOLOGY

## 2023-06-04 PROCEDURE — 81001 URINALYSIS AUTO W/SCOPE: CPT | Performed by: EMERGENCY MEDICINE

## 2023-06-04 PROCEDURE — 96361 HYDRATE IV INFUSION ADD-ON: CPT | Performed by: EMERGENCY MEDICINE

## 2023-06-04 RX ORDER — MECLIZINE HYDROCHLORIDE 25 MG/1
25 TABLET ORAL ONCE
Status: COMPLETED | OUTPATIENT
Start: 2023-06-04 | End: 2023-06-04

## 2023-06-04 RX ORDER — SODIUM CHLORIDE 9 MG/ML
INJECTION, SOLUTION INTRAVENOUS CONTINUOUS
Status: DISCONTINUED | OUTPATIENT
Start: 2023-06-04 | End: 2023-06-04 | Stop reason: HOSPADM

## 2023-06-04 RX ORDER — IOPAMIDOL 755 MG/ML
115 INJECTION, SOLUTION INTRAVASCULAR ONCE
Status: COMPLETED | OUTPATIENT
Start: 2023-06-04 | End: 2023-06-04

## 2023-06-04 RX ADMIN — SODIUM CHLORIDE 1000 ML: 9 INJECTION, SOLUTION INTRAVENOUS at 19:56

## 2023-06-04 RX ADMIN — MECLIZINE HYDROCHLORIDE 25 MG: 25 TABLET ORAL at 17:10

## 2023-06-04 RX ADMIN — IOPAMIDOL 115 ML: 755 INJECTION, SOLUTION INTRAVENOUS at 18:22

## 2023-06-04 ASSESSMENT — ACTIVITIES OF DAILY LIVING (ADL)
ADLS_ACUITY_SCORE: 33
ADLS_ACUITY_SCORE: 35
ADLS_ACUITY_SCORE: 35

## 2023-06-04 NOTE — DISCHARGE INSTRUCTIONS
TODAY'S VISIT:  You were seen today for shortness of breath  -   - If you had any labs or imaging/radiology tests performed today, you should also discuss these tests with your usual provider.     FOLLOW-UP:  Please make an appointment to follow up with:  - Your Primary Care Provider. If you do not have a PCP, please call the Primary Care Center (phone: [phone] for an appointment    - Have your provider review the results from today's visit with you again to make sure no further follow-up or additional testing is needed based on those results.     RETURN TO THE EMERGENCY DEPARTMENT  Return to the Emergency Department at any time for any new or worsening symptoms or any concerns.

## 2023-06-04 NOTE — ED PROVIDER NOTES
History     Chief Complaint   Patient presents with     Shortness of Breath     Dizziness     HPI  Chad Olivares is a 67 year old male with a past medical history of memory loss, cataracts, epilepsy, Achilles bursitis/tendinitis, plantar fascial fibromatosis who presents to the emergency department with a chief complaint of dizziness.  It is associated with shortness of breath and some balance issues.  However, he describes his dizziness is primarily like lightheadedness/presyncope rather than a room spinning sensation.  It often happens when he stands up.  The patient feels he has had an overall decrease in energy and intermittent shortness of breath as well as some middle bilateral back pain as well.  Pain radiates through to his abdomen.  His symptoms have been going on for 1 month, they worsen while he is doing yoga.  However, his shortness of breath worsened over the last couple of days.  The patient states that he felt like he was going to pass out while doing yoga.  Patient does have a family history of cardiac disease and thinks his mother may have had a history of blood clots, though he is not sure (he notes that she was on warfarin, though she also had a history of TAVR).  No personal or family history of lung disease.  The patient denies any chest pain.    I have reviewed the Medications, Allergies, Past Medical and Surgical History, and Social History in the [employer] system.    Past Medical History:   Diagnosis Date     Achilles bursitis or tendinitis 08/07/2008     Advanced directives, counseling/discussion 05/20/2015    Gave pt packet on 5/20/2015  Syeda Torres CMA       CARDIOVASCULAR SCREENING; LDL GOAL LESS THAN 160 10/12/2010     Cholesteatoma, unspecified      Colon polyps 04/2015    tubular villous and serrated adenoma     Complex sleep apnea syndrome     does not use CPAP     Dupuytren's contracture      Dyslexia      Dyspnea and respiratory abnormality 09/03/2014     Problem list name updated  by automated process. Provider to review     Generalized convulsive epilepsy (H) 2014     Problem list name updated by automated process. Provider to review     Generalized tonic-clonic seizure (H) 2014    uncertain etiology     Memory loss 2016     Organic parasomnia 2014     Problem list name updated by automated process. Provider to review     Plantar fascial fibromatosis 2008     Past Surgical History:   Procedure Laterality Date     cholesteatoma surgery Left      COLONOSCOPY  10/09/2002; 2015    polyps - needs f/u 5 yrs      COLONOSCOPY N/A 2021    Procedure: COLONOSCOPY, WITH POLYPECTOMY;  Surgeon: Asaf Guajardo MD;  [place] [facility] [place]      REMOVAL OF TONSILS,<13 Y/O  2     RELEASE DUPUYTRENS CONTRACTURE Right 2021    Procedure: Right fifth finger and palm Dupuytren's contracture release;  Surgeon: Caron Farrell MD;  [place] [facility] [place]     No current facility-administered medications for this encounter.     Current Outpatient Medications   Medication     lamoTRIgine (LAMICTAL) 100 MG tablet     medical cannabis oral liquid  (Patient's own supply.  Not a prescription)     tamsulosin (FLOMAX) 0.4 MG capsule     Allergies   Allergen Reactions     Gluten Meal      Seasonal Allergies      Past medical history, past surgical history, medications, and allergies were reviewed with the patient. Additional pertinent items: None    Social History     Socioeconomic History     Marital status: Single     Spouse name: Not on file     Number of children: 2     Years of education: 14     Highest education level: Not on file   Occupational History     Occupation:      Comment: Self Employed     Occupation: contractor     Employer: SELF   Tobacco Use     Smoking status: Former     Types: Cigarettes     Quit date: 2005     Years since quittin.7     Smokeless tobacco: Never   Vaping Use     Vaping status: Not on file   Substance and Sexual Activity      Alcohol use: Yes     Comment: 1 drink every 2 wks     Drug use: No     Comment: uses CBD      Sexual activity: Yes     Partners: Female   Other Topics Concern     Parent/sibling w/ CABG, MI or angioplasty before 65F 55M? No      Service Not Asked     Blood Transfusions Not Asked     Caffeine Concern Not Asked     Comment: 3 cups of coffee a day     Occupational Exposure Not Asked     Hobby Hazards Not Asked     Sleep Concern Not Asked     Stress Concern Not Asked     Weight Concern Not Asked     Special Diet Not Asked     Back Care Not Asked     Exercise Not Asked     Comment: Exercise 2 to 3 times a week     Bike Helmet Not Asked     Seat Belt Not Asked     Self-Exams Not Asked   Social History Narrative    Balanced Diet - Yes    Osteoporosis Preventative measures-  Dairy servings per day: no servings daily takes soy milk and almond milk - 2 glasses/day     Regular Exercise -  Yes Describe dance 3 times weekly (salsa), bike ride, and paula    Dental Exam up - YES - Date: 2016    Eye Exam - YES - Date: 2007    Self Testicular Exam -  sometimes    Do you have any concerns about STD's -  Partner has hx of genital herpes    Abuse: Current or Past (Physical, Sexual or Emotional)- No    Do you feel safe in your environment - Yes    Guns stored in the home - Yes, locked away    Sunscreen used - No using coconut     Seatbelts used - Yes    Lipids - YES - Date: 4-28-06    Glucose -  NO    Colon Cancer Screening - Colonoscopy 2002(date completed)    Hemoccults - NO    PSA - YES - Date: 4-28-06    Digital Rectal Exam - YES - Date: 4-28-06    Immunizations reviewed and up to date - Yes, last TD was 11-5-2001 2008, Dania Miller MA             Social Determinants of Health     Financial Resource Strain: Not on file   Food Insecurity: Not on file   Transportation Needs: Not on file   Physical Activity: Not on file   Stress: Not on file   Social Connections: Not on file   Intimate Partner Violence: Not on file    Housing Stability: Not on file     Social history was reviewed with the patient. Additional pertinent items: None    Review of Systems  A medically appropriate review of systems was performed with pertinent positives and negatives noted in the HPI, and all other systems negative.    Physical Exam   BP: 114/78  Pulse: 74  Temp: 97.6  F (36.4  C)  Resp: 16  Weight: 84.8 kg (187 lb)  SpO2: 95 %      General: Well nourished, well developed, NAD  HEENT: EOMI, anicteric. NCAT, MMM  Neck: no jugular venous distension, supple, nl ROM  Cardiac: Regular rate and rhythm. No murmurs, rubs, or gallops. Normal S1, S2.  Intact peripheral pulses  Pulm: CTAB, no stridor, wheezes, rales, rhonchi  Abd: Soft, nontender, nondistended.  No masses palpated.  Back: No midline tenderness, no CVA tenderness, no step-off, normal range of motion  Skin: Warm and dry to the touch.  No rash  Extremities: No LE edema, no cyanosis, w/w/p, no posterior calf tenderness  Neuro: A&Ox3, no gross focal deficits    ED Course        Procedures                 ED Course Selections:        EKG Interpretation:      Interpreted by Krystle Magallon MD  Time reviewed: 1707  Symptoms at time of EKG: dizziness   Rhythm: normal sinus   Rate: normal, 70 bpm   Axis: normal  Ectopy: none  Conduction: RBBB  ST Segments/ T Waves: No ST-T wave changes  Q Waves: none  Comparison to prior: RBBB is new, otherwise unchanged from 7/32/14    Clinical Impression: abnormal EKG                Labs Ordered and Resulted from Time of ED Arrival to Time of ED Departure   COMPREHENSIVE METABOLIC PANEL - Abnormal       Result Value    Sodium 142      Potassium 4.4      Chloride 106      Carbon Dioxide (CO2) 24      Anion Gap 12      Urea Nitrogen 18.2      Creatinine 1.23 (*)     Calcium 8.9      Glucose 97      Alkaline Phosphatase 100      AST 26      ALT 29      Protein Total 6.7      Albumin 4.4      Bilirubin Total 0.4      GFR Estimate 64     TROPONIN T, HIGH SENSITIVITY -  Normal    Troponin T, High Sensitivity 7     MAGNESIUM - Normal    Magnesium 1.9     TSH WITH FREE T4 REFLEX - Normal    TSH 2.44     ROUTINE UA WITH MICROSCOPIC REFLEX TO CULTURE - Normal    Color Urine Light Yellow      Appearance Urine Clear      Glucose Urine Negative      Bilirubin Urine Negative      Ketones Urine Negative      Specific Gravity Urine 1.010      Blood Urine Negative      pH Urine 7.0      Protein Albumin Urine Negative      Urobilinogen Urine Normal      Nitrite Urine Negative      Leukocyte Esterase Urine Negative      RBC Urine <1      WBC Urine 1     NT PROBNP INPATIENT - Normal    N terminal Pro BNP Inpatient 58     D DIMER QUANTITATIVE - Normal    D-Dimer Quantitative <0.27     CBC WITH PLATELETS AND DIFFERENTIAL    WBC Count 6.1      RBC Count 4.77      Hemoglobin 15.0      Hematocrit 45.2      MCV 95      MCH 31.4      MCHC 33.2      RDW 12.4      Platelet Count 209      % Neutrophils 56      % Lymphocytes 33      % Monocytes 8      % Eosinophils 3      % Basophils 0      % Immature Granulocytes 0      NRBCs per 100 WBC 0      Absolute Neutrophils 3.4      Absolute Lymphocytes 2.0      Absolute Monocytes 0.5      Absolute Eosinophils 0.2      Absolute Basophils 0.0      Absolute Immature Granulocytes 0.0      Absolute NRBCs 0.0     LIPASE            Results for orders placed or performed during the hospital encounter of 06/04/23 (from the past 24 hour(s))   EKG 12-lead, tracing only   Result Value Ref Range    Systolic Blood Pressure  mmHg    Diastolic Blood Pressure  mmHg    Ventricular Rate 70 BPM    Atrial Rate 70 BPM    DE Interval 174 ms    QRS Duration 132 ms     ms    QTc 470 ms    P Axis 12 degrees    R AXIS 52 degrees    T Axis 35 degrees    Interpretation ECG       Sinus rhythm  Right bundle branch block  Abnormal ECG     CBC with platelets differential    Narrative    The following orders were created for panel order CBC with platelets differential.  Procedure                                Abnormality         Status                     ---------                               -----------         ------                     CBC with platelets and d...[[id number]]                      Final result                 Please view results for these tests on the individual orders.   Comprehensive metabolic panel   Result Value Ref Range    Sodium 142 136 - 145 mmol/L    Potassium 4.4 3.4 - 5.3 mmol/L    Chloride 106 98 - 107 mmol/L    Carbon Dioxide (CO2) 24 22 - 29 mmol/L    Anion Gap 12 7 - 15 mmol/L    Urea Nitrogen 18.2 8.0 - 23.0 mg/dL    Creatinine 1.23 (H) 0.67 - 1.17 mg/dL    Calcium 8.9 8.8 - 10.2 mg/dL    Glucose 97 70 - 99 mg/dL    Alkaline Phosphatase 100 40 - 129 U/L    AST 26 10 - 50 U/L    ALT 29 10 - 50 U/L    Protein Total 6.7 6.4 - 8.3 g/dL    Albumin 4.4 3.5 - 5.2 g/dL    Bilirubin Total 0.4 <=1.2 mg/dL    GFR Estimate 64 >60 mL/min/1.73m2   Troponin T, High Sensitivity   Result Value Ref Range    Troponin T, High Sensitivity 7 <=22 ng/L   Magnesium   Result Value Ref Range    Magnesium 1.9 1.7 - 2.3 mg/dL   TSH with free T4 reflex   Result Value Ref Range    TSH 2.44 0.30 - 4.20 uIU/mL   Nt probnp inpatient (BNP)   Result Value Ref Range    N terminal Pro BNP Inpatient 58 0 - 900 pg/mL   D dimer quantitative   Result Value Ref Range    D-Dimer Quantitative <0.27 0.00 - 0.50 ug/mL FEU    Narrative    This D-dimer assay is intended for use in conjunction with a clinical pretest probability assessment model to exclude pulmonary embolism (PE) and deep venous thrombosis (DVT) in outpatients suspected of PE or DVT. The cut-off value is 0.50 ug/mL FEU.   CBC with platelets and differential   Result Value Ref Range    WBC Count 6.1 4.0 - 11.0 10e3/uL    RBC Count 4.77 4.40 - 5.90 10e6/uL    Hemoglobin 15.0 13.3 - 17.7 g/dL    Hematocrit 45.2 40.0 - 53.0 %    MCV 95 78 - 100 fL    MCH 31.4 26.5 - 33.0 pg    MCHC 33.2 31.5 - 36.5 g/dL    RDW 12.4 10.0 - 15.0 %    Platelet Count 209  150 - 450 10e3/uL    % Neutrophils 56 %    % Lymphocytes 33 %    % Monocytes 8 %    % Eosinophils 3 %    % Basophils 0 %    % Immature Granulocytes 0 %    NRBCs per 100 WBC 0 <1 /100    Absolute Neutrophils 3.4 1.6 - 8.3 10e3/uL    Absolute Lymphocytes 2.0 0.8 - 5.3 10e3/uL    Absolute Monocytes 0.5 0.0 - 1.3 10e3/uL    Absolute Eosinophils 0.2 0.0 - 0.7 10e3/uL    Absolute Basophils 0.0 0.0 - 0.2 10e3/uL    Absolute Immature Granulocytes 0.0 <=0.4 10e3/uL    Absolute NRBCs 0.0 10e3/uL   CT Head w/o Contrast    Narrative    CT HEAD W/O CONTRAST 6/4/2023 6:37 PM    Provided History: dizziness  ICD-10:    Comparison: Head CT 7/31/2014, brain MRI 8/1/2014    Technique: Using multidetector thin collimation helical acquisition  technique, axial, coronal and sagittal CT images from the skull base  to the vertex were obtained without intravenous contrast.     Findings:    No intracranial hemorrhage, mass effect, or midline shift. The  ventricles are proportionate to the cerebral sulci. The gray to white  matter differentiation of the cerebral hemispheres is preserved. The  basal cisterns are patent.     Surgical changes of prior left mastoidectomy. Chronic appearing left  orbital floor deformity.The visualized paranasal sinuses are clear.  The mastoid air cells are clear.       Impression    Impression: No acute intracranial pathology.    I have personally reviewed the examination and initial interpretation  and I agree with the findings.    AMY KUO MD         SYSTEM ID:  [id number]   CT Chest Pulmonary Embolism w Contrast    Narrative    EXAMINATION: CTA pulmonary angiogram, 6/4/2023 6:39 PM     COMPARISON: None.    HISTORY: Shortness of breath    TECHNIQUE: Volumetric helical acquisition of CT images of the chest  from the lung apices to the kidneys were acquired after the  administration of 80 mL of Isovue-370 IV contrast.  Post-processed  multiplanar and/or MIP reformations were obtained, archived to PACS  and  used in interpretation of this study.     FINDINGS:  Contrast bolus is: adequate.  Exam is negative for acute  pulmonary embolism. Heart size is normal. No right heart strain. No  pericardial effusion.  No paradoxical bowing of the intraventricular  septum. RV to LV ratio is within normal limits. No significant reflux  of contrast into the IVC.    Lines, tubes, devices: None.    Lungs: The central tracheobronchial tree is patent. Trace  centrilobular changes, most pronounced in the upper lobes. No areas of  bronchiectasis or architectural distortion. No pleural effusion,  pulmonary consolidation, or pneumothorax. Mild bibasilar atelectasis.  Stable solid 6 mm pulmonary nodule along the right minor fissure,  consistent with benign intrapulmonary lymph node. No new or suspicious  nodules.    Mediastinum: The visualized thyroid is unremarkable.Heart size is  within normal limits. No pericardial effusion. Mild coronary artery  calcifications. Borderline dilation of the ascending thoracic aorta,  measuring up to 4.0 cm. The main pulmonary artery is normal in  caliber. Standard branching pattern of the great vessels. No abnormal  thoracic lymph nodes.    Bones and soft tissues: No suspicious osseous lesion. Mild  degenerative changes of the thoracolumbar spine.    Upper abdomen:  Limited evaluation of the upper abdomen. No acute  pathology of the visualized solid organs.       Impression    IMPRESSION:   1. Exam is negative for acute pulmonary embolism. No acute pulmonary  abnormality.      2. Evidence for right heart strain or increased right heart pressures?   is not present.     3. Stable borderline dilation of the ascending thoracic aorta,  measuring up to 4.0 cm.    In the event of a positive result for acute pulmonary embolism  resulting in right heart strain, please activate the [facility]  Multidisciplinary group for consultation by paging [phone]  [phone]).     [facility] -- Pulmonary Embolism Response Team  (Multidisciplinary team  including cardiology, interventional radiology, critical care,  hematology)   CT Abdomen Pelvis w Contrast    Narrative    EXAMINATION: CT ABDOMEN PELVIS W CONTRAST, 6/4/2023 6:39 PM    TECHNIQUE:  Helical CT images from the lung bases through the  symphysis pubis were obtained with IV contrast. Contrast dose: Isovue  370    COMPARISON: Abdominal ultrasound 10/24/2022    HISTORY: RUQ abdominal pain, back pain    FINDINGS:    Lung bases: Trace basilar atelectasis..    Liver: 15 mm ill-defined hypoattenuating lesion in segment 6 which  correlates with a hemangioma on prior ultrasound and is stable in  size. Few other scattered subcentimeter hypodensities, likely simple  cysts.  Biliary system: Gallbladder is within normal limits. No intrahepatic  or extrahepatic biliary ductal dilatation.  Pancreas: No focal mass or dilation of the main pancreatic duct.  Spleen: Within normal limits.  Adrenal glands: Within normal limits.  Kidneys: No focal mass, hydronephrosis, or stone.  Bladder: Within normal limits.  Reproductive organs: Prostate is enlarged.  GI: Nondilated small bowel. Few colonic diverticula without evidence  of diverticulitis. Appendix is normal.  Lymph nodes: No intra-abdominal or pelvic lymphadenopathy.  Vasculature: Arterial atherosclerosis without aneurysm.    Bones and soft tissues: No suspicious soft tissue mass or fluid  collection. No suspicious osseous lesion. Degenerative changes in the  spine.    Fat-containing umbilical hernia.      Impression    IMPRESSION:   1. No acute findings in the abdomen or pelvis.  2. Prostatomegaly.    UA with Microscopic reflex to Culture    Specimen: Urine, Clean Catch   Result Value Ref Range    Color Urine Light Yellow Colorless, Straw, Light Yellow, Yellow    Appearance Urine Clear Clear    Glucose Urine Negative Negative mg/dL    Bilirubin Urine Negative Negative    Ketones Urine Negative Negative mg/dL    Specific Gravity Urine 1.010 1.003  - 1.035    Blood Urine Negative Negative    pH Urine 7.0 5.0 - 7.0    Protein Albumin Urine Negative Negative mg/dL    Urobilinogen Urine Normal Normal, 2.0 mg/dL    Nitrite Urine Negative Negative    Leukocyte Esterase Urine Negative Negative    RBC Urine <1 <=2 /HPF    WBC Urine 1 <=5 /HPF    Narrative    Urine Culture not indicated       Labs, vital signs, and imaging studies were reviewed by me.    Medications   0.9% sodium chloride BOLUS (0 mLs Intravenous Stopped 6/4/23 2136)     Followed by   sodium chloride 0.9% infusion (has no administration in time range)   meclizine (ANTIVERT) tablet 25 mg (25 mg Oral $Given 6/4/23 1710)   iopamidol (ISOVUE-370) solution 115 mL (115 mLs Intravenous $Given 6/4/23 1822)   sodium chloride (PF) 0.9% PF flush 90 mL (90 mLs Intravenous $Given 6/4/23 1822)       Assessments & Plan (with Medical Decision Making)   Chad Olivares is a 67 year old male who presents to the emergency department with shortness of breath, lightheadedness, back/abdominal pain.  This has been present for the past month, but worsened in the last several days.  Differential diagnosis includes cardiac arrhythmia, orthostatic hypotension, vasovagal presyncope, BPPV, ACS, pulmonary embolism, aortic dissection, nephrolithiasis, pyelonephritis, pancreatitis, cholelithiasis, musculoskeletal pain, anxiety, CHF.  Labs, EKG, UA, CT head/chest/abdomen/pelvis ordered to further evaluate the patient. Medications were given for symptomatic relief in the emergency department.    EKG shows RBBB    Laboratory work-up is remarkable for slight creatinine elevation (IV fluids given), normal troponin, normal BNP, normal TSH, normal magnesium, normal D-dimer, normal CBC    CT angiogram of the chest shows no PE.  CT of the abdomen and pelvis shows no acute disease process.  CT head shows no acute disease process.    Patient provided with a Zio patch prior to discharge and advised to follow-up with his PCP regarding these  results.    Critical care was not performed.     Medical Decision Making  The patient's presentation was of high complexity (an acute health issue posing potential threat to life or bodily function).    The patient's evaluation involved:  an assessment requiring an independent historian (pt's wife)  ordering and/or review of 3+ test(s) in this encounter (see separate area of note for details)  independent interpretation of testing performed by another health professional (CTs)    The patient's management necessitated moderate risk (prescription drug management including medications given in the ED).    CT images were personally reviewed by me, I agree with the radiology reads.    I have reviewed the nursing notes.    I have reviewed the findings, diagnosis, plan and need for follow up with the patient.    Patient to be discharged home. Advised to follow up with PCP within 1 week. To return to ER immediately with any new/worsening symptoms. Plan of care discussed with patient who expresses understanding and agrees with plan of care.    Discharge Medication List as of 6/4/2023  9:37 PM          Final diagnoses:   Dyspnea, unspecified type   BEATRIZ (acute kidney injury) (H)   Lightheadedness       SHANE MAGALLON MD  6/4/2023   [facility] EMERGENCY DEPARTMENT     Shane Magallon MD  06/04/23 5702

## 2023-06-04 NOTE — ED TRIAGE NOTES
"Pt arrives ambulatory to triage w/ c/o dizziness, balance issues w/ out falling, decrease in energy, intermittent sob, lower back pain. States sob as ongoing, worsening the last couple of days. States dizziness ongoing ~1 month, worsening while doing yoga, \"I was doing yoga and I almost passed out.\" Partner states familial hx of cardiac issues.     Denies cp.  "

## 2023-06-04 NOTE — ED TRIAGE NOTES
Triage Assessment     Row Name 06/04/23 1538       Triage Assessment (Adult)    Airway WDL WDL       Respiratory WDL    Respiratory WDL X       Skin Circulation/Temperature WDL    Skin Circulation/Temperature WDL WDL       Cardiac WDL    Cardiac WDL WDL       Peripheral/Neurovascular WDL    Peripheral Neurovascular WDL WDL       Cognitive/Neuro/Behavioral WDL    Cognitive/Neuro/Behavioral WDL X

## 2023-06-05 LAB
ATRIAL RATE - MUSE: 70 BPM
DIASTOLIC BLOOD PRESSURE - MUSE: NORMAL MMHG
INTERPRETATION ECG - MUSE: NORMAL
P AXIS - MUSE: 12 DEGREES
PR INTERVAL - MUSE: 174 MS
QRS DURATION - MUSE: 132 MS
QT - MUSE: 436 MS
QTC - MUSE: 470 MS
R AXIS - MUSE: 52 DEGREES
SYSTOLIC BLOOD PRESSURE - MUSE: NORMAL MMHG
T AXIS - MUSE: 35 DEGREES
VENTRICULAR RATE- MUSE: 70 BPM

## 2023-06-14 ENCOUNTER — OFFICE VISIT (OUTPATIENT)
Dept: INTERNAL MEDICINE | Facility: CLINIC | Age: 68
End: 2023-06-14
Payer: COMMERCIAL

## 2023-06-14 VITALS — HEIGHT: 69 IN | BODY MASS INDEX: 28.11 KG/M2 | WEIGHT: 189.8 LBS | HEART RATE: 64 BPM | OXYGEN SATURATION: 95 %

## 2023-06-14 DIAGNOSIS — R29.818 PARKINSONIAN FEATURES: Primary | ICD-10-CM

## 2023-06-14 PROCEDURE — 99215 OFFICE O/P EST HI 40 MIN: CPT | Mod: GC | Performed by: INTERNAL MEDICINE

## 2023-06-14 NOTE — PROGRESS NOTES
"[facility]  Internal Medicine    Chief Complaint   Patient presents with     Shortness of Breath     Pt has had shortness of breath for past year.     Pain     Pt has pain and fatigue in right leg and lower right side. Pt has tremors in right hand and body core.       Primary Care Provider: Asaf Craig MD    Subjective:    HPI:  Chad Olivares is a/an 68 year old male with a past medical history as noted below, who presents today with above chief complaint. He is accompanied by his significant other Flora who assists with this encounter. The patient reports he is mainly here due to concerns for his breathing. Over the last several months he has noticed himself intermittently requiring deep breaths and gasping for air. He recalls a recent episode when he was in line at the store. He also reports an episode of pain in his right thigh and right side associated with his breathing. He does not report any dyspnea with exercise and reports he is able to exercise without difficulty. Additionally, the patient reports several months of tremors in his right hand which has moved to his core and sometimes his left hand. He has also been acting out dreams every once in a while. Additionally, his significant other reports decreased strength, reduced affect, worsening cognition, worsening balance and generalized slowing. The patient states he has been running into things and he is walking slower with a \"shuffle\". He states his walking improves after walking a ~block. He also mentions occassionally having a resting tremor and that his neurologist has been observing for possible parkinson's disease. His tremor has resulted in him having difficulty with writing. Of note, the patient also reports nocturia, increased emotional lability, poor sleep, increased dizziness with standing up, fatigue and that he has not been using his CPAP machine due to his mask causing a rash. He denies any fevers, unintentional weight " changes, coughing, chest pain, issues with urination or issues with his bowel movements. The patient is also still awaiting the results of his Zio patch. The patient has no further concerns or questions at this time.     Review of systems:  See HPI    Patient Active Problem List   Diagnosis     Plantar fascial fibromatosis     Achilles bursitis or tendinitis     CARDIOVASCULAR SCREENING; LDL GOAL LESS THAN 160     Generalized convulsive epilepsy (H)     Dyspnea and respiratory abnormality     Organic parasomnia     Advanced directives, counseling/discussion     Memory loss     Colon polyps     Nuclear senile cataract of both eyes     RUQ abdominal pain     Past Medical History:   Diagnosis Date     Achilles bursitis or tendinitis 08/07/2008     Advanced directives, counseling/discussion 05/20/2015    Gave pt packet on 5/20/2015  Syeda Torres CMA       CARDIOVASCULAR SCREENING; LDL GOAL LESS THAN 160 10/12/2010     Cholesteatoma, unspecified      Colon polyps 04/2015    tubular villous and serrated adenoma     Complex sleep apnea syndrome     does not use CPAP     Dupuytren's contracture      Dyslexia      Dyspnea and respiratory abnormality 09/03/2014     Problem list name updated by automated process. 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 Provider to review     Plantar fascial fibromatosis 08/07/2008     Past Surgical History:   Procedure Laterality Date     cholesteatoma surgery Left      COLONOSCOPY  10/09/2002; 2015    polyps - needs f/u 5 yrs      COLONOSCOPY N/A 7/20/2021    Procedure: COLONOSCOPY, WITH POLYPECTOMY;  Surgeon: Asaf Guajardo MD;  Location: [facility] [place] OR      REMOVAL OF TONSILS,<11 Y/O  1962     RELEASE DUPUYTRENS  "CONTRACTURE Right 2021    Procedure: Right fifth finger and palm Dupuytren's contracture release;  Surgeon: Caron Farrell MD;  Location: [facility] OR     Current Outpatient Medications   Medication Sig Dispense Refill     lamoTRIgine (LAMICTAL) 100 MG tablet Take 2 tablets (200 mg) by mouth 2 times daily 360 tablet 3     medical cannabis oral liquid  (Patient's own supply.  Not a prescription) (This is NOT a prescription, and does not certify that the patient has a qualifying medical condition for medical cannabis.  The purpose of this order is  to document that the patient reports taking medical cannabis.)  (Patient not taking: Reported on 2023)       Allergies   Allergen Reactions     Gluten Meal      Seasonal Allergies      Social History     Tobacco Use     Smoking status: Former     Types: Cigarettes     Quit date: 2005     Years since quittin.8     Smokeless tobacco: Never   Substance Use Topics     Alcohol use: Yes     Comment: 1 drink every 2 wks     Drug use: No     Comment: uses CBD      Family History   Problem Relation Age of Onset     Diabetes Mother         diet controlled, Htn     Heart Murmur Mother         TAVR     Diabetes Maternal Grandmother      Coronary Artery Disease Brother      Glaucoma No family hx of      Macular Degeneration No family hx of        Objective:  BP Readings from Last 6 Encounters:   23 130/77   23 130/78   23 131/89   10/17/22 125/84   22 122/85   22 (!) 145/89     Wt Readings from Last 5 Encounters:   23 86.1 kg (189 lb 12.8 oz)   23 84.8 kg (187 lb)   23 84.8 kg (187 lb)   23 86.4 kg (190 lb 8 oz)   10/17/22 87.3 kg (192 lb 8 oz)     Estimated body mass index is 28.02 kg/m  as calculated from the following:    Height as of this encounter: 1.753 m (5' 9.02\").    Weight as of this encounter: 86.1 kg (189 lb 12.8 oz).  Pulse 64   Ht 1.753 m (5' 9.02\")   Wt 86.1 kg (189 lb 12.8 oz)   SpO2 95%  "  BMI 28.02 kg/m      Physical Exam:    General: Alert and oriented without distress  HEENT: Normocephalic/atraumatic  Respiratory: CTAB without increased respiratory effort or accessory muscle use  Cardiovascular: RRR without murmurs, rubs or gallop. S1, S2 intact.  Abdomen: Soft, non-distended without tenderness to palpation or rebound.   Skin: No overt lesions, bruises, rashes or bleeding on exposed skin surfaces.  Neuro: Masked facies. CN II-XII grossly intact. Mild shuffling gait and cogwheel rigidity.    THE FOLLOWING PERTINENT TEST RESULTS WERE REVIEWED TODAY:  CT imaging, recent TSH, CMP and CBC    Assessment and Plan:    Chad was seen today for shortness of breath and pain.    Diagnoses and all orders for this visit:    Parkinsonian features  The patient reports signs and symptoms most consistent with parkinson's disease which is also consistent with today's clinical exam. It is likely his gasping for air is related to this and he should be further evaluated by Neurology.   -     Adult Neurology  Referral; Future  -     Adult Sleep Eval & Management Referral; Future  -     Speech Therapy Referral; Future  - Patient instructed on importance of exercise and sleep quality    BPH  The patient has been taking tamsulosin. Given his orthostatic hypotension, nocturia and lack of post void residual he should discontinue his tamsulosin. Bladder scan during visit showed ~130 mL before urination and zero after. His orthostatic vitals showed a drop in his systolic pressure from 123 to 100.  - Discontinue tamsulosin    Obstructive Sleep Apnea  The patient reports intolerance of his CPAP mask due to it causing a rash. This could additionally be a component to his symptoms due to poor sleep quality.  - Refer to sleep clinic for assistance    Patient seen and case dicussed with Dr. Craig who agrees with the above assessment and plan    Brennan Cook, DO  PGY-2, Internal Medicine  [facility]  [facility]    Pt was seen and examined with Dr. Cook.  I agree with his documentation as noted above.    My additional comments: Patient has poverty of movement he has some cogwheeling and rigidity in his arms.  He has a positive glabellar reflex he has no arm swing with his gait and slowness with rapid alternating movements.  Over 40 minutes on the day of service spent in chart review, patient contact, record completion and review and notification of lab reports      Asaf Craig MD

## 2023-06-14 NOTE — NURSING NOTE
"Chad Olivares is a 68 year old male patient that presents today in clinic for the following:    Chief Complaint   Patient presents with     Shortness of Breath     Pt has had shortness of breath for past year.     Pain     Pt has pain and fatigue in right leg and lower right side. Pt has tremors in right hand and body core.     The patient's allergies and medications were reviewed as noted. A set of vitals were recorded as noted without incident: Pulse 64   Ht 1.753 m (5' 9.02\")   Wt 86.1 kg (189 lb 12.8 oz)   SpO2 95%   BMI 28.02 kg/m  . The patient does not have any other questions for the provider.    Jeannette Roberson, EMT at 10:44 AM on 6/14/2023  "

## 2023-06-21 ENCOUNTER — THERAPY VISIT (OUTPATIENT)
Dept: SPEECH THERAPY | Facility: CLINIC | Age: 68
End: 2023-06-21
Attending: INTERNAL MEDICINE
Payer: COMMERCIAL

## 2023-06-21 DIAGNOSIS — R47.1 DYSARTHRIA: ICD-10-CM

## 2023-06-21 DIAGNOSIS — R29.818 PARKINSONIAN FEATURES: ICD-10-CM

## 2023-06-21 PROCEDURE — 92507 TX SP LANG VOICE COMM INDIV: CPT | Mod: GN | Performed by: SPEECH-LANGUAGE PATHOLOGIST

## 2023-06-21 PROCEDURE — 92524 BEHAVRAL QUALIT ANALYS VOICE: CPT | Mod: GN | Performed by: SPEECH-LANGUAGE PATHOLOGIST

## 2023-06-21 NOTE — PROGRESS NOTES
SPEECH LANGUAGE PATHOLOGY EVALUATION    See electronic medical record for Abuse and Falls Screening details.    Subjective      Presenting condition or subjective complaint: Pt is a 68 y.o. male who was referred for an OP SLP evaluation d/t concerns regarding Parkinson's disease with related symptoms of shortness of breath occasionally while at rest, worsening cognition, and changes in his voice. Pt denies dysphagia.  Date of onset: 06/14/23 (Date of MD orders.)      Prior therapy history for the same diagnosis, illness or injury: No      Living Environment  Social support: With a significant other or spouse   Help at home: None  Equipment owned:       Employment: No    Hobbies/Interests: Yoga, pickleball, paula, daily meditation.    Patient goals for therapy: Communicate easily and effectively.    Pain assessment: Pain denied     Objective     PERSONAL RATING/VOICE USE RATING  Patient description of current voice quality: Pt reports he speaks more softly.   Describe the amount of typical non-work voice use: moderate    Describe the intensity of typical non-work voice use: moderate       ACOUSTIC MEASURES:  Equipment use for testing: Sound level meter at 30 cm distance from microphone to mouth    Maximum sustained phonation:     Duration: 14     Prolonged  ah  at mid pitch (sec): 62dB SPL for an average of 13 seconds     Vibratory Function of Vocal Folds Scale Norms: males 20 - 80 yrs: 15 - 25 secs      Loudness in dB SPL: 60     Stimulability- duration: 15 seconds     Stimulability- loudness in dB SPL: 76       Reading: phrase level reading loudness in dB SPL: 78 with cues.   paragraph level reading loudness in dB SPL: 58 (no cues)     Stimulability - loudness in dB SPL: 78  Discourse: Conversation/Monologue level: 58 dB SPL.         PERCEPTUAL EVALUATION  Voice quality: breathy, airy, thin   Breath control: weak  Voice Use/Effort: increased effort reported when cued for volume.   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 The following significant findings have been identified: impaired voicing, characterized by breathy, airy, weak voice with overall reduced volume. Identified deficits interfere with their ability to communicate within the home or community as compared to previous level of function.    PLAN OF CARE  Treatment Interventions:  Voice    Prognosis to achieve stated therapy goals is excellent   Rehab potential is impacted by: comorbidities, PD disease progression.     Long Term Goals   SLP Goal 1  Goal Identifier: LTG 1-Voice  Goal Description: Pt will improve vocal production for all communication situations to an average of at least 70 dB SPL for improved communication in order to communicate easily and effectively with family and friends.  Rationale: To maximize functional communication within the home or community  Target Date: 08/04/23  SLP Goal 2  Goal Identifier: STG 2-Voice  Goal Description: Pt will be able to increase vocal loudness to reach a target sound pressure level of at least 70 dB SPL with minimal cues during sustained phonation, and at the word, sentence, extended reading, and conversational levels of speech to aid in increased vocal respiratory support needed for volume when speaking on the phone.  Rationale: To maximize functional communication within the home or community  Target Date: 08/04/23      Frequency of Treatment: 1x/week  Duration of Treatment: 4 weeks (Goal dates extended d/t diff. scheduling.)     Recommended Referrals to Other Professionals: Occupational Therapy  Education Assessment:   Learner/Method: Patient;Listening;Reading    Risks and benefits of evaluation/treatment have been explained.   Patient/Family/caregiver agrees with  Plan of Care.     Evaluation Time:     Voice Minutes (71262): 30        Signing Clinician: JORGE Simpson        [facility]                                                                                   OUTPATIENT SPEECH LANGUAGE PATHOLOGY      PLAN OF TREATMENT FOR OUTPATIENT REHABILITATION   Patient's Last Name, First Name, Chad Sosa YOB: 1955   Provider's Name   [facility]   Medical Record No.  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 scheduling.)     Certification date from 06/21/23   To 08/04/23          See note for plan of treatment details and functional goals     JORGE Simpson                         I CERTIFY THE NEED FOR THESE SERVICES FURNISHED UNDER        THIS PLAN OF TREATMENT AND WHILE UNDER MY CARE .             Physician Signature               Date    X_____________________________________________________                        Referring Provider:  Asaf Craig      Initial Assessment  See Epic Evaluation- 06/21/23

## 2023-06-26 NOTE — TELEPHONE ENCOUNTER
Action 6/26/23 MV 10.05am   Action Taken Imaging request faxed to [facility]         RECORDS RECEIVED FROM: internal   REASON FOR VISIT: Parkinsonian features    Date of Appt: 6/28/23   NOTES (FOR ALL VISITS) STATUS DETAILS   OFFICE NOTE from referring provider Internal Dr Asaf Craig @ [facility] Internal Med:  6/14/23   OFFICE NOTE from other specialist Internal Neuropsych Chris @ [facility]:  8/24/22   MEDICATION LIST Internal    IMAGING  (FOR ALL VISITS)     MRI (HEAD, NECK, SPINE) In process [facility]:  MRI Brain 4/4/22   CT (HEAD, NECK, SPINE) Internal [facility]:  CT Head 6/4/23

## 2023-06-28 ENCOUNTER — PRE VISIT (OUTPATIENT)
Dept: NEUROLOGY | Facility: CLINIC | Age: 68
End: 2023-06-28

## 2023-06-28 ENCOUNTER — OFFICE VISIT (OUTPATIENT)
Dept: NEUROLOGY | Facility: CLINIC | Age: 68
End: 2023-06-28
Attending: INTERNAL MEDICINE
Payer: COMMERCIAL

## 2023-06-28 VITALS
HEART RATE: 68 BPM | DIASTOLIC BLOOD PRESSURE: 84 MMHG | BODY MASS INDEX: 28.24 KG/M2 | OXYGEN SATURATION: 92 % | SYSTOLIC BLOOD PRESSURE: 127 MMHG | WEIGHT: 191.3 LBS

## 2023-06-28 DIAGNOSIS — R29.818 PARKINSONIAN FEATURES: ICD-10-CM

## 2023-06-28 DIAGNOSIS — G20.A1 PARKINSON'S DISEASE (H): Primary | ICD-10-CM

## 2023-06-28 PROCEDURE — 99205 OFFICE O/P NEW HI 60 MIN: CPT | Performed by: PSYCHIATRY & NEUROLOGY

## 2023-06-28 ASSESSMENT — UNIFIED PARKINSONS DISEASE RATING SCALE (UPDRS)
AMPLITUDE_RLE: (0) NORMAL: NO TREMOR.
FINGER_TAPPING_LEFT: (0) NORMAL: NO PROBLEMS.
LEG_AGILITY_LEFT: (0) NORMAL: NO PROBLEMS.
POSTURE: (0) NORMAL: NO PROBLEMS.
GAIT: (1) SLIGHT: INDEPENDENT WALKING WITH MINOR GAIT IMPAIRMENT.
AMPLITUDE_LIP_JAW: (0) NORMAL: NO TREMOR.
LEG_AGILITY_RIGHT: (1) SLIGHT: ANY OF THE FOLLOWING: A) THE REGULAR RHYTHM IS BROKEN WITH ONE WITH ONE OR TWO INTERRUPTIONS OR HESITATIONS OF THE MOVEMENT  B) SLIGHT SLOWING  C) THE AMPLITUDE DECREMENTS NEAR THE END OF THE 10 MOVEMENTS.
FINGER_TAPPING_RIGHT: (2) MILD: ANY OF THE FOLLOWING: A) 3 TO 5 INTERRUPTIONS DURING TAPPING  B) MILD SLOWING  C) THE AMPLITUDE DECREMENTS MIDWAY IN THE 10-MOVEMENT SEQUENCE.
POSTURAL_STABILITY: (0) NORMAL: NO PROBLEMS. RECOVERS WITH ONE OR TWO STEPS.
CONSTANCY_TREMOR_ATREST: (0) NORMAL: NO TREMOR.
PARKINSONS_MEDS: OFF
AMPLITUDE_RUE: (0) NORMAL: NO TREMOR.
RIGIDITY_RUE: (2) MILD: RIGIDITY DETECTED WITHOUT THE ACTIVATION MANEUVER. FULL RANGE OF MOTION IS EASILY ACHIEVED.
DYSKINESIAS_PRESENT: NO
ARISING_CHAIR: (1) SLIGHT: ARISING IS SLOWER THAN NORMAL, OR MAY NEED MORE THAN ONE ATTEMPT, OR MAY NEED TO MOVE FORWARD IN THE CHAIR TO ARISE. NO NEED TO USE THE ARMS OF THE CHAIR.
TOETAPPING_LEFT: (0) NORMAL: NO PROBLEMS.
SPONTANEITY_OF_MOVEMENT: (2) MILD: MILD GLOBAL SLOWNESS AND POVERTY OF SPONTANEOUS MOVEMENTS.
RIGIDITY_RLE: (0) NORMAL: NO RIGIDITY.
FACIAL_EXPRESSION: (2) MILD: IN ADDITION TO DECREASED EYE-BLINK FREQUENCY, MASKED FACIES PRESENT IN THE LOWER FACE AS WELL, NAMELY FEWER MOVEMENTS AROUND THE MOUTH, SUCH AS LESS SPONTANEOUS SMILING, BUT LIPS NOT PARTED.
TOTAL_SCORE: 18
RIGIDITY_NECK: (2) MILD: RIGIDITY DETECTED WITHOUT THE ACTIVATION MANEUVER. FULL RANGE OF MOTION IS EASILY ACHIEVED.
TOTAL_SCORE: 9
RIGIDITY_LUE: (0) NORMAL: NO RIGIDITY.
HANDMOVEMENTS_RIGHT: (1) SLIGHT: ANY OF THE FOLLOWING: A) THE REGULAR RHYTHM IS BROKEN WITH ONE WITH ONE OR TWO INTERRUPTIONS OR HESITATIONS OF THE MOVEMENT  B) SLIGHT SLOWING  C) THE AMPLITUDE DECREMENTS NEAR THE END OF THE 10 MOVEMENTS.
RIGIDITY_LLE: (0) NORMAL: NO RIGIDITY.
SPEECH: (1) SLIGHT: LOSS OF MODULATION, DICTION OR VOLUME, BUT STILL ALL WORDS EASY TO UNDERSTAND.
PRONATION_SUPINATION_LEFT: (0) NORMAL: NO PROBLEMS.
AMPLITUDE_LLE: (0) NORMAL: NO TREMOR.
HANDMOVEMENTS_LEFT: (0) NORMAL: NO PROBLEMS.
TOETAPPING_RIGHT: (1) SLIGHT: ANY OF THE FOLLOWING: A) THE REGULAR RHYTHM IS BROKEN WITH ONE WITH ONE OR TWO INTERRUPTIONS OR HESITATIONS OF THE MOVEMENT  B) SLIGHT SLOWING  C) THE AMPLITUDE DECREMENTS NEAR THE END OF THE 10 MOVEMENTS.
PRONATION_SUPINATION_RIGHT: (1) SLIGHT: ANY OF THE FOLLOWING: A) THE REGULAR RHYTHM IS BROKEN WITH ONE WITH ONE OR TWO INTERRUPTIONS OR HESITATIONS OF THE MOVEMENT  B) SLIGHT SLOWING  C) THE AMPLITUDE DECREMENTS NEAR THE END OF THE 10 MOVEMENTS.
TOTAL_SCORE_LEFT: 0
FREEZING_GAIT: (0) NORMAL: NO FREEZING.
AMPLITUDE_LUE: (0) NORMAL: NO TREMOR.
AXIAL_SCORE: 9

## 2023-06-28 ASSESSMENT — PAIN SCALES - GENERAL: PAINLEVEL: NO PAIN (0)

## 2023-06-28 NOTE — PATIENT INSTRUCTIONS
Your exam does look like Parkinson's disease.     Continue staying physically active.     I will ask the  to reach out to you.
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
## 2023-06-29 ENCOUNTER — OFFICE VISIT (OUTPATIENT)
Dept: SLEEP MEDICINE | Facility: CLINIC | Age: 68
End: 2023-06-29
Payer: COMMERCIAL

## 2023-06-29 VITALS
SYSTOLIC BLOOD PRESSURE: 106 MMHG | DIASTOLIC BLOOD PRESSURE: 78 MMHG | HEART RATE: 59 BPM | HEIGHT: 69 IN | RESPIRATION RATE: 16 BRPM | OXYGEN SATURATION: 96 % | BODY MASS INDEX: 28.24 KG/M2 | WEIGHT: 190.7 LBS

## 2023-06-29 DIAGNOSIS — G47.33 OSA (OBSTRUCTIVE SLEEP APNEA): Primary | ICD-10-CM

## 2023-06-29 PROCEDURE — 99215 OFFICE O/P EST HI 40 MIN: CPT | Performed by: NURSE PRACTITIONER

## 2023-06-29 ASSESSMENT — SLEEP AND FATIGUE QUESTIONNAIRES
HOW LIKELY ARE YOU TO NOD OFF OR FALL ASLEEP WHILE SITTING QUIETLY AFTER LUNCH WITHOUT ALCOHOL: HIGH CHANCE OF DOZING
HOW LIKELY ARE YOU TO NOD OFF OR FALL ASLEEP WHEN YOU ARE A PASSENGER IN A CAR FOR AN HOUR WITHOUT A BREAK: WOULD NEVER DOZE
HOW LIKELY ARE YOU TO NOD OFF OR FALL ASLEEP WHILE LYING DOWN TO REST IN THE AFTERNOON WHEN CIRCUMSTANCES PERMIT: HIGH CHANCE OF DOZING
HOW LIKELY ARE YOU TO NOD OFF OR FALL ASLEEP WHILE SITTING AND TALKING TO SOMEONE: SLIGHT CHANCE OF DOZING
HOW LIKELY ARE YOU TO NOD OFF OR FALL ASLEEP IN A CAR, WHILE STOPPED FOR A FEW MINUTES IN TRAFFIC: WOULD NEVER DOZE
HOW LIKELY ARE YOU TO NOD OFF OR FALL ASLEEP WHILE SITTING INACTIVE IN A PUBLIC PLACE: SLIGHT CHANCE OF DOZING
HOW LIKELY ARE YOU TO NOD OFF OR FALL ASLEEP WHILE SITTING AND READING: HIGH CHANCE OF DOZING
HOW LIKELY ARE YOU TO NOD OFF OR FALL ASLEEP WHILE WATCHING TV: MODERATE CHANCE OF DOZING

## 2023-06-29 NOTE — PATIENT INSTRUCTIONS
SLEEP DENTAL PROVIDERS LIST:    [facility] [place] (Medicare)  Provider: Mariano Vega DDS   [address] [place] [address]  Phone: [phone]  Fax: [phone]    [facility]  Providers: Tigre Cochran DDS, Maddy Sharif DDS, Fly Vazquez DDS  Fax [phone]  Locations:   [phone]  -[place]  [address], [address]  [facility]  [address]  -[place]  [address]   [facility]  [address]   [phone]  [facility]  [address] [facility]  [address]  -[place]  [address] (Medicare)  Providers: Sebastián Prince DDS, FAMARIAMA, Savannah Vera DDS, MS, Charleen Salgado DDS, Fadumo Rowland DDS  [address], [address], [place] [address]  Phone: [phone]  Fax: [phone]    [facility]  Provider: Cherie Schmitz DDS, PhD    [address] [place] [address]  Phone: [phone]  Fax: [phone]    [address] [place] [address]  Phone: [phone]  Fax: [phone]      [facility]  Provider: Darrius Lord DDS  Address: [address], [place] [address]  Phone: [phone]      [facility]   Provider: Basilio Tyson DDS  [facility]  [address] [place] [address]  Appointments: [phone]    [facility]   Provider: Jeremy Little DDS   [facility]   [address]   [place] [address]   Appointments: [phone]   Fax: [phone]     [facility]   Provider: Ned Grier DDS, MS - [DME Medicare]  [address]   [place] [address]   Appointments: [phone]   Fax: [phone]     [facility]  Provider: Harsha Montoya DMD, MSD - [DME  Medicare]  [address], [address]  [place] [address]  Appointments [phone]  Fax: [phone]    [facility]   [facility]   Provider: Tushar Amin DDS, [facility]  [address] [place] [address] [address]  [place] [address]   Appointments: [phone]  Fax: [phone]     [facility]   Dental [facility]   Providers: Ta Chavez, AMAYA, Jeremy Little DDS   [address]   [place] [address]   Appointments: [phone]     [facility]   Providers: Adebayo Lindsey DDS, Fly Hardwick DDS  [address], [address]   [place] [address]   Appointments: [phone]   Fax: [phone]     Provider: Warren Alexandra DDS  [address]  [address]  [phone]

## 2023-06-29 NOTE — PROGRESS NOTES
Sleep Apnea - Follow-up Visit:    Impression/Plan:  1. HUSSAIN (obstructive sleep apnea)  - Sleep Dental Referral; Future    Mild Sleep apnea. Did not tolerate PAP well due to issues with mask and development of skin rash associated with mask use, previously. Subsequent to that, he returned his CPAP device due to difficulty with tolerance. Daytime symptoms are worsened.  He reports new diagnosis of Parkinson's disease.  We discussed treatment options for mild HUSSAIN including retrial of CPAP therapy with different mask interface such as use of a cloth mask or foam lined mask, use of mandibular advancement device, and positional therapy.    He would like to proceed with use of a mandibular advancement device (MAD) and a Sleep Dental Referral order was placed for this today.  Information with sleep dental providers list was given to the patient as well as a copy of the referral and he will make an appointment with a Medicare approved sleep dental provider to obtain his dental device to treat mild HUSSAIN.    We also discussed that he should contact me once he has adequately adjusted his dental device to consider an efficacy HST with MAD at that time.    Chad Olivares will follow up in about 3 month(s) after obtaining new mandibular advancement device and after adequate adjustment/titration to discuss efficacy HST with MAD.    Forty minutes spent with patient, all of which were spent face-to-face counseling, consulting, chart review/documentation, and coordinating plan of care on the date of the encounter.      PEDRO LUIS Chan CNP  Sleep Medicine      CC:  Asaf Craig,         History of Present Illness:  Chief Complaint   Patient presents with     CPAP Follow Up     Parkinson Confirmed yesterday, has sleep apnea, Not using CPAP caused rash and mask issues       Chad Olivares is a pleasant 68-year-old male with a PMH pertinent for epilepsy and mild HUSSAIN who presents for follow-up of their mild sleep apnea.  He  reports that he was recently diagnosed with Parkinson's disease and per his PCP he needs to be on PAP therapy.  Thus, he would like to discuss restarting PAP therapy at this time.  He is following with his neurologist, Dr. Michelle Carter, Movement Disorders Fellow.    Previous Study Results: FV - PSG (1A 3% rule)  Date: 10/16/2014.  Weight 185 pounds.  AHI: 10.6/hr. Supine AHI: 103.4/hr. RDI 11.0/hr. O2 chad 84%.     **UPDATED 9/23/2022:  Previous Study Results: FV - PSG (Rescored using 1B 4% rule)  Date: 10/16/2014.  Weight 185 pounds.  AHI: 9.3/hr. RDI 9.7/hr. O2 chad 84%.      Do you use a CPAP Machine at home: No  Overall, on a scale of 0-10 how would you rate your CPAP (0 poor, 10 great):      What type of mask do you use:    Is your mask comfortable:    If not, why:      Is your mask leaking:    If yes, where do you feel it:    How many night per week does the mask leak (0-7):      Do you notice snoring with mask on:    Do you notice gasping arousals with mask on:    Are you having significant oral or nasal dryness:    Is the pressure setting comfortable:    If not, why:      What is your typical bedtime:  10-11 PM  How long does it take you to go to sleep:  5 minutes  What time do you typically get out of bed for the day:  7-8 AM  How many hours are you sleeping per night:  7  Do you feel well rested in the morning:  No           EPWORTH SLEEPINESS SCALE         6/29/2023    12:37 PM    [place] Sleepiness Scale ( KRISHNA Amaya  1990-1997[facility] [place]/English - Final version - 21 Nov 07 - [facility] [place].)   Sitting and reading High chance of dozing   Watching TV Moderate chance of dozing   Sitting, inactive in a public place (e.g. a theatre or a meeting) Slight chance of dozing   As a passenger in a car for an hour without a break Would never doze   Lying down to rest in the afternoon when circumstances permit High chance of dozing   Sitting and talking to someone Slight chance of dozing   Sitting quietly  after a lunch without alcohol High chance of dozing   In a car, while stopped for a few minutes in traffic Would never doze   Williamson Score (MC) 13   Williamson Score (Sleep) 13       INSOMNIA SEVERITY INDEX (MERVIN)          6/29/2023    12:33 PM   Insomnia Severity Index (MERVIN)   Difficulty falling asleep 1   Difficulty staying asleep 3   Problems waking up too early 2   How SATISFIED/DISSATISFIED are you with your CURRENT sleep pattern? 4   How NOTICEABLE to others do you think your sleep problem is in terms of impairing the quality of your life? 4   How WORRIED/DISTRESSED are you about your current sleep problem? 3   To what extent do you consider your sleep problem to INTERFERE with your daily functioning (e.g. daytime fatigue, mood, ability to function at work/daily chores, concentration, memory, mood, etc.) CURRENTLY? 4   MERVIN Total Score 21       Guidelines for Scoring/Interpretation:  Total score categories:  0-7 = No clinically significant insomnia   8-14 = Subthreshold insomnia   15-21 = Clinical insomnia (moderate severity)  22-28 = Clinical insomnia (severe)  Used via courtesy of www.DataP[employer].va.gov with permission from Jerry Glynn PhD., [facility]      Past medical/surgical history, family history, social history, medications and allergies were reviewed.        Problem List:  Patient Active Problem List    Diagnosis Date Noted     Dysarthria 06/21/2023     Priority: Medium     RUQ abdominal pain 10/17/2022     Priority: Medium     Nuclear senile cataract of both eyes 08/19/2022     Priority: Medium     Memory loss 07/20/2016     Priority: Medium     Advanced directives, counseling/discussion 05/20/2015     Priority: Medium     Gave pt packet on 5/20/2015    Syeda Torres CMA         Colon polyps 04/01/2015     Priority: Medium     tubular villous and serrated adenoma       Dyspnea and respiratory abnormality 09/03/2014     Priority: Medium     Problem list name updated by automated process.  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 Provider to review       CARDIOVASCULAR SCREENING; LDL GOAL LESS THAN 160 10/12/2010     Priority: Medium      Current Outpatient Medications   Medication     HEMP OIL OR EXTRACT OR OTHER CBD CANNABINOID, NOT MEDICAL CANNABIS,     lamoTRIgine (LAMICTAL) 100 MG tablet     medical cannabis oral liquid  (Patient's own supply.  Not a prescription)     No current facility-administered medications for this visit.     /78   Pulse 59   Resp 16   Ht 1.753 m (5' 9.02\")   Wt 86.5 kg (190 lb 11.2 oz)   SpO2 96%   BMI 28.15 kg/m        This note was written with the assistance of the Dragon voice-dictation technology software. The final document, although reviewed, may contain errors. For corrections, please contact the office.    "

## 2023-07-12 DIAGNOSIS — R29.818 PARKINSONIAN FEATURES: Primary | ICD-10-CM

## 2023-07-12 DIAGNOSIS — I45.10 RBBB: ICD-10-CM

## 2023-07-12 DIAGNOSIS — R06.09 DOE (DYSPNEA ON EXERTION): ICD-10-CM

## 2023-07-13 ENCOUNTER — TELEPHONE (OUTPATIENT)
Dept: NEUROLOGY | Facility: CLINIC | Age: 68
End: 2023-07-13
Payer: COMMERCIAL

## 2023-07-13 NOTE — TELEPHONE ENCOUNTER
Spoke to patient and explained the situation below. He appreciated the call.        ----- Message from Daniel Leroy MD sent at 7/12/2023  8:55 AM CDT -----  Regarding: FW: SPARX3 Referral?  Mr Olivares saw Dr Carter and had an ED visit for dyspnea - he was to get a zio patch  Think he should see cardiology and get clearance for exercise for parkinson   He was reaching out for the sparx3 trial   I put in for a cardiology consultation which would be good to have to clear him for exercise regardless if he is in the study or not.   Please call to relay that a referral was placed.  Jacinda Rodrigez will reach out  to him about the Sparx3 trial    Thanks    ----- Message -----  From: Jacinda Rodrigez  Sent: 7/11/2023  10:09 AM CDT  To: Daniel Leroy MD  Subject: SPARX3 Referral?                                 Hi Dr. Leroy,     Pt is interested in SPARX3, but has a diagnosis of epilepsy and a recent bout of dyspnea that brought him to the ER last month. Thoughts about whether we should consider given the above?    ThanksJacinda

## 2023-07-18 ENCOUNTER — THERAPY VISIT (OUTPATIENT)
Dept: SPEECH THERAPY | Facility: CLINIC | Age: 68
End: 2023-07-18
Payer: COMMERCIAL

## 2023-07-18 DIAGNOSIS — R47.1 DYSARTHRIA: Primary | ICD-10-CM

## 2023-07-18 DIAGNOSIS — G20.A1 PARKINSON'S DISEASE (H): ICD-10-CM

## 2023-07-18 PROCEDURE — 92507 TX SP LANG VOICE COMM INDIV: CPT | Mod: GN | Performed by: SPEECH-LANGUAGE PATHOLOGIST

## 2023-07-20 ENCOUNTER — THERAPY VISIT (OUTPATIENT)
Dept: PHYSICAL THERAPY | Facility: CLINIC | Age: 68
End: 2023-07-20
Attending: INTERNAL MEDICINE
Payer: COMMERCIAL

## 2023-07-20 DIAGNOSIS — G20.A1 PARKINSON'S DISEASE (H): ICD-10-CM

## 2023-07-20 PROCEDURE — 97162 PT EVAL MOD COMPLEX 30 MIN: CPT | Mod: GP | Performed by: PHYSICAL THERAPIST

## 2023-07-20 NOTE — PROGRESS NOTES
Mini-BESTest: Balance Evaluation Systems Test    0633-8251 Critical access hospital & [facility]. All rights reserved.    ANTICIPATORY BALANCE  1. SIT TO STAND  Instruction:  Cross your arms across your chest. Try not to use your hands unless you must. Do not let your legs lean  against the back of the chair when you stand. Please stand up now.   (2) Normal: Comes to stand without use of hands and stabilizes independently.  (1) Moderate: Comes to stand WITH use of hands on first attempt.  (0) Severe: Unable to stand up from chair without assistance, OR needs several attempts with use of hands.    2. RISE TO TOES  Instruction:  Place your feet shoulder width apart. Place your hands on your hips. Try to rise as high as you can onto your  toes. I will count out loud to 3 seconds. Try to hold this pose for at least 3 seconds. Look straight ahead. Rise now.   (2) Normal: Stable for 3 s with maximum height.  (1) Moderate: Heels up, but not full range (smaller than when holding hands), OR noticeable instability for 3 s.  (0) Severe: < 3 s.    3. STAND ON ONE LEG  Instruction:  Look straight ahead. Keep your hands on your hips. Lift your leg off of the ground behind you without touching or  resting your raised leg upon your other standing leg. Stay standing on one leg as long as you can. Look straight ahead. Lift  now.   Left: Time in Seconds Trial 1:__20___Trial 2:_____  (2) Normal: 20 s.  (1) Moderate: < 20 s.  (0) Severe: Unable.    Right: Time in Seconds Trial 1:_20____Trial 2:_____  (2) Normal: 20 s.  (1) Moderate: < 20 s.  (0) Severe: Unable    To score each side separately use the trial with the longest time.  To calculate the sub-score and total score use the side [left or right] with the lowest numerical score [i.e. the worse side].    REACTIVE POSTURAL CONTROL  4. COMPENSATORY STEPPING CORRECTION- FORWARD  Instruction:  Stand with your feet shoulder width apart, arms at your sides. Lean forward against my hands  beyond your  forward limits. When I let go, do whatever is necessary, including taking a step, to avoid a fall.   (2) Normal: Recovers independently with a single, large step (second realignment step is allowed).  (1) Moderate: More than one step used to recover equilibrium.  (0) Severe: No step, OR would fall if not caught, OR falls spontaneously.    5. COMPENSATORY STEPPING CORRECTION- BACKWARD  Instruction:  Stand with your feet shoulder width apart, arms at your sides. Lean backward against my hands beyond your  backward limits. When I let go, do whatever is necessary, including taking a step, to avoid a fall.   (2) Normal: Recovers independently with a single, large step.  (1) Moderate: More than one step used to recover equilibrium.  (0) Severe: No step, OR would fall if not caught, OR falls spontaneously.    6. COMPENSATORY STEPPING CORRECTION- LATERAL  Instruction:  Stand with your feet together, arms down at your sides. Lean into my hand beyond your sideways limit. When I  let go, do whatever is necessary, including taking a step, to avoid a fall.   Left  (2) Normal: Recovers independently with 1 step  (crossover or lateral OK).  (1) Moderate: Several steps to recover equilibrium.  (0) Severe: Falls, or cannot step.    Right  (2) Normal: Recovers independently with 1 step  (crossover or lateral OK).  (1) Moderate: Several steps to recover equilibrium.  (0) Severe: Falls, or cannot step.    To calculate the sub-score and total score use the side [left or right] with the lowest numerical score [i.e. the worse side].    SENSORY ORIENTATION  7. STANCE (FEET TOGETHER); EYES OPEN, FIRM SURFACE  Instruction:  Place your hands on your hips. Place your feet together until almost touching. Look straight ahead. Be as stable  and still as possible, until I say stop.   Time in seconds:____30____  (2) Normal: 30 s.  (1) Moderate: < 30 s.  (0) Severe: Unable.    8. STANCE (FEET TOGETHER); EYES CLOSED, FOAM  SURFACE  Instruction:  Step onto the foam. Place your hands on your hips. Place your feet together until almost touching. Be as stable  and still as possible, until I say stop. I will start timing when you close your eyes.   Time in seconds:____30____  (2) Normal: 30 s.  (1) Moderate: < 30 s.  (0) Severe: Unable.    9. INCLINE- EYES CLOSED  Instruction:  Step onto the incline ramp. Please stand on the incline ramp with your toes toward the top. Place your feet  shoulder width apart and have your arms down at your sides. I will start timing when you close your eyes.   Time in seconds:___30_____  (2) Normal: Stands independently 30 s and aligns with gravity.  (1) Moderate: Stands independently <30 s OR aligns with surface.  (0) Severe: Unable.    DYNAMIC GAIT  10. CHANGE IN GAIT SPEED  Instruction:  Begin walking at your normal speed, when I tell you  fast , walk as fast as you can. When I say  slow , walk very  slowly.   (2) Normal: Significantly changes walking speed without imbalance.  (1) Moderate: Unable to change walking speed or signs of imbalance.  (0) Severe: Unable to achieve significant change in walking speed AND signs of imbalance.    11. WALK WITH HEAD TURNS - HORIZONTAL  Instruction:  Begin walking at your normal speed, when I say  right , turn your head and look to the right. When I say  left   turn your head and look to the left. Try to keep yourself walking in a straight line.   (2) Normal: performs head turns with no change in gait speed and good balance.  (1) Moderate: performs head turns with reduction in gait speed.  (0) Severe: performs head turns with imbalance.    12. WALK WITH PIVOT TURNS  Instruction:  Begin walking at your normal speed. When I tell you to  turn and stop , turn as quickly as you can, face the  opposite direction, and stop. After the turn, your feet should be close together.   (2) Normal: Turns with feet close FAST (< 3 steps) with good balance.  (1) Moderate: Turns with feet  close SLOW (>4 steps) with good balance.  (0) Severe: Cannot turn with feet close at any speed without imbalance.    13. STEP OVER OBSTACLES  Instruction:  Begin walking at your normal speed. When you get to the box, step over it, not around it and keep walking.   (2) Normal: Able to step over box with minimal change of gait speed and with good balance.  (1) Moderate: Steps over box but touches box OR displays cautious behavior by slowing gait.  (0) Severe: Unable to step over box OR steps around box.    14. TIMED UP & GO WITH DUAL TASK [3 METER WALK]  Instruction TUG:  When I say  Go , stand up from chair, walk at your normal speed across the tape on the floor, turn around,  and come back to sit in the chair.   Instruction TUG with Dual Task:  Count backwards by threes starting at ___. When I say  Go , stand up from chair, walk at  your normal speed across the tape on the floor, turn around, and come back to sit in the chair. Continue counting backwards  the entire time.   TUG: __7______seconds; Dual Task TUG: _9.16_______seconds  (2) Normal: No noticeable change in sitting, standing or walking while backward counting when compared to TUG without  Dual Task.  (1) Moderate: Dual Task affects either counting OR walking (>10%) when compared to the TUG without Dual Task.  (0) Severe: Stops counting while walking OR stops walking while counting.    When scoring item 14, if subject s gait speed slows more than 10% between the TUG without and with a Dual Task the score  should be decreased by a point.    REACTIVE POSTURAL CONTROL SUB SCORE: / 6  ANTICIPATORY SUB SCORE: / 6  SENSORY ORIENTATION SUB SCORE: / 6  DYNAMIC GAIT SUB SCORE: /10    TOTAL SCORE: ___25___/28      Mini-BESTest: The Mini-BESTest assesses reactive postural, anticipatory, sensory orientation, and dynamic gait balance.  Gait assistive device used: none    Patient Score: 25/28    Scores of <17.5/28 have identified people with chronic stroke that have a  history of falling according to Teri et al 2013.   Scores of <20/32 are predictive of increased falls risk for persons with Parkinsons Disease according to Sebastián et al 2012.    Minimally Detectable Change (MDC) for persons with Parkinsons Disease 5.52pts per Karen et al 2011.  Minimally Clinically Significant Difference (MCID) for persons with Balance Disorders: 4pts according to Vladislav et al 2013.    Assessment (rationale for performing, application to patient s function & care plan):  Minutes billed as physical performance test:

## 2023-07-20 NOTE — PROGRESS NOTES
PHYSICAL THERAPY EVALUATION  Type of Visit: Evaluation    See electronic medical record for Abuse and Falls Screening details.    Subjective       Presenting condition or subjective complaint:  SLP requested orders since patient reporting decline in UE and LE strength. Recently dx with PD. Not on medication at this time and is going to try a study at the  to help with prolonging the need for medication.  R hand tremor for about 3 years and even felt trembling in his chest. Reports runs into things now like doorways. Reports increasing fatigue but does not sleep well. Seems slower in his movements and has to concentrate more on his walking.  Reports some falls where he misses a step, or misses a pothole, hard time descending steps untess he is looking at the steps. He reports his R hand is incoordinated with doing things and it takes him longer.  Reports unable to write his name clearly and handwriting is very difficult.  Date of onset: 07/18/23    Relevant medical history:   Epilepsy  Dates & types of surgery:      Prior diagnostic imaging/testing results:       Prior therapy history for the same diagnosis, illness or injury: No      Prior Level of Function  Transfers: Independent  Ambulation: Independent  ADL: Independent      Living Environment  Social support: With a significant other or spouse   Type of home: apartment/condo  Stairs to enter the home:         Ramp:     Stairs inside the home:         Help at home: None  Equipment owned:       Employment: Yes  Hobbies/Interests:  Yoga, pickelball. paula  Patient goals for therapy: improve strength on R side, maintain strength with PD progression, has to concentrate with walking.     Pain assessment: Denies pain     Objective      Strength:  B hip flexion, B knee extension and B dorsilfextion 5/5, B abduction 4/5, B hip extension 3+/5.     Transfers: Independent and able to do without using UEs.    Gait: Rigidity noted in trunk, decreased B arm swing with R arm  swing less than L arm swing. With faster speds able to take long steps. Good heel to toe walking pattern.     Coordination: Impaired B coordination but R > L.  Very slow with alternating movements supination/pronation, alternating finger to thumb, finger to nose  and tapping of LEs.  Good heel to shin movements.    5x STS: 7.13 sec  25 ft timed walk: 7.03 sec  6MWT: 1703 ft  Mini BESTest:  25/28 (had the most difficulty with protective reactions and dual tasking TUG)      Assessment & Plan   CLINICAL IMPRESSIONS  Medical Diagnosis: Parkinson's Disease    Treatment Diagnosis: Gait abnormality, Falls, Impaired coordination.   Impression/Assessment: Patient is a 68 year old male with impaired coordination and diffculty with gait complaints.  The following significant findings have been identified: Decreased strength, Impaired balance, Impaired gait, Instability and Disequilibrium . These impairments interfere with their ability to perform self care tasks, work tasks, recreational activities, household chores, driving , household mobility and community mobility as compared to previous level of function.     Clinical Decision Making (Complexity):  Clinical Presentation: Evolving/Changing  Clinical Presentation Rationale: based on medical and personal factors listed in PT evaluation  Clinical Decision Making (Complexity): Moderate complexity    PLAN OF CARE  Treatment Interventions:  Interventions: Gait Training, Neuromuscular Re-education, Therapeutic Exercise    Long Term Goals     PT Goal 1  Goal Identifier: HEP  Goal Description: Client will be educated in Parkinson specific exercises to address rigidity, bradykinesia and other PD symptoms faithfully performing it at least 4 days per week.  Target Date: 10/17/23  PT Goal 2  Goal Identifier: mini BESTest  Goal Description: Client will demonstrate improved protective reactions scoring at 27/28 on the mini BESTest.  Target Date: 10/17/23  PT Goal 3  Goal Identifier: PD  resources  Goal Description: Client will be educated in PD resources as a newly dx PD patient so that he is aware of community resources.  Target Date: 10/17/23      Frequency of Treatment: 3x/week for 2 weeks and then 2x/week for the rest of the time.  Duration of Treatment: 90 days    Recommended Referrals to Other Professionals: Occupational Therapy, Speech Language Pathology  Education Assessment:   Learner/Method: Listening;Patient  Education Comments: treatment options BIG and PWR approaches.    Risks and benefits of evaluation/treatment have been explained.   Patient/Family/caregiver agrees with Plan of Care.     Evaluation Time:          Signing Clinician: Niurka Chu, PT      [facility]                                                                                   OUTPATIENT PHYSICAL THERAPY      PLAN OF TREATMENT FOR OUTPATIENT REHABILITATION   Patient's Last Name, First Name, Chad Sosa YOB: 1955   Provider's Name   [facility]   Medical Record No.  [id number]     Onset Date: 07/18/23  Start of Care Date: 07/20/23     Medical Diagnosis:  Parkinson's Disease      PT Treatment Diagnosis:  Gait abnormality, Falls, Impaired coordination. Plan of Treatment  Frequency/Duration: 2-3x/week/ 90 days    Certification date from 07/20/23 to 10/17/23         See note for plan of treatment details and functional goals     Niurka Chu, PT                         I CERTIFY THE NEED FOR THESE SERVICES FURNISHED UNDER        THIS PLAN OF TREATMENT AND WHILE UNDER MY CARE     (Physician attestation of this document indicates review and certification of the therapy plan).                  Referring Provider:  Asaf Craig      Initial Assessment  See Epic Evaluation- Start of Care Date: 07/20/23

## 2023-07-21 DIAGNOSIS — R29.818 PARKINSONIAN FEATURES: Primary | ICD-10-CM

## 2023-07-25 ENCOUNTER — THERAPY VISIT (OUTPATIENT)
Dept: SPEECH THERAPY | Facility: CLINIC | Age: 68
End: 2023-07-25
Payer: COMMERCIAL

## 2023-07-25 DIAGNOSIS — R47.1 DYSARTHRIA: Primary | ICD-10-CM

## 2023-07-25 PROCEDURE — 92507 TX SP LANG VOICE COMM INDIV: CPT | Mod: GN | Performed by: SPEECH-LANGUAGE PATHOLOGIST

## 2023-07-27 ENCOUNTER — THERAPY VISIT (OUTPATIENT)
Dept: PHYSICAL THERAPY | Facility: CLINIC | Age: 68
End: 2023-07-27
Attending: INTERNAL MEDICINE
Payer: COMMERCIAL

## 2023-07-27 DIAGNOSIS — Z74.09 IMPAIRED FUNCTIONAL MOBILITY, BALANCE, GAIT, AND ENDURANCE: ICD-10-CM

## 2023-07-27 DIAGNOSIS — G20.A1 PARKINSON'S DISEASE (H): Primary | ICD-10-CM

## 2023-07-27 PROCEDURE — 97110 THERAPEUTIC EXERCISES: CPT | Mod: 59 | Performed by: PHYSICAL THERAPIST

## 2023-07-27 PROCEDURE — 97112 NEUROMUSCULAR REEDUCATION: CPT | Mod: 59 | Performed by: PHYSICAL THERAPIST

## 2023-07-27 PROCEDURE — 97530 THERAPEUTIC ACTIVITIES: CPT | Mod: GP | Performed by: PHYSICAL THERAPIST

## 2023-07-28 ENCOUNTER — THERAPY VISIT (OUTPATIENT)
Dept: PHYSICAL THERAPY | Facility: CLINIC | Age: 68
End: 2023-07-28
Payer: COMMERCIAL

## 2023-07-28 DIAGNOSIS — G20.A1 PARKINSON'S DISEASE (H): Primary | ICD-10-CM

## 2023-07-28 DIAGNOSIS — Z74.09 IMPAIRED FUNCTIONAL MOBILITY, BALANCE, GAIT, AND ENDURANCE: ICD-10-CM

## 2023-07-28 PROCEDURE — 97112 NEUROMUSCULAR REEDUCATION: CPT | Mod: GP | Performed by: PHYSICAL THERAPIST

## 2023-08-02 ENCOUNTER — THERAPY VISIT (OUTPATIENT)
Dept: PHYSICAL THERAPY | Facility: CLINIC | Age: 68
End: 2023-08-02
Payer: COMMERCIAL

## 2023-08-02 DIAGNOSIS — R25.8 BRADYKINESIA: Primary | ICD-10-CM

## 2023-08-02 DIAGNOSIS — R26.2 DIFFICULTY WALKING: ICD-10-CM

## 2023-08-02 PROCEDURE — 97110 THERAPEUTIC EXERCISES: CPT | Mod: GP | Performed by: PHYSICAL THERAPIST

## 2023-08-02 PROCEDURE — 97112 NEUROMUSCULAR REEDUCATION: CPT | Mod: GP | Performed by: PHYSICAL THERAPIST

## 2023-08-04 NOTE — PROGRESS NOTES
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   Certification date from 08/04/23   Certification date to 09/22/23   Progress Note Due Date 09/22/23   Subjective Report   Subjective Report Pt on time, doing well, reports mild increase in hoarsness noted with exercises, does feel that he is already improving.   SLP Goals   SLP Goals 1;2   SLP Goal 1   Goal Identifier LTG 1-Voice   Goal Description Pt will improve vocal production for all communication situations to an average of at least 70 dB SPL for improved communication in order to communicate easily and effectively with family and friends.   Rationale To maximize functional communication within the home or community   Target Date 09/22/23   SLP Goal 2   Goal Identifier STG 2-Voice   Goal Description Pt will be able to increase vocal loudness to reach a target sound pressure level of at least 70 dB SPL with minimal cues during sustained phonation, and at the word, sentence, extended reading, and conversational levels of speech to aid in increased vocal respiratory support needed for volume when speaking on the phone.   Rationale To maximize functional communication within the home or community   Target Date 09/22/23   Treatment Interventions (SLP)   Treatment Interventions Treatment Speech/Lang/Voice   Treatment Speech/Lang/Voice   Treatment of Speech, Language, Voice Communication&/or Auditory Processing (90632) 40 Minutes   Skilled Intervention  "Provided written and verbal information on.;Demonstrated voice exercises;Other  (SPEAK OUT! program)   Patient Response/Progress LTG 1-Pt demonstrating 65 dB SPL in semi-structured conversation, increased self ID and correction by end of the session. STG 2-Pt completed vocal warm up x5=97 dB SPL, sustained \"ah\" for an average duration of 10 seconds x5=97 dB SPL. Vocal glides x5 with an average of 95 dB SPL, reading phrases=75 dB SPL, simple cognitive tasks=75 dB SPL. All tasks completed with min to mod cues.   Education   Learner/Method Patient;Listening;Reading   Plan   Home program SpeakOUT! program 2x/day.   Plan for next session Progress thru. workbook.   Total Session Time   Total Treatment Time (sum of timed and untimed services) 40         [facility]                                                                                   OUTPATIENT SPEECH LANGUAGE PATHOLOGY    PLAN OF TREATMENT FOR OUTPATIENT REHABILITATION   Patient's Last Name, First Name, ÁLVARORIN ShepardkChad  R YOB: 1955   Provider's Name   [facility]   Medical Record No.  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     Certification date from 08/05/23   To 09/22/23          See note for plan of treatment details and functional goals     Ariana Gleason, SLP                         I CERTIFY THE NEED FOR THESE SERVICES FURNISHED UNDER        THIS PLAN OF TREATMENT AND WHILE UNDER MY CARE     (Physician attestation of this document indicates review and certification of the therapy plan).                Referring Provider:  Asaf Craig      Initial Assessment  See Epic Evaluation- 06/21/23          PLAN  Continue therapy per " current plan of care.    Beginning/End Dates of Progress Note Reporting Period:  6/21/23  to 07/25/2023    Referring Provider:  Asaf Craig

## 2023-08-07 ENCOUNTER — THERAPY VISIT (OUTPATIENT)
Dept: PHYSICAL THERAPY | Facility: CLINIC | Age: 68
End: 2023-08-07
Payer: COMMERCIAL

## 2023-08-07 DIAGNOSIS — R29.898 RIGIDITY: ICD-10-CM

## 2023-08-07 DIAGNOSIS — R25.8 BRADYKINESIA: Primary | ICD-10-CM

## 2023-08-07 PROCEDURE — 97110 THERAPEUTIC EXERCISES: CPT | Mod: GP | Performed by: PHYSICAL THERAPIST

## 2023-08-07 PROCEDURE — 97116 GAIT TRAINING THERAPY: CPT | Mod: GP | Performed by: PHYSICAL THERAPIST

## 2023-08-07 PROCEDURE — 97112 NEUROMUSCULAR REEDUCATION: CPT | Mod: GP | Performed by: PHYSICAL THERAPIST

## 2023-08-08 ENCOUNTER — THERAPY VISIT (OUTPATIENT)
Dept: PHYSICAL THERAPY | Facility: CLINIC | Age: 68
End: 2023-08-08
Payer: COMMERCIAL

## 2023-08-08 DIAGNOSIS — R29.898 RIGIDITY: Primary | ICD-10-CM

## 2023-08-08 DIAGNOSIS — Z74.09 MOBILITY IMPAIRED: ICD-10-CM

## 2023-08-08 PROCEDURE — 97112 NEUROMUSCULAR REEDUCATION: CPT | Mod: GP | Performed by: PHYSICAL THERAPIST

## 2023-08-08 PROCEDURE — 97110 THERAPEUTIC EXERCISES: CPT | Mod: GP | Performed by: PHYSICAL THERAPIST

## 2023-08-09 ENCOUNTER — THERAPY VISIT (OUTPATIENT)
Dept: SPEECH THERAPY | Facility: CLINIC | Age: 68
End: 2023-08-09
Payer: COMMERCIAL

## 2023-08-09 DIAGNOSIS — R47.1 DYSARTHRIA: Primary | ICD-10-CM

## 2023-08-09 PROCEDURE — 92507 TX SP LANG VOICE COMM INDIV: CPT | Mod: GN | Performed by: SPEECH-LANGUAGE PATHOLOGIST

## 2023-08-11 ENCOUNTER — THERAPY VISIT (OUTPATIENT)
Dept: OCCUPATIONAL THERAPY | Facility: CLINIC | Age: 68
End: 2023-08-11
Payer: COMMERCIAL

## 2023-08-11 DIAGNOSIS — R29.818 PARKINSONIAN FEATURES: ICD-10-CM

## 2023-08-11 DIAGNOSIS — Z78.9 DECREASED ACTIVITIES OF DAILY LIVING (ADL): Primary | ICD-10-CM

## 2023-08-11 PROCEDURE — 97166 OT EVAL MOD COMPLEX 45 MIN: CPT | Mod: GO

## 2023-08-11 NOTE — PROGRESS NOTES
OCCUPATIONAL THERAPY EVALUATION  Type of Visit: Evaluation    See electronic medical record for Abuse and Falls Screening details.    Subjective      Presenting condition or subjective complaint:    Date of onset: 07/21/23 (order date)    Relevant medical history:   Epilepsy  Dates & types of surgery:      Occupational Profile: Patient is an 68 year old right hand male who was referred to outpatient occupational therapy in to maximize functional independence in setting of recent diagnosis of Parkinson's disease. Past medical history significant for seizures being followed by Dr. Martin in epilepsy clinic  and was seen in movement disorder clinic 6/28/2023 and diagnosed with PD. First movement disorder symptom presentation and Date: R hand tremor for about 3 years ago. Medications: Not currently on medications and is in a study at the [facility] to help with prolonging the need for medications.  Motor symptoms: right hand tremor, bradykinesia (slow movement), hyokinesia (small movement), rigidity. Non-motor symptoms: fatigue, sleep disturbances History of PD specific PT/OT? Currently participating in physical therapy. Current Activity Level: Active; participates in physical therapy, yoga, zumbo, used to enjoy salsa. Functional limitations: worsening right hand tremor with hand incoordination, extra time to perform tasks, difficulty with handwriting legibility, navigating obstacles in environment,  money.  Falls history: Reports some falls where he misses a step, states he stumbles at times, he has to concentrate more on his walking. Pt has a goal of maximizing functional independence minimize impact of PD symptoms on function.      Prior diagnostic imaging/testing results:       Prior therapy history for the same diagnosis, illness or injury:    None    Prior Level of Function  Transfers: Independent  Ambulation: Independent  ADL: Independent  IADL: Driving, Finances, Housekeeping, Laundry, Meal preparation,  Medication management, Yard work    Living Environment  Social support:   significant other, children, grandchildren  Type of home:   Two story; all needs met on first floor   Stairs to enter the home:       2  Ramp:     Stairs inside the home:       12  Help at home:  gardening    Employment:    Yes - Owns rental properties  Hobbies/Interests:  yoga, zuma, salsa, meditation, spending time with grandchildren, boating    Patient goals for therapy:  Pt has a goal of maximizing functional independence minimize impact of PD symptoms on function.     Pain assessment: Pain denied     Objective   Fatigue    General Fatigue ADL, Work   Reports he remains active and enjoys yoga a paula. Reports poor sleep quality and fatigues with daily activities     Fatigue Rating Scale  The FACIT-F (Functional Assessment of Chronic Illness Therapy - Fatigue) is a 13-item questionnaire that assesses self-reported fatigue and its impact upon daily activities and function.  The range of possible scores is 0-52, with 0 being the worst possible score and 52 the best.  Average score in [place] general population is 40.  Any effort to raise or maintain a score above 30 would help keep the person WNLs as defined by + or - one SD.  If a person were to have a 3-4 point increase or decrease in score, one can reasonably classify that person as having changed. Pt scored 34/52 (WNL/BNLs, see flowsheet for details).     Cognitive Status Examination  Orientation: Oriented to person, place and time   Level of Consciousness: Alert, slowed processing  Follows Commands and Answers Questions: 100% of the time, Follows multi step instructions, slowed processing   Personal Safety and Judgement: Intact  Memory: Intact  Attention: Divided attention impaired, difficulty with simultaneous tasks, Difficulty with dual tasking   Organization/Problem Solving: No deficits identified - required to write things down to aid memory and organization   Executive Function: not  formally assessed; will continue to monitor    BALANCE: narrow base of support, shuffling gait; reports he is required to concentrate more on walking to avoid stumbling and falls     Balance and Falls Screens:      The Timed Up & Go Test (TUG) is designed to assess mobility risk of falls in adult populations. Pt was observed ambulating 10 feet, turning, and returning to chair without use of an assistive device. Scores above 12 seconds indicates risk for falls. Pt initiates sit - stand transfer with UE as postural support. Pt displayed short stride length, narrow base of support, decreased right arm swing, able to initiate turning with postural instability noted - no gross loss of balance. No observed gait festination or freezing. Results revealed a score of 8 seconds.    The Timed Up & Go: COGNITIVE: Patient has difficulty with dual motor and cognitive task component with changes in gait when ambulating 10 ft and counting backwards by 3's. Score: 10 seconds. Score >14.7 sec indicates increased risk of falls.     Four Square Step Test is a test of cognitively-demanding dynamic balance assessing the person's ability to step over obstacles forward, sideways, and backwards. Scores > 9.68 seconds suggests increased risk of falls in the parkinson's population. Scores reveal 11 seconds and able to clear threshold.  The increased time to complete this test with the addition of a cognitive and a motor task would indicate difficulty with dual tasking is evident in this patient.       VISUAL SKILLS  Visual Acuity: Wears glasses - reports he will be getting an updated presciption   Visual Field: Appears normal  Visual Attention: Appears normal  Oculomotor: normal    SENSATION: UE Sensation WNL, LE Sensation WNL    POSTURE: forwardly flexed, rounded shoulders  RANGE OF MOTION: AROM WFL  STRENGTH: UE Strength WFL    Left Right    Strength (lbs) 90 lb (within +/- 1 SD from mean) 78 lb    Lateral Pinch (lbs) WFL WFL   Hand  Dominance: Right  MUSCLE TONE: mild trunk rigidity with decreased trunk rotation.   COORDINATION: Left Hand, Nine Hole Peg Test (sec): 29 seconds (BNL; average: 22 seconds)  Right Hand, Nine Hole Peg Test (sec): 33 seconds (BNL; average: 20 seconds)  Tremor: right hand - decreased dexterity, fine motor impairments, impaired manipulation  ADL Functional Limitations: Right hand tremor impacts fine motor coordination including handwriting, utensil use (scooping, stabbing cutting),  engaging dressing fasteners,  and manipulating money  Motor Planning and Proprioception: bradykinesia (slow movement), hypokinesia (small movement); impaired kinesthetic awareness of movement size/speed and force needed; no freezing with gait observed          FUNCTIONAL MOBILITY  Assistive Device(s): None  Ambulation: Independent    Functional Mobility Observations. Patient demonstrates initiation of sit - stand without hesitation and without requiring UE as postural support. Displays narrow base of support, short stride length, decreased right arm swing. Observed to have difficulty avoiding obstacles in environment resulting in tripping    BED MOBILITY:  Independent     TRANSFERS:  Independent without requiring UE support    BATHING:  Independent     UPPER BODY DRESSING:  Mod I, increased time for donning UB garments - IND in tying, buttoning, and zipping with increased time and effort    LOWER BODY DRESSING: Independent,  increased time and effort    TOILETING: Independent    GROOMING:  Independent with increased time and effot    EATING/SELF FEEDING:     Reports challenges with managing utensils, some spillage with hand to mouth; brings cup to mouth without spillage     INSTRUMENTAL ACTIVITIES OF DAILY LIVING (IADL): Impaired ease and efficiency due to motor symptoms and fatigue, requiring increased time and effort. Reports challenges with fine motor control using right hand including handwriting, engaging fasteners   money.   Item Retrieval: Some challenges putting items overhead, balance concerns  Meal Planning/Prep: Independent; required to focus on walking with kitchen mobility; balance concerns  Home/Financial Management: Shared with significant other  Communication/Computer Use: Difficulty with handwriting legibility; increased time required  Community Mobility: Continues to drive without reported concerns   Care of Others:- 2 dogs  Leisure: Enjoys boating and continues to put boat in and out of water independently; notes balance challenges on various terrains and on/off boat      Assessment & Plan   CLINICAL IMPRESSIONS  Medical Diagnosis: Parkinsonian features (R25.9)  - Primary    Treatment Diagnosis: impaired ease, efficieny, and safety in ADL/IADL's    Impression/Assessment: Pt is a 68 year old right hand male presenting to Occupational Therapy in setting of recent diagnosis of Parkinson's disease.  Patient presents with characteristic symptoms associated with Parksinsons, impacting safety and independence with functional mobility and performance during daily occupations  The following significant findings have been identified: right hand tremor, impaired dexterity, bradykinesia (slownness of movement), and hypokinesia (small movement), short stride length, decreased arm swing and balance concerns, fatigue, challenges with dual tasking.These identified deficits interfere with their ability to perform self-care, communication (handwriting), leisure tasks, manipulating of items, and functional mobility as compared to previous level of function.  Patient benefits from skilled outpatient therapy for education regarding sensory/cognitive/environmental strategies to maximize motor performance, daily living modifications or strategies to improve distal control and enhance task completion, participating and training in PD specific exercise program focused on amplitude to mitigate symptom progression and maximize independence  in ADL/IADL's.  Clinical Decision Making (Complexity):  Assessment of Occupational Performance: 3-5 Performance Deficits  Occupational Performance Limitations: self-care (dressing, feeding/eating), functional mobility, sleep, communication, leisure activities  Clinical Decision Making (Complexity): Moderate complexity    PLAN OF CARE  Treatment Interventions:  Interventions: Self-Care/Home Management, Therapeutic Activity, Therapeutic Exercise    Long Term Goals   OT Goal 1  Goal Identifier: Tremor Management  Goal Description: Patient will be educated on daily living modifications to improve distal control and task performance to minimize tremor on function and maximize independence in ADL/IADL's  Target Date: 11/09/23  OT Goal 2  Goal Identifier: Falls Prevention  Goal Description: Patient will demonstrate/verbalize understanding of strategies of reduce risk of falls and enhance safety with transfers/functional mobility and ADL/IADL's at home and in the community  Target Date: 11/09/23  OT Goal 3  Goal Identifier: Handwriting  Goal Description: Patient will be educated on and implement appropriate motor and environmental strategies to enhance handwriting size and legibility for filling out paper work, signing his name, making to-do lists, etc.  Target Date: 11/09/23  OT Goal 4  Goal Identifier: HEP  Goal Description: Patient will report adherence to amplitude focused HEP addressing deficits in amplitude and movement patterns during ADL/IADL and mobility n order to develop healthy exercise habits and reinforce movements that are safe, effective, and efficient as well as hand strengthening and FMC home program to maximize hand function  Target Date: 11/09/23  OT Goal 5  Goal Identifier: FMC  Goal Description: Patient will independently use strategies (BIG movements and hand flicks) to increase coordination to assist with buttoning, dialing a telephone, and FM ADL's by completing the 9HPT in 28 seconds (for both R  and L hands  Target Date: 11/09/23      Frequency of Treatment: 1x/week  Duration of Treatment: up to 90 days     Recommended Referrals to Other Professionals:  none  Education Assessment: Learner/Method: Patient;Listening  Education Comments: OT role in setting of PD and OT POC     Risks and benefits of evaluation/treatment have been explained.   Patient/Family/caregiver agrees with Plan of Care.     Evaluation Time:    OT Eval, Low Complexity Minutes (90237): 45    Signing Clinician: JUNIOR Plasencia [facility]                                                                                   OUTPATIENT OCCUPATIONAL THERAPY      PLAN OF TREATMENT FOR OUTPATIENT REHABILITATION   Patient's Last Name, First Name, ÁLVAROSeraEDINSera  MarshallChad YOB: 1955   Provider's Name   [facility]   Medical Record No.  [id number]     Onset Date: 07/21/23 (order date) Start of Care Date: 08/11/23     Medical Diagnosis:  Parkinsonian features (R25.9)  - Primary      OT Treatment Diagnosis:  impaired ease, efficieny, and safety in ADL/IADL's Plan of Treatment  Frequency/Duration:1x/week/up to 90 days    Certification date from 08/11/23   To 11/09/23        See note for plan of treatment details and functional goals     JUNIOR Plasencia                         I CERTIFY THE NEED FOR THESE SERVICES FURNISHED UNDER        THIS PLAN OF TREATMENT AND WHILE UNDER MY CARE     (Physician attestation of this document indicates review and certification of the therapy plan).                Referring Provider:  Asaf Craig      Initial Assessment  See Epic Evaluation- 08/11/23

## 2023-08-14 ENCOUNTER — APPOINTMENT (OUTPATIENT)
Dept: ULTRASOUND IMAGING | Facility: CLINIC | Age: 68
End: 2023-08-14
Attending: EMERGENCY MEDICINE
Payer: COMMERCIAL

## 2023-08-14 ENCOUNTER — HOSPITAL ENCOUNTER (EMERGENCY)
Facility: CLINIC | Age: 68
Discharge: HOME | End: 2023-08-14
Attending: EMERGENCY MEDICINE | Admitting: EMERGENCY MEDICINE
Payer: COMMERCIAL

## 2023-08-14 ENCOUNTER — OFFICE VISIT (OUTPATIENT)
Dept: URGENT CARE | Facility: URGENT CARE | Age: 68
End: 2023-08-14
Payer: COMMERCIAL

## 2023-08-14 VITALS
WEIGHT: 186 LBS | SYSTOLIC BLOOD PRESSURE: 112 MMHG | TEMPERATURE: 97.7 F | RESPIRATION RATE: 18 BRPM | DIASTOLIC BLOOD PRESSURE: 74 MMHG | BODY MASS INDEX: 27.47 KG/M2 | HEART RATE: 62 BPM | OXYGEN SATURATION: 95 %

## 2023-08-14 VITALS
DIASTOLIC BLOOD PRESSURE: 81 MMHG | HEART RATE: 61 BPM | SYSTOLIC BLOOD PRESSURE: 123 MMHG | HEIGHT: 69 IN | WEIGHT: 185 LBS | BODY MASS INDEX: 27.4 KG/M2 | RESPIRATION RATE: 16 BRPM | OXYGEN SATURATION: 97 % | TEMPERATURE: 97.9 F

## 2023-08-14 DIAGNOSIS — M79.662 PAIN OF LEFT CALF: Primary | ICD-10-CM

## 2023-08-14 DIAGNOSIS — M79.662 PAIN AND SWELLING OF LEFT LOWER LEG: ICD-10-CM

## 2023-08-14 DIAGNOSIS — M79.89 PAIN AND SWELLING OF LEFT LOWER LEG: ICD-10-CM

## 2023-08-14 PROCEDURE — 99284 EMERGENCY DEPT VISIT MOD MDM: CPT | Mod: 25

## 2023-08-14 PROCEDURE — 93971 EXTREMITY STUDY: CPT | Mod: LT

## 2023-08-14 PROCEDURE — 99213 OFFICE O/P EST LOW 20 MIN: CPT | Performed by: PHYSICIAN ASSISTANT

## 2023-08-14 NOTE — PROGRESS NOTES
SUBJECTIVE:   Chad Olivares is a 68 year old male presenting with a chief complaint of   Chief Complaint   Patient presents with    Urgent Care     Bump on left leg near ankle causing pain x7 days         He is an established patient of [facility].  Patient presents with a painful lump to left anterior lower shin x 1 week.  States he also has calf pain and is concerned about a blood clot.        Review of Systems    Past Medical History:   Diagnosis Date    Achilles bursitis or tendinitis 08/07/2008    Advanced directives, counseling/discussion 05/20/2015    Gave pt packet on 5/20/2015  Syeda Torres CMA      CARDIOVASCULAR SCREENING; LDL GOAL LESS THAN 160 10/12/2010    Cholesteatoma, unspecified     Colon polyps 04/2015    tubular villous and serrated adenoma    Complex sleep apnea syndrome     does not use CPAP    Dupuytren's contracture     Dyslexia     Dyspnea and respiratory abnormality 09/03/2014     Problem list name updated by automated process. 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 Provider to review    Parkinson's disease (H)     Plantar fascial fibromatosis 08/07/2008     Family History   Problem Relation Age of Onset    Diabetes Mother         diet controlled, Htn    Heart Murmur Mother         TAVR    Dementia Mother         age 89    Coronary Artery Disease Brother     Diabetes Maternal Grandmother     ALS Maternal Cousin     Glaucoma No family hx of     Macular Degeneration No family hx of      Current Outpatient Medications   Medication Sig Dispense Refill    HEMP OIL OR EXTRACT OR OTHER CBD CANNABINOID, NOT MEDICAL CANNABIS,       lamoTRIgine (LAMICTAL) 100 MG tablet Take 2 tablets (200 mg) by mouth 2 times daily 360 tablet  "3    medical cannabis oral liquid  (Patient's own supply.  Not a prescription) (This is NOT a prescription, and does not certify that the patient has a qualifying medical condition for medical cannabis.  The purpose of this order is  to document that the patient reports taking medical cannabis.)       Social History     Tobacco Use    Smoking status: Former     Types: Cigarettes     Quit date: 2005     Years since quittin.9    Smokeless tobacco: Never   Substance Use Topics    Alcohol use: Yes     Comment: 1 drink every 2 wks       OBJECTIVE  /81   Pulse 61   Temp 97.9  F (36.6  C) (Oral)   Resp 16   Ht 1.753 m (5' 9\")   Wt 83.9 kg (185 lb)   SpO2 97%   BMI 27.32 kg/m      Physical Exam  Vitals reviewed.   Constitutional:       Appearance: Normal appearance. He is obese.   Cardiovascular:      Rate and Rhythm: Normal rate.   Musculoskeletal:      Comments: Left calf pain with palpation.    Small area of edema to the anterior medial aspect also tender.  No erythema.     Neurological:      Mental Status: He is alert.         Labs:  No results found for this or any previous visit (from the past 24 hour(s)).    X-Ray was not done.    ASSESSMENT:    No diagnosis found.     Medical Decision Making:    Differential Diagnosis:  Calf strain, dvt    Serious Comorbid Conditions:  Adult:   reviewed    PLAN:    Patient sent to ED to r/o DVT.  Patient left in stable condition.    Followup:    If not improving or if condition worsens, follow up with your Primary Care Provider, Transfer to ED via private car    There are no Patient Instructions on file for this visit.      "

## 2023-08-15 NOTE — DISCHARGE INSTRUCTIONS
Compression sock.  Elevation.  Return if you develop worsening pain or swelling, skin changes or fever, or any other concerns.  Otherwise, follow with your primary care provider to ensure you are improving as expected.

## 2023-08-15 NOTE — ED TRIAGE NOTES
Pt reports possible dvt in left calf pt reports pain with walking or when he touches it     Denies hx of blood clots     Advised to come here by [facility]   [facility] did not do any blood work or testing

## 2023-08-15 NOTE — ED PROVIDER NOTES
History     Chief Complaint:  Concern for DVT    The history is provided by the patient.      Chad Olivares is a 68 year old male with a history of seizure disorder on Lamictal and Parkinson's disease who presents with concern for DVT.  Chad noticed pain and swelling on the anterior distal left leg about 1 week ago after playing pickle ball.  He did not see any skin changes and did not remember a specific injury.  In the last week his pain has migrated towards the calf and he has persistent swelling of the lower leg and ankle, not extending to the foot.  His pain is manageable and he was able to do yoga today.  He has had no fever.  He has had no shortness of breath.  His partner, Flora, was worried about a blood clot which prompted visit to urgent care and emergency department referral.    Review of External Notes:  Neurology visit 6/28/2023 when he was diagnosed with Parkinson's disease.  Urgent care visit today.    Medications:    HEMP OIL OR EXTRACT OR OTHER CBD CANNABINOID, NOT MEDICAL CANNABIS,  lamoTRIgine (LAMICTAL) 100 MG tablet  medical cannabis oral liquid  (Patient's own supply.  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    Parkinson's disease (H)     Plantar fascial fibromatosis 08/07/2008     Past Surgical History:    Past Surgical History:   Procedure Laterality Date    cholesteatoma surgery Left     COLONOSCOPY  10/09/2002; 2015    polyps - needs f/u 5 yrs      COLONOSCOPY N/A 7/20/2021    Procedure: COLONOSCOPY, WITH POLYPECTOMY;  Surgeon: Asaf Guajardo MD;  [place] [facility] [place]     REMOVAL OF TONSILS,<11 Y/O  1962    RELEASE DUPUYTRENS CONTRACTURE Right 8/26/2021    Procedure: Right fifth finger and palm Dupuytren's contracture release;  Surgeon: Caron Farrell MD;  [place] [facility] [place]      Physical Exam   Patient Vitals for the past 24 hrs:   BP Temp Temp src Pulse Resp SpO2 Weight   08/14/23 2154 112/74 97.7  F (36.5  C) Oral 62 18 95 % --   08/14/23 2027 (!) 146/87 (!) 96.5  F (35.8  C) Temporal 62 18 97 % 84.4 kg (186 lb)        Physical Exam  General: Well-developed and well-nourished. Well appearing elderly  man. Cooperative.  Head:  Atraumatic.  Eyes:  Conjunctivae, lids, and sclerae are normal.  ENT:    Normal nose. Moist mucous membranes.  Neck:  Supple. Normal range of motion.  CV:  Warm and well-perfused throughout including the left lower extremity.  Resp:  No respiratory distress.   GI:  Non-distended.     MS:  Normal ROM.  1+ left lower extremity edema at the ankle and to mid shin.  No calf tenderness.  Skin:  Warm. Non-diaphoretic. No pallor.  There is what appears to be old ecchymosis on the distalmost lateral left leg/ankle.  There is a 1 cm in diameter erythematous macule on the distal anterior leg without surrounding cellulitis.  No open wounds.  Neuro:  Awake. A&Ox3. Normal strength.  Psych: Normal mood and affect. Normal speech.  Vitals reviewed.    Emergency Department Course   Imaging:  US Lower Extremity Venous Duplex Left   Final Result   IMPRESSION:   No deep venous thrombosis in the left lower extremity.        Emergency Department Course & Assessments:  Independent Interpretation (X-rays, CTs, rhythm strip):  None    Consultations/Discussion of Management or Tests:  Not applicable    Social Determinants of Health affecting care:  Supportive partner  Active lifestyle including pickleball and yoga  Establish care providers      Disposition:  Discharged    Impression & Plan    Medical Decision Making:  Chad is a 68 year old man presenting with 1 week of left leg pain and swelling.  He initially noticed this anteriorly after playing pickle ball but the pain has since migrated towards his calf.  He does not recall any specific injuries and has been tolerating his pain and staying active, including yoga today.  His partner was concerned he may have DVT which prompted his visit.  He denies any other concerns and appears well on exam.  He is not hypoxic or tachypneic.  The left leg does have 1+ edema which is primarily around the ankle and extending somewhat to the distal leg.  There is no calf tenderness.  There is no bony tenderness.  There are no skin changes consistent with a cellulitis.  Compartments are soft and compressible.  It appears there may be old ecchymosis on the lateral aspect of the distal most left leg and I strongly suspect this was an initial injury while playing pickle ball.  To practice with an abundance of caution he was sent for DVT study which, fortunately, is negative.  Given reassuring work-up, he is appropriate for discharge.  I suggested he purchase a compression sock and also elevate the extremity.  I encouraged him to follow up with his primary care provider to ensure he is improving but indications for return were reviewed.  Questions answered.  Amenable to discharge.    Diagnosis:    ICD-10-CM    1. Pain and swelling of left lower leg  M79.662     M79.89            Discharge Medications:  Discharge Medication List as of 8/14/2023 10:49 PM         8/14/2023   Kasia Soria MD Dixson, Kylie S, MD  08/17/23 1052

## 2023-08-20 ENCOUNTER — HOSPITAL ENCOUNTER (EMERGENCY)
Facility: CLINIC | Age: 68
Discharge: HOME | End: 2023-08-20
Attending: PHYSICIAN ASSISTANT | Admitting: PHYSICIAN ASSISTANT
Payer: COMMERCIAL

## 2023-08-20 ENCOUNTER — APPOINTMENT (OUTPATIENT)
Dept: ULTRASOUND IMAGING | Facility: CLINIC | Age: 68
End: 2023-08-20
Attending: PHYSICIAN ASSISTANT
Payer: COMMERCIAL

## 2023-08-20 VITALS
BODY MASS INDEX: 27.47 KG/M2 | OXYGEN SATURATION: 96 % | TEMPERATURE: 97.8 F | RESPIRATION RATE: 20 BRPM | WEIGHT: 186 LBS | DIASTOLIC BLOOD PRESSURE: 78 MMHG | HEART RATE: 72 BPM | SYSTOLIC BLOOD PRESSURE: 124 MMHG

## 2023-08-20 DIAGNOSIS — M79.89 LEFT LEG SWELLING: ICD-10-CM

## 2023-08-20 DIAGNOSIS — M79.662 PAIN OF LEFT LOWER LEG: ICD-10-CM

## 2023-08-20 PROCEDURE — 93971 EXTREMITY STUDY: CPT | Mod: LT

## 2023-08-20 PROCEDURE — 99284 EMERGENCY DEPT VISIT MOD MDM: CPT | Mod: 25

## 2023-08-20 ASSESSMENT — ACTIVITIES OF DAILY LIVING (ADL): ADLS_ACUITY_SCORE: 35

## 2023-08-20 NOTE — ED PROVIDER NOTES
History     Chief Complaint:  Leg Pain       The history is provided by the patient.      Chad Olivares is a 68 year old male who presents with left leg pain. Patient reports he had an onset of pain on the posterior aspect of his left calf about 11 days ago. He presented to the ED 6 days ago, where he was sent for a DVT study, which was negative, and he was discharged. Patient reports the pain went away as he was elevating his foot, but reoccurred about 3 days ago, and has been worsening. His pain is now radiating from his calf to his knee. He states he has pain while walking on his toes, but not on his heel. He adds his left leg is a little swollen. He has not been exerting himself much, but has been going on walks, which is normal routine. He did not wear new shoes. Denies fall or trauma. Also denies skin changes, chest pain, or shortness of breath.    Independent Historian:   None - Patient Only    Review of External Notes:   ED visit from 8/14/23  US Lower Extremity Venous Duplex Left:  IMPRESSION:  No deep venous thrombosis in the left lower extremity.    Medications:    Lamotrigine    Past Medical History:  Achilles bursitis   Cholesteatoma  Colon polyps  Complex sleep apnea syndrome  Dupuytren's contracture  Dyslexia  Dyspnea and respiratory abnormality  Generalized convulsive epilepsy  Generalized tonic-clonic seizure  Memory loss  Organic parasomnia  Parkinson's disease  Plantar fascial fibromatosis    Past Surgical History:    Cholesteatoma surgery L  Colonoscopy x 2  Tonsillectomy  R 5th finger and palm Dupuytren's contracture release    Physical Exam   Patient Vitals for the past 24 hrs:   BP Temp Temp src Pulse Resp SpO2 Weight   08/20/23 1253 124/78 97.8  F (36.6  C) Temporal 72 20 96 % 84.4 kg (186 lb)      Physical Exam  HENT:  mmm  Eyes: PERRL B/L  Neck: Supple  CV: Peripheral pulses in tact and regular  Resp: Speaking in full sentences without any respiratory distress  Ext:  Left lower  extremity  No bony TTP.  Mild TTP to posterior calf musculature.  Full ROM.  Sensation and perfusion intact throughout foot  No swelling noted.  Remainder of the skeletal survey is unremarkable  Skin: warm dry well perfused  Neuro: Alert, no gross motor or sensory deficits  Psych: Calm  Nursing notes and vital signs reviewed.      Emergency Department Course   Imaging:  US Lower Extremity Venous Duplex Left   Final Result   IMPRESSION:   1.  No deep venous thrombosis in the left lower extremity.         Report per radiology    Emergency Department Course & Assessments:  Independent Interpretation (X-rays, CTs, rhythm strip):  None    Assessments/Consultations/Discussion of Management or Tests:   ED Course as of 08/20/23 1910   Sun Aug 20, 2023   1719 I evaluated the patient and obtained history.   1905 Updated.     Social Determinants of Health affecting care:   None    Disposition:  The patient was discharged to home.     Impression & Plan      Medical Decision Making:  Chad Olivares is a 68 year old male who presents to the ED for evaluation of left calf pain.  Is been present for the last 11 days.  Patient was seen in the ED 6 days ago and had a negative ultrasound.  Pain improved, however is since recurred, and he is concerned that he has a DVT.  No falls or trauma.  See HPI as above for additional details.  Vitals and physical exam as above.  Differentials broad included fracture, strain, contusion, DVT, superficial thrombophlebitis, amongst others.  No falls or trauma or bony tenderness to suggest for need for x-ray at this time.  Ultrasound is negative for venous abnormality.  Discussed with patient unclear etiology of his symptoms at this time.  No evidence for acute nefarious pathology identified based upon the above work-up.  No evidence for shingles.  Advised that he follow-up closely with his PCP with persistent symptoms. Discussed reasons to return. All questions answered. Patient discharged to home in  stable condition.    Diagnosis    ICD-10-CM    1. Pain of left lower leg  M79.662       2. Left leg swelling  M79.89          Scribe Disclosure:  I, Anais Covington, am serving as a scribe at 5:09 PM on 8/20/2023 to document services personally performed by Enoch Jain PA-C based on my observations and the provider's statements to me.    This record was created at least in part using electronic voice recognition software, so please excuse any typographical errors.       Enoch Jain PA-C  08/20/23 3834

## 2023-08-20 NOTE — ED TRIAGE NOTES
"  Pt here for pain to lt calf/shin, was here 6 days ago with same complaint and had an US, no hx of blood clots, pt states its getting worse, no tylenol or ibuprofen, states \"I don't want to mask the pain\", no injuries   Triage Assessment       Row Name 08/20/23 1254       Triage Assessment (Adult)    Airway WDL WDL       Respiratory WDL    Respiratory WDL WDL       Cardiac WDL    Cardiac WDL WDL       Cognitive/Neuro/Behavioral WDL    Cognitive/Neuro/Behavioral WDL WDL                    "
burning with urination

## 2023-08-21 ENCOUNTER — OFFICE VISIT (OUTPATIENT)
Dept: OPHTHALMOLOGY | Facility: CLINIC | Age: 68
End: 2023-08-21
Attending: OPHTHALMOLOGY
Payer: COMMERCIAL

## 2023-08-21 DIAGNOSIS — H25.13 NUCLEAR SENILE CATARACT OF BOTH EYES: Primary | ICD-10-CM

## 2023-08-21 DIAGNOSIS — H52.4 PRESBYOPIA OF BOTH EYES: ICD-10-CM

## 2023-08-21 PROCEDURE — 99213 OFFICE O/P EST LOW 20 MIN: CPT | Mod: GC | Performed by: OPHTHALMOLOGY

## 2023-08-21 PROCEDURE — G0463 HOSPITAL OUTPT CLINIC VISIT: HCPCS | Performed by: OPHTHALMOLOGY

## 2023-08-21 PROCEDURE — 92015 DETERMINE REFRACTIVE STATE: CPT

## 2023-08-21 ASSESSMENT — REFRACTION_WEARINGRX
OD_AXIS: 178
OD_CYLINDER: +0.50
OS_SPHERE: +0.75
OD_ADD: +2.50
SPECS_TYPE: PAL
OS_ADD: +2.50
OD_SPHERE: +1.25
OS_CYLINDER: SPHERE

## 2023-08-21 ASSESSMENT — CONF VISUAL FIELD
OD_INFERIOR_TEMPORAL_RESTRICTION: 0
OD_SUPERIOR_NASAL_RESTRICTION: 0
OS_INFERIOR_TEMPORAL_RESTRICTION: 0
METHOD: COUNTING FINGERS
OD_INFERIOR_NASAL_RESTRICTION: 0
OD_NORMAL: 1
OS_NORMAL: 1
OS_SUPERIOR_NASAL_RESTRICTION: 0
OS_INFERIOR_NASAL_RESTRICTION: 0
OD_SUPERIOR_TEMPORAL_RESTRICTION: 0
OS_SUPERIOR_TEMPORAL_RESTRICTION: 0

## 2023-08-21 ASSESSMENT — EXTERNAL EXAM - RIGHT EYE: OD_EXAM: NORMAL

## 2023-08-21 ASSESSMENT — EXTERNAL EXAM - LEFT EYE: OS_EXAM: NORMAL

## 2023-08-21 ASSESSMENT — VISUAL ACUITY
METHOD: SNELLEN - LINEAR
OS_CC: 20/20
CORRECTION_TYPE: GLASSES
OS_CC+: -3
OD_CC: 20/20

## 2023-08-21 ASSESSMENT — TONOMETRY
OD_IOP_MMHG: 15
IOP_METHOD: ICARE
OS_IOP_MMHG: 12

## 2023-08-21 ASSESSMENT — REFRACTION_MANIFEST
OD_ADD: +2.50
OS_ADD: +2.50
OD_CYLINDER: +0.50
OD_AXIS: 028
OS_SPHERE: +0.75
OD_SPHERE: +1.25
OS_CYLINDER: SPHERE

## 2023-08-21 ASSESSMENT — CUP TO DISC RATIO
OD_RATIO: 0.2
OS_RATIO: 0.2

## 2023-08-21 NOTE — PROGRESS NOTES
"CC    Blurry vision OU  HPI:  Patient presents for an eye exam. He complains of his glasses not working properly.     Social history: He has 5 grand kids.     Interval hx. Blurry vision In both eyes. This started months ago. Associated symptoms include headache (a few, but not from the glasses, more from dehydration). Negative for glare, haloes, double vision, dryness, eye pain, flashes, and floaters. Treatments tried include glasses. Pain was noted as 0/10. Patient reports it's difficult when wearing glasses. \"It's hard to read his computer. The ground is moving. My side vision is blurry. My eyes seem to be worse since I started wearing glasses.\" Patient's last annual exam was 12/23/21. Patient notes every once and a while he sees something out of the corner of his eye.       Pertinent Medical History:  Colon polyps  Seasonal allergies  Obstructive sleep apnea  Epilepsy    Ocular History:  Cataract, both eyes  Presbyopia, both eyes.     Eye Medications:  None        Patient consented to a dilated eye exam:  Yes. Side effects discussed.    Medical History:  Past Medical History:   Diagnosis Date    Achilles bursitis or tendinitis 08/07/2008    Advanced directives, counseling/discussion 05/20/2015    Gave pt packet on 5/20/2015  Syeda Torres CMA      CARDIOVASCULAR SCREENING; LDL GOAL LESS THAN 160 10/12/2010    Cholesteatoma, unspecified     Colon polyps 04/2015    tubular villous and serrated adenoma    Complex sleep apnea syndrome     does not use CPAP    Dupuytren's contracture     Dyslexia     Dyspnea and respiratory abnormality 09/03/2014     Problem list name updated by automated process. 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 " Provider to review    Parkinson's disease (H)     Plantar fascial fibromatosis 08/07/2008       Medications:  Current Outpatient Medications   Medication Sig Dispense Refill    HEMP OIL OR EXTRACT OR OTHER CBD CANNABINOID, NOT MEDICAL CANNABIS,       lamoTRIgine (LAMICTAL) 100 MG tablet Take 2 tablets (200 mg) by mouth 2 times daily 360 tablet 3    medical cannabis oral liquid  (Patient's own supply.  Not a prescription) (This is NOT a prescription, and does not certify that the patient has a qualifying medical condition for medical cannabis.  The purpose of this order is  to document that the patient reports taking medical cannabis.)         Assessment and Plan:  #blurry vision OU  # Nuclear sclerosis -Trace OU  Mild. Not affecting the vision for now. Will watch yearly    #Refractive error and Presbyopia   -Updated Mrx given today (similar prescription  -Unhappy with current glasses; wears a progressive lens and the bifocal is too low. He is not able to see out of the bottom without lifting the glasses up.   -Discussed asking the lens crafter to move the blend of the progreessive up or try a different lens shape or get separate glasses.     #floppy eyelids and lash ptosis  -Has HUSSAIN and uses CPAP machine  -Monitor    F/U in 1 year with VTD  Attending Physician Attestation:  Complete documentation of historical and exam elements from today's encounter can be found in the full encounter summary report (not reduplicated in this progress note).  I personally obtained the chief complaint(s) and history of present illness.  I confirmed and edited as necessary the review of systems, past medical/surgical history, family history, social history, and examination findings as documented by others; and I examined the patient myself.  I personally reviewed the relevant tests, images, and reports as documented above.  I formulated and edited as necessary the assessment and plan and discussed the findings and management plan with  the patient and family. - Jasper Brand MD

## 2023-08-21 NOTE — NURSING NOTE
"Chief Complaints and History of Present Illnesses   Patient presents with    COMPREHENSIVE EYE EXAM     1 year follow up for Nuclear sclerosis -Trace each eye.     Patient notes vision is blurry each eye in the last year. \"I have trouble with reading and computer. I bought readers for computer work.\" No eye drops used.     Olga Lidia Tavarez, NAGI 8:55 AM 08/21/2023       Chief Complaint(s) and History of Present Illness(es)       COMPREHENSIVE EYE EXAM              Laterality: both eyes    Onset: years ago    Quality: blurred vision    Associated symptoms: floaters (once and a while, but it's small).  Negative for glare, haloes, double vision, dryness, eye pain, redness, tearing, flashes and itching    Treatments tried: glasses    Pain scale: 0/10    Comments: 1 year follow up for Nuclear sclerosis -Trace each eye.     Patient notes vision is blurry each eye in the last year. \"I have trouble with reading and computer. I bought readers for computer work.\" No eye drops used.     Olga Lidia Tavarez, NAGI 8:55 AM 08/21/2023                      "

## 2023-08-21 NOTE — DISCHARGE INSTRUCTIONS
The cause of your symptoms is unclear at this time.  No evidence for life-threatening cause such as blood clot based upon the ultrasound performed today.  This may represent a muscular cause.  Follow-up with your primary doctor or orthopedist with persistent symptoms.

## 2023-08-25 ENCOUNTER — THERAPY VISIT (OUTPATIENT)
Dept: PHYSICAL THERAPY | Facility: CLINIC | Age: 68
End: 2023-08-25
Payer: COMMERCIAL

## 2023-08-25 DIAGNOSIS — R26.89 IMPAIRED GAIT AND MOBILITY: Primary | ICD-10-CM

## 2023-08-25 DIAGNOSIS — R29.898 RIGIDITY: ICD-10-CM

## 2023-08-25 PROCEDURE — 97110 THERAPEUTIC EXERCISES: CPT | Mod: GP | Performed by: PHYSICAL THERAPIST

## 2023-08-25 PROCEDURE — 97112 NEUROMUSCULAR REEDUCATION: CPT | Mod: GP | Performed by: PHYSICAL THERAPIST

## 2023-08-28 ENCOUNTER — OFFICE VISIT (OUTPATIENT)
Dept: NEUROLOGY | Facility: CLINIC | Age: 68
End: 2023-08-28
Payer: COMMERCIAL

## 2023-08-28 VITALS — SYSTOLIC BLOOD PRESSURE: 130 MMHG | DIASTOLIC BLOOD PRESSURE: 85 MMHG | OXYGEN SATURATION: 98 % | HEART RATE: 65 BPM

## 2023-08-28 DIAGNOSIS — G20.A1 PARKINSON'S DISEASE (H): Primary | ICD-10-CM

## 2023-08-28 PROCEDURE — 99214 OFFICE O/P EST MOD 30 MIN: CPT | Performed by: PSYCHIATRY & NEUROLOGY

## 2023-08-28 ASSESSMENT — UNIFIED PARKINSONS DISEASE RATING SCALE (UPDRS)
SPEECH: (1) SLIGHT: LOSS OF MODULATION, DICTION OR VOLUME, BUT STILL ALL WORDS EASY TO UNDERSTAND.
AMPLITUDE_LLE: (0) NORMAL: NO TREMOR.
TOTAL_SCORE: 12
DYSKINESIAS_PRESENT: NO
RIGIDITY_RUE: (2) MILD: RIGIDITY DETECTED WITHOUT THE ACTIVATION MANEUVER. FULL RANGE OF MOTION IS EASILY ACHIEVED.
PRONATION_SUPINATION_RIGHT: (1) SLIGHT: ANY OF THE FOLLOWING: A) THE REGULAR RHYTHM IS BROKEN WITH ONE WITH ONE OR TWO INTERRUPTIONS OR HESITATIONS OF THE MOVEMENT  B) SLIGHT SLOWING  C) THE AMPLITUDE DECREMENTS NEAR THE END OF THE 10 MOVEMENTS.
HANDMOVEMENTS_LEFT: (1) SLIGHT: ANY OF THE FOLLOWING: A) THE REGULAR RHYTHM IS BROKEN WITH ONE WITH ONE OR TWO INTERRUPTIONS OR HESITATIONS OF THE MOVEMENT  B) SLIGHT SLOWING  C) THE AMPLITUDE DECREMENTS NEAR THE END OF THE 10 MOVEMENTS.
RIGIDITY_LUE: (0) NORMAL: NO RIGIDITY.
AMPLITUDE_LUE: (0) NORMAL: NO TREMOR.
TOTAL_SCORE: 19
FACIAL_EXPRESSION: (1) SLIGHT: MINIMAL MASKED FACIES MANIFESTED ONLY BY DECREASED FREQUENCY OF BLINKING.
RIGIDITY_NECK: (2) MILD: RIGIDITY DETECTED WITHOUT THE ACTIVATION MANEUVER. FULL RANGE OF MOTION IS EASILY ACHIEVED.
RIGIDITY_RLE: (1) SLIGHT: RIGIDITY ONLY DETECTED WITH ACTIVATION MANEUVER.
FINGER_TAPPING_LEFT: (0) NORMAL: NO PROBLEMS.
SPONTANEITY_OF_MOVEMENT: (1) SLIGHT: SLIGHT GLOBAL SLOWNESS AND POVERTY OF SPONTANEOUS MOVEMENTS.
POSTURAL_STABILITY: (0) NORMAL: NO PROBLEMS. RECOVERS WITH ONE OR TWO STEPS.
CONSTANCY_TREMOR_ATREST: (1) SLIGHT: TREMOR AT REST IS PRESENT 25% OF THE ENTIRE EXAMINATION PERIOD.
HANDMOVEMENTS_RIGHT: (2) MILD: ANY OF THE FOLLOWING: A) 3 TO 5 INTERRUPTIONS DURING TAPPING  B) MILD SLOWING  C) THE AMPLITUDE DECREMENTS MIDWAY IN THE 10-MOVEMENT SEQUENCE.
ARISING_CHAIR: (0) NORMAL: NO PROBLEMS. ABLE TO ARISE QUICKLY WITHOUT HESITATION.
AMPLITUDE_LIP_JAW: (0) NORMAL: NO TREMOR.
PARKINSONS_MEDS: OFF
AMPLITUDE_RLE: (0) NORMAL: NO TREMOR.
FINGER_TAPPING_RIGHT: (2) MILD: ANY OF THE FOLLOWING: A) 3 TO 5 INTERRUPTIONS DURING TAPPING  B) MILD SLOWING  C) THE AMPLITUDE DECREMENTS MIDWAY IN THE 10-MOVEMENT SEQUENCE.
TOTAL_SCORE_LEFT: 1
PRONATION_SUPINATION_LEFT: (0) NORMAL: NO PROBLEMS.
AMPLITUDE_RUE: (1) SLIGHT: < 1 CM IN MAXIMAL AMPLITUDE.
GAIT: (0) NORMAL: NO PROBLEMS.
TOETAPPING_RIGHT: (0) NORMAL: NO PROBLEMS.
AXIAL_SCORE: 5
FREEZING_GAIT: (0) NORMAL: NO FREEZING.
POSTURE: (0) NORMAL: NO PROBLEMS.
LEG_AGILITY_RIGHT: (1) SLIGHT: ANY OF THE FOLLOWING: A) THE REGULAR RHYTHM IS BROKEN WITH ONE WITH ONE OR TWO INTERRUPTIONS OR HESITATIONS OF THE MOVEMENT  B) SLIGHT SLOWING  C) THE AMPLITUDE DECREMENTS NEAR THE END OF THE 10 MOVEMENTS.
TOETAPPING_LEFT: (0) NORMAL: NO PROBLEMS.
LEG_AGILITY_LEFT: (0) NORMAL: NO PROBLEMS.
RIGIDITY_LLE: (0) NORMAL: NO RIGIDITY.
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
## 2023-08-28 NOTE — NURSING NOTE
"Chad Olivares is a 68 year old male who presents for:  Chief Complaint   Patient presents with    Parkinson     follow up        Initial Vitals:  /85   Pulse 65   SpO2 98%  Estimated body mass index is 27.47 kg/m  as calculated from the following:    Height as of 8/14/23: 1.753 m (5' 9\").    Weight as of 8/20/23: 84.4 kg (186 lb).. There is no height or weight on file to calculate BSA. BP completed using cuff size: regular  Data Unavailable    Nursing Comments:     Kylie Todd MA   "

## 2023-08-28 NOTE — PATIENT INSTRUCTIONS
Keep staying physically active!    If you have any questions, please reach out. Otherwise I will see you in 6 months.

## 2023-08-29 ENCOUNTER — THERAPY VISIT (OUTPATIENT)
Dept: SPEECH THERAPY | Facility: CLINIC | Age: 68
End: 2023-08-29
Payer: COMMERCIAL

## 2023-08-29 DIAGNOSIS — R47.1 DYSARTHRIA: Primary | ICD-10-CM

## 2023-08-29 PROCEDURE — 92507 TX SP LANG VOICE COMM INDIV: CPT | Mod: GN | Performed by: SPEECH-LANGUAGE PATHOLOGIST
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
## 2023-08-30 ENCOUNTER — THERAPY VISIT (OUTPATIENT)
Dept: PHYSICAL THERAPY | Facility: CLINIC | Age: 68
End: 2023-08-30
Payer: COMMERCIAL

## 2023-08-30 DIAGNOSIS — R26.89 IMPAIRED GAIT AND MOBILITY: ICD-10-CM

## 2023-08-30 DIAGNOSIS — R29.898 RIGIDITY: ICD-10-CM

## 2023-08-30 DIAGNOSIS — Z74.09 MOBILITY IMPAIRED: ICD-10-CM

## 2023-08-30 DIAGNOSIS — G20.A1 PARKINSON'S DISEASE (H): Primary | ICD-10-CM

## 2023-08-30 DIAGNOSIS — R25.8 BRADYKINESIA: ICD-10-CM

## 2023-08-30 PROCEDURE — 97112 NEUROMUSCULAR REEDUCATION: CPT | Mod: GP | Performed by: PHYSICAL THERAPIST

## 2023-08-30 PROCEDURE — 97110 THERAPEUTIC EXERCISES: CPT | Mod: GP | Performed by: PHYSICAL THERAPIST

## 2023-09-01 ENCOUNTER — THERAPY VISIT (OUTPATIENT)
Dept: PHYSICAL THERAPY | Facility: CLINIC | Age: 68
End: 2023-09-01
Payer: COMMERCIAL

## 2023-09-01 ENCOUNTER — THERAPY VISIT (OUTPATIENT)
Dept: OCCUPATIONAL THERAPY | Facility: CLINIC | Age: 68
End: 2023-09-01
Payer: COMMERCIAL

## 2023-09-01 DIAGNOSIS — Z78.9 DECREASED ACTIVITIES OF DAILY LIVING (ADL): ICD-10-CM

## 2023-09-01 DIAGNOSIS — R26.89 IMPAIRED GAIT AND MOBILITY: Primary | ICD-10-CM

## 2023-09-01 DIAGNOSIS — R29.818 PARKINSONIAN FEATURES: Primary | ICD-10-CM

## 2023-09-01 DIAGNOSIS — R29.898 RIGIDITY: ICD-10-CM

## 2023-09-01 PROCEDURE — 97535 SELF CARE MNGMENT TRAINING: CPT | Mod: GO

## 2023-09-01 PROCEDURE — 97110 THERAPEUTIC EXERCISES: CPT | Mod: GO

## 2023-09-01 PROCEDURE — 97110 THERAPEUTIC EXERCISES: CPT | Mod: GP | Performed by: PHYSICAL THERAPIST

## 2023-09-01 PROCEDURE — 97112 NEUROMUSCULAR REEDUCATION: CPT | Mod: GP | Performed by: PHYSICAL THERAPIST

## 2023-09-06 ENCOUNTER — THERAPY VISIT (OUTPATIENT)
Dept: PHYSICAL THERAPY | Facility: CLINIC | Age: 68
End: 2023-09-06
Payer: COMMERCIAL

## 2023-09-06 DIAGNOSIS — R25.8 BRADYKINESIA: ICD-10-CM

## 2023-09-06 DIAGNOSIS — R26.89 IMPAIRED GAIT AND MOBILITY: ICD-10-CM

## 2023-09-06 DIAGNOSIS — R29.898 RIGIDITY: ICD-10-CM

## 2023-09-06 DIAGNOSIS — G20.A1 PARKINSON'S DISEASE (H): Primary | ICD-10-CM

## 2023-09-06 PROCEDURE — 97110 THERAPEUTIC EXERCISES: CPT | Mod: GP | Performed by: PHYSICAL THERAPIST

## 2023-09-06 PROCEDURE — 97750 PHYSICAL PERFORMANCE TEST: CPT | Mod: GP | Performed by: PHYSICAL THERAPIST

## 2023-09-06 PROCEDURE — 97112 NEUROMUSCULAR REEDUCATION: CPT | Mod: GP | Performed by: PHYSICAL THERAPIST

## 2023-09-06 NOTE — PROGRESS NOTES
Mini-BESTest: Balance Evaluation Systems Test    [id number] Critical access hospital & [facility]. All rights reserved.    ANTICIPATORY BALANCE  1. SIT TO STAND  Instruction:  Cross your arms across your chest. Try not to use your hands unless you must. Do not let your legs lean  against the back of the chair when you stand. Please stand up now.   (2) Normal: Comes to stand without use of hands and stabilizes independently.  (1) Moderate: Comes to stand WITH use of hands on first attempt.  (0) Severe: Unable to stand up from chair without assistance, OR needs several attempts with use of hands.    2. RISE TO TOES  Instruction:  Place your feet shoulder width apart. Place your hands on your hips. Try to rise as high as you can onto your  toes. I will count out loud to 3 seconds. Try to hold this pose for at least 3 seconds. Look straight ahead. Rise now.   (2) Normal: Stable for 3 s with maximum height.  (1) Moderate: Heels up, but not full range (smaller than when holding hands), OR noticeable instability for 3 s.  (0) Severe: < 3 s.    3. STAND ON ONE LEG  Instruction:  Look straight ahead. Keep your hands on your hips. Lift your leg off of the ground behind you without touching or  resting your raised leg upon your other standing leg. Stay standing on one leg as long as you can. Look straight ahead. Lift  now.   Left: Time in Seconds Trial 1:__>20___Trial 2:_____  (2) Normal: 20 s.  (1) Moderate: < 20 s.  (0) Severe: Unable.    Right: Time in Seconds Trial 1:_>20s____Trial 2:_____  (2) Normal: 20 s.  (1) Moderate: < 20 s.  (0) Severe: Unable    To score each side separately use the trial with the longest time.  To calculate the sub-score and total score use the side [left or right] with the lowest numerical score [i.e. the worse side].    REACTIVE POSTURAL CONTROL  4. COMPENSATORY STEPPING CORRECTION- FORWARD  Instruction:  Stand with your feet shoulder width apart, arms at your sides. Lean forward against my hands  beyond your  forward limits. When I let go, do whatever is necessary, including taking a step, to avoid a fall.   (2) Normal: Recovers independently with a single, large step (second realignment step is allowed).  (1) Moderate: More than one step used to recover equilibrium.  (0) Severe: No step, OR would fall if not caught, OR falls spontaneously.    5. COMPENSATORY STEPPING CORRECTION- BACKWARD  Instruction:  Stand with your feet shoulder width apart, arms at your sides. Lean backward against my hands beyond your  backward limits. When I let go, do whatever is necessary, including taking a step, to avoid a fall.   (2) Normal: Recovers independently with a single, large step.  (1) Moderate: More than one step used to recover equilibrium.  (0) Severe: No step, OR would fall if not caught, OR falls spontaneously.    6. COMPENSATORY STEPPING CORRECTION- LATERAL  Instruction:  Stand with your feet together, arms down at your sides. Lean into my hand beyond your sideways limit. When I  let go, do whatever is necessary, including taking a step, to avoid a fall.   Left  (2) Normal: Recovers independently with 1 step  (crossover or lateral OK).  (1) Moderate: Several steps to recover equilibrium.  (0) Severe: Falls, or cannot step.    Right  (2) Normal: Recovers independently with 1 step  (crossover or lateral OK).  (1) Moderate: Several steps to recover equilibrium.  (0) Severe: Falls, or cannot step.    To calculate the sub-score and total score use the side [left or right] with the lowest numerical score [i.e. the worse side].    SENSORY ORIENTATION  7. STANCE (FEET TOGETHER); EYES OPEN, FIRM SURFACE  Instruction:  Place your hands on your hips. Place your feet together until almost touching. Look straight ahead. Be as stable  and still as possible, until I say stop.   Time in seconds:___>30s_____  (2) Normal: 30 s.  (1) Moderate: < 30 s.  (0) Severe: Unable.    8. STANCE (FEET TOGETHER); EYES CLOSED, FOAM  SURFACE  Instruction:  Step onto the foam. Place your hands on your hips. Place your feet together until almost touching. Be as stable  and still as possible, until I say stop. I will start timing when you close your eyes.   Time in seconds:__>30s_- moderate sway_____  (2) Normal: 30 s.  (1) Moderate: < 30 s.  (0) Severe: Unable.    9. INCLINE- EYES CLOSED  Instruction:  Step onto the incline ramp. Please stand on the incline ramp with your toes toward the top. Place your feet  shoulder width apart and have your arms down at your sides. I will start timing when you close your eyes.   Time in seconds:__>30s______  (2) Normal: Stands independently 30 s and aligns with gravity.  (1) Moderate: Stands independently <30 s OR aligns with surface.  (0) Severe: Unable.    DYNAMIC GAIT  10. CHANGE IN GAIT SPEED  Instruction:  Begin walking at your normal speed, when I tell you  fast , walk as fast as you can. When I say  slow , walk very  slowly.   (2) Normal: Significantly changes walking speed without imbalance.  (1) Moderate: Unable to change walking speed or signs of imbalance.  (0) Severe: Unable to achieve significant change in walking speed AND signs of imbalance.    11. WALK WITH HEAD TURNS - HORIZONTAL  Instruction:  Begin walking at your normal speed, when I say  right , turn your head and look to the right. When I say  left   turn your head and look to the left. Try to keep yourself walking in a straight line.   (2) Normal: performs head turns with no change in gait speed and good balance.  (1) Moderate: performs head turns with reduction in gait speed.  (0) Severe: performs head turns with imbalance.    12. WALK WITH PIVOT TURNS  Instruction:  Begin walking at your normal speed. When I tell you to  turn and stop , turn as quickly as you can, face the  opposite direction, and stop. After the turn, your feet should be close together.   (2) Normal: Turns with feet close FAST (< 3 steps) with good balance.  (1)  Moderate: Turns with feet close SLOW (>4 steps) with good balance.  (0) Severe: Cannot turn with feet close at any speed without imbalance.    13. STEP OVER OBSTACLES  Instruction:  Begin walking at your normal speed. When you get to the box, step over it, not around it and keep walking.   (2) Normal: Able to step over box with minimal change of gait speed and with good balance.  (1) Moderate: Steps over box but touches box OR displays cautious behavior by slowing gait.  (0) Severe: Unable to step over box OR steps around box.    14. TIMED UP & GO WITH DUAL TASK [3 METER WALK]  Instruction TUG:  When I say  Go , stand up from chair, walk at your normal speed across the tape on the floor, turn around,  and come back to sit in the chair.   Instruction TUG with Dual Task:  Count backwards by threes starting at ___. When I say  Go , stand up from chair, walk at  your normal speed across the tape on the floor, turn around, and come back to sit in the chair. Continue counting backwards  the entire time.   TUG: __7.78______seconds; Dual Task TUG: __9.66____seconds  (2) Normal: No noticeable change in sitting, standing or walking while backward counting when compared to TUG without  Dual Task.  (1) Moderate: Dual Task affects either counting OR walking (>10%) when compared to the TUG without Dual Task.  (0) Severe: Stops counting while walking OR stops walking while counting.    When scoring item 14, if subject s gait speed slows more than 10% between the TUG without and with a Dual Task the score  should be decreased by a point.    REACTIVE POSTURAL CONTROL SUB SCORE: 5/ 6  ANTICIPATORY SUB SCORE: 6/ 6  SENSORY ORIENTATION SUB SCORE: 6/ 6  DYNAMIC GAIT SUB SCORE: 8/10    TOTAL SCORE: ____25____/28      Mini-BESTest: The Mini-BESTest assesses reactive postural, anticipatory, sensory orientation, and dynamic gait balance.  Gait assistive device used: none     Patient Score: 25/28  Scores of <17.5/28 have identified people  with chronic stroke that have a history of falling according to Teri et al 2013.   Scores of <20/32 are predictive of increased falls risk for persons with Parkinsons Disease according to Sebastián et al 2012.    Minimally Detectable Change (MDC) for persons with Parkinsons Disease 5.52pts per Karen et al 2011.  Minimally Clinically Significant Difference (MCID) for persons with Balance Disorders: 4pts according to Vladislav et al 2013.    Assessment (rationale for performing, application to patient s function & care plan): Mini BEST completed to assess balance and progress. He had made great progress with compensatory stepping reactions and is able to recover balance with one large step in all directions.  Today he was challenged with balance on rise to toes due to foot/calf pain.  Additionally he was challenged with speed changes and divided attention areas.   Minutes billed as physical performance test: 15

## 2023-09-06 NOTE — PROGRESS NOTES
09/06/23 0500   Appointment Info   Signing clinician's name / credentials Hamilton Mortensen, PT, DPT, NCS   Visits Used 10   Medical Diagnosis Parkinson's Disease   PT Tx Diagnosis Gait abnormality, Falls, Impaired coordination.   Progress Note/Certification   Start of Care Date 07/20/23   Onset of illness/injury or Date of Surgery 07/18/23   Therapy Frequency 2-3x/week   Predicted Duration 90 days   Certification date from 07/20/23   Certification date to 10/17/23   Progress Note Completed Date 09/06/23   PT Goal 1   Goal Identifier HEP   Goal Description Client will be educated in Parkinson specific exercises to address rigidity, bradykinesia and other PD symptoms faithfully performing it at least 4 days per week.   Goal Progress Current HEP continues to be addressed and advanced   Target Date 10/17/23   PT Goal 2   Goal Identifier mini BESTest   Goal Description Client will demonstrate improved protective reactions scoring at 27/28 on the mini BESTest.   Target Date 10/17/23   Goal Progress Pt has made great progress with compensatory stepping reactions.  He demonstrated difficulty with divided attention, speed changes with gait and balance with rise to toes this date   PT Goal 3   Goal Identifier PD resources   Goal Description Client will be educated in PD resources as a newly dx PD patient so that he is aware of community resources.   Target Date 10/17/23   Goal Progress Pt continues to require instruction for community resources available and how to locate them   Subjective Report   Subjective Report Pt reports that he went to a soledad class yesterday and then walked for 2 miles.  His feet have been hurting and he continues to have calf pain.   Therapeutic Procedure/Exercise   Therapeutic Procedures: strength, endurance, ROM, flexibillity minutes (63005) 8   Ther Proc 1 HEP   Ther Proc 1 - Details Instruction in frequency and type of group exercise classes: strength/tone: 2x/week, Soledad 2x/wk, yoga: 1x/wk,  Walking 3x/wk.  Pt encouraged to focus on increasing strength and paula for added coordination focus.  Pt has goal of returning to dance.   Skilled Intervention In order to improve self management of symptoms and improve HEP   Neuromuscular Re-education   Neuromuscular re-ed of mvmt, balance, coord, kinesthetic sense, posture, proprioception minutes (54453) 20   Neuro Re-ed 2 Boxing   Neuro Re-ed 2 - Details Task switching: . Boxing and task switching with gloves and targets: boxing combo: jab, jab, punch x 10 reps on each side then progressed to adding random ducks to avoid moving targets x 5 bouts, alternating hooks x 20 reps, alternating upper cuts x 10 reps,   Neuro Re-ed 3 Coordination   Neuro Re-ed 3 - Details hand flicks x 10 reps - VC For full ROM.  High knee marching then added opposite hand to knee  - VC for slow march and hold leg up to improve timing of arm movement.  Backwards marching then added opposite hand to knee  - increased time for coordination.  Tandem walking x 10' - fair balance and coordination of foot placement.  Walking eyes closed - no path deviations.   Skilled Intervention cues for technique   Patient Response/Progress Pt improving with coordination with added repetition. Task switching is still delayed   Eval/Assessments   Assessments Physical Performance Test/Measures   Physical Performance Test/measures   Physical Performance Test/Measurement, Minutes (15679) 15   Physical Performance Test/Measurement Details mini BEST test completed 25/28   Skilled Intervention In order to assess progresss   Patient Response/Progress see progress note for details - pt improved with compensatory stepping reactions.  Foot/calf pain appears to be affecting balance with rise to toes   Education   Learner/Method Patient   Education Comments see above   Plan   Home program added doorway stretch and bow and arrow stretching to HEP   Plan for next session task switching, change of directions, divided  attention tasks.   Total Session Time   Timed Code Treatment Minutes 43   Total Treatment Time (sum of timed and untimed services) 43         PLAN  Continue therapy per current plan of care.    Beginning/End Dates of Progress Note Reporting Period:  7/20/23 to 09/06/2023    Referring Provider:  Asaf Craig

## 2023-09-13 ENCOUNTER — OFFICE VISIT (OUTPATIENT)
Dept: CARDIOLOGY | Facility: CLINIC | Age: 68
End: 2023-09-13
Attending: PSYCHIATRY & NEUROLOGY
Payer: COMMERCIAL

## 2023-09-13 ENCOUNTER — THERAPY VISIT (OUTPATIENT)
Dept: PHYSICAL THERAPY | Facility: CLINIC | Age: 68
End: 2023-09-13
Payer: COMMERCIAL

## 2023-09-13 VITALS
DIASTOLIC BLOOD PRESSURE: 88 MMHG | HEIGHT: 69 IN | HEART RATE: 60 BPM | SYSTOLIC BLOOD PRESSURE: 133 MMHG | BODY MASS INDEX: 28.87 KG/M2 | OXYGEN SATURATION: 97 % | WEIGHT: 194.9 LBS

## 2023-09-13 DIAGNOSIS — G20.A1 PARKINSON'S DISEASE (H): Primary | ICD-10-CM

## 2023-09-13 DIAGNOSIS — R26.89 IMPAIRED GAIT AND MOBILITY: ICD-10-CM

## 2023-09-13 DIAGNOSIS — R29.898 RIGIDITY: ICD-10-CM

## 2023-09-13 DIAGNOSIS — Z82.49 FAMILY HISTORY OF ISCHEMIC HEART DISEASE: ICD-10-CM

## 2023-09-13 DIAGNOSIS — R25.8 BRADYKINESIA: ICD-10-CM

## 2023-09-13 DIAGNOSIS — R06.02 SHORTNESS OF BREATH: Primary | ICD-10-CM

## 2023-09-13 PROCEDURE — 97112 NEUROMUSCULAR REEDUCATION: CPT | Mod: GP | Performed by: PHYSICAL THERAPIST

## 2023-09-13 PROCEDURE — 93000 ELECTROCARDIOGRAM COMPLETE: CPT | Performed by: INTERNAL MEDICINE

## 2023-09-13 PROCEDURE — 99203 OFFICE O/P NEW LOW 30 MIN: CPT | Performed by: INTERNAL MEDICINE

## 2023-09-13 NOTE — PATIENT INSTRUCTIONS
It was a pleasure seeing you today and thank you for allowing me to be a part of your health care team.  Should you have any questions regarding your visit or future needs please feel free to reach out to my care team for assistance.      Thank you, Dr. Marco A Resendiz        **Nursing: [phone]       **Scheduling: [phone]

## 2023-09-13 NOTE — PROGRESS NOTES
General Cardiology Clinic Progress Note  Chad Olivares MRN# [id number]   YOB: 1955 Age: 68 year old       Reason for visit: Shortness of breath    History of presenting illness:    I had the opportunity to see Chad Olivares at [facility] today for evaluation of shortness of breath and a strong family history of heart disease.  His brother, uncle, and mother all had ischemic heart disease at a young age.  He was recently diagnosed with Parkinson's disease and would like to participate in a program for Parkinson's patients that involves treadmill exercise.  His neurologist, Dr. Leroy, referred him for cardiology consultation to evaluate for cardiac causes of shortness of breath and to make sure that he is okay to participate in this exercise program.    Normally he walks several times a week, 2 to 3 miles at a time, without shortness of breath.  He also does yoga.  However, recently he has noticed that he suddenly needs to gasp for air.  This can occur at rest or with minimal exertion, without any clear cause.  He has had no chest pain.  He has some dizziness which sounds more like vertigo type symptoms and has had inner ear reconstruction in the past.  He has not had syncope.    In terms of cardiac risk factors, family history seems to be his only major risk factor.  He has no history of hypertension, dyslipidemia, or diabetes.  He quit smoking many years ago.    His examination is normal.  I reviewed his twelve-lead ECG which demonstrates normal sinus rhythm with a right bundle branch block but no evidence of ischemia.              Assessment and Plan:     ASSESSMENT:    Mr. Chad Olivares is a 68-year-old gentleman with recent diagnosis of Parkinson's disease.  He has had some sudden shortness of breath symptoms at times but is able to be active without significant shortness of breath or chest pain symptoms at other times.  He is looking for clearance to start participating in outpatient exercise  program.    He has a strong family history of cardiac disease and was a smoker but quit years ago.  He has no other cardiac risk factors.    I suggested that we do a stress echocardiogram to determine that he can exercise appropriately on the treadmill, evaluate left ventricular and valvular function, and rule out significant myocardial ischemia.  If that study looks normal, I would not recommend any further cardiac work-up.  I would recommend that he proceed with his exercise program and notify us if he develops any worsening shortness of breath problems.    Marco A Resendiz MD           Orders this Visit:  Orders Placed This Encounter   Procedures    EKG 12-lead complete w/read - Clinics (performed today)    Exercise Stress Echocardiogram     No orders of the defined types were placed in this encounter.    There are no discontinued medications.    Today's clinic visit entailed:  Review of the result(s) of each unique test - fasting lipid panel, basic metabolic panel, LFTs, TSH, CBC, ECG  The following tests were independently interpreted by me as noted in my documentation: Electrocardiogram  Ordering of each unique test  Prescription drug management  30 minutes spent by me on the date of the encounter doing chart review, history and exam, documentation and further activities per the note  Provider  Link to [facility] Help Grid     The level of medical decision making during this visit was of moderate complexity.           Review of Systems:     Review of Systems:  Skin:  Negative     Eyes:  Positive for glasses  ENT:  Negative    Respiratory:  Positive for sleep apnea  Cardiovascular:  chest pain;Negative;palpitations;dizziness;edema;fatigue;syncope or near-syncope Positive for;lightheadedness  Gastroenterology: Positive for heartburn  Genitourinary:  Positive for urinary frequency;nocturia  Musculoskeletal:  Negative    Neurologic:  Positive for    Psychiatric:  Negative    Heme/Lymph/Imm:  Positive for  "allergies  Endocrine:  Negative              Physical Exam:     Vitals: /88 (BP Location: Left arm, Patient Position: Sitting)   Pulse 60   Ht 1.753 m (5' 9\")   Wt 88.4 kg (194 lb 14.4 oz)   SpO2 97%   BMI 28.78 kg/m    Constitutional: Well nourished and in no apparent distress.  Eyes: Pupils equal, round. Sclerae anicteric.   HEENT: Normocephalic, atraumatic.   Neck: Supple. JVD   Respiratory: Breathing non-labored. Lungs clear to auscultation bilaterally. No crackles, wheezes, rhonchi, or rales.  Cardiovascular:  Regular rate and rhythm, normal S1 and S2. No murmur, rub, or gallop.  Skin: Warm, dry. No rashes, cyanosis, or xanthelasma.  Extremities: No edema.  Neurologic: No gross motor deficits. Alert, awake, and oriented to person, place and time.  Psychiatric: Affect appropriate.             Medications:     Current Outpatient Medications   Medication Sig Dispense Refill    HEMP OIL OR EXTRACT OR OTHER CBD CANNABINOID, NOT MEDICAL CANNABIS,       lamoTRIgine (LAMICTAL) 100 MG tablet Take 2 tablets (200 mg) by mouth 2 times daily 360 tablet 3    medical cannabis oral liquid  (Patient's own supply.  Not a prescription) (This is NOT a prescription, and does not certify that the patient has a qualifying medical condition for medical cannabis.  The purpose of this order is  to document that the patient reports taking medical cannabis.)         Family History   Problem Relation Age of Onset    Diabetes Mother         diet controlled, Htn    Heart Murmur Mother         TAVR    Dementia Mother         age 89    Coronary Artery Disease Brother     Diabetes Maternal Grandmother     ALS Maternal Cousin     Glaucoma No family hx of     Macular Degeneration No family hx of        Social History     Socioeconomic History    Marital status: Single     Spouse name: Not on file    Number of children: 2    Years of education: 14    Highest education level: Not on file   Occupational History    Occupation: real estate " [employer]     Comment: Self Employed    Occupation: contractor     Employer: SELF   Tobacco Use    Smoking status: Former     Types: Cigarettes     Quit date: 2005     Years since quittin.0    Smokeless tobacco: Never   Vaping Use    Vaping Use: Never used   Substance and Sexual Activity    Alcohol use: Yes     Comment: 1 drink every 2 wks    Drug use: No     Comment: uses CBD     Sexual activity: Yes     Partners: Female   Other Topics Concern    Parent/sibling w/ CABG, MI or angioplasty before 65F 55M? No     Service Not Asked    Blood Transfusions Not Asked    Caffeine Concern Not Asked     Comment: 3 cups of coffee a day    Occupational Exposure Not Asked    Hobby Hazards Not Asked    Sleep Concern Not Asked    Stress Concern Not Asked    Weight Concern Not Asked    Special Diet Not Asked    Back Care Not Asked    Exercise Not Asked     Comment: Exercise 2 to 3 times a week    Bike Helmet Not Asked    Seat Belt Not Asked    Self-Exams Not Asked   Social History Narrative    Balanced Diet - Yes    Osteoporosis Preventative measures-  Dairy servings per day: no servings daily takes soy milk and almond milk - 2 glasses/day     Regular Exercise -  Yes Describe dance 3 times weekly (salsa), bike ride, and paula    Dental Exam up - YES - Date:     Eye Exam - YES - Date:     Self Testicular Exam -  sometimes    Do you have any concerns about STD's -  Partner has hx of genital herpes    Abuse: Current or Past (Physical, Sexual or Emotional)- No    Do you feel safe in your environment - Yes    Guns stored in the home - Yes, locked away    Sunscreen used - No using coconut     Seatbelts used - Yes    Lipids - YES - Date: 06    Glucose -  NO    Colon Cancer Screening - Colonoscopy (date completed)    Hemoccults - NO    PSA - YES - Date: 06    Digital Rectal Exam - YES - Date: 06    Immunizations reviewed and up to date - Yes, last TD was 2001    2008, Dania Miller MA              Social Determinants of Health     Financial Resource Strain: Not on file   Food Insecurity: Not on file   Transportation Needs: Not on file   Physical Activity: Not on file   Stress: Not on file   Social Connections: Not on file   Intimate Partner Violence: Not on file   Housing Stability: Not on file            Past Medical History:     Past Medical History:   Diagnosis Date    Achilles bursitis or tendinitis 08/07/2008    Advanced directives, counseling/discussion 05/20/2015    Gave pt packet on 5/20/2015  Syeda Torres CMA      CARDIOVASCULAR SCREENING; LDL GOAL LESS THAN 160 10/12/2010    Cholesteatoma, unspecified     Colon polyps 04/2015    tubular villous and serrated adenoma    Complex sleep apnea syndrome     does not use CPAP    Dupuytren's contracture     Dyslexia     Dyspnea and respiratory abnormality 09/03/2014     Problem list name updated by automated process. 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 Provider to review    Parkinson's disease (H)     Plantar fascial fibromatosis 08/07/2008              Past Surgical History:     Past Surgical History:   Procedure Laterality Date    cholesteatoma surgery Left     COLONOSCOPY  10/09/2002; 2015    polyps - needs f/u 5 yrs     COLONOSCOPY N/A 7/20/2021    Procedure: COLONOSCOPY, WITH POLYPECTOMY;  Surgeon: Asaf Guajardo MD;  [place] [facility] [place]     REMOVAL OF TONSILS,<13 Y/O  1962    RELEASE DUPUYTRENS CONTRACTURE Right 8/26/2021    Procedure: Right fifth finger and palm Dupuytren's contracture release;  Surgeon: Caron Farrell MD;  [place] [facility] [place]              Allergies:   Gluten meal and Seasonal allergies       Data:   All laboratory data reviewed:    Recent  Labs   Lab Test 06/04/23  1707 07/27/22  0946 06/28/21  0750 06/23/21  0834 08/05/20  0953   LDL  --  130* 113* 145*  --    HDL  --  60 54 59  --    NHDL  --  144* 129 158*  --    CHOL  --  204* 183 216*  --    TRIG  --  71 81 60  --    TSH 2.44 2.34  --   --  1.64       Lab Results   Component Value Date    WBC 6.1 06/04/2023    WBC 6.2 06/28/2021    RBC 4.77 06/04/2023    RBC 4.94 06/28/2021    HGB 15.0 06/04/2023    HGB 15.3 06/28/2021    HCT 45.2 06/04/2023    HCT 46.0 06/28/2021    MCV 95 06/04/2023    MCV 93 06/28/2021    MCH 31.4 06/04/2023    MCH 31.0 06/28/2021    MCHC 33.2 06/04/2023    MCHC 33.3 06/28/2021    RDW 12.4 06/04/2023    RDW 12.1 06/28/2021     06/04/2023     06/28/2021       Lab Results   Component Value Date     06/04/2023     06/28/2021    POTASSIUM 4.4 06/04/2023    POTASSIUM 3.9 07/27/2022    POTASSIUM 4.2 06/28/2021    CHLORIDE 106 06/04/2023    CHLORIDE 112 (H) 07/27/2022    CHLORIDE 108 06/28/2021    CO2 24 06/04/2023    CO2 26 07/27/2022    CO2 27 06/28/2021    ANIONGAP 12 06/04/2023    ANIONGAP <1 (L) 07/27/2022    ANIONGAP 4 06/28/2021    GLC 97 06/04/2023     (H) 07/27/2022     (H) 06/28/2021    BUN 18.2 06/04/2023    BUN 15 07/27/2022    BUN 13 06/28/2021    CR 1.23 (H) 06/04/2023    CR 1.10 06/28/2021    GFRESTIMATED 64 06/04/2023    GFRESTIMATED 70 06/28/2021    GFRESTBLACK 81 06/28/2021    MARYJANE 8.9 06/04/2023    MARYJANE 8.8 06/28/2021      Lab Results   Component Value Date    AST 26 06/04/2023    AST 35 06/28/2021    ALT 29 06/04/2023    ALT 50 06/28/2021       Lab Results   Component Value Date    A1C 5.6 07/27/2022       No results found for: CRYSTAL SHERWOOD MD  [facility]

## 2023-09-13 NOTE — LETTER
9/13/2023    Physician No Ref-Primary  No address on file    RE: Chad Olivares       Dear Colleague,     I had the pleasure of seeing Chad Olivares in the [facility].    General Cardiology Clinic Progress Note  Chad Olivares MRN# [id number]   YOB: 1955 Age: 68 year old       Reason for visit: Shortness of breath    History of presenting illness:    I had the opportunity to see Chad Olivares at [facility] today for evaluation of shortness of breath and a strong family history of heart disease.  His brother, uncle, and mother all had ischemic heart disease at a young age.  He was recently diagnosed with Parkinson's disease and would like to participate in a program for Parkinson's patients that involves treadmill exercise.  His neurologist, Dr. Leroy, referred him for cardiology consultation to evaluate for cardiac causes of shortness of breath and to make sure that he is okay to participate in this exercise program.    Normally he walks several times a week, 2 to 3 miles at a time, without shortness of breath.  He also does yoga.  However, recently he has noticed that he suddenly needs to gasp for air.  This can occur at rest or with minimal exertion, without any clear cause.  He has had no chest pain.  He has some dizziness which sounds more like vertigo type symptoms and has had inner ear reconstruction in the past.  He has not had syncope.    In terms of cardiac risk factors, family history seems to be his only major risk factor.  He has no history of hypertension, dyslipidemia, or diabetes.  He quit smoking many years ago.    His examination is normal.  I reviewed his twelve-lead ECG which demonstrates normal sinus rhythm with a right bundle branch block but no evidence of ischemia.              Assessment and Plan:     ASSESSMENT:    Mr. Chad Olivares is a 68-year-old gentleman with recent diagnosis of Parkinson's disease.  He has had some sudden shortness of breath symptoms at  times but is able to be active without significant shortness of breath or chest pain symptoms at other times.  He is looking for clearance to start participating in outpatient exercise program.    He has a strong family history of cardiac disease and was a smoker but quit years ago.  He has no other cardiac risk factors.    I suggested that we do a stress echocardiogram to determine that he can exercise appropriately on the treadmill, evaluate left ventricular and valvular function, and rule out significant myocardial ischemia.  If that study looks normal, I would not recommend any further cardiac work-up.  I would recommend that he proceed with his exercise program and notify us if he develops any worsening shortness of breath problems.    Marco A Resendiz MD           Orders this Visit:  Orders Placed This Encounter   Procedures    EKG 12-lead complete w/read - Clinics (performed today)    Exercise Stress Echocardiogram     No orders of the defined types were placed in this encounter.    There are no discontinued medications.    Today's clinic visit entailed:  Review of the result(s) of each unique test - fasting lipid panel, basic metabolic panel, LFTs, TSH, CBC, ECG  The following tests were independently interpreted by me as noted in my documentation: Electrocardiogram  Ordering of each unique test  Prescription drug management  30 minutes spent by me on the date of the encounter doing chart review, history and exam, documentation and further activities per the note  Provider  Link to MDM Help Grid     The level of medical decision making during this visit was of moderate complexity.           Review of Systems:     Review of Systems:  Skin:  Negative     Eyes:  Positive for glasses  ENT:  Negative    Respiratory:  Positive for sleep apnea  Cardiovascular:  chest pain;Negative;palpitations;dizziness;edema;fatigue;syncope or near-syncope Positive for;lightheadedness  Gastroenterology: Positive for  "heartburn  Genitourinary:  Positive for urinary frequency;nocturia  Musculoskeletal:  Negative    Neurologic:  Positive for    Psychiatric:  Negative    Heme/Lymph/Imm:  Positive for allergies  Endocrine:  Negative              Physical Exam:     Vitals: /88 (BP Location: Left arm, Patient Position: Sitting)   Pulse 60   Ht 1.753 m (5' 9\")   Wt 88.4 kg (194 lb 14.4 oz)   SpO2 97%   BMI 28.78 kg/m    Constitutional: Well nourished and in no apparent distress.  Eyes: Pupils equal, round. Sclerae anicteric.   HEENT: Normocephalic, atraumatic.   Neck: Supple. JVD   Respiratory: Breathing non-labored. Lungs clear to auscultation bilaterally. No crackles, wheezes, rhonchi, or rales.  Cardiovascular:  Regular rate and rhythm, normal S1 and S2. No murmur, rub, or gallop.  Skin: Warm, dry. No rashes, cyanosis, or xanthelasma.  Extremities: No edema.  Neurologic: No gross motor deficits. Alert, awake, and oriented to person, place and time.  Psychiatric: Affect appropriate.             Medications:     Current Outpatient Medications   Medication Sig Dispense Refill    HEMP OIL OR EXTRACT OR OTHER CBD CANNABINOID, NOT MEDICAL CANNABIS,       lamoTRIgine (LAMICTAL) 100 MG tablet Take 2 tablets (200 mg) by mouth 2 times daily 360 tablet 3    medical cannabis oral liquid  (Patient's own supply.  Not a prescription) (This is NOT a prescription, and does not certify that the patient has a qualifying medical condition for medical cannabis.  The purpose of this order is  to document that the patient reports taking medical cannabis.)         Family History   Problem Relation Age of Onset    Diabetes Mother         diet controlled, Htn    Heart Murmur Mother         TAVR    Dementia Mother         age 89    Coronary Artery Disease Brother     Diabetes Maternal Grandmother     ALS Maternal Cousin     Glaucoma No family hx of     Macular Degeneration No family hx of        Social History     Socioeconomic History    Marital " status: Single     Spouse name: Not on file    Number of children: 2    Years of education: 14    Highest education level: Not on file   Occupational History    Occupation: real estate broker     Comment: Self Employed    Occupation: contractor     Employer: SELF   Tobacco Use    Smoking status: Former     Types: Cigarettes     Quit date: 2005     Years since quittin.0    Smokeless tobacco: Never   Vaping Use    Vaping Use: Never used   Substance and Sexual Activity    Alcohol use: Yes     Comment: 1 drink every 2 wks    Drug use: No     Comment: uses CBD     Sexual activity: Yes     Partners: Female   Other Topics Concern    Parent/sibling w/ CABG, MI or angioplasty before 65F 55M? No     Service Not Asked    Blood Transfusions Not Asked    Caffeine Concern Not Asked     Comment: 3 cups of coffee a day    Occupational Exposure Not Asked    Hobby Hazards Not Asked    Sleep Concern Not Asked    Stress Concern Not Asked    Weight Concern Not Asked    Special Diet Not Asked    Back Care Not Asked    Exercise Not Asked     Comment: Exercise 2 to 3 times a week    Bike Helmet Not Asked    Seat Belt Not Asked    Self-Exams Not Asked   Social History Narrative    Balanced Diet - Yes    Osteoporosis Preventative measures-  Dairy servings per day: no servings daily takes soy milk and almond milk - 2 glasses/day     Regular Exercise -  Yes Describe dance 3 times weekly (salsa), bike ride, and paula    Dental Exam up - YES - Date:     Eye Exam - YES - Date:     Self Testicular Exam -  sometimes    Do you have any concerns about STD's -  Partner has hx of genital herpes    Abuse: Current or Past (Physical, Sexual or Emotional)- No    Do you feel safe in your environment - Yes    Guns stored in the home - Yes, locked away    Sunscreen used - No using coconut     Seatbelts used - Yes    Lipids - YES - Date: 06    Glucose -  NO    Colon Cancer Screening - Colonoscopy (date completed)     Hemoccults - NO    PSA - YES - Date: 4-28-06    Digital Rectal Exam - YES - Date: 4-28-06    Immunizations reviewed and up to date - Yes, last TD was 11-5-2001 2008, Dania Miller MA             Social Determinants of Health     Financial Resource Strain: Not on file   Food Insecurity: Not on file   Transportation Needs: Not on file   Physical Activity: Not on file   Stress: Not on file   Social Connections: Not on file   Intimate Partner Violence: Not on file   Housing Stability: Not on file            Past Medical History:     Past Medical History:   Diagnosis Date    Achilles bursitis or tendinitis 08/07/2008    Advanced directives, counseling/discussion 05/20/2015    Gave pt packet on 5/20/2015  Syeda Torres CMA      CARDIOVASCULAR SCREENING; LDL GOAL LESS THAN 160 10/12/2010    Cholesteatoma, unspecified     Colon polyps 04/2015    tubular villous and serrated adenoma    Complex sleep apnea syndrome     does not use CPAP    Dupuytren's contracture     Dyslexia     Dyspnea and respiratory abnormality 09/03/2014     Problem list name updated by automated process. 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 Provider to review    Parkinson's disease (H)     Plantar fascial fibromatosis 08/07/2008              Past Surgical History:     Past Surgical History:   Procedure Laterality Date    cholesteatoma surgery Left     COLONOSCOPY  10/09/2002; 2015    polyps - needs f/u 5 yrs     COLONOSCOPY N/A 7/20/2021    Procedure: COLONOSCOPY, WITH POLYPECTOMY;  Surgeon: Asaf Guajardo MD;  [place] [facility] [place]     REMOVAL OF TONSILS,<11 Y/O  1962    RELEASE DUPUYTRENS CONTRACTURE Right 8/26/2021    Procedure: Right fifth finger and palm Dupuytren's  contracture release;  Surgeon: Caron Farrell MD;  [place] [facility] [place]              Allergies:   Gluten meal and Seasonal allergies       Data:   All laboratory data reviewed:    Recent Labs   Lab Test 06/04/23  1707 07/27/22  0946 06/28/21  0750 06/23/21  0834 08/05/20  0953   LDL  --  130* 113* 145*  --    HDL  --  60 54 59  --    NHDL  --  144* 129 158*  --    CHOL  --  204* 183 216*  --    TRIG  --  71 81 60  --    TSH 2.44 2.34  --   --  1.64       Lab Results   Component Value Date    WBC 6.1 06/04/2023    WBC 6.2 06/28/2021    RBC 4.77 06/04/2023    RBC 4.94 06/28/2021    HGB 15.0 06/04/2023    HGB 15.3 06/28/2021    HCT 45.2 06/04/2023    HCT 46.0 06/28/2021    MCV 95 06/04/2023    MCV 93 06/28/2021    MCH 31.4 06/04/2023    MCH 31.0 06/28/2021    MCHC 33.2 06/04/2023    MCHC 33.3 06/28/2021    RDW 12.4 06/04/2023    RDW 12.1 06/28/2021     06/04/2023     06/28/2021       Lab Results   Component Value Date     06/04/2023     06/28/2021    POTASSIUM 4.4 06/04/2023    POTASSIUM 3.9 07/27/2022    POTASSIUM 4.2 06/28/2021    CHLORIDE 106 06/04/2023    CHLORIDE 112 (H) 07/27/2022    CHLORIDE 108 06/28/2021    CO2 24 06/04/2023    CO2 26 07/27/2022    CO2 27 06/28/2021    ANIONGAP 12 06/04/2023    ANIONGAP <1 (L) 07/27/2022    ANIONGAP 4 06/28/2021    GLC 97 06/04/2023     (H) 07/27/2022     (H) 06/28/2021    BUN 18.2 06/04/2023    BUN 15 07/27/2022    BUN 13 06/28/2021    CR 1.23 (H) 06/04/2023    CR 1.10 06/28/2021    GFRESTIMATED 64 06/04/2023    GFRESTIMATED 70 06/28/2021    GFRESTBLACK 81 06/28/2021    MARYJANE 8.9 06/04/2023    MARYJANE 8.8 06/28/2021      Lab Results   Component Value Date    AST 26 06/04/2023    AST 35 06/28/2021    ALT 29 06/04/2023    ALT 50 06/28/2021       Lab Results   Component Value Date    A1C 5.6 07/27/2022       No results found for: CRYSTAL SHERWOOD MD  [facility]    Thank you for allowing me to participate in the care of your  patient.      Sincerely,     CRYSTAL RAHMAN MD     [facility]  cc:   Daniel Leroy MD  [address] [place] [id number]

## 2023-09-15 ENCOUNTER — THERAPY VISIT (OUTPATIENT)
Dept: OCCUPATIONAL THERAPY | Facility: CLINIC | Age: 68
End: 2023-09-15
Payer: COMMERCIAL

## 2023-09-15 DIAGNOSIS — R29.818 PARKINSONIAN FEATURES: Primary | ICD-10-CM

## 2023-09-15 DIAGNOSIS — Z78.9 DECREASED ACTIVITIES OF DAILY LIVING (ADL): ICD-10-CM

## 2023-09-15 PROCEDURE — 97110 THERAPEUTIC EXERCISES: CPT | Mod: GO

## 2023-09-15 PROCEDURE — 97535 SELF CARE MNGMENT TRAINING: CPT | Mod: GO

## 2023-09-16 ENCOUNTER — HEALTH MAINTENANCE LETTER (OUTPATIENT)
Age: 68
End: 2023-09-16

## 2023-09-18 ENCOUNTER — HOSPITAL ENCOUNTER (OUTPATIENT)
Dept: CARDIOLOGY | Facility: CLINIC | Age: 68
Discharge: HOME | End: 2023-09-18
Attending: INTERNAL MEDICINE | Admitting: INTERNAL MEDICINE
Payer: COMMERCIAL

## 2023-09-18 ENCOUNTER — CARE COORDINATION (OUTPATIENT)
Dept: CARDIOLOGY | Facility: CLINIC | Age: 68
End: 2023-09-18

## 2023-09-18 DIAGNOSIS — R06.02 SHORTNESS OF BREATH: ICD-10-CM

## 2023-09-18 PROCEDURE — 93017 CV STRESS TEST TRACING ONLY: CPT

## 2023-09-18 PROCEDURE — 93018 CV STRESS TEST I&R ONLY: CPT | Performed by: INTERNAL MEDICINE

## 2023-09-18 PROCEDURE — 93016 CV STRESS TEST SUPVJ ONLY: CPT | Performed by: INTERNAL MEDICINE

## 2023-09-18 PROCEDURE — 93321 DOPPLER ECHO F-UP/LMTD STD: CPT | Mod: 26 | Performed by: INTERNAL MEDICINE

## 2023-09-18 PROCEDURE — 93350 STRESS TTE ONLY: CPT | Mod: 26 | Performed by: INTERNAL MEDICINE

## 2023-09-18 PROCEDURE — 93325 DOPPLER ECHO COLOR FLOW MAPG: CPT | Mod: TC

## 2023-09-18 PROCEDURE — 93325 DOPPLER ECHO COLOR FLOW MAPG: CPT | Mod: 26 | Performed by: INTERNAL MEDICINE

## 2023-09-18 NOTE — PROGRESS NOTES
Stress echo results from 9/18/23 noted. Pt had 9/13/23 visit with  for shortness of breath and family hx of CAD. Will route update on results to . Johanne KATE September 18, 2023, 3:50 PM    Interpretation Summary  Exercise stress echocardiogram non-diagnostic for the evaluation of myocardial  ischemia.  Target heart rate not achieved.  Stress test terminated due to fatigue.  Normal resting LV function with EF of approximately 55-60%; at sub-maximal  stress LV function improves with EF of approximately 60-65%, with mild  decrease in chamber size.  Normal wall motion at rest and at sub-maximal stress.  Normal blood pressure response to sub-maximal exercise.  Rest ECG normal sinus rhythm with RBBB.  Stress ECG negative for myocardial ischemia. No arrhythmia.  No chest pain or symptoms concerning for angina with exercise.     On routine screening 2D echocardiogram and Doppler examination, normal  biventricular function, no significant valvular dysfunction, aortic root  normal in size.

## 2023-09-20 ENCOUNTER — THERAPY VISIT (OUTPATIENT)
Dept: PHYSICAL THERAPY | Facility: CLINIC | Age: 68
End: 2023-09-20
Payer: COMMERCIAL

## 2023-09-20 DIAGNOSIS — G20.A1 PARKINSON'S DISEASE (H): Primary | ICD-10-CM

## 2023-09-20 DIAGNOSIS — R26.89 IMPAIRED GAIT AND MOBILITY: ICD-10-CM

## 2023-09-20 DIAGNOSIS — R25.8 BRADYKINESIA: ICD-10-CM

## 2023-09-20 DIAGNOSIS — R29.898 RIGIDITY: ICD-10-CM

## 2023-09-20 PROCEDURE — 97112 NEUROMUSCULAR REEDUCATION: CPT | Mod: GP | Performed by: PHYSICAL THERAPIST

## 2023-09-22 ENCOUNTER — THERAPY VISIT (OUTPATIENT)
Dept: OCCUPATIONAL THERAPY | Facility: CLINIC | Age: 68
End: 2023-09-22
Payer: COMMERCIAL

## 2023-09-22 DIAGNOSIS — Z78.9 DECREASED ACTIVITIES OF DAILY LIVING (ADL): ICD-10-CM

## 2023-09-22 DIAGNOSIS — R29.818 PARKINSONIAN FEATURES: Primary | ICD-10-CM

## 2023-09-22 PROCEDURE — 97530 THERAPEUTIC ACTIVITIES: CPT | Mod: GO

## 2023-09-22 PROCEDURE — 97110 THERAPEUTIC EXERCISES: CPT | Mod: GO

## 2023-09-29 ENCOUNTER — THERAPY VISIT (OUTPATIENT)
Dept: OCCUPATIONAL THERAPY | Facility: CLINIC | Age: 68
End: 2023-09-29
Payer: COMMERCIAL

## 2023-09-29 DIAGNOSIS — R29.818 PARKINSONIAN FEATURES: Primary | ICD-10-CM

## 2023-09-29 DIAGNOSIS — Z78.9 DECREASED ACTIVITIES OF DAILY LIVING (ADL): ICD-10-CM

## 2023-09-29 PROCEDURE — 97530 THERAPEUTIC ACTIVITIES: CPT | Mod: GO

## 2023-09-29 NOTE — TELEPHONE ENCOUNTER
Although he didn't quite reach optimal HR during stress, there are no concerning features of the stress test that would suggest significant CAD. We had a low suspicion for heart disease before the test and did it mainly to make sure that he is ok to begin an exercise program. I am comfortable that his heart is normal. He can exercise without restriction and report any concerning symptoms to me. EE

## 2023-10-06 ENCOUNTER — THERAPY VISIT (OUTPATIENT)
Dept: OCCUPATIONAL THERAPY | Facility: CLINIC | Age: 68
End: 2023-10-06
Payer: COMMERCIAL

## 2023-10-06 DIAGNOSIS — Z78.9 DECREASED ACTIVITIES OF DAILY LIVING (ADL): ICD-10-CM

## 2023-10-06 DIAGNOSIS — R29.818 PARKINSONIAN FEATURES: Primary | ICD-10-CM

## 2023-10-06 PROCEDURE — 97530 THERAPEUTIC ACTIVITIES: CPT | Mod: GO

## 2023-10-10 ENCOUNTER — THERAPY VISIT (OUTPATIENT)
Dept: OCCUPATIONAL THERAPY | Facility: CLINIC | Age: 68
End: 2023-10-10
Payer: COMMERCIAL

## 2023-10-10 DIAGNOSIS — Z78.9 DECREASED ACTIVITIES OF DAILY LIVING (ADL): ICD-10-CM

## 2023-10-10 DIAGNOSIS — R29.818 PARKINSONIAN FEATURES: Primary | ICD-10-CM

## 2023-10-10 PROCEDURE — 97110 THERAPEUTIC EXERCISES: CPT | Mod: GO | Performed by: OCCUPATIONAL THERAPIST

## 2023-10-10 PROCEDURE — 97530 THERAPEUTIC ACTIVITIES: CPT | Mod: GO | Performed by: OCCUPATIONAL THERAPIST

## 2023-10-10 PROCEDURE — 97535 SELF CARE MNGMENT TRAINING: CPT | Mod: GO | Performed by: OCCUPATIONAL THERAPIST

## 2023-10-15 ENCOUNTER — MYC MEDICAL ADVICE (OUTPATIENT)
Dept: NEUROLOGY | Facility: CLINIC | Age: 68
End: 2023-10-15
Payer: COMMERCIAL

## 2023-10-23 NOTE — TELEPHONE ENCOUNTER
Emailed completed form to: [email or web address]    MYCHART sent to patient to inform of completion of form and that we sent it as requested.    Sylvia GIVENS CMA  [facility] - Neurology.

## 2023-10-26 ENCOUNTER — TELEPHONE (OUTPATIENT)
Dept: INTERNAL MEDICINE | Facility: CLINIC | Age: 68
End: 2023-10-26
Payer: COMMERCIAL

## 2023-10-26 NOTE — TELEPHONE ENCOUNTER
[facility] Call Center    Phone Message    May a detailed message be left on voicemail: yes     Reason for Call: Other: Flora (patients significant other) calling stating Chad is in [place] right now and having trouble with his prostate and would like to be referred to a Urologist here. Can you place that referral or do you want to see him first? Chad get's back from [place] Saturday 11/4. Please call Flora and advise.

## 2023-10-27 NOTE — TELEPHONE ENCOUNTER
Patient needs to be evaluated by Dr. Craig first.  Spot on hold:  Dr. Craig on 11/06/23 at 10:30 AM

## 2023-11-06 ENCOUNTER — PRE VISIT (OUTPATIENT)
Dept: UROLOGY | Facility: CLINIC | Age: 68
End: 2023-11-06

## 2023-11-06 ENCOUNTER — ANCILLARY PROCEDURE (OUTPATIENT)
Dept: ULTRASOUND IMAGING | Facility: CLINIC | Age: 68
End: 2023-11-06
Attending: INTERNAL MEDICINE
Payer: COMMERCIAL

## 2023-11-06 ENCOUNTER — OFFICE VISIT (OUTPATIENT)
Dept: INTERNAL MEDICINE | Facility: CLINIC | Age: 68
End: 2023-11-06
Payer: COMMERCIAL

## 2023-11-06 ENCOUNTER — LAB (OUTPATIENT)
Dept: LAB | Facility: CLINIC | Age: 68
End: 2023-11-06
Payer: COMMERCIAL

## 2023-11-06 VITALS
HEIGHT: 69 IN | HEART RATE: 66 BPM | SYSTOLIC BLOOD PRESSURE: 117 MMHG | OXYGEN SATURATION: 96 % | WEIGHT: 191.7 LBS | BODY MASS INDEX: 28.39 KG/M2 | DIASTOLIC BLOOD PRESSURE: 78 MMHG

## 2023-11-06 DIAGNOSIS — N13.9 OBSTRUCTIVE UROPATHY: ICD-10-CM

## 2023-11-06 DIAGNOSIS — N13.9 OBSTRUCTIVE UROPATHY: Primary | ICD-10-CM

## 2023-11-06 LAB
ALBUMIN UR-MCNC: 10 MG/DL
ANION GAP SERPL CALCULATED.3IONS-SCNC: 10 MMOL/L (ref 7–15)
APPEARANCE UR: CLEAR
BACTERIA #/AREA URNS HPF: ABNORMAL /HPF
BASOPHILS # BLD AUTO: 0 10E3/UL (ref 0–0.2)
BASOPHILS NFR BLD AUTO: 1 %
BILIRUB UR QL STRIP: NEGATIVE
BUN SERPL-MCNC: 13.1 MG/DL (ref 8–23)
CALCIUM SERPL-MCNC: 9.3 MG/DL (ref 8.8–10.2)
CHLORIDE SERPL-SCNC: 104 MMOL/L (ref 98–107)
COLOR UR AUTO: YELLOW
CREAT SERPL-MCNC: 1.15 MG/DL (ref 0.67–1.17)
DEPRECATED HCO3 PLAS-SCNC: 27 MMOL/L (ref 22–29)
EGFRCR SERPLBLD CKD-EPI 2021: 69 ML/MIN/1.73M2
EOSINOPHIL # BLD AUTO: 0.2 10E3/UL (ref 0–0.7)
EOSINOPHIL NFR BLD AUTO: 3 %
ERYTHROCYTE [DISTWIDTH] IN BLOOD BY AUTOMATED COUNT: 12.3 % (ref 10–15)
GLUCOSE SERPL-MCNC: 102 MG/DL (ref 70–99)
GLUCOSE UR STRIP-MCNC: NEGATIVE MG/DL
HCT VFR BLD AUTO: 45.2 % (ref 40–53)
HGB BLD-MCNC: 15.5 G/DL (ref 13.3–17.7)
HGB UR QL STRIP: ABNORMAL
IMM GRANULOCYTES # BLD: 0 10E3/UL
IMM GRANULOCYTES NFR BLD: 1 %
KETONES UR STRIP-MCNC: NEGATIVE MG/DL
LEUKOCYTE ESTERASE UR QL STRIP: ABNORMAL
LYMPHOCYTES # BLD AUTO: 1.4 10E3/UL (ref 0.8–5.3)
LYMPHOCYTES NFR BLD AUTO: 23 %
MCH RBC QN AUTO: 32 PG (ref 26.5–33)
MCHC RBC AUTO-ENTMCNC: 34.3 G/DL (ref 31.5–36.5)
MCV RBC AUTO: 93 FL (ref 78–100)
MONOCYTES # BLD AUTO: 0.4 10E3/UL (ref 0–1.3)
MONOCYTES NFR BLD AUTO: 7 %
MUCOUS THREADS #/AREA URNS LPF: PRESENT /LPF
NEUTROPHILS # BLD AUTO: 3.9 10E3/UL (ref 1.6–8.3)
NEUTROPHILS NFR BLD AUTO: 65 %
NITRATE UR QL: NEGATIVE
NRBC # BLD AUTO: 0 10E3/UL
NRBC BLD AUTO-RTO: 0 /100
PH UR STRIP: 6.5 [PH] (ref 5–7)
PLATELET # BLD AUTO: 266 10E3/UL (ref 150–450)
POTASSIUM SERPL-SCNC: 4.5 MMOL/L (ref 3.4–5.3)
RBC # BLD AUTO: 4.85 10E6/UL (ref 4.4–5.9)
RBC URINE: 96 /HPF
SODIUM SERPL-SCNC: 141 MMOL/L (ref 135–145)
SP GR UR STRIP: 1.02 (ref 1–1.03)
UROBILINOGEN UR STRIP-MCNC: NORMAL MG/DL
WBC # BLD AUTO: 5.9 10E3/UL (ref 4–11)
WBC URINE: 18 /HPF

## 2023-11-06 PROCEDURE — 87088 URINE BACTERIA CULTURE: CPT | Performed by: INTERNAL MEDICINE

## 2023-11-06 PROCEDURE — 99000 SPECIMEN HANDLING OFFICE-LAB: CPT | Performed by: PATHOLOGY

## 2023-11-06 PROCEDURE — 36415 COLL VENOUS BLD VENIPUNCTURE: CPT | Performed by: PATHOLOGY

## 2023-11-06 PROCEDURE — 76770 US EXAM ABDO BACK WALL COMP: CPT | Mod: GC | Performed by: RADIOLOGY

## 2023-11-06 PROCEDURE — 85025 COMPLETE CBC W/AUTO DIFF WBC: CPT | Performed by: PATHOLOGY

## 2023-11-06 PROCEDURE — 81001 URINALYSIS AUTO W/SCOPE: CPT | Performed by: PATHOLOGY

## 2023-11-06 PROCEDURE — 99214 OFFICE O/P EST MOD 30 MIN: CPT | Performed by: INTERNAL MEDICINE

## 2023-11-06 PROCEDURE — 80048 BASIC METABOLIC PNL TOTAL CA: CPT | Performed by: PATHOLOGY

## 2023-11-06 ASSESSMENT — PATIENT HEALTH QUESTIONNAIRE - PHQ9: SUM OF ALL RESPONSES TO PHQ QUESTIONS 1-9: 9

## 2023-11-06 NOTE — TELEPHONE ENCOUNTER
Reason for visit: BPH Consult     Relevant information:   BPH with urinary obstruction as noted by Dr. Craig at 11/6/23 Office Visit    Records/imaging/labs/orders:   CT Abdomen/Pelvis 6/4/23  US Renal 11/6/23  PSA: 7/27/2022    Pt called: Sandrine Ly LPN  11/6/2023  3:05 PM

## 2023-11-06 NOTE — TELEPHONE ENCOUNTER
MEDICAL RECORDS REQUEST   [facility]  Urology Clinic  [address] [place] [address]  PHONE: [phone]  Fax: [phone]        FUTURE VISIT INFORMATION                                                   Chad Olivares, : 1955 scheduled for future visit at [facility]    APPOINTMENT INFORMATION:  Date: 2023  Provider:  Roberto Altamirano MD  Reason for Visit/Diagnosis: BPH consult    REFERRAL INFORMATION:  Referring provider:  Dr. Craig      RECORDS REQUESTED FOR VISIT                                                     NOTES  STATUS/DETAILS   OFFICE NOTE from referring provider  yes, 2023, 2022 -- Asaf Craig MD @ [facility]     DISCHARGE REPORT from the ER  yes, 2023 -- [facility]   MEDICATION LIST  yes   BENIGN PROSTATIC HYPERPLASIA (BPH)     CT ABDOMEN/PELVIS (IMAGES & REPORT)  yes, 2023   US RENAL  yes, 2023   PSA  yes     PRE-VISIT CHECKLIST      Joint diagnostic appointment coordinated correctly          (ensure right order & amount of time) Yes   RECORD COLLECTION COMPLETE Yes

## 2023-11-06 NOTE — PROGRESS NOTES
Chad is a 68 year old that presents in clinic today for the following:     Chief Complaint   Patient presents with    Consult     Trouble with prostate, can't pee, doctor in [place] said prostate is big and hard could be calcium deposit            11/6/2023    10:06 AM   Additional Questions   Roomed by Sarita Saucedo       Screenings as of today     PHQ-9 Total Score 9        Sarita Saucedo MA at 10:20 AM on 11/6/2023      HPI  68-year-old presents today because of urinary obstruction.  He was recently at a retreat in [place] where he suffered complete urinary obstruction.  He was seen in the emergency room a catheter was placed.  Ultrasound was done of his bladder and prostate and was told he had a large prostate with calcium deposits.  A subsequent catheter manipulation resulted in some hematuria and apparent urinary infection.  He is treated with antibiotics and the catheter was replaced a week ago.  Its been in place since that time and has not had any additional symptoms or problems.  While in [place] he was started on tamsulosin 0.4 mg daily and finasteride 5 mg daily.  There is no apparent upper tract imaging.  Past Medical History:   Diagnosis Date    Achilles bursitis or tendinitis 08/07/2008    Advanced directives, counseling/discussion 05/20/2015    Gave pt packet on 5/20/2015  Syeda Torres CMA      CARDIOVASCULAR SCREENING; LDL GOAL LESS THAN 160 10/12/2010    Cholesteatoma, unspecified     Colon polyps 04/2015    tubular villous and serrated adenoma    Complex sleep apnea syndrome     does not use CPAP    Dupuytren's contracture     Dyslexia     Dyspnea and respiratory abnormality 09/03/2014     Problem list name updated by automated process. 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 Provider to review    Generalized tonic-clonic seizure (H) 2014    uncertain etiology    Memory loss 07/20/2016    Organic parasomnia  "09/03/2014     Problem list name updated by automated process. Provider to review    Parkinson's disease     Plantar fascial fibromatosis 08/07/2008     Past Surgical History:   Procedure Laterality Date    cholesteatoma surgery Left     COLONOSCOPY  10/09/2002; 2015    polyps - needs f/u 5 yrs     COLONOSCOPY N/A 7/20/2021    Procedure: COLONOSCOPY, WITH POLYPECTOMY;  Surgeon: Asaf Guajardo MD;  [place] [facility] [place]     REMOVAL OF TONSILS,<11 Y/O  1962    RELEASE DUPUYTRENS CONTRACTURE Right 8/26/2021    Procedure: Right fifth finger and palm Dupuytren's contracture release;  Surgeon: Caron Farrell MD;  [place] [facility] [place]     Family History   Problem Relation Age of Onset    Diabetes Mother         diet controlled, Htn    Heart Murmur Mother         TAVR    Dementia Mother         age 89    Coronary Artery Disease Brother     Diabetes Maternal Grandmother     ALS Maternal Cousin     Glaucoma No family hx of     Macular Degeneration No family hx of          Exam:  /78 (BP Location: Right arm, Patient Position: Sitting, Cuff Size: Adult Regular)   Pulse 66   Ht 1.753 m (5' 9\")   Wt 87 kg (191 lb 11.2 oz)   SpO2 96%   BMI 28.31 kg/m    191 lbs 11.2 oz  The patient is alert, oriented with a clear sensorium.   Skin shows no lesions or rashes and good turgor.   Head is normocephalic and atraumatic.      ASSESSMENT  1 BPH with urinary obstruction  2 Parkinson disease stable on exercise therapy  3 sleep apnea not on CPAP   4 hyperlipidemia   5 seizures in remission on lamotrigine  6 Colon polyps due for colonoscopy 2025  7 history of smoking    Plan  Discussed with urology and will schedule for a video visit tomorrow.  We will assess his renal function and CBC and UA today and try and get an ultrasound of his kidneys to rule out hydronephrosis.  This PSA was 3.2 last year will not repeat at this time in light of his current catheterization.    Over 30 minutes spent on the day of service in chart " review, patient contact, record completion and review and notification of lab reports    This note was completed using Dragon voice recognition software.      Asaf Craig MD  General Internal Medicine  [facility]  [phone]

## 2023-11-07 ENCOUNTER — VIRTUAL VISIT (OUTPATIENT)
Dept: UROLOGY | Facility: CLINIC | Age: 68
End: 2023-11-07
Payer: COMMERCIAL

## 2023-11-07 ENCOUNTER — PRE VISIT (OUTPATIENT)
Dept: UROLOGY | Facility: CLINIC | Age: 68
End: 2023-11-07

## 2023-11-07 VITALS — HEIGHT: 69 IN | WEIGHT: 185 LBS | BODY MASS INDEX: 27.4 KG/M2

## 2023-11-07 DIAGNOSIS — N40.0 ENLARGED PROSTATE: Primary | ICD-10-CM

## 2023-11-07 PROCEDURE — 99204 OFFICE O/P NEW MOD 45 MIN: CPT | Mod: 95 | Performed by: UROLOGY

## 2023-11-07 ASSESSMENT — PAIN SCALES - GENERAL: PAINLEVEL: NO PAIN (0)

## 2023-11-07 NOTE — PROGRESS NOTES
UROLOGY OUTPATIENT VISIT      Chief Complaint:   Urinary Retention      Synopsis    Chad Olivares is a very pleasant AGE: 68 year old year old person.  He is a retired .  He has a history of Parkinson's.  He was recently in [place] where he went into acute urinary retention and currently has a Max catheter.  He has had 1 urinary tract infection with the catheter which resulted in needing a catheter change.  He is currently tolerating the catheter adequately.    He is being referred from Dr. Parrish for discussion of definitive treatment.  He has been having obstructive lower urinary tract symptoms for several months.  He was tried on an alpha-blocker but this was causing him severe orthostasis so he stopped it.    He denies a personal history or family history of prostate cancer.  His last PSA was checked a year ago and was 3.2.    He has a CT scan from this past summer which I personally reviewed.  On my own read there does appear to be a fairly substantial intravesical component.  Prostate measures 103 gm on my own personal measurement.          Medications     Current Outpatient Medications   Medication    HEMP OIL OR EXTRACT OR OTHER CBD CANNABINOID, NOT MEDICAL CANNABIS,    lamoTRIgine (LAMICTAL) 100 MG tablet    medical cannabis oral liquid  (Patient's own supply.  Not a prescription)     No current facility-administered medications for this visit.         The following  distinct labs were reviewed    I personally reviewed all applicable laboratory data and went over findings with patient  Significant for:    CBC RESULTS:  Recent Labs   Lab Test 11/06/23  1128 06/04/23  1707 07/27/22  0946 06/28/21  0750   WBC 5.9 6.1 5.5 6.2   HGB 15.5 15.0 15.4 15.3    209 219 235        BMP RESULTS:  Recent Labs   Lab Test 11/06/23  1128 06/04/23  1707 10/18/22  0942 07/27/22  0946 06/28/21  0750 06/23/21  0834 08/05/20  0953 11/25/19  0715    142 140 136 139 137 140 139   POTASSIUM  4.5 4.4 4.2 3.9 4.2 4.0 4.0 4.1   CHLORIDE 104 106 106 112* 108 104 108 107   CO2 27 24 23 26 27 28 25 30   ANIONGAP 10 12 11 <1* 4 5 7 3   * 97 103* 100* 105* 96 105* 100*   BUN 13.1 18.2 15.8 15 13 19 14 13   CR 1.15 1.23* 1.00 1.13 1.10 1.26* 1.12 1.28*   GFRESTIMATED 69 64 82 71 70 59* 68 59*   GFRESTBLACK  --   --   --   --  81 68 79 68       CALCIUM RESULTS:  Recent Labs   Lab Test 11/06/23  1128 06/04/23  1707 10/18/22  0942 07/27/22  0946   MARYJANE 9.3 8.9 9.5 8.8     HGB A1C RESULTS:  Lab Results   Component Value Date    A1C 5.6 07/27/2022       UA RESULTS:   Recent Labs   Lab Test 11/06/23  1133 06/04/23  2034 08/05/20  1000   SG 1.019 1.010 1.026   URINEPH 6.5 7.0 6.0   NITRITE Negative Negative Negative   RBCU 96* <1 2   WBCU 18* 1 1       PSA RESULTS  PSA   Date Value Ref Range Status   06/28/2021 2.94 0 - 4 ug/L Final     Comment:     Assay Method:  Chemiluminescence using Siemens Vista analyzer   06/23/2021 3.39 0 - 4 ug/L Final     Comment:     Assay Method:  Chemiluminescence using Siemens Vista analyzer   11/25/2019 3.32 0 - 4 ug/L Final     Comment:     Assay Method:  Chemiluminescence using Siemens Vista analyzer   05/21/2018 2.42 0 - 4 ug/L Final     Comment:     Assay Method:  Chemiluminescence using Siemens Vista analyzer   05/27/2016 2.17 0 - 4 ug/L Final   06/13/2014 1.94 0 - 4 ug/L Final   04/28/2006 1.04 0 - 4 ug/L Final     Prostate Specific Antigen Screen   Date Value Ref Range Status   07/27/2022 3.21 0.00 - 4.00 ug/L Final         Recent Imaging Report    I personally reviewed all applicable imaging and went over the below findings with patient.    Results for orders placed or performed in visit on 11/06/23   US Renal Complete Non-Vascular    Narrative    EXAMINATION: US RENAL COMPLETE NON-VASCULAR, 11/6/2023 12:04 PM     COMPARISON: CT abdomen and pelvis 6/4/2023 and ultrasound 10/24/2022    HISTORY: Obstructive uropathy    TECHNIQUE: The kidneys and bladder were scanned in the  standard  fashion with specialized ultrasound transducer(s) using both gray  scale and limited color/spectral Doppler techniques.    FINDINGS:    Right kidney: Measures 10.1 cm in length. Parenchyma is of normal  thickness and echogenicity. No focal mass. No hydronephrosis.    Left kidney: Measures 10.1 cm in length. Parenchyma is of normal  thickness and echogenicity. No focal mass. No hydronephrosis.     Bladder: Urinary bladder is decompressed and contains a Max  catheter.    Prostatomegaly.      Impression    IMPRESSION:  Prostatomegaly, otherwise a normal renal ultrasound.    I have personally reviewed the examination and initial interpretation  and I agree with the findings.    CRUZITO ROE MD         SYSTEM ID:  [id number]            Assessment/Plan   68 year old year old person with history of Parkinson's, large prostate, urinary retention  -We discussed the various available management options of his retention including medical therapy, minimally invasive procedures, intermittent catheterization, continued catheterization, and surgery.  -Ultimately he expressed the greatest interest in laser enucleation.  We discussed the various risks benefits and alternatives to this approach including but not limited to bleeding, infection, transient incontinence, retrograde ejaculation, identification of incidental prostate cancer.  -We also discussed that he is at heightened risk for postoperative leakage given the history of Parkinson's.  He would prefer to have his indwelling catheter until surgery rather than try intermittent catheterization      CC:  No Ref-Primary, Physician              Virtual Visit Details    Type of service:  Video Visit   Video Start Time:  8:10  Video End Time:8:26 AM    Originating Location (pt. Location): Home    Distant Location (provider location):  On-site  Platform used for Video Visit: PalWell

## 2023-11-07 NOTE — NURSING NOTE
Is the patient currently in the state of [place]? YES    Visit mode:VIDEO    If the visit is dropped, the patient can be reconnected by: VIDEO VISIT: Text to cell phone:   Telephone Information:   Mobile [phone]       Will anyone else be joining the visit? NO  (If patient encounters technical issues they should call [phone]    How would you like to obtain your AVS? MyChart    Are changes needed to the allergy or medication list? No, Pt stated no changes to allergies, and Pt stated no med changes    Reason for visit: Consult    Haylee SEBASTIAN

## 2023-11-07 NOTE — LETTER
11/7/2023       RE: Chad Olivares  [address]  [facility] [address]     Dear Colleague,    Thank you for referring your patient, Chad Olivares, to the [facility] [place] at [facility]. Please see a copy of my visit note below.          UROLOGY OUTPATIENT VISIT      Chief Complaint:   Urinary Retention      Synopsis    Chad Olivares is a very pleasant AGE: 68 year old year old person.  He is a retired .  He has a history of Parkinson's.  He was recently in [place] where he went into acute urinary retention and currently has a Max catheter.  He has had 1 urinary tract infection with the catheter which resulted in needing a catheter change.  He is currently tolerating the catheter adequately.    He is being referred from Dr. Parrish for discussion of definitive treatment.  He has been having obstructive lower urinary tract symptoms for several months.  He was tried on an alpha-blocker but this was causing him severe orthostasis so he stopped it.    He denies a personal history or family history of prostate cancer.  His last PSA was checked a year ago and was 3.2.    He has a CT scan from this past summer which I personally reviewed.  On my own read there does appear to be a fairly substantial intravesical component.  Prostate measures 103 gm on my own personal measurement.          Medications     Current Outpatient Medications   Medication    HEMP OIL OR EXTRACT OR OTHER CBD CANNABINOID, NOT MEDICAL CANNABIS,    lamoTRIgine (LAMICTAL) 100 MG tablet    medical cannabis oral liquid  (Patient's own supply.  Not a prescription)     No current facility-administered medications for this visit.         The following  distinct labs were reviewed    I personally reviewed all applicable laboratory data and went over findings with patient  Significant for:    CBC RESULTS:  Recent Labs   Lab Test 11/06/23  1128 06/04/23  1707  07/27/22  0946 06/28/21  0750   WBC 5.9 6.1 5.5 6.2   HGB 15.5 15.0 15.4 15.3    209 219 235        BMP RESULTS:  Recent Labs   Lab Test 11/06/23  1128 06/04/23  1707 10/18/22  0942 07/27/22  0946 06/28/21  0750 06/23/21  0834 08/05/20  0953 11/25/19  0715    142 140 136 139 137 140 139   POTASSIUM 4.5 4.4 4.2 3.9 4.2 4.0 4.0 4.1   CHLORIDE 104 106 106 112* 108 104 108 107   CO2 27 24 23 26 27 28 25 30   ANIONGAP 10 12 11 <1* 4 5 7 3   * 97 103* 100* 105* 96 105* 100*   BUN 13.1 18.2 15.8 15 13 19 14 13   CR 1.15 1.23* 1.00 1.13 1.10 1.26* 1.12 1.28*   GFRESTIMATED 69 64 82 71 70 59* 68 59*   GFRESTBLACK  --   --   --   --  81 68 79 68       CALCIUM RESULTS:  Recent Labs   Lab Test 11/06/23  1128 06/04/23  1707 10/18/22  0942 07/27/22  0946   MARYJANE 9.3 8.9 9.5 8.8     HGB A1C RESULTS:  Lab Results   Component Value Date    A1C 5.6 07/27/2022       UA RESULTS:   Recent Labs   Lab Test 11/06/23  1133 06/04/23 2034 08/05/20  1000   SG 1.019 1.010 1.026   URINEPH 6.5 7.0 6.0   NITRITE Negative Negative Negative   RBCU 96* <1 2   WBCU 18* 1 1       PSA RESULTS  PSA   Date Value Ref Range Status   06/28/2021 2.94 0 - 4 ug/L Final     Comment:     Assay Method:  Chemiluminescence using Siemens Vista analyzer   06/23/2021 3.39 0 - 4 ug/L Final     Comment:     Assay Method:  Chemiluminescence using Siemens Vista analyzer   11/25/2019 3.32 0 - 4 ug/L Final     Comment:     Assay Method:  Chemiluminescence using Siemens Vista analyzer   05/21/2018 2.42 0 - 4 ug/L Final     Comment:     Assay Method:  Chemiluminescence using Siemens Vista analyzer   05/27/2016 2.17 0 - 4 ug/L Final   06/13/2014 1.94 0 - 4 ug/L Final   04/28/2006 1.04 0 - 4 ug/L Final     Prostate Specific Antigen Screen   Date Value Ref Range Status   07/27/2022 3.21 0.00 - 4.00 ug/L Final         Recent Imaging Report    I personally reviewed all applicable imaging and went over the below findings with patient.    Results for orders placed  or performed in visit on 11/06/23   US Renal Complete Non-Vascular    Narrative    EXAMINATION: US RENAL COMPLETE NON-VASCULAR, 11/6/2023 12:04 PM     COMPARISON: CT abdomen and pelvis 6/4/2023 and ultrasound 10/24/2022    HISTORY: Obstructive uropathy    TECHNIQUE: The kidneys and bladder were scanned in the standard  fashion with specialized ultrasound transducer(s) using both gray  scale and limited color/spectral Doppler techniques.    FINDINGS:    Right kidney: Measures 10.1 cm in length. Parenchyma is of normal  thickness and echogenicity. No focal mass. No hydronephrosis.    Left kidney: Measures 10.1 cm in length. Parenchyma is of normal  thickness and echogenicity. No focal mass. No hydronephrosis.     Bladder: Urinary bladder is decompressed and contains a Max  catheter.    Prostatomegaly.      Impression    IMPRESSION:  Prostatomegaly, otherwise a normal renal ultrasound.    I have personally reviewed the examination and initial interpretation  and I agree with the findings.    CRUZITO ROE MD         SYSTEM ID:  [id number]            Assessment/Plan   68 year old year old person with history of Parkinson's, large prostate, urinary retention  -We discussed the various available management options of his retention including medical therapy, minimally invasive procedures, intermittent catheterization, continued catheterization, and surgery.  -Ultimately he expressed the greatest interest in laser enucleation.  We discussed the various risks benefits and alternatives to this approach including but not limited to bleeding, infection, transient incontinence, retrograde ejaculation, identification of incidental prostate cancer.  -We also discussed that he is at heightened risk for postoperative leakage given the history of Parkinson's.  He would prefer to have his indwelling catheter until surgery rather than try intermittent catheterization      CC:  No Ref-Primary, Physician              Virtual Visit  Details    Type of service:  Video Visit   Video Start Time:  8:10  Video End Time:8:26 AM    Originating Location (pt. Location): Home    Distant Location (provider location):  On-site  Platform used for Video Visit: Amita Altamirano MD

## 2023-11-10 NOTE — TELEPHONE ENCOUNTER
FUTURE VISIT INFORMATION      SURGERY INFORMATION:  Date: 23  Location: ur or  Surgeon:  Roberto Altamirano MD   Anesthesia Type:  choice  Procedure: Holmium Laser Enucleation of the Prostate   Consult: virtual visit 23    RECORDS REQUESTED FROM:       Primary Care Provider:Asaf Craig MD - [facility]    Most recent EKG+ Tracin23    Most recent Cardiac Stress Test:

## 2023-11-13 ENCOUNTER — PRE VISIT (OUTPATIENT)
Dept: SURGERY | Facility: CLINIC | Age: 68
End: 2023-11-13

## 2023-11-14 ENCOUNTER — ALLIED HEALTH/NURSE VISIT (OUTPATIENT)
Dept: UROLOGY | Facility: CLINIC | Age: 68
End: 2023-11-14
Payer: COMMERCIAL

## 2023-11-14 DIAGNOSIS — N40.0 ENLARGED PROSTATE: Primary | ICD-10-CM

## 2023-11-14 PROCEDURE — 51702 INSERT TEMP BLADDER CATH: CPT

## 2023-11-14 RX ORDER — SULFAMETHOXAZOLE/TRIMETHOPRIM 800-160 MG
1 TABLET ORAL ONCE
Status: COMPLETED | OUTPATIENT
Start: 2023-11-14 | End: 2023-11-14

## 2023-11-14 RX ADMIN — SULFAMETHOXAZOLE AND TRIMETHOPRIM 1 TABLET: 800; 160 TABLET ORAL at 09:19

## 2023-11-14 NOTE — PROGRESS NOTES
Chad Olivares comes into clinic today at the request of Dr. Altamirano with the diagnosis of BPH with urinary retention for a catheter exchange. He reports his current catheter was placed 3 weeks ago and causing him discomfort    Order has been verified: Yes.    The following medication was given:     MEDICATION: Bactrim (Trimethoprim / Sulfamethoxazole)  ROUTE: PO  SITE: Medication was given orally  DOSE: 800mg/160mg  LOT #: [id number]  : [employer]  EXPIRATION DATE: 10/2024  NDC#: 5236-3381-25   Was there drug waste? No    Prior to medication administration, verified patient identity using patient's name and date of birth.  Due to medication administration, patient instructed to remain in clinic for 15 minutes  afterwards, and to report any adverse reaction to me immediately.      Allergies   Allergen Reactions    Gluten Meal     Seasonal Allergies        Removal:  16 Fr straight tipped latex keller catheter removed from urethral meatus without difficulty after removing 9 mL of fluid from the balloon.    Insertion:  16 Fr straight tipped latex keller catheter inserted into urethral meatus in the usual sterile fashion without difficulty.  Received > 25 ml yellow positive urine return.   Balloon filled with 10 mL sterile H2O after positive urine return.  Catheter secured in place with leg strap: Yes.     Patient did tolerate procedure well.     Patient instructed as to where to call or go for pain, fever, leakage, or decreased urine flow.     This service provided today was under the direct supervision of Dr. Altamirano, who was available if needed.    Mattie Encarnacion RN   11/14/2023  8:34 AM

## 2023-11-15 ENCOUNTER — TELEPHONE (OUTPATIENT)
Dept: UROLOGY | Facility: CLINIC | Age: 68
End: 2023-11-15
Payer: COMMERCIAL

## 2023-11-15 NOTE — TELEPHONE ENCOUNTER
Spoke with: Patient       Date of surgery: Thursday Dec 14 2023 with Dr Altamirano      Location: [facility]       Informed patient they will need a adult : YES      Pre op with provider: HAYLEE      H&P Scheduled in PAC- Patient is scheduled for a in person PAC EVAL on 11-28        Pre procedure covid : NA      Additional imaging: NA        Surgery Packet :Sent via [employer]      Additional comments: Please call for surgery teaching

## 2023-11-15 NOTE — TELEPHONE ENCOUNTER
Spoke with pt to schedule 6 week post op with TOREY after 12/14 surgery. Pt will be leaving [place] country on Jan 7th, and no dates/times avail. Will inform surgery scheduler to find an appropriate post op date.

## 2023-11-16 LAB
BACTERIA UR CULT: ABNORMAL
BACTERIA UR CULT: ABNORMAL

## 2023-11-16 NOTE — TELEPHONE ENCOUNTER
FUTURE VISIT INFORMATION      SURGERY INFORMATION:  Date: 23  Location: ur or  Surgeon:  Roberto Altamirano MD   Anesthesia Type:  choice  Procedure: Holmium Laser Enucleation of the Prostate     RECORDS REQUESTED FROM:       Primary Care Provider: Asaf Craig MD - [facility]    Most recent EKG+ Tracin23    Most recent Cardiac Stress Test: 23

## 2023-11-16 NOTE — TELEPHONE ENCOUNTER
FUTURE VISIT INFORMATION        SURGERY INFORMATION:  Date: 23  Location: ur or  Surgeon:  Roberto Altamirano MD   Anesthesia Type:  choice  Procedure: Holmium Laser Enucleation of the Prostate      RECORDS REQUESTED FROM:         Primary Care Provider: Asaf Craig MD - [facility]     Most recent EKG+ Tracin23     Most recent Cardiac Stress Test: 23

## 2023-11-28 ENCOUNTER — ANESTHESIA EVENT (OUTPATIENT)
Dept: SURGERY | Facility: CLINIC | Age: 68
End: 2023-11-28
Payer: COMMERCIAL

## 2023-11-28 ENCOUNTER — OFFICE VISIT (OUTPATIENT)
Dept: SURGERY | Facility: CLINIC | Age: 68
End: 2023-11-28
Payer: COMMERCIAL

## 2023-11-28 ENCOUNTER — PRE VISIT (OUTPATIENT)
Dept: SURGERY | Facility: CLINIC | Age: 68
End: 2023-11-28

## 2023-11-28 VITALS
SYSTOLIC BLOOD PRESSURE: 133 MMHG | HEIGHT: 69 IN | OXYGEN SATURATION: 97 % | WEIGHT: 195.2 LBS | HEART RATE: 74 BPM | RESPIRATION RATE: 16 BRPM | BODY MASS INDEX: 28.91 KG/M2 | DIASTOLIC BLOOD PRESSURE: 88 MMHG

## 2023-11-28 DIAGNOSIS — Z01.818 PRE-OP EVALUATION: Primary | ICD-10-CM

## 2023-11-28 PROCEDURE — 99214 OFFICE O/P EST MOD 30 MIN: CPT | Performed by: PHYSICIAN ASSISTANT

## 2023-11-28 RX ORDER — IBUPROFEN 200 MG
200-600 TABLET ORAL EVERY 4 HOURS PRN
COMMUNITY
End: 2024-02-22

## 2023-11-28 ASSESSMENT — LIFESTYLE VARIABLES: TOBACCO_USE: 0

## 2023-11-28 ASSESSMENT — PAIN SCALES - GENERAL: PAINLEVEL: NO PAIN (0)

## 2023-11-28 ASSESSMENT — ENCOUNTER SYMPTOMS: SEIZURES: 1

## 2023-11-28 NOTE — PATIENT INSTRUCTIONS
Preparing for Your Surgery      Name:  Chad Olivares   MRN:  [id number]   :  1955   Today's Date:  2023       Arriving for surgery:  Surgery date:  2023  Arrival time:  1:15 PM    Please come to:     Please come to:      [facility] [address]  [address] [place]  [address]  The Green Ramp for patients and visitors is located beneath the [facility]. The parking facility entrance is at the intersection of [address] [place] [address] and [address]. Patients and visitors who self-park will receive the reduced hospital parking rate (no ticket validation needed).  [employer] parking, located at the [facility] main entrance on [address] [place] [address], is available Monday - Friday from 7 am to 3:30 pm.  Discounted parking pass options can be purchased from  attendants during business hours.  -Check in at the security desk in the [facility] ([facility]) Lobby. They will direct you to the correct elevators.  -Proceed to the 3rd floor, check in at the Adult Surgery Waiting Lounge. [phone]  If you are in need of directions, a wheelchair or escort please stop at the Information Desk in the lobby.  Inform the information person that you are here for surgery; a wheelchair and escort to [address] will be provided.   An escort to the Adult Surgery Waiting Lounge will be provided.    What can I eat or drink?  -  You may eat and drink normally up to 8 hours prior to arrival time. (Until 5:15 AM)  -  You may have clear liquids until 2 hours prior to arrival time. (Until 11:15 AM)    Examples of clear liquids:  Water  Clear broth  Juices (apple, white grape, white cranberry  and cider) without pulp  Noncarbonated, powder based beverages  (lemonade and Nilesh-Aid)  Sodas (Sprite, 7-Up, ginger ale and seltzer)  Coffee or tea (without milk or cream)  Gatorade    ** No Alcohol or cannabis  products for at least 24 hours before surgery.     Which medicines can I take?    Hold Aspirin for 7 days before surgery.   Hold Multivitamins for 7 days before surgery.  Hold Supplements for 7 days before surgery.  **Hold Ibuprofen (Advil, Motrin) for 1 day(s) before surgery--unless otherwise directed by surgeon.  Hold Naproxen (Aleve) for 4 days before surgery.    -  PLEASE TAKE these medications the day of surgery:  Lamictal  Proscar  Flomax    How do I prepare myself?  - Please take 2 showers (one the night prior to surgery and one the morning of surgery) using Scrubcare or Hibiclens soap.    Use this soap only from the neck to your toes.     Leave the soap on your skin for one minute--then rinse thoroughly.      You may use your own shampoo and conditioner. No other hair products.   - Please remove all jewelry and body piercings.  - No lotions, deodorants or fragrance.  - No makeup or fingernail polish.   - Bring your ID and insurance card.    -If you use a CPAP machine, please bring the CPAP machine, tubing, and mask to hospital.    -If you have a Deep Brain Stimulator, Spinal Cord Stimulator, or any Neuro Stimulator device---you must bring the remote control to the hospital.      ALL PATIENTS GOING HOME THE SAME DAY OF SURGERY ARE REQUIRED TO HAVE A RESPONSIBLE ADULT TO DRIVE AND BE IN ATTENDANCE WITH THEM FOR 24 HOURS FOLLOWING SURGERY.    Covid testing policy as of 12/06/2022  Your surgeon will notify and schedule you for a COVID test if one is needed before surgery--please direct any questions or COVID symptoms to your surgeon      Questions or Concerns:    - For any questions regarding the day of surgery or your hospital stay, please contact the Pre Admission Nursing Office at [phone].       - If you have health changes between today and your surgery, please call your surgeon.       - For questions after surgery, please call your surgeons office.           Current Visitor Guidelines    You may have 2  visitors in the pre op area.    Visiting hours: 8 a.m. to 8:30 p.m.    You may have four visitors during your inpatient hospital stay.    Patients confirmed or suspected to have symptoms of COVID 19 or flu:     No visitors allowed for adult patients.   Children (under age 18) can have 1 named visitor.     People who are sick or showing symptoms of COVID 19 or flu:    Are not allowed to visit patients--we can only make exceptions in special situations.       Please follow these guidelines for your visit:          Please maintain social distance          Masking is optional--however at times you may be asked to wear a mask for the safety of yourself and others     Clean your hands with alcohol hand . Do this when you arrive at and leave the building and patient room,    And again after you touch your mask or anything in the room.     Go directly to and from the room you are visiting.     Stay in the patient s room during your visit. Limit going to other places in the hospital as much as possible     Leave bags and jackets at home or in the car.     For everyone s health, please don t come and go during your visit. That includes for smoking   during your visit.

## 2023-11-28 NOTE — H&P
Pre-Operative H & P     CC:  Preoperative exam to assess for increased cardiopulmonary risk while undergoing surgery and anesthesia.    Date of Encounter: 11/28/2023  Primary Care Physician:  No Ref-Primary, Physician     Reason for visit:   Encounter Diagnosis   Name Primary?    Pre-op evaluation Yes       HPI  Chad Olivares is a 68 year old male who presents for pre-operative H & P in preparation for  Procedure Information       Case: [id number] Date/Time: 12/14/23 1515    Procedure: Holmium Laser Enucleation of the Prostate (Urethra)    Anesthesia type: Choice    Diagnosis: Enlarged prostate [N40.0]    Pre-op diagnosis: Enlarged prostate [N40.0]    [facility]    Providers: Roberto Altamirano MD            Patient is being evaluated for comorbid conditions of epilepsy, sleep apnea, parkinson's, memory loss    Mr. Olivares went into acute urinary retention recently and now has a Max catheter. He followed up with Dr. Altamirano and is now scheduled for the above procedure.     History is obtained from the patient and chart review    Hx of abnormal bleeding or anti-platelet use: denies      Past Medical History  Past Medical History:   Diagnosis Date    Achilles bursitis or tendinitis 08/07/2008    Advanced directives, counseling/discussion 05/20/2015    Gave pt packet on 5/20/2015  Syeda Torres CMA      CARDIOVASCULAR SCREENING; LDL GOAL LESS THAN 160 10/12/2010    Cholesteatoma, unspecified     Colon polyps 04/2015    tubular villous and serrated adenoma    Complex sleep apnea syndrome     does not use CPAP    Dupuytren's contracture     Dyslexia     Dyspnea and respiratory abnormality 09/03/2014     Problem list name updated by automated process. 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 Provider to review    Parkinson's disease     Plantar fascial fibromatosis 08/07/2008       Past Surgical History  Past Surgical History:   Procedure Laterality Date    cholesteatoma surgery Left     COLONOSCOPY  10/09/2002; 2015    polyps - needs f/u 5 yrs     COLONOSCOPY N/A 7/20/2021    Procedure: COLONOSCOPY, WITH POLYPECTOMY;  Surgeon: Asaf Guajardo MD;  [place] [facility] [place]     REMOVAL OF TONSILS,<13 Y/O  1962    RELEASE DUPUYTRENS CONTRACTURE Right 8/26/2021    Procedure: Right fifth finger and palm Dupuytren's contracture release;  Surgeon: Caron Farrell MD;  [place] [facility] [place]       Prior to Admission Medications  Current Outpatient Medications   Medication Sig Dispense Refill    finasteride (PROSCAR) 5 MG tablet Take 1 tablet (5 mg) by mouth daily (Patient taking differently: Take 5 mg by mouth every morning) 60 tablet 4    HEMP OIL OR EXTRACT OR OTHER CBD CANNABINOID, NOT MEDICAL CANNABIS,       ibuprofen (ADVIL/MOTRIN) 200 MG tablet Take 200-600 mg by mouth every 4 hours as needed for pain      lamoTRIgine (LAMICTAL) 100 MG tablet Take 2 tablets (200 mg) by mouth 2 times daily 360 tablet 3    tamsulosin (FLOMAX) 0.4 MG capsule Take 1 capsule (0.4 mg) by mouth daily (Patient taking differently: Take 0.4 mg by mouth every morning) 60 capsule 1    medical cannabis oral liquid  (Patient's own supply.  Not a prescription) (This is NOT a prescription, and does not certify that the patient has a qualifying medical condition for medical cannabis.  The purpose of this order is  to document that the patient reports taking medical cannabis.) (Patient not taking: Reported on 11/6/2023)         Allergies  Allergies   Allergen Reactions    Gluten Meal     Seasonal Allergies        Social History  Social History     Socioeconomic History    Marital status: Single     Spouse name: Not on file    Number of children: 2    Years of education: 14     Highest education level: Not on file   Occupational History    Occupation: real estate broker     Comment: Self Employed    Occupation: contractor     Employer: SELF   Tobacco Use    Smoking status: Former     Types: Cigarettes     Quit date: 2005     Years since quittin.2    Smokeless tobacco: Never   Vaping Use    Vaping Use: Never used   Substance and Sexual Activity    Alcohol use: Not Currently    Drug use: No     Comment: uses CBD     Sexual activity: Yes     Partners: Female   Other Topics Concern    Parent/sibling w/ CABG, MI or angioplasty before 65F 55M? No     Service Not Asked    Blood Transfusions Not Asked    Caffeine Concern Not Asked     Comment: 3 cups of coffee a day    Occupational Exposure Not Asked    Hobby Hazards Not Asked    Sleep Concern Not Asked    Stress Concern Not Asked    Weight Concern Not Asked    Special Diet Not Asked    Back Care Not Asked    Exercise Not Asked     Comment: Exercise 2 to 3 times a week    Bike Helmet Not Asked    Seat Belt Not Asked    Self-Exams Not Asked   Social History Narrative    Balanced Diet - Yes    Osteoporosis Preventative measures-  Dairy servings per day: no servings daily takes soy milk and almond milk - 2 glasses/day     Regular Exercise -  Yes Describe dance 3 times weekly (salsa), bike ride, and paula    Dental Exam up - YES - Date:     Eye Exam - YES - Date:     Self Testicular Exam -  sometimes    Do you have any concerns about STD's -  Partner has hx of genital herpes    Abuse: Current or Past (Physical, Sexual or Emotional)- No    Do you feel safe in your environment - Yes    Guns stored in the home - Yes, locked away    Sunscreen used - No using coconut     Seatbelts used - Yes    Lipids - YES - Date: 06    Glucose -  NO    Colon Cancer Screening - Colonoscopy (date completed)    Hemoccults - NO    PSA - YES - Date: 06    Digital Rectal Exam - YES - Date: 06    Immunizations reviewed and up to date  - Yes, last TD was 11-5-2001 2008, Dania Miller MA             Social Determinants of Health     Financial Resource Strain: Low Risk  (11/6/2023)    Financial Resource Strain     Within the past 12 months, have you or your family members you live with been unable to get utilities (heat, electricity) when it was really needed?: No   Food Insecurity: Low Risk  (11/6/2023)    Food Insecurity     Within the past 12 months, did you worry that your food would run out before you got money to buy more?: No     Within the past 12 months, did the food you bought just not last and you didn t have money to get more?: No   Transportation Needs: Low Risk  (11/6/2023)    Transportation Needs     Within the past 12 months, has lack of transportation kept you from medical appointments, getting your medicines, non-medical meetings or appointments, work, or from getting things that you need?: No   Physical Activity: Not on file   Stress: Not on file   Social Connections: Not on file   Interpersonal Safety: Low Risk  (11/6/2023)    Interpersonal Safety     Do you feel physically and emotionally safe where you currently live?: Yes     Within the past 12 months, have you been hit, slapped, kicked or otherwise physically hurt by someone?: No     Within the past 12 months, have you been humiliated or emotionally abused in other ways by your partner or ex-partner?: No   Housing Stability: Low Risk  (11/6/2023)    Housing Stability     Do you have housing? : Yes     Are you worried about losing your housing?: No       Family History  Family History   Problem Relation Age of Onset    Diabetes Mother         diet controlled, Htn    Heart Murmur Mother         TAVR    Dementia Mother         age 89    Deep Vein Thrombosis (DVT) Mother     Coronary Artery Disease Brother     Diabetes Maternal Grandmother     ALS Maternal Cousin     Glaucoma No family hx of     Macular Degeneration No family hx of     Anesthesia Reaction No family hx of         Review of Systems  The complete review of systems is negative other than noted in the HPI or here.   Anesthesia Evaluation   Pt has had prior anesthetic.         ROS/MED HX  ENT/Pulmonary:     (+) sleep apnea (mouth guard),                                   (-) tobacco use   Neurologic:     (+)       seizures, last seizure: about 9 years ago,         Parkinson's disease, features: some right hand weakness, feels slower thinking,           (-) no CVA   Cardiovascular:     (+)  - -   -  - -                                 Previous cardiac testing   Echo: Date: Results:    Stress Test:  Date: 9/2023 Results:  Interpretation Summary  Exercise stress echocardiogram non-diagnostic for the evaluation of myocardial  ischemia.  Target heart rate not achieved.  Stress test terminated due to fatigue.  Normal resting LV function with EF of approximately 55-60%; at sub-maximal  stress LV function improves with EF of approximately 60-65%, with mild  decrease in chamber size.  Normal wall motion at rest and at sub-maximal stress.  Normal blood pressure response to sub-maximal exercise.  Rest ECG normal sinus rhythm with RBBB.  Stress ECG negative for myocardial ischemia. No arrhythmia.  No chest pain or symptoms concerning for angina with exercise.    ECG Reviewed:  Date: 9/2023 Results:  Sinus zane, RBBB  Cath:  Date: Results:   (-) taking anticoagulants/antiplatelets   METS/Exercise Tolerance: >4 METS Comment: Walks 2-3 miles, zoomba classes, yoga classes, boxing team    Hematologic:  - neg hematologic  ROS  (-) history of blood clots and history of blood transfusion   Musculoskeletal:  - neg musculoskeletal ROS     GI/Hepatic:  - neg GI/hepatic ROS  (-) GERD   Renal/Genitourinary: Comment: Enlarged prostate       Endo:  - neg endo ROS  (-) chronic steroid usage   Psychiatric/Substance Use:  - neg psychiatric ROS     Infectious Disease:  - neg infectious disease ROS     Malignancy:       Other:            /88 (BP  "Location: Right arm, Patient Position: Sitting, Cuff Size: Adult Regular)   Pulse 74   Resp 16   Ht 1.753 m (5' 9\")   Wt 88.5 kg (195 lb 3.2 oz)   SpO2 97%   BMI 28.83 kg/m      Physical Exam   Constitutional: Awake, alert, cooperative, no apparent distress, and appears stated age.  Eyes: Pupils equal, round and reactive to light, extra ocular muscles intact, sclera clear, conjunctiva normal.  HENT: Normocephalic, oral pharynx with moist mucus membranes, good dentition.   Respiratory: Clear to auscultation bilaterally, no crackles or wheezing.  Cardiovascular: Regular rate and rhythm, normal S1 and S2, and no murmur noted.  Carotids no bruits. No edema. Palpable pulses to radial arteries.   GI: Normal bowel sounds, soft, non-distended, non-tender  Genitourinary:  deferred. Catheter present.   Skin: Warm and dry.    Musculoskeletal: Full ROM of neck. There is no redness, warmth, or swelling of the exposed joints. Gross motor strength is normal.    Neurologic: Awake, alert, oriented to name, place and time. Cranial nerves II-XII are grossly intact. Gait is normal.   Neuropsychiatric: Calm, cooperative. Normal affect.     Prior Labs/Diagnostic Studies   All labs and imaging personally reviewed     Component      Latest Ref Rng 11/6/2023  11:28 AM   WBC      4.0 - 11.0 10e3/uL 5.9    RBC Count      4.40 - 5.90 10e6/uL 4.85    Hemoglobin      13.3 - 17.7 g/dL 15.5    Hematocrit      40.0 - 53.0 % 45.2    MCV      78 - 100 fL 93    MCH      26.5 - 33.0 pg 32.0    MCHC      31.5 - 36.5 g/dL 34.3    RDW      10.0 - 15.0 % 12.3    Platelet Count      150 - 450 10e3/uL 266    % Neutrophils      % 65    % Lymphocytes      % 23    % Monocytes      % 7    % Eosinophils      % 3    % Basophils      % 1    % Immature Granulocytes      % 1    NRBCs per 100 WBC      <1 /100 0    Absolute Neutrophils      1.6 - 8.3 10e3/uL 3.9    Absolute Lymphocytes      0.8 - 5.3 10e3/uL 1.4    Absolute Monocytes      0.0 - 1.3 10e3/uL 0.4  "   Absolute Eosinophils      0.0 - 0.7 10e3/uL 0.2    Absolute Basophils      0.0 - 0.2 10e3/uL 0.0    Absolute Immature Granulocytes      <=0.4 10e3/uL 0.0    Absolute NRBCs      10e3/uL 0.0    Sodium      135 - 145 mmol/L 141    Potassium      3.4 - 5.3 mmol/L 4.5    Chloride      98 - 107 mmol/L 104    Carbon Dioxide (CO2)      22 - 29 mmol/L 27    Anion Gap      7 - 15 mmol/L 10    Urea Nitrogen      8.0 - 23.0 mg/dL 13.1    Creatinine      0.67 - 1.17 mg/dL 1.15    GFR Estimate      >60 mL/min/1.73m2 69    Calcium      8.8 - 10.2 mg/dL 9.3    Glucose      70 - 99 mg/dL 102 (H)       Legend:  (H) High    EKG/ stress test - if available please see in ROS above   Echo result w/o MOPS: Interpretation SummaryGlobal and regional left ventricular function is normal with an EF of 55-60%.Right ventricular function, chamber size, wall motion, and thickness arenormal.Both atria appear normal.Mild dilatation of the aorta is present.Ascending aorta 4 cm.The inferior vena cava is normal.No pericardial effusion is present.Previous study not available for comparison.           No data to display                  The patient's records and results personally reviewed by this provider.     Outside records reviewed from: Care Everywhere    LAB/DIAGNOSTIC STUDIES TODAY:  none    Assessment    Chad Olivares is a 68 year old male seen as a PAC referral for risk assessment and optimization for anesthesia.    Plan/Recommendations  Pt will be optimized for the proposed procedure.  See below for details on the assessment, risk, and preoperative recommendations    NEUROLOGY  - History of Seizure- 2 about 9 years ago  -denies CVA history  -recently diagnosed with Parkinson's   -Post Op delirium risk factors:  No risk identified    ENT  - No current airway concerns.  Will need to be reassessed day of surgery.  Mallampati: I  TM: > 3    CARDIAC  - No history of CAD, Hypertension, and Afib  Was recently seen by cardiology for intermittent SOB.  "Able to walk 2 blocks without exertional symptoms. Will randomly feel short of breath- sometimes at rest and sometime with movement. Completed exercise stress test- non-diagnostic, but negative for ischemia at 7 mets. Per result note, \"  Although he didn't quite reach optimal heart rate during stress, there are no concerning features of the stress test that would suggest significant CAD. We had a low suspicion for heart disease before the test and did it mainly to make sure that he is ok to begin an exercise program. I am comfortable that his heart is normal. He can exercise without restriction and report any concerning symptoms to me\"     - METS (Metabolic Equivalents)  Patient performs 4 or more METS exercise without symptoms            Total Score: -        Criteria with incomplete data:    Functional Capacity: Unable to complete 4 METS      - This score is not calculated because of inadequate data     RCRI-Very low risk: Class 1 0.4% complication rate            Total Score: -        Criteria with incomplete data:    RCRI: High Risk Surgery    RCRI: CAD    RCRI: CHF    RCRI: Cerebrovascular Disease     RCRI: Diabetes    RCRI: Elevated Creatinine      - This score is not calculated because of inadequate data       PULMONARY  - Obstructive Sleep Apnea  HUSSAIN without home CPAP use.  - Denies asthma or inhaler use  - Tobacco History    History   Smoking Status    Former    Types: Cigarettes    Quit date: 8/26/2005   Smokeless Tobacco    Never       GI  PONV Low Risk  Total Score: 1           1 AN PONV: Patient is not a current smoker        /RENAL  - Baseline Creatinine 1.15  -enlarged prostate. Indwelling catheter present. Above procedure planned     ENDOCRINE    - BMI: Estimated body mass index is 28.83 kg/m  as calculated from the following:    Height as of this encounter: 1.753 m (5' 9\").    Weight as of this encounter: 88.5 kg (195 lb 3.2 oz).  Overweight (BMI 25.0-29.9)  - No history of Diabetes " Mellitus    HEME  VTE Low Risk 0.26%            Total Score: 0      - No history of abnormal bleeding or antiplatelet use.      Different anesthesia methods/types have been discussed with the patient, but they are aware that the final plan will be decided by the assigned anesthesia provider on the date of service.    The patient is optimized for their procedure. AVS with information on surgery time/arrival time, meds and NPO status given by nursing staff. No further diagnostic testing indicated.      On the day of service:     Prep time: 15 minutes  Visit time: 14 minutes  Documentation time: 5 minutes  ------------------------------------------  Total time: 34 minutes      Fabiana Bangura PA-C  Preoperative Assessment Center  [facility]  Phone: [phone]  Fax: [phone]

## 2023-12-01 ENCOUNTER — TELEPHONE (OUTPATIENT)
Dept: UROLOGY | Facility: CLINIC | Age: 68
End: 2023-12-01
Payer: COMMERCIAL

## 2023-12-01 DIAGNOSIS — N40.0 ENLARGED PROSTATE: Primary | ICD-10-CM

## 2023-12-02 NOTE — TELEPHONE ENCOUNTER
Left message for pt to call back for surgery teaching. Advised we need to repeat UC prior to procedure, and that I can set up a nurse visit with me to do this. Direct call back number provided.    LAVINIA Phelps  Care Coordinator- Urology   [phone]

## 2023-12-04 ENCOUNTER — ALLIED HEALTH/NURSE VISIT (OUTPATIENT)
Dept: UROLOGY | Facility: CLINIC | Age: 68
End: 2023-12-04
Payer: COMMERCIAL

## 2023-12-04 DIAGNOSIS — N40.0 ENLARGED PROSTATE: Primary | ICD-10-CM

## 2023-12-04 PROCEDURE — 87086 URINE CULTURE/COLONY COUNT: CPT | Performed by: UROLOGY

## 2023-12-04 PROCEDURE — 99000 SPECIMEN HANDLING OFFICE-LAB: CPT | Performed by: PATHOLOGY

## 2023-12-04 PROCEDURE — 99211 OFF/OP EST MAY X REQ PHY/QHP: CPT

## 2023-12-04 NOTE — PROGRESS NOTES
Pt seen in clinic to collect urine culture from keller catheter. Pt notes catheter is draining well. He reports occasional bladder spasms related to his catheter.     Urine collected and sent to lab.    LAVINIA Phelps  Care Coordinator- Urology   [phone]

## 2023-12-08 ENCOUNTER — TELEPHONE (OUTPATIENT)
Dept: UROLOGY | Facility: CLINIC | Age: 68
End: 2023-12-08
Payer: COMMERCIAL

## 2023-12-08 DIAGNOSIS — R33.9 URINARY RETENTION: ICD-10-CM

## 2023-12-08 DIAGNOSIS — N40.0 ENLARGED PROSTATE: Primary | ICD-10-CM

## 2023-12-08 LAB
BACTERIA UR CULT: ABNORMAL

## 2023-12-08 RX ORDER — NITROFURANTOIN 25; 75 MG/1; MG/1
100 CAPSULE ORAL 2 TIMES DAILY
Qty: 12 CAPSULE | Refills: 0 | Status: ON HOLD | OUTPATIENT
Start: 2023-12-08 | End: 2023-12-15

## 2023-12-08 RX ORDER — CIPROFLOXACIN 500 MG/1
500 TABLET, FILM COATED ORAL 2 TIMES DAILY
Qty: 12 TABLET | Refills: 0 | Status: ON HOLD | OUTPATIENT
Start: 2023-12-08 | End: 2023-12-15

## 2023-12-08 NOTE — TELEPHONE ENCOUNTER
Spoke with pt and reviewed ucx results. Sent rx for macrobid and cipro to pt's preferred pharmacy and advised he should take both medications BID until time of surgery.     Reviewed date/time of procedure, NPO guidelines, showering with hibiclens etc. Pt aware he will stay overnight. He has a /support person.    Hold OTC viatmins/supplements 7 days prior to surgery.     Not on anticoagulation.     LAVINIA Phelps  Care Coordinator- Urology   [phone]

## 2023-12-12 ENCOUNTER — TELEPHONE (OUTPATIENT)
Dept: NEUROLOGY | Facility: CLINIC | Age: 68
End: 2023-12-12
Payer: COMMERCIAL

## 2023-12-12 NOTE — TELEPHONE ENCOUNTER
Received Physician Verification and Medical Release Form. Completed by provider on 10/17/23.    Faxed back.    Sylvia Lorenzana MA  [facility]

## 2023-12-13 RX ORDER — HALOPERIDOL 5 MG/ML
1 INJECTION INTRAMUSCULAR
Status: CANCELLED | OUTPATIENT
Start: 2023-12-13

## 2023-12-13 RX ORDER — OXYCODONE HYDROCHLORIDE 5 MG/1
10 TABLET ORAL
Status: CANCELLED | OUTPATIENT
Start: 2023-12-13

## 2023-12-13 RX ORDER — OXYCODONE HYDROCHLORIDE 5 MG/1
5 TABLET ORAL
Status: CANCELLED | OUTPATIENT
Start: 2023-12-13

## 2023-12-13 RX ORDER — ONDANSETRON 2 MG/ML
4 INJECTION INTRAMUSCULAR; INTRAVENOUS EVERY 30 MIN PRN
Status: CANCELLED | OUTPATIENT
Start: 2023-12-13

## 2023-12-13 RX ORDER — FENTANYL CITRATE 50 UG/ML
25 INJECTION, SOLUTION INTRAMUSCULAR; INTRAVENOUS
Status: CANCELLED | OUTPATIENT
Start: 2023-12-13

## 2023-12-13 RX ORDER — ACETAMINOPHEN 325 MG/1
975 TABLET ORAL ONCE
Status: CANCELLED | OUTPATIENT
Start: 2023-12-13 | End: 2023-12-13

## 2023-12-13 RX ORDER — ONDANSETRON 4 MG/1
4 TABLET, ORALLY DISINTEGRATING ORAL EVERY 30 MIN PRN
Status: CANCELLED | OUTPATIENT
Start: 2023-12-13

## 2023-12-13 ASSESSMENT — ENCOUNTER SYMPTOMS: SEIZURES: 1

## 2023-12-13 ASSESSMENT — LIFESTYLE VARIABLES: TOBACCO_USE: 0

## 2023-12-13 NOTE — ANESTHESIA PREPROCEDURE EVALUATION
Anesthesia Pre-Procedure Evaluation    Patient: Chad Olivares   MRN: [id number] : 1955        Procedure : Procedure(s):  Holmium Laser Enucleation of the Prostate          Past Medical History:   Diagnosis Date    Achilles bursitis or tendinitis 2008    Advanced directives, counseling/discussion 2015    Gave pt packet on 2015  Syeda Torres CMA      CARDIOVASCULAR SCREENING; LDL GOAL LESS THAN 160 10/12/2010    Cholesteatoma, unspecified     Colon polyps 2015    tubular villous and serrated adenoma    Complex sleep apnea syndrome     does not use CPAP    Dupuytren's contracture     Dyslexia     Dyspnea and respiratory abnormality 2014     Problem list name updated by automated process. Provider to review    Generalized convulsive epilepsy (H) 2014     Problem list name updated by automated process. Provider to review    Generalized tonic-clonic seizure (H)     uncertain etiology    Memory loss 2016    Organic parasomnia 2014     Problem list name updated by automated process. Provider to review    Parkinson's disease     Plantar fascial fibromatosis 2008      Past Surgical History:   Procedure Laterality Date    cholesteatoma surgery Left     COLONOSCOPY  10/09/2002;     polyps - needs f/u 5 yrs     COLONOSCOPY N/A 2021    Procedure: COLONOSCOPY, WITH POLYPECTOMY;  Surgeon: Asaf Guajardo MD;  [place] [facility] [place]    [place] REMOVAL OF TONSILS,<11 Y/O      RELEASE DUPUYTRENS CONTRACTURE Right 2021    Procedure: Right fifth finger and palm Dupuytren's contracture release;  Surgeon: Caron Farrell MD;  [place] [facility] [place]      Allergies   Allergen Reactions    Gluten Meal     Seasonal Allergies       Social History     Tobacco Use    Smoking status: Former     Types: Cigarettes     Quit date: 2005     Years since quittin.3    Smokeless tobacco: Never   Substance Use Topics    Alcohol use: Not Currently      Wt Readings from  Last 1 Encounters:   11/28/23 88.5 kg (195 lb 3.2 oz)        Anesthesia Evaluation   Pt has had prior anesthetic.         ROS/MED HX  ENT/Pulmonary:     (+) sleep apnea (mouth guard),                                   (-) tobacco use   Neurologic:     (+)       seizures, last seizure: about 9 years ago,         Parkinson's disease, features: some right hand weakness, feels slower thinking,           (-) no CVA   Cardiovascular:     (+)  - -   -  - -                                 Previous cardiac testing   Echo: Date: Results:    Stress Test:  Date: 9/2023 Results:  Interpretation Summary  Exercise stress echocardiogram non-diagnostic for the evaluation of myocardial  ischemia.  Target heart rate not achieved.  Stress test terminated due to fatigue.  Normal resting LV function with EF of approximately 55-60%; at sub-maximal  stress LV function improves with EF of approximately 60-65%, with mild  decrease in chamber size.  Normal wall motion at rest and at sub-maximal stress.  Normal blood pressure response to sub-maximal exercise.  Rest ECG normal sinus rhythm with RBBB.  Stress ECG negative for myocardial ischemia. No arrhythmia.  No chest pain or symptoms concerning for angina with exercise.    ECG Reviewed:  Date: 9/2023 Results:  Sinus zane, RBBB  Cath:  Date: Results:   (-) taking anticoagulants/antiplatelets   METS/Exercise Tolerance: >4 METS Comment: Walks 2-3 miles, zoomba classes, yoga classes, boxing team    Hematologic:  - neg hematologic  ROS  (-) history of blood clots and history of blood transfusion   Musculoskeletal:  - neg musculoskeletal ROS     GI/Hepatic:  - neg GI/hepatic ROS  (-) GERD   Renal/Genitourinary: Comment: Enlarged prostate       Endo:  - neg endo ROS  (-) chronic steroid usage   Psychiatric/Substance Use:  - neg psychiatric ROS     Infectious Disease:  - neg infectious disease ROS     Malignancy:       Other:            Physical Exam    Airway        Mallampati: II   TM distance: >  "3 FB   Neck ROM: full   Mouth opening: > 3 cm    Respiratory Devices and Support         Dental       (+) Minor Abnormalities - some fillings, tiny chips      Cardiovascular   cardiovascular exam normal          Pulmonary   pulmonary exam normal                OUTSIDE LABS:  CBC:   Lab Results   Component Value Date    WBC 5.9 11/06/2023    WBC 6.1 06/04/2023    HGB 15.5 11/06/2023    HGB 15.0 06/04/2023    HCT 45.2 11/06/2023    HCT 45.2 06/04/2023     11/06/2023     06/04/2023     BMP:   Lab Results   Component Value Date     11/06/2023     06/04/2023    POTASSIUM 4.5 11/06/2023    POTASSIUM 4.4 06/04/2023    CHLORIDE 104 11/06/2023    CHLORIDE 106 06/04/2023    CO2 27 11/06/2023    CO2 24 06/04/2023    BUN 13.1 11/06/2023    BUN 18.2 06/04/2023    CR 1.15 11/06/2023    CR 1.23 (H) 06/04/2023     (H) 11/06/2023    GLC 97 06/04/2023     COAGS: No results found for: \"PTT\", \"INR\", \"FIBR\"  POC: No results found for: \"BGM\", \"HCG\", \"HCGS\"  HEPATIC:   Lab Results   Component Value Date    ALBUMIN 4.4 06/04/2023    PROTTOTAL 6.7 06/04/2023    ALT 29 06/04/2023    AST 26 06/04/2023    ALKPHOS 100 06/04/2023    BILITOTAL 0.4 06/04/2023     OTHER:   Lab Results   Component Value Date    A1C 5.6 07/27/2022    MARYJANE 9.3 11/06/2023    MAG 1.9 06/04/2023    LIPASE 30 06/04/2023    TSH 2.44 06/04/2023    SED 1 05/27/2016       Anesthesia Plan    ASA Status:  3       Anesthesia Type: General.     - Airway: LMA   Induction: Intravenous.   Maintenance: Inhalation.   Techniques and Equipment:     - Lines/Monitors: BIS     - Blood: T&S     Consents            Postoperative Care    Pain management: IV analgesics, Oral pain medications, Multi-modal analgesia.   PONV prophylaxis: Dexamethasone or Solumedrol, Ondansetron (or other 5HT-3)     Comments:               Dana Velázquez MD    I have reviewed the pertinent notes and labs in the chart from the past 30 days and (re)examined the patient.  Any updates or " "changes from those notes are reflected in this note.              # Overweight: Estimated body mass index is 28.83 kg/m  as calculated from the following:    Height as of 11/28/23: 1.753 m (5' 9\").    Weight as of 11/28/23: 88.5 kg (195 lb 3.2 oz).      "

## 2023-12-14 ENCOUNTER — HOSPITAL ENCOUNTER (OUTPATIENT)
Facility: CLINIC | Age: 68
Discharge: HOME | End: 2023-12-15
Attending: UROLOGY | Admitting: UROLOGY
Payer: COMMERCIAL

## 2023-12-14 ENCOUNTER — ANESTHESIA (OUTPATIENT)
Dept: SURGERY | Facility: CLINIC | Age: 68
End: 2023-12-14
Payer: COMMERCIAL

## 2023-12-14 PROBLEM — N40.0 BPH (BENIGN PROSTATIC HYPERPLASIA): Status: ACTIVE | Noted: 2023-12-14

## 2023-12-14 LAB
ABO/RH(D): NORMAL
ANTIBODY SCREEN: NEGATIVE
SPECIMEN EXPIRATION DATE: NORMAL

## 2023-12-14 PROCEDURE — 250N000009 HC RX 250: Performed by: UROLOGY

## 2023-12-14 PROCEDURE — 258N000003 HC RX IP 258 OP 636: Performed by: NURSE ANESTHETIST, CERTIFIED REGISTERED

## 2023-12-14 PROCEDURE — 88305 TISSUE EXAM BY PATHOLOGIST: CPT | Mod: 26 | Performed by: PATHOLOGY

## 2023-12-14 PROCEDURE — 250N000013 HC RX MED GY IP 250 OP 250 PS 637: Performed by: STUDENT IN AN ORGANIZED HEALTH CARE EDUCATION/TRAINING PROGRAM

## 2023-12-14 PROCEDURE — 258N000001 HC RX 258: Performed by: STUDENT IN AN ORGANIZED HEALTH CARE EDUCATION/TRAINING PROGRAM

## 2023-12-14 PROCEDURE — 250N000011 HC RX IP 250 OP 636: Performed by: UROLOGY

## 2023-12-14 PROCEDURE — 250N000009 HC RX 250: Performed by: NURSE ANESTHETIST, CERTIFIED REGISTERED

## 2023-12-14 PROCEDURE — 360N000077 HC SURGERY LEVEL 4, PER MIN: Performed by: UROLOGY

## 2023-12-14 PROCEDURE — 86901 BLOOD TYPING SEROLOGIC RH(D): CPT

## 2023-12-14 PROCEDURE — 250N000011 HC RX IP 250 OP 636: Performed by: NURSE ANESTHETIST, CERTIFIED REGISTERED

## 2023-12-14 PROCEDURE — 52649 PROSTATE LASER ENUCLEATION: CPT | Mod: GC | Performed by: UROLOGY

## 2023-12-14 PROCEDURE — 86850 RBC ANTIBODY SCREEN: CPT

## 2023-12-14 PROCEDURE — 258N000003 HC RX IP 258 OP 636: Performed by: UROLOGY

## 2023-12-14 PROCEDURE — 250N000025 HC SEVOFLURANE, PER MIN: Performed by: UROLOGY

## 2023-12-14 PROCEDURE — 999N000141 HC STATISTIC PRE-PROCEDURE NURSING ASSESSMENT: Performed by: UROLOGY

## 2023-12-14 PROCEDURE — 36415 COLL VENOUS BLD VENIPUNCTURE: CPT

## 2023-12-14 PROCEDURE — 250N000011 HC RX IP 250 OP 636

## 2023-12-14 PROCEDURE — 370N000017 HC ANESTHESIA TECHNICAL FEE, PER MIN: Performed by: UROLOGY

## 2023-12-14 PROCEDURE — 250N000013 HC RX MED GY IP 250 OP 250 PS 637

## 2023-12-14 PROCEDURE — 258N000003 HC RX IP 258 OP 636

## 2023-12-14 PROCEDURE — 88305 TISSUE EXAM BY PATHOLOGIST: CPT | Mod: TC | Performed by: UROLOGY

## 2023-12-14 PROCEDURE — 258N000003 HC RX IP 258 OP 636: Performed by: STUDENT IN AN ORGANIZED HEALTH CARE EDUCATION/TRAINING PROGRAM

## 2023-12-14 PROCEDURE — 272N000001 HC OR GENERAL SUPPLY STERILE: Performed by: UROLOGY

## 2023-12-14 PROCEDURE — C1758 CATHETER, URETERAL: HCPCS | Performed by: UROLOGY

## 2023-12-14 PROCEDURE — 710N000010 HC RECOVERY PHASE 1, LEVEL 2, PER MIN: Performed by: UROLOGY

## 2023-12-14 RX ORDER — SODIUM CHLORIDE, SODIUM LACTATE, POTASSIUM CHLORIDE, CALCIUM CHLORIDE 600; 310; 30; 20 MG/100ML; MG/100ML; MG/100ML; MG/100ML
INJECTION, SOLUTION INTRAVENOUS CONTINUOUS
Status: DISCONTINUED | OUTPATIENT
Start: 2023-12-14 | End: 2023-12-14 | Stop reason: HOSPADM

## 2023-12-14 RX ORDER — OXYCODONE HYDROCHLORIDE 10 MG/1
10 TABLET ORAL EVERY 4 HOURS PRN
Status: DISCONTINUED | OUTPATIENT
Start: 2023-12-14 | End: 2023-12-15 | Stop reason: HOSPADM

## 2023-12-14 RX ORDER — ONDANSETRON 4 MG/1
4 TABLET, ORALLY DISINTEGRATING ORAL EVERY 6 HOURS PRN
Status: DISCONTINUED | OUTPATIENT
Start: 2023-12-14 | End: 2023-12-15 | Stop reason: HOSPADM

## 2023-12-14 RX ORDER — FENTANYL CITRATE 50 UG/ML
INJECTION, SOLUTION INTRAMUSCULAR; INTRAVENOUS PRN
Status: DISCONTINUED | OUTPATIENT
Start: 2023-12-14 | End: 2023-12-14

## 2023-12-14 RX ORDER — HALOPERIDOL 5 MG/ML
1 INJECTION INTRAMUSCULAR
Status: DISCONTINUED | OUTPATIENT
Start: 2023-12-14 | End: 2023-12-14 | Stop reason: HOSPADM

## 2023-12-14 RX ORDER — LIDOCAINE 40 MG/G
CREAM TOPICAL
Status: DISCONTINUED | OUTPATIENT
Start: 2023-12-14 | End: 2023-12-14 | Stop reason: HOSPADM

## 2023-12-14 RX ORDER — TRANEXAMIC ACID 10 MG/ML
125 INJECTION, SOLUTION INTRAVENOUS CONTINUOUS
Status: DISCONTINUED | OUTPATIENT
Start: 2023-12-14 | End: 2023-12-14 | Stop reason: HOSPADM

## 2023-12-14 RX ORDER — FUROSEMIDE 10 MG/ML
INJECTION INTRAMUSCULAR; INTRAVENOUS PRN
Status: DISCONTINUED | OUTPATIENT
Start: 2023-12-14 | End: 2023-12-14

## 2023-12-14 RX ORDER — PROPOFOL 10 MG/ML
INJECTION, EMULSION INTRAVENOUS PRN
Status: DISCONTINUED | OUTPATIENT
Start: 2023-12-14 | End: 2023-12-14

## 2023-12-14 RX ORDER — NALOXONE HYDROCHLORIDE 0.4 MG/ML
0.4 INJECTION, SOLUTION INTRAMUSCULAR; INTRAVENOUS; SUBCUTANEOUS
Status: DISCONTINUED | OUTPATIENT
Start: 2023-12-14 | End: 2023-12-15 | Stop reason: HOSPADM

## 2023-12-14 RX ORDER — HYDROMORPHONE HYDROCHLORIDE 1 MG/ML
0.4 INJECTION, SOLUTION INTRAMUSCULAR; INTRAVENOUS; SUBCUTANEOUS EVERY 5 MIN PRN
Status: DISCONTINUED | OUTPATIENT
Start: 2023-12-14 | End: 2023-12-14 | Stop reason: HOSPADM

## 2023-12-14 RX ORDER — OXYCODONE HYDROCHLORIDE 5 MG/1
5 TABLET ORAL EVERY 4 HOURS PRN
Status: DISCONTINUED | OUTPATIENT
Start: 2023-12-14 | End: 2023-12-15 | Stop reason: HOSPADM

## 2023-12-14 RX ORDER — ACETAMINOPHEN 325 MG/1
975 TABLET ORAL ONCE
Status: COMPLETED | OUTPATIENT
Start: 2023-12-14 | End: 2023-12-14

## 2023-12-14 RX ORDER — LIDOCAINE HYDROCHLORIDE 20 MG/ML
INJECTION, SOLUTION INFILTRATION; PERINEURAL PRN
Status: DISCONTINUED | OUTPATIENT
Start: 2023-12-14 | End: 2023-12-14

## 2023-12-14 RX ORDER — ACETAMINOPHEN 325 MG/1
975 TABLET ORAL ONCE
Status: DISCONTINUED | OUTPATIENT
Start: 2023-12-14 | End: 2023-12-14 | Stop reason: HOSPADM

## 2023-12-14 RX ORDER — PROCHLORPERAZINE MALEATE 5 MG
5 TABLET ORAL EVERY 6 HOURS PRN
Status: DISCONTINUED | OUTPATIENT
Start: 2023-12-14 | End: 2023-12-15 | Stop reason: HOSPADM

## 2023-12-14 RX ORDER — ONDANSETRON 4 MG/1
4 TABLET, ORALLY DISINTEGRATING ORAL EVERY 30 MIN PRN
Status: DISCONTINUED | OUTPATIENT
Start: 2023-12-14 | End: 2023-12-14 | Stop reason: HOSPADM

## 2023-12-14 RX ORDER — NALOXONE HYDROCHLORIDE 0.4 MG/ML
0.2 INJECTION, SOLUTION INTRAMUSCULAR; INTRAVENOUS; SUBCUTANEOUS
Status: DISCONTINUED | OUTPATIENT
Start: 2023-12-14 | End: 2023-12-15 | Stop reason: HOSPADM

## 2023-12-14 RX ORDER — FENTANYL CITRATE 50 UG/ML
25 INJECTION, SOLUTION INTRAMUSCULAR; INTRAVENOUS EVERY 5 MIN PRN
Status: DISCONTINUED | OUTPATIENT
Start: 2023-12-14 | End: 2023-12-14 | Stop reason: HOSPADM

## 2023-12-14 RX ORDER — ONDANSETRON 2 MG/ML
INJECTION INTRAMUSCULAR; INTRAVENOUS PRN
Status: DISCONTINUED | OUTPATIENT
Start: 2023-12-14 | End: 2023-12-14

## 2023-12-14 RX ORDER — LIDOCAINE 40 MG/G
CREAM TOPICAL
Status: DISCONTINUED | OUTPATIENT
Start: 2023-12-14 | End: 2023-12-15 | Stop reason: HOSPADM

## 2023-12-14 RX ORDER — AMPICILLIN 2 G/1
2 INJECTION, POWDER, FOR SOLUTION INTRAVENOUS EVERY 6 HOURS
Status: DISCONTINUED | OUTPATIENT
Start: 2023-12-14 | End: 2023-12-14 | Stop reason: HOSPADM

## 2023-12-14 RX ORDER — FENTANYL CITRATE 50 UG/ML
50 INJECTION, SOLUTION INTRAMUSCULAR; INTRAVENOUS EVERY 5 MIN PRN
Status: DISCONTINUED | OUTPATIENT
Start: 2023-12-14 | End: 2023-12-14 | Stop reason: HOSPADM

## 2023-12-14 RX ORDER — LAMOTRIGINE 200 MG/1
200 TABLET ORAL DAILY
Status: DISCONTINUED | OUTPATIENT
Start: 2023-12-15 | End: 2023-12-15 | Stop reason: HOSPADM

## 2023-12-14 RX ORDER — EPHEDRINE SULFATE 50 MG/ML
INJECTION, SOLUTION INTRAMUSCULAR; INTRAVENOUS; SUBCUTANEOUS PRN
Status: DISCONTINUED | OUTPATIENT
Start: 2023-12-14 | End: 2023-12-14

## 2023-12-14 RX ORDER — SODIUM CHLORIDE, SODIUM LACTATE, POTASSIUM CHLORIDE, CALCIUM CHLORIDE 600; 310; 30; 20 MG/100ML; MG/100ML; MG/100ML; MG/100ML
INJECTION, SOLUTION INTRAVENOUS CONTINUOUS
Status: DISCONTINUED | OUTPATIENT
Start: 2023-12-14 | End: 2023-12-15 | Stop reason: HOSPADM

## 2023-12-14 RX ORDER — ONDANSETRON 2 MG/ML
4 INJECTION INTRAMUSCULAR; INTRAVENOUS EVERY 30 MIN PRN
Status: DISCONTINUED | OUTPATIENT
Start: 2023-12-14 | End: 2023-12-14 | Stop reason: HOSPADM

## 2023-12-14 RX ORDER — ONDANSETRON 2 MG/ML
4 INJECTION INTRAMUSCULAR; INTRAVENOUS EVERY 6 HOURS PRN
Status: DISCONTINUED | OUTPATIENT
Start: 2023-12-14 | End: 2023-12-15 | Stop reason: HOSPADM

## 2023-12-14 RX ORDER — HYDROMORPHONE HYDROCHLORIDE 1 MG/ML
0.2 INJECTION, SOLUTION INTRAMUSCULAR; INTRAVENOUS; SUBCUTANEOUS EVERY 5 MIN PRN
Status: DISCONTINUED | OUTPATIENT
Start: 2023-12-14 | End: 2023-12-14 | Stop reason: HOSPADM

## 2023-12-14 RX ORDER — HYDROXYZINE HYDROCHLORIDE 10 MG/1
10 TABLET, FILM COATED ORAL EVERY 6 HOURS PRN
Status: DISCONTINUED | OUTPATIENT
Start: 2023-12-14 | End: 2023-12-14 | Stop reason: HOSPADM

## 2023-12-14 RX ADMIN — FUROSEMIDE 20 MG: 10 INJECTION, SOLUTION INTRAVENOUS at 17:14

## 2023-12-14 RX ADMIN — Medication 10 MG: at 16:12

## 2023-12-14 RX ADMIN — FENTANYL CITRATE 50 MCG: 50 INJECTION INTRAMUSCULAR; INTRAVENOUS at 18:15

## 2023-12-14 RX ADMIN — PHENYLEPHRINE HYDROCHLORIDE 100 MCG: 10 INJECTION INTRAVENOUS at 16:12

## 2023-12-14 RX ADMIN — FENTANYL CITRATE 25 MCG: 50 INJECTION INTRAMUSCULAR; INTRAVENOUS at 16:29

## 2023-12-14 RX ADMIN — SODIUM CHLORIDE 3000 ML: 900 IRRIGANT IRRIGATION at 20:11

## 2023-12-14 RX ADMIN — PHENYLEPHRINE HYDROCHLORIDE 100 MCG: 10 INJECTION INTRAVENOUS at 16:55

## 2023-12-14 RX ADMIN — ONDANSETRON 4 MG: 2 INJECTION INTRAMUSCULAR; INTRAVENOUS at 17:20

## 2023-12-14 RX ADMIN — FENTANYL CITRATE 25 MCG: 50 INJECTION INTRAMUSCULAR; INTRAVENOUS at 16:40

## 2023-12-14 RX ADMIN — PHENYLEPHRINE HYDROCHLORIDE 100 MCG: 10 INJECTION INTRAVENOUS at 16:15

## 2023-12-14 RX ADMIN — FENTANYL CITRATE 50 MCG: 50 INJECTION INTRAMUSCULAR; INTRAVENOUS at 17:58

## 2023-12-14 RX ADMIN — FENTANYL CITRATE 25 MCG: 50 INJECTION INTRAMUSCULAR; INTRAVENOUS at 16:30

## 2023-12-14 RX ADMIN — PHENYLEPHRINE HYDROCHLORIDE 100 MCG: 10 INJECTION INTRAVENOUS at 16:09

## 2023-12-14 RX ADMIN — AMPICILLIN SODIUM 2 G: 2 INJECTION, POWDER, FOR SOLUTION INTRAMUSCULAR; INTRAVENOUS at 16:10

## 2023-12-14 RX ADMIN — OXYCODONE HYDROCHLORIDE 10 MG: 10 TABLET ORAL at 20:10

## 2023-12-14 RX ADMIN — SODIUM CHLORIDE, POTASSIUM CHLORIDE, SODIUM LACTATE AND CALCIUM CHLORIDE: 600; 310; 30; 20 INJECTION, SOLUTION INTRAVENOUS at 22:00

## 2023-12-14 RX ADMIN — ACETAMINOPHEN 975 MG: 325 TABLET, FILM COATED ORAL at 14:16

## 2023-12-14 RX ADMIN — PROPOFOL 200 MG: 10 INJECTION, EMULSION INTRAVENOUS at 16:01

## 2023-12-14 RX ADMIN — FENTANYL CITRATE 50 MCG: 50 INJECTION INTRAMUSCULAR; INTRAVENOUS at 16:01

## 2023-12-14 RX ADMIN — LIDOCAINE HYDROCHLORIDE 100 MG: 20 INJECTION, SOLUTION INFILTRATION; PERINEURAL at 16:01

## 2023-12-14 RX ADMIN — SODIUM CHLORIDE, POTASSIUM CHLORIDE, SODIUM LACTATE AND CALCIUM CHLORIDE: 600; 310; 30; 20 INJECTION, SOLUTION INTRAVENOUS at 15:34

## 2023-12-14 RX ADMIN — TRANEXAMIC ACID 1 G: 10 INJECTION, SOLUTION INTRAVENOUS at 16:29

## 2023-12-14 RX ADMIN — GENTAMICIN SULFATE 360 MG: 40 INJECTION, SOLUTION INTRAMUSCULAR; INTRAVENOUS at 14:45

## 2023-12-14 RX ADMIN — PHENYLEPHRINE HYDROCHLORIDE 100 MCG: 10 INJECTION INTRAVENOUS at 16:07

## 2023-12-14 ASSESSMENT — ACTIVITIES OF DAILY LIVING (ADL)
ADLS_ACUITY_SCORE: 20

## 2023-12-14 NOTE — ANESTHESIA CARE TRANSFER NOTE
Patient: Chad Olivares    Procedure: Procedure(s):  Holmium Laser Enucleation of the Prostate       Diagnosis: Enlarged prostate [N40.0]  Diagnosis Additional Information: No value filed.    Anesthesia Type:   General     Note:      Level of Consciousness: drowsy  Oxygen Supplementation: face mask  Level of Supplemental Oxygen (L/min / FiO2): 5  Independent Airway: airway patency satisfactory and stable  Dentition: dentition unchanged  Vital Signs Stable: post-procedure vital signs reviewed and stable  Report to RN Given: handoff report given  Patient transferred to: PACU    Handoff Report: Identifed the Patient, Identified the Reponsible Provider, Reviewed the pertinent medical history, Discussed the surgical course, Reviewed Intra-OP anesthesia mangement and issues during anesthesia, Set expectations for post-procedure period and Allowed opportunity for questions and acknowledgement of understanding    Vitals:  Vitals Value Taken Time   BP     Temp     Pulse     Resp 39 12/14/23 1741   SpO2 98 % 12/14/23 1741   Vitals shown include unfiled device data.    Electronically Signed By: PEDRO LUIS Curry CRNA  December 14, 2023  5:42 PM

## 2023-12-14 NOTE — OP NOTE
Operative Report  12/14/2023    PREOPERATIVE DIAGNOSIS:  Prostatic hypertrophy with urinary retention  POSTOPERATIVE DIAGNOSIS: Same as above    PROCEDURE PERFORMED: Holmium laser enucleation of the prostate  ATTENDING SURGEON: Roberto Altamirano MD  RESIDENT SURGEON: Yaneth Sandoval MD    FINDINGS: Approximately 100 gm prostate with bilateral lobe hypertrophy. Bladder with moderate trabeculation  ANESTHESIA: general  INTRAVENOUS FLUIDS: See anesthesia records  ESTIMATED BLOOD LOSS: Less than 100 ml   SPECIMENS: Prostate adenoma  DRAINS: 22-Icelandic 3-way catheter with 60 ml in balloon     INDICATIONS FOR PROCEDURE: Chad Olivares is a(n) 68 year old male who was seen in consultation for urinary retention. He has elected treatment with laser enucleation. Prostate volume estimated to be 100 grams. His digital rectal exam was unremarkable.  After discussion of the risks, benefits and alternatives of the procedure, the patient agreed to proceed with the above stated procdure.    DESCRIPTION OF PROCEDURE: After obtaining informed consent, the patient was taken to the operating room and placed under general anesthesia.  He was repositioned in dorsal lithotomy making sure that the legs were positioned and padded safely.  He was then prepped and draped in standard sterile fashion.  Culture directed antibiotics were administered and bilateral sequential compression devices were placed.  A time out was performed confirming the appropriate patient identity and planned procedure.     The procedure was begun by generously lubricating the urethra.   The outer sheath was placed over a deflecting obturator and we then looked the scope through the posterior urethra and into the bladder.  The prostate was noted to have a bilobar configuration.  At this point we inserted the 550 micron holmium laser through the 7F laser catheter.    We began enucleation by making an apical incision adjacent to the veromontanum.  We developed  "the apical plane on the left side and carried this laterally until 3 oclock.  We then proceeded to make a counter incision in the same fashion on the right side.  We next dissected the prostate out in a circumferential fashion, coming over the top of the gland and entering the bladder neck.  We next identified the mucosal strip anteriorly and transected it with the laser.  We then proceeded to take down the remaining lateral and posterior attachments which allowed us to push the adenoma in an en-bloc fashion into the bladder. Total enucleation time was 72 minutes.\"}    At this point there was a moderate amount of bleeding and approximately 5 minutes were spent identifying bleeding vessels in the fossa and coagulating them with the laser.  Once hemostasis was adequate we switched from the laser resectoscope to the 26F offset telescope with the Piranha morcellation device.  We added an extra inflow to distend the bladder.  The blades were adjusted and set at a rate of 1500 RPM.  We proceeded with morcellation making sure that we morcellated the entirety of the adenoma.  Total morcellation time was 7 minutes.     We then switched back to the laser resectoscope one final time to ensure that no residual tissue was left in the bladder.  We also confirmed that the bladder was unharmed from morcellation and that both ureteral orifices were unharmed during the procedure.  We inspected the fossa and obtained final hemostasis.   We looked the scope out noting that the sphincter was completely intact without evidence of thermal or mechanical injury.     We lubricated the urethra again and passed a 22F 3-way keller catheter over a catheter guide.  The urine was noted to be light pink.  We filled the balloon with 60 ml of sterile water and did not place the catheter on traction.  The patient was woken from anesthesia and taken to the recovery room in stable condition.     The specimen was weighed and found to be approximately 56 " g.     POSTOPERATIVE PLAN:   -We will plan to admit for observation    Yaneth Sandoval MD pgy5    Roberto Altamirano I, Roberto Altamirano, was present and participatory for the entirety of the procedure

## 2023-12-14 NOTE — ANESTHESIA PROCEDURE NOTES
Airway       Patient location during procedure: OR  Staff -        CRNA: Dean Myles APRN CRNA       Performed By: CRNA  Consent for Airway        Urgency: elective  Indications and Patient Condition       Indications for airway management: nahed-procedural       Induction type:intravenous       Mask difficulty assessment: 0 - not attempted    Final Airway Details       Final airway type: supraglottic airway    Supraglottic Airway Details        Type: LMA       Brand: I-Gel       LMA size: 5    Post intubation assessment        Placement verified by: capnometry, equal breath sounds and chest rise        Number of attempts at approach: 1       Secured with: tape       Ease of procedure: easy       Dentition: Unchanged and Intact

## 2023-12-15 VITALS
WEIGHT: 193.78 LBS | HEIGHT: 69 IN | SYSTOLIC BLOOD PRESSURE: 142 MMHG | OXYGEN SATURATION: 92 % | BODY MASS INDEX: 28.7 KG/M2 | TEMPERATURE: 97.8 F | RESPIRATION RATE: 17 BRPM | DIASTOLIC BLOOD PRESSURE: 98 MMHG | HEART RATE: 105 BPM

## 2023-12-15 LAB
ANION GAP SERPL CALCULATED.3IONS-SCNC: 9 MMOL/L (ref 7–15)
BASOPHILS # BLD AUTO: 0 10E3/UL (ref 0–0.2)
BASOPHILS NFR BLD AUTO: 0 %
BUN SERPL-MCNC: 12.8 MG/DL (ref 8–23)
CALCIUM SERPL-MCNC: 8.1 MG/DL (ref 8.8–10.2)
CHLORIDE SERPL-SCNC: 102 MMOL/L (ref 98–107)
CREAT SERPL-MCNC: 1.05 MG/DL (ref 0.67–1.17)
DEPRECATED HCO3 PLAS-SCNC: 25 MMOL/L (ref 22–29)
EGFRCR SERPLBLD CKD-EPI 2021: 77 ML/MIN/1.73M2
EOSINOPHIL # BLD AUTO: 0.1 10E3/UL (ref 0–0.7)
EOSINOPHIL NFR BLD AUTO: 2 %
ERYTHROCYTE [DISTWIDTH] IN BLOOD BY AUTOMATED COUNT: 12.3 % (ref 10–15)
GLUCOSE SERPL-MCNC: 109 MG/DL (ref 70–99)
HCT VFR BLD AUTO: 42.5 % (ref 40–53)
HGB BLD-MCNC: 14.3 G/DL (ref 13.3–17.7)
HOLD SPECIMEN: NORMAL
IMM GRANULOCYTES # BLD: 0 10E3/UL
IMM GRANULOCYTES NFR BLD: 0 %
LYMPHOCYTES # BLD AUTO: 1.3 10E3/UL (ref 0.8–5.3)
LYMPHOCYTES NFR BLD AUTO: 15 %
MCH RBC QN AUTO: 31.5 PG (ref 26.5–33)
MCHC RBC AUTO-ENTMCNC: 33.6 G/DL (ref 31.5–36.5)
MCV RBC AUTO: 94 FL (ref 78–100)
MONOCYTES # BLD AUTO: 0.6 10E3/UL (ref 0–1.3)
MONOCYTES NFR BLD AUTO: 7 %
NEUTROPHILS # BLD AUTO: 6.5 10E3/UL (ref 1.6–8.3)
NEUTROPHILS NFR BLD AUTO: 76 %
NRBC # BLD AUTO: 0 10E3/UL
NRBC BLD AUTO-RTO: 0 /100
PLATELET # BLD AUTO: 205 10E3/UL (ref 150–450)
POTASSIUM SERPL-SCNC: 4.2 MMOL/L (ref 3.4–5.3)
RBC # BLD AUTO: 4.54 10E6/UL (ref 4.4–5.9)
SODIUM SERPL-SCNC: 136 MMOL/L (ref 135–145)
WBC # BLD AUTO: 8.5 10E3/UL (ref 4–11)

## 2023-12-15 PROCEDURE — 85014 HEMATOCRIT: CPT | Performed by: STUDENT IN AN ORGANIZED HEALTH CARE EDUCATION/TRAINING PROGRAM

## 2023-12-15 PROCEDURE — 250N000013 HC RX MED GY IP 250 OP 250 PS 637: Performed by: STUDENT IN AN ORGANIZED HEALTH CARE EDUCATION/TRAINING PROGRAM

## 2023-12-15 PROCEDURE — 258N000003 HC RX IP 258 OP 636: Performed by: STUDENT IN AN ORGANIZED HEALTH CARE EDUCATION/TRAINING PROGRAM

## 2023-12-15 PROCEDURE — 80048 BASIC METABOLIC PNL TOTAL CA: CPT | Performed by: STUDENT IN AN ORGANIZED HEALTH CARE EDUCATION/TRAINING PROGRAM

## 2023-12-15 PROCEDURE — 36415 COLL VENOUS BLD VENIPUNCTURE: CPT | Performed by: STUDENT IN AN ORGANIZED HEALTH CARE EDUCATION/TRAINING PROGRAM

## 2023-12-15 RX ORDER — ACETAMINOPHEN 325 MG/1
975 TABLET ORAL ONCE
Status: COMPLETED | OUTPATIENT
Start: 2023-12-15 | End: 2023-12-15

## 2023-12-15 RX ADMIN — ACETAMINOPHEN 975 MG: 325 TABLET, FILM COATED ORAL at 16:31

## 2023-12-15 RX ADMIN — SODIUM CHLORIDE, POTASSIUM CHLORIDE, SODIUM LACTATE AND CALCIUM CHLORIDE: 600; 310; 30; 20 INJECTION, SOLUTION INTRAVENOUS at 07:48

## 2023-12-15 RX ADMIN — LAMOTRIGINE 200 MG: 200 TABLET ORAL at 09:17

## 2023-12-15 RX ADMIN — OXYCODONE HYDROCHLORIDE 10 MG: 10 TABLET ORAL at 02:33

## 2023-12-15 ASSESSMENT — ACTIVITIES OF DAILY LIVING (ADL)
ADLS_ACUITY_SCORE: 20

## 2023-12-15 NOTE — PHARMACY-ADMISSION MEDICATION HISTORY
Addendum: Patient slightly groggy last night after surgery so [place] wanted me to confirm PTA lamotrigine regimen in AM with patient. Patient confirmed that he is supposed to be taking lamotrigine 200 mg (2x 100 mg tablets) two times per day but he only takes 200 mg (2x 100 mg tablets) once daily. He made this change himself and he is aware that it is prescribed differently. He says he has been taking 200 mg daily consistently for a while and knows not to increase the dose rapidly. -Sky Amaro PharmD      Pharmacist Admission Medication History    Admission medication history is complete. The information provided in this note is only as accurate as the sources available at the time of the update.    Information Source(s): Patient and CareEverywhere/SureScripts via in-person    Pertinent Information: Patient seemed slightly groggy during interview. Confirmed several times that lamotrigine was being taken 2 tablets once a day. Patient states he made this change on his own.     Changes made to PTA medication list:  Added: None  Deleted: None  Changed: lamotrigine 200 mg BID --> 200 mg every day     Allergies reviewed with patient and updates made in EHR:  RN reviewed    Medication History Completed By: [place] Chaudhari RPH 12/14/2023 7:05 PM    PTA Med List   Medication Sig Last Dose    ciprofloxacin (CIPRO) 500 MG tablet Take 1 tablet (500 mg) by mouth 2 times daily for 6 days 12/14/2023 at 0700    finasteride (PROSCAR) 5 MG tablet Take 1 tablet (5 mg) by mouth daily 12/14/2023 at 0700    ibuprofen (ADVIL/MOTRIN) 200 MG tablet Take 200-600 mg by mouth every 4 hours as needed for pain     lamoTRIgine (LAMICTAL) 100 MG tablet Take 2 tablets (200 mg) by mouth 2 times daily (Patient taking differently: Take 200 mg by mouth daily) 12/14/2023 at 0700    medical cannabis oral liquid  (Patient's own supply.  Not a prescription) (This is NOT a prescription, and does not certify that the patient has a qualifying medical  condition for medical cannabis.  The purpose of this order is  to document that the patient reports taking medical cannabis.) More than a month    nitroFURantoin macrocrystal-monohydrate (MACROBID) 100 MG capsule Take 1 capsule (100 mg) by mouth 2 times daily for 6 days 12/14/2023 at 0700    tamsulosin (FLOMAX) 0.4 MG capsule Take 1 capsule (0.4 mg) by mouth daily 12/14/2023 at 0700

## 2023-12-15 NOTE — OR NURSING
"PACU to Inpatient Nursing Handoff    Patient Chad Olivares is a 68 year old male who speaks English.   Procedure Procedure(s):  Holmium Laser Enucleation of the Prostate   Surgeon(s) Primary: Roberto Altamirano MD  Resident - Assisting: Yaneth Sandoval MD     Allergies   Allergen Reactions    Gluten Meal     Seasonal Allergies        Isolation  No active isolations     Past Medical History   has a past medical history of Achilles bursitis or tendinitis (08/07/2008), Advanced directives, counseling/discussion (05/20/2015), CARDIOVASCULAR SCREENING; LDL GOAL LESS THAN 160 (10/12/2010), Cholesteatoma, unspecified, Colon polyps (04/2015), Complex sleep apnea syndrome, Dupuytren's contracture, Dyslexia, Dyspnea and respiratory abnormality (09/03/2014), 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    Anesthesia Choice   Dermatome Level     Preop Meds acetaminophen (Tylenol) - time given: 1416   Nerve block Not applicable   Intraop Meds fentanyl (Sublimaze): 175 mcg total  ondansetron (Zofran): last given at 1720  Lasix 20 mg @ 1714   Local Meds No   Antibiotics ampicillin (Omnipen) - last given at 1610     Pain Patient Currently in Pain: yes   PACU meds  fentanyl (Sublimaze): 100 mcg (total dose) last given at 1815    PCA / epidural No   Capnography     Telemetry ECG Rhythm: Sinus rhythm   Inpatient Telemetry Monitor Ordered? No        Labs Glucose Lab Results   Component Value Date     11/06/2023     07/27/2022     06/28/2021       Hgb Lab Results   Component Value Date    HGB 15.5 11/06/2023    HGB 15.3 06/28/2021       INR No results found for: \"INR\"   PACU Imaging Not applicable     Wound/Incision Incision/Surgical Site 08/26/21 Right Finger (Comment which one) (Active)   Number of days: 840      CMS      "   Equipment CBI   Other LDA       IV Access Peripheral IV 12/14/23 Left Hand (Active)   Site Assessment WDL 12/14/23 1739   Line Status Infusing 12/14/23 1739   Dressing Transparent 12/14/23 1739   Dressing Status clean;dry;intact 12/14/23 1739   Dressing Intervention New dressing  12/14/23 1351   Phlebitis Scale 0-->no symptoms 12/14/23 1739   Infiltration? no 12/14/23 1739   Number of days: 0      Blood Products Not applicable  mL   Intake/Output Date 12/14/23 0700 - 12/15/23 0659   Shift 4663-2376 0800-7924 3957-7626 24 Hour Total   INTAKE   I.V.  500  500   Shift Total(mL/kg)  500(5.69)  500(5.69)   OUTPUT   Urine  4300  4300   Blood  100  100   Shift Total(mL/kg)  4400(50.06)  4400(50.06)   Weight (kg) 87.9 87.9 87.9 87.9      Drains / Max Urethral Catheter 12/14/23 Triple-lumen;Latex 22 fr (Active)   Tube Description Positional 12/14/23 1739   Catheter Care Done 12/14/23 1739   Collection Container Standard 12/14/23 1739   Securement Method Securing device (Describe) 12/14/23 1739   Rationale for Continued Use Anesthesia;Strict 1-2 Hour I&O 12/14/23 1739   Urine Output 2000 mL 12/14/23 1739   Number of days: 0      Time of void PreOp Time of Void Prior to Procedure:  (catheter) (12/14/23 1449)    PostOp      Diapered? No   Bladder Scan     PO    tolerating sips     Vitals    B/P: 113/69  T: 97.2  F (36.2  C)    Temp src: Axillary  P:  Pulse: 70 (12/14/23 1815)          R: 15  O2:  SpO2: 95 %    O2 Device: Nasal cannula (12/14/23 1758)    Oxygen Delivery: 2 LPM (12/14/23 1758)         Family/support present significant other   Patient belongings     Patient transported on cart   DC meds/scripts (obs/outpt) Not applicable   Inpatient Pain Meds Released? Yes       Special needs/considerations None   Tasks needing completion None       Leyla Morales RN  ASCOM [id number]

## 2023-12-15 NOTE — PROGRESS NOTES
"Urology  Progress Note    NAEO  Pain well controlled  Possibly obstructed overnight, nursing irrigating with some clots and relief    Exam  /69 (BP Location: Left arm)   Pulse 72   Temp 97.5  F (36.4  C) (Oral)   Resp 17   Ht 1.753 m (5' 9.02\")   Wt 87.9 kg (193 lb 12.6 oz)   SpO2 90%   BMI 28.60 kg/m    No acute distress  Unlabored breathing  Abdomen soft,  appropriately tender, nondistended.   Max with clear red urine in tubing, CBI clamped. Irrigated with no significant clots, limited by spasms      Labs  Recent Labs   Lab 12/15/23  0735      POTASSIUM 4.2   CHLORIDE 102   CO2 25   BUN 12.8   CR 1.05   ANIONGAP 9   MARYJANE 8.1*   *       CBC pending    Assessment/Plan  68 year old male POD#1 s/p holep    Neuro: Tylenol, prn oxy/dilaudid for pain control  CV: LUCITA  Pulm: IS while awake  FEN/GI: Reg diet, MIVF @ 75/hr. Bowel regimen.  Endo: LUCITA  : TOV this am. Please bladder scan after voids x2 and page [phone]  or urology on call with amount voided and post void residual  Heme/ID: Hgb pending  Activity: Up ad damir  PPx: SCDs.   Dispo: Home with or without catheter pending voiding this am    Seen and examined with the chief resident. Will discuss with Dr. Altamirano.    Adebayo Jung MD  Urology Resident     Contacting the Urology Team     Please use the following job codes to reach the Urology Team. Note that you must use an in house phone and that job codes cannot receive text pages.   On weekdays, dial [phone] (or star-star-star 777 on the new [employer] telephones) then [phone] to reach the Adult Urology resident or PA on call  On weekdays, dial [phone] (or star-star-star 777 on the new [employer] telephones) then 0818 to reach the Pediatric Urology resident  On weeknights and weekends, dial [phone] (or star-star-star 777 on the new [employer] telephones) then [phone] to reach the Urology resident on call (for both Adult and Pediatrics)            "

## 2023-12-15 NOTE — PLAN OF CARE
Goal Outcome Evaluation:    Neuro: alert and oriented X4    Vitals: vitally stable    Activity: not out of bed    Pain: pain managed with oxycodone      Other: On continuous bladder irrigation. At around 3 am, Patient stated he was feeling spasms around his bladder , was feeling so full and was in lots of pain, staff used plunger to irrigate catheter and found some cloths. After taking out the clots patient stated he was feeling so relieved .

## 2023-12-15 NOTE — PROGRESS NOTES
8MS ADMISSION    D: Patient admitted/transferred from  PACU via stretcher for post-op observation at 1845     I: Upon arrival to the unit patient was oriented to room, unit, and call light. Patient s vital signs were obtained. Allergies reviewed and allergy band applied. Provider notified of patient s arrival on the unit. Adult AVS completed. Head to toe assessment completed. Education assessment completed. Care plan initiated.    A: Vital signs stable upon admission. Patient rates pain at 2/10. RN skin assessment completed Yes. No significant skin findings.  Bed Algorithm can be found in PCS flow sheets (Support Surface Algorithm) and on IP [facility] NURSE RESOURCE TAB, was this used during this assessment? No. Was a pulsate mattress ordered ** No.    P: Continue to monitor patient s continuous drainage and intervene as needed. Continue with plan of care. Notify provider with any concerns or changes in patient status.

## 2023-12-15 NOTE — PROGRESS NOTES
Patient is discharged via own transportation driving by significant other ambulation to home. Pt. Will be escorted by NST with wheelchair, and left with personal belongings.  Prior to discharge, PIV was removed.  Pt. received complete discharge paperwork and last of medications.  Pt. was given times of last dose for all discharge medications in writing on discharge medication sheets.  Discharge teaching included how to take and record and dosage of pt's medication, pain management, activity restrictions, dressing and signs and symptoms of infection.  Pt. to follow up with Michelle Carter and Dandre Martin in Neurology clinic and [facility].  Pt. had no further questions at the time of discharge and no unmet needs were identified.

## 2023-12-15 NOTE — PLAN OF CARE
"  VS: BP (!) 142/98 (BP Location: Left arm)   Pulse 105   Temp 97.8  F (36.6  C) (Oral)   Resp 17   Ht 1.753 m (5' 9.02\")   Wt 87.9 kg (193 lb 12.6 oz)   SpO2 92%   BMI 28.60 kg/m       O2: 92% on RA. Denies SOB and chest pain.   Output: Voiding using urinal voiding red/pink output. See flowsheet for output and PVR. Urology provider aware.   Last BM: 12/14   Activity: SBA.    Skin: Intact.   Pain: 3/10.   CMS: A&ox4. Able to make needs known. CMS intact.   Dressing: None.   Diet: Regular, tolerating well. Denies N/V.   LDA: LPIV SL.   Equipment: IV pole, urinal, Personal belongings.   Plan: Possible discharge later today.   Additional Info: Pt noted to have sanguinous moderately saturated sheets with sanguinous discharge around genitals. Pt cleaned and Urology provider Fabiana made aware.    Pt reported passing stringy blood clot in urine. Urology provider aware.         "

## 2023-12-15 NOTE — ANESTHESIA POSTPROCEDURE EVALUATION
Patient: Chad Olivares    Procedure: Procedure(s):  Holmium Laser Enucleation of the Prostate       Anesthesia Type:  General    Note:  Disposition: Inpatient   Postop Pain Control: Uneventful            Sign Out: Well controlled pain   PONV: No   Neuro/Psych: Uneventful            Sign Out: Acceptable/Baseline neuro status   Airway/Respiratory: Uneventful            Sign Out: Acceptable/Baseline resp. status   CV/Hemodynamics: Uneventful            Sign Out: Acceptable CV status; No obvious hypovolemia; No obvious fluid overload   Other NRE:    DID A NON-ROUTINE EVENT OCCUR?            Last vitals:  Vitals Value Taken Time   /72 12/14/23 1830   Temp 36.7  C (98  F) 12/14/23 1830   Pulse 72 12/14/23 1830   Resp 16 12/14/23 1830   SpO2 96 % 12/14/23 1830       Electronically Signed By: Shivam Mead MD  December 14, 2023  7:22 PM

## 2023-12-18 ENCOUNTER — TELEPHONE (OUTPATIENT)
Dept: UROLOGY | Facility: CLINIC | Age: 68
End: 2023-12-18

## 2023-12-18 DIAGNOSIS — N40.0 ENLARGED PROSTATE: Primary | ICD-10-CM

## 2023-12-18 LAB
PATH REPORT.COMMENTS IMP SPEC: NORMAL
PATH REPORT.COMMENTS IMP SPEC: NORMAL
PATH REPORT.FINAL DX SPEC: NORMAL
PATH REPORT.GROSS SPEC: NORMAL
PATH REPORT.RELEVANT HX SPEC: NORMAL
PHOTO IMAGE: NORMAL

## 2023-12-18 RX ORDER — NITROFURANTOIN 25; 75 MG/1; MG/1
100 CAPSULE ORAL 2 TIMES DAILY
Qty: 6 CAPSULE | Refills: 0 | Status: SHIPPED | OUTPATIENT
Start: 2023-12-18 | End: 2024-02-22

## 2023-12-18 RX ORDER — CIPROFLOXACIN 500 MG/1
500 TABLET, FILM COATED ORAL 2 TIMES DAILY
Qty: 6 TABLET | Refills: 0 | Status: SHIPPED | OUTPATIENT
Start: 2023-12-18 | End: 2024-02-22

## 2023-12-18 NOTE — TELEPHONE ENCOUNTER
Spoke fredy pt to clarify his discharge antibiotics- advised that I sent a message to Dr. Canales who is helping to cover for Dr. Altamirano while he is on vacation. He recommends 3 additional days of macrobid and cipro given pt's pre-op ucx. Advised pt I will fill 3 days for him.     Also advised to stop taking tamsulosin post-op.     Pt reports he is recovering well, having expected hematuria and small amount of leakage and he has no additional questions today.    LAVINIA Phelps  Care Coordinator- Urology   [phone]

## 2023-12-21 ENCOUNTER — TELEPHONE (OUTPATIENT)
Dept: UROLOGY | Facility: CLINIC | Age: 68
End: 2023-12-21
Payer: COMMERCIAL

## 2023-12-21 NOTE — TELEPHONE ENCOUNTER
"Spoke with pt who reports feeling \"swelling\" in the region of his prostate, particularly when sitting. He reports no pain when standing or laying down. He denies fever, difficulty voiding/emptying his bladder, or other concerns.     I advised pt this writer will reach out to provider and return a call to him with recommendations.    Mattie Encarnacion RN  "

## 2023-12-22 ENCOUNTER — PRE VISIT (OUTPATIENT)
Dept: UROLOGY | Facility: CLINIC | Age: 68
End: 2023-12-22
Payer: COMMERCIAL

## 2023-12-22 NOTE — TELEPHONE ENCOUNTER
Spoke with pt to see how he is feeling. Advised pt that this writer discussed symptoms with MD, and that constipation may be contributing. Also advised we can repeat UA/UC if pt has any concerns for infection.     Pt reports he is constipated, though he did have a BM this morning with some relief. Rec he start miralax and senna. Pt agrees and has no additional questions.    LAVINIA Phelps  Care Coordinator- Urology   [phone]

## 2024-01-05 ENCOUNTER — VIRTUAL VISIT (OUTPATIENT)
Dept: UROLOGY | Facility: CLINIC | Age: 69
End: 2024-01-05
Payer: COMMERCIAL

## 2024-01-05 ENCOUNTER — TELEPHONE (OUTPATIENT)
Dept: UROLOGY | Facility: CLINIC | Age: 69
End: 2024-01-05

## 2024-01-05 DIAGNOSIS — Z98.890 HISTORY OF PROSTATE SURGERY: Primary | ICD-10-CM

## 2024-01-05 DIAGNOSIS — N40.0 ENLARGED PROSTATE: ICD-10-CM

## 2024-01-05 PROCEDURE — 99213 OFFICE O/P EST LOW 20 MIN: CPT | Mod: 95 | Performed by: NURSE PRACTITIONER

## 2024-01-05 ASSESSMENT — PAIN SCALES - GENERAL: PAINLEVEL: NO PAIN (0)

## 2024-01-05 NOTE — PATIENT INSTRUCTIONS
UROLOGY CLINIC VISIT PATIENT INSTRUCTIONS    -Have your PSA checked when you return from [place]. Would plan to do this on Wednesday, 2/7.   -Follow up with me in Friday, 2/9 at 10:30 AM.     If you have any issues, questions or concerns in the meantime, do not hesitate to contact us at [phone] or via [employer].     Tere Fonseca, CNP  Department of Urology

## 2024-01-05 NOTE — NURSING NOTE
Is the patient currently in the state of [place]? YES    Visit mode:VIDEO    If the visit is dropped, the patient can be reconnected by: VIDEO VISIT: Text to cell phone:   Telephone Information:   Mobile [phone]       Will anyone else be joining the visit? NO  (If patient encounters technical issues they should call [phone]    How would you like to obtain your AVS? MyChart    Are changes needed to the allergy or medication list? No    Reason for visit: No chief complaint on file.    Carla SEBASTIAN

## 2024-01-05 NOTE — LETTER
1/5/2024       RE: Chad Olivares  [address]  [facility] [address]     Dear Colleague,    Thank you for referring your patient, Chad Olivares, to the [facility]OLOG[facility] [place] [facility]. Please see a copy of my visit note below.    Virtual Visit Details    Type of service:  Video Visit   Video Start Time: 3:25 PM  Video End Time: 3:43 PM    Originating Location (pt. Location): Home  Distant Location (provider location):  Off-site  Platform used for Video Visit: Amita    CC: HoLEP follow up    Assessment/Plan:  68 year old male with a history of BPH with urinary retention, now about three weeks s/p HoLEP, with 51 g of benign tissue removed. Doing quite well this early post-op. Stream is strong and bleeding has resolved. Some urgency which is manageable. Asks about returning to normal activities and advised him to remain low-impact with weight restriction for one more week before gradually resuming activities. If bleeding or pain return, these would be signs to back off and reattempt after a few days to a week. He is planning to leave on a month-long trip to [place] soon, to return in early-Feb. He will leave for [place] about one week later. Will therefore try to fit in a follow up visit in early-Feb with a PSA checked beforehand. Order placed.     Tere Fonseca, CNP  Department of Urology      Subjective:   68 year old male with PMH of BPH with urinary retention who is about three weeks post-HoLEP with Dr. Altamirano on 12/14/23, with 51 g of benign prostatic tissue removed.     Today, he states that he has seen progress with his surgical recover over the past week or so. Stream is strong, though he has had some urgency where he has to rush. Urge is controllable and he does not have significant leakage issues. No stress incontinence. There is a bit of dysuria but pain has significantly improved. Blood has resolved.     Objective:     Exam:    Patient is a 68 year old male   No vital signs obtained due to virtual visit.  General Appearance: Well groomed, hygenic  Respiratory: No cough, no respiratory distress or labored breathing  Musculoskeletal: Grossly normal with no gross deficits  Skin: No discoloration or apparent rashes  Neurologic: No tremors  Psychiatric: Alert and oriented  Further examination is deferred due to the nature of our visit.

## 2024-01-05 NOTE — PROGRESS NOTES
Virtual Visit Details    Type of service:  Video Visit   Video Start Time: 3:25 PM  Video End Time: 3:43 PM    Originating Location (pt. Location): Home  Distant Location (provider location):  Off-site  Platform used for Video Visit: Amita    CC: HoLEP follow up    Assessment/Plan:  68 year old male with a history of BPH with urinary retention, now about three weeks s/p HoLEP, with 51 g of benign tissue removed. Doing quite well this early post-op. Stream is strong and bleeding has resolved. Some urgency which is manageable. Asks about returning to normal activities and advised him to remain low-impact with weight restriction for one more week before gradually resuming activities. If bleeding or pain return, these would be signs to back off and reattempt after a few days to a week. He is planning to leave on a month-long trip to [place] soon, to return in early-Feb. He will leave for [place] about one week later. Will therefore try to fit in a follow up visit in early-Feb with a PSA checked beforehand. Order placed.     Tere Fonseca CNP  Department of Urology      Subjective:   68 year old male with PMH of BPH with urinary retention who is about three weeks post-HoLEP with Dr. Altamirano on 12/14/23, with 51 g of benign prostatic tissue removed.     Today, he states that he has seen progress with his surgical recover over the past week or so. Stream is strong, though he has had some urgency where he has to rush. Urge is controllable and he does not have significant leakage issues. No stress incontinence. There is a bit of dysuria but pain has significantly improved. Blood has resolved.     Objective:     Exam:   Patient is a 68 year old male   No vital signs obtained due to virtual visit.  General Appearance: Well groomed, hygenic  Respiratory: No cough, no respiratory distress or labored breathing  Musculoskeletal: Grossly normal with no gross deficits  Skin: No discoloration or apparent rashes  Neurologic: No  tremors  Psychiatric: Alert and oriented  Further examination is deferred due to the nature of our visit.

## 2024-01-26 ENCOUNTER — PRE VISIT (OUTPATIENT)
Dept: UROLOGY | Facility: CLINIC | Age: 69
End: 2024-01-26
Payer: COMMERCIAL

## 2024-02-07 ENCOUNTER — LAB (OUTPATIENT)
Dept: LAB | Facility: CLINIC | Age: 69
End: 2024-02-07
Payer: COMMERCIAL

## 2024-02-07 DIAGNOSIS — Z98.890 HISTORY OF PROSTATE SURGERY: ICD-10-CM

## 2024-02-07 DIAGNOSIS — N40.0 ENLARGED PROSTATE: ICD-10-CM

## 2024-02-07 LAB — PSA SERPL DL<=0.01 NG/ML-MCNC: 0.77 NG/ML (ref 0–4.5)

## 2024-02-07 PROCEDURE — 84153 ASSAY OF PSA TOTAL: CPT | Performed by: PATHOLOGY

## 2024-02-07 PROCEDURE — 36415 COLL VENOUS BLD VENIPUNCTURE: CPT | Performed by: PATHOLOGY

## 2024-02-08 DIAGNOSIS — R39.9 LOWER URINARY TRACT SYMPTOMS (LUTS): ICD-10-CM

## 2024-02-09 ENCOUNTER — VIRTUAL VISIT (OUTPATIENT)
Dept: UROLOGY | Facility: CLINIC | Age: 69
End: 2024-02-09
Payer: COMMERCIAL

## 2024-02-09 DIAGNOSIS — R35.0 BENIGN PROSTATIC HYPERPLASIA WITH URINARY FREQUENCY: ICD-10-CM

## 2024-02-09 DIAGNOSIS — Z98.890 HISTORY OF PROSTATE SURGERY: Primary | ICD-10-CM

## 2024-02-09 DIAGNOSIS — N40.1 BENIGN PROSTATIC HYPERPLASIA WITH URINARY FREQUENCY: ICD-10-CM

## 2024-02-09 PROCEDURE — 99024 POSTOP FOLLOW-UP VISIT: CPT | Mod: 95 | Performed by: NURSE PRACTITIONER

## 2024-02-09 NOTE — PATIENT INSTRUCTIONS
UROLOGY CLINIC VISIT PATIENT INSTRUCTIONS    Follow up with urology as needed going forward.     If you have any issues, questions or concerns in the meantime, do not hesitate to contact us at [phone] or via Sp[employer]edly.     Tere Fonseca, CNP  Department of Urology    
179.8

## 2024-02-09 NOTE — LETTER
2/9/2024       RE: Chad Olivares  [address]  [facility] [address]     Dear Colleague,    Thank you for referring your patient, Chad Olivares, to the [facility] [place] at [facility]. Please see a copy of my visit note below.    Virtual Visit Details    Type of service:  Video Visit   Video Start Time: 10:30 AM  Video End Time: 10:39 AM    Originating Location (pt. Location): Home  Distant Location (provider location):  Off-site  Platform used for Video Visit: Amita    CC: HoLEP follow up    Assessment/Plan:  68 year old male with PMH of BPH with urinary retention who is now 8 weeks post-HoLEP. Doing well from a recovery standpoint with some lingering frequency and urgency that have improved over time. No other concerns today. Post-op PSA nice and low at 0.77 ng/mL. Recommend these continue to be monitored annually until age 75. He will follow up as needed going forward if the urgency and frequency were to plateau and become bothersome. Could consider trial of OAB med if that were to occur.   -Follow up PRN.    Tere Fonseca, CNP  Department of Urology      Subjective:   68 year old male with PMH of BPH with urinary retention who is about three weeks post-HoLEP with Dr. Altamirano on 12/14/23, with 51 g of benign prostatic tissue removed.     I saw him about three weeks after surgery, at which time he was doing quite well. Reported some urgency, though this was manageable.     PSA checked earlier this week and was 0.77 ng/mL.     Today, he states that he has continued to do well in his recovery. Pain has subsided and he is voiding better than he has in 10 years. He continued to have frequent and urgent voids at times but this depends on how much water he drinks. He can void every 15-20 minutes if he drinks enough water. These symptoms are slowly improving.     Objective:     Exam:   Patient is a 68 year old male   No vital signs obtained  due to virtual visit.  General Appearance: Well groomed, hygenic  Respiratory: No cough, no respiratory distress or labored breathing  Musculoskeletal: Grossly normal with no gross deficits  Skin: No discoloration or apparent rashes  Neurologic: No tremors  Psychiatric: Alert and oriented  Further examination is deferred due to the nature of our visit.

## 2024-02-09 NOTE — NURSING NOTE
Is the patient currently in the state of [place]? YES    Visit mode:VIDEO    If the visit is dropped, the patient can be reconnected by: VIDEO VISIT: Text to cell phone:   Telephone Information:   Mobile [phone]       Will anyone else be joining the visit? NO  (If patient encounters technical issues they should call [phone])    How would you like to obtain your AVS? MyChart    Are changes needed to the allergy or medication list? No    Reason for visit: RECHECK    Haylee SEBASTIAN

## 2024-02-09 NOTE — PROGRESS NOTES
Virtual Visit Details    Type of service:  Video Visit   Video Start Time: 10:30 AM  Video End Time: 10:39 AM    Originating Location (pt. Location): Home  Distant Location (provider location):  Off-site  Platform used for Video Visit: Amita    CC: HoLEP follow up    Assessment/Plan:  68 year old male with PMH of BPH with urinary retention who is now 8 weeks post-HoLEP. Doing well from a recovery standpoint with some lingering frequency and urgency that have improved over time. No other concerns today. Post-op PSA nice and low at 0.77 ng/mL. Recommend these continue to be monitored annually until age 75. He will follow up as needed going forward if the urgency and frequency were to plateau and become bothersome. Could consider trial of OAB med if that were to occur.   -Follow up PRN.    Tere Fonseca, CNP  Department of Urology      Subjective:   68 year old male with PMH of BPH with urinary retention who is about three weeks post-HoLEP with Dr. Altamirano on 12/14/23, with 51 g of benign prostatic tissue removed.     I saw him about three weeks after surgery, at which time he was doing quite well. Reported some urgency, though this was manageable.     PSA checked earlier this week and was 0.77 ng/mL.     Today, he states that he has continued to do well in his recovery. Pain has subsided and he is voiding better than he has in 10 years. He continued to have frequent and urgent voids at times but this depends on how much water he drinks. He can void every 15-20 minutes if he drinks enough water. These symptoms are slowly improving.     Objective:     Exam:   Patient is a 68 year old male   No vital signs obtained due to virtual visit.  General Appearance: Well groomed, hygenic  Respiratory: No cough, no respiratory distress or labored breathing  Musculoskeletal: Grossly normal with no gross deficits  Skin: No discoloration or apparent rashes  Neurologic: No tremors  Psychiatric: Alert and oriented  Further  examination is deferred due to the nature of our visit.

## 2024-02-12 ENCOUNTER — OFFICE VISIT (OUTPATIENT)
Dept: NEUROLOGY | Facility: CLINIC | Age: 69
End: 2024-02-12
Payer: COMMERCIAL

## 2024-02-12 VITALS
OXYGEN SATURATION: 96 % | SYSTOLIC BLOOD PRESSURE: 127 MMHG | DIASTOLIC BLOOD PRESSURE: 86 MMHG | BODY MASS INDEX: 27.85 KG/M2 | WEIGHT: 188 LBS | HEIGHT: 69 IN | HEART RATE: 71 BPM

## 2024-02-12 DIAGNOSIS — G20.A1 PARKINSON'S DISEASE WITHOUT DYSKINESIA OR FLUCTUATING MANIFESTATIONS (H): Primary | ICD-10-CM

## 2024-02-12 PROCEDURE — 99214 OFFICE O/P EST MOD 30 MIN: CPT | Performed by: PSYCHIATRY & NEUROLOGY

## 2024-02-12 ASSESSMENT — UNIFIED PARKINSONS DISEASE RATING SCALE (UPDRS)
ARISING_CHAIR: (0) NORMAL: NO PROBLEMS. ABLE TO ARISE QUICKLY WITHOUT HESITATION.
RIGIDITY_LLE: (0) NORMAL: NO RIGIDITY.
SPONTANEITY_OF_MOVEMENT: (1) SLIGHT: SLIGHT GLOBAL SLOWNESS AND POVERTY OF SPONTANEOUS MOVEMENTS.
SPEECH: (1) SLIGHT: LOSS OF MODULATION, DICTION OR VOLUME, BUT STILL ALL WORDS EASY TO UNDERSTAND.
LEG_AGILITY_LEFT: (0) NORMAL: NO PROBLEMS.
TOETAPPING_RIGHT: (1) SLIGHT: ANY OF THE FOLLOWING: A) THE REGULAR RHYTHM IS BROKEN WITH ONE WITH ONE OR TWO INTERRUPTIONS OR HESITATIONS OF THE MOVEMENT  B) SLIGHT SLOWING  C) THE AMPLITUDE DECREMENTS NEAR THE END OF THE 10 MOVEMENTS.
AXIAL_SCORE: 6
HANDMOVEMENTS_LEFT: (0) NORMAL: NO PROBLEMS.
FACIAL_EXPRESSION: (1) SLIGHT: MINIMAL MASKED FACIES MANIFESTED ONLY BY DECREASED FREQUENCY OF BLINKING.
RIGIDITY_RUE: (1) SLIGHT: RIGIDITY ONLY DETECTED WITH ACTIVATION MANEUVER.
FINGER_TAPPING_LEFT: (0) NORMAL: NO PROBLEMS.
TOTAL_SCORE_LEFT: 0
AMPLITUDE_RLE: (0) NORMAL: NO TREMOR.
LEG_AGILITY_RIGHT: (1) SLIGHT: ANY OF THE FOLLOWING: A) THE REGULAR RHYTHM IS BROKEN WITH ONE WITH ONE OR TWO INTERRUPTIONS OR HESITATIONS OF THE MOVEMENT  B) SLIGHT SLOWING  C) THE AMPLITUDE DECREMENTS NEAR THE END OF THE 10 MOVEMENTS.
RIGIDITY_LUE: (0) NORMAL: NO RIGIDITY.
TOETAPPING_LEFT: (0) NORMAL: NO PROBLEMS.
AMPLITUDE_LIP_JAW: (0) NORMAL: NO TREMOR.
FREEZING_GAIT: (0) NORMAL: NO FREEZING.
PRONATION_SUPINATION_LEFT: (0) NORMAL: NO PROBLEMS.
TOTAL_SCORE: 7
FINGER_TAPPING_RIGHT: (1) SLIGHT: ANY OF THE FOLLOWING: A) THE REGULAR RHYTHM IS BROKEN WITH ONE WITH ONE OR TWO INTERRUPTIONS OR HESITATIONS OF THE MOVEMENT  B) SLIGHT SLOWING  C) THE AMPLITUDE DECREMENTS NEAR THE END OF THE 10 MOVEMENTS.
DYSKINESIAS_PRESENT: NO
AMPLITUDE_LUE: (0) NORMAL: NO TREMOR.
RIGIDITY_RLE: (1) SLIGHT: RIGIDITY ONLY DETECTED WITH ACTIVATION MANEUVER.
CONSTANCY_TREMOR_ATREST: (1) SLIGHT: TREMOR AT REST IS PRESENT 25% OF THE ENTIRE EXAMINATION PERIOD.
AMPLITUDE_RUE: (0) NORMAL: NO TREMOR.
AMPLITUDE_LLE: (0) NORMAL: NO TREMOR.
GAIT: (1) SLIGHT: INDEPENDENT WALKING WITH MINOR GAIT IMPAIRMENT.
PARKINSONS_MEDS: OFF
TOTAL_SCORE: 14
PRONATION_SUPINATION_RIGHT: (1) SLIGHT: ANY OF THE FOLLOWING: A) THE REGULAR RHYTHM IS BROKEN WITH ONE WITH ONE OR TWO INTERRUPTIONS OR HESITATIONS OF THE MOVEMENT  B) SLIGHT SLOWING  C) THE AMPLITUDE DECREMENTS NEAR THE END OF THE 10 MOVEMENTS.
HANDMOVEMENTS_RIGHT: (1) SLIGHT: ANY OF THE FOLLOWING: A) THE REGULAR RHYTHM IS BROKEN WITH ONE WITH ONE OR TWO INTERRUPTIONS OR HESITATIONS OF THE MOVEMENT  B) SLIGHT SLOWING  C) THE AMPLITUDE DECREMENTS NEAR THE END OF THE 10 MOVEMENTS.
POSTURE: (0) NORMAL: NO PROBLEMS.
RIGIDITY_NECK: (2) MILD: RIGIDITY DETECTED WITHOUT THE ACTIVATION MANEUVER. FULL RANGE OF MOTION IS EASILY ACHIEVED.
POSTURAL_STABILITY: (0) NORMAL: NO PROBLEMS. RECOVERS WITH ONE OR TWO STEPS.

## 2024-02-12 NOTE — PROGRESS NOTES
"Department of Neurology  Movement Disorders Division   Follow-up Note    Patient: Chad Olivares   MRN: [id number]   : 1955   Date of Visit: 2024    Mr. Olivares is a 68 year old right-handed male who presents for follow-up of Parkinson's disease. He was last seen on 23, at which time no changes were made. Today, he is unaccompanied.    INTERVAL EVENTS:  Since last visit, he reports overall he is doing very well. He regularly exercises, also participates in rock steady boxing. He got back recently from [place] and walked there daily on uneven ground and did well. No falls.     He has been \"foggy\" every now and then. Improved with taking magnesium. Otherwise no cognitive symptoms except sometimes friends tell him that he's slow to react.     Diagnosis with sleep apnea 6 years ago. Wears a mouth device for this. Did not tolerate CPAP in the past - developed rash on face. He notes that his watch only recognizes 20 min of good sleep some nights. He does endorse dream enactment behavior. He has tried melatonin in the past - did not help.    Appointment for [facility] program in 1 week.    Mood is good. No concerns.    Tremor maybe a bit worse in the right hand but tolerable.     MEDICATIONS reviewed & pertinent for:  Lamotrigine 200 mg BID    ROS:  All others negative except as listed above.    Past Medical History:   Diagnosis Date    Achilles bursitis or tendinitis 2008    Advanced directives, counseling/discussion 2015    Gave pt packet on 2015  Syeda Torres CMA      CARDIOVASCULAR SCREENING; LDL GOAL LESS THAN 160 10/12/2010    Cholesteatoma, unspecified     Colon polyps 2015    tubular villous and serrated adenoma    Complex sleep apnea syndrome     does not use CPAP    Dupuytren's contracture     Dyslexia     Dyspnea and respiratory abnormality 2014     Problem list name updated by automated process. Provider to review    Generalized convulsive epilepsy (H) 2014     " Problem list name updated by automated process. 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 Provider to review    Parkinson's disease     Plantar fascial fibromatosis 08/07/2008        Past Surgical History:   Procedure Laterality Date    cholesteatoma surgery Left     COLONOSCOPY  10/09/2002; 2015    polyps - needs f/u 5 yrs     COLONOSCOPY N/A 7/20/2021    Procedure: COLONOSCOPY, WITH POLYPECTOMY;  Surgeon: Asaf Guajardo MD;  Location: [facility] OR     REMOVAL OF TONSILS,<11 Y/O  1962    LASER HOLMIUM ENUCLEATION PROSTATE N/A 12/14/2023    Procedure: Holmium Laser Enucleation of the Prostate;  Surgeon: Roberto Altamirano MD;  Location: [facility] OR    RELEASE DUPUYTRENS CONTRACTURE Right 8/26/2021    Procedure: Right fifth finger and palm Dupuytren's contracture release;  Surgeon: Caron Farrell MD;  Location: [facility] OR        Current Outpatient Medications   Medication Sig Dispense Refill    lamoTRIgine (LAMICTAL) 100 MG tablet Take 2 tablets (200 mg) by mouth 2 times daily 360 tablet 3    ciprofloxacin (CIPRO) 500 MG tablet Take 1 tablet (500 mg) by mouth 2 times daily 6 tablet 0    ibuprofen (ADVIL/MOTRIN) 200 MG tablet Take 200-600 mg by mouth every 4 hours as needed for pain (Patient not taking: Reported on 2/9/2024)      medical cannabis oral liquid  (Patient's own supply.  Not a prescription) (This is NOT a prescription, and does not certify that the patient has a qualifying medical condition for medical cannabis.  The purpose of this order is  to document that the patient reports taking medical cannabis.) (Patient not taking: Reported on 2/9/2024)      nitroFURantoin macrocrystal-monohydrate (MACROBID) 100 MG capsule Take 1 capsule (100 mg) by mouth 2 times daily 6 capsule 0    tamsulosin (FLOMAX) 0.4 MG capsule Take 1 capsule (0.4 mg) by mouth daily 60  capsule 1       Allergies   Allergen Reactions    Gluten Meal     Seasonal Allergies         Family History   Problem Relation Age of Onset    Diabetes Mother         diet controlled, Htn    Heart Murmur Mother         TAVR    Dementia Mother         age 89    Deep Vein Thrombosis (DVT) Mother     Coronary Artery Disease Brother     Diabetes Maternal Grandmother     ALS Maternal Cousin     Glaucoma No family hx of     Macular Degeneration No family hx of     Anesthesia Reaction No family hx of         Social History     Socioeconomic History    Marital status: Single     Spouse name: Not on file    Number of children: 2    Years of education: 14    Highest education level: Not on file   Occupational History    Occupation: real estate broker     Comment: Self Employed    Occupation: contractor     Employer: SELF   Tobacco Use    Smoking status: Former     Types: Cigarettes     Quit date: 2005     Years since quittin.4    Smokeless tobacco: Never   Vaping Use    Vaping Use: Never used   Substance and Sexual Activity    Alcohol use: Not Currently    Drug use: No     Comment: uses CBD     Sexual activity: Yes     Partners: Female   Other Topics Concern    Parent/sibling w/ CABG, MI or angioplasty before 65F 55M? No     Service Not Asked    Blood Transfusions Not Asked    Caffeine Concern Not Asked     Comment: 3 cups of coffee a day    Occupational Exposure Not Asked    Hobby Hazards Not Asked    Sleep Concern Not Asked    Stress Concern Not Asked    Weight Concern Not Asked    Special Diet Not Asked    Back Care Not Asked    Exercise Not Asked     Comment: Exercise 2 to 3 times a week    Bike Helmet Not Asked    Seat Belt Not Asked    Self-Exams Not Asked   Social History Narrative    Balanced Diet - Yes    Osteoporosis Preventative measures-  Dairy servings per day: no servings daily takes soy milk and almond milk - 2 glasses/day     Regular Exercise -  Yes Describe dance 3 times weekly (salsa), bike  ride, and paula    Dental Exam up - YES - Date: 2016    Eye Exam - YES - Date: 2007    Self Testicular Exam -  sometimes    Do you have any concerns about STD's -  Partner has hx of genital herpes    Abuse: Current or Past (Physical, Sexual or Emotional)- No    Do you feel safe in your environment - Yes    Guns stored in the home - Yes, locked away    Sunscreen used - No using coconut     Seatbelts used - Yes    Lipids - YES - Date: 4-28-06    Glucose -  NO    Colon Cancer Screening - Colonoscopy 2002(date completed)    Hemoccults - NO    PSA - YES - Date: 4-28-06    Digital Rectal Exam - YES - Date: 4-28-06    Immunizations reviewed and up to date - Yes, last TD was 11-5-2001 2008, Dania Miller MA             Social Determinants of Health     Financial Resource Strain: Low Risk  (11/6/2023)    Financial Resource Strain     Within the past 12 months, have you or your family members you live with been unable to get utilities (heat, electricity) when it was really needed?: No   Food Insecurity: Low Risk  (11/6/2023)    Food Insecurity     Within the past 12 months, did you worry that your food would run out before you got money to buy more?: No     Within the past 12 months, did the food you bought just not last and you didn t have money to get more?: No   Transportation Needs: Low Risk  (11/6/2023)    Transportation Needs     Within the past 12 months, has lack of transportation kept you from medical appointments, getting your medicines, non-medical meetings or appointments, work, or from getting things that you need?: No   Physical Activity: Not on file   Stress: Not on file   Social Connections: Not on file   Interpersonal Safety: Low Risk  (11/6/2023)    Interpersonal Safety     Do you feel physically and emotionally safe where you currently live?: Yes     Within the past 12 months, have you been hit, slapped, kicked or otherwise physically hurt by someone?: No     Within the past 12 months, have you been  "humiliated or emotionally abused in other ways by your partner or ex-partner?: No   Housing Stability: Low Risk  (11/6/2023)    Housing Stability     Do you have housing? : Yes     Are you worried about losing your housing?: No        PHYSICAL EXAM:  /86   Pulse 71   Ht 1.753 m (5' 9\")   Wt 85.3 kg (188 lb)   SpO2 96%   BMI 27.76 kg/m         Gait:  Narrow base, good stride length, good armswing bilaterally (practiced), quick swivel turn without difficulty or unsteadiness.         2/12/2024    11:00 AM   UPDRS Motor Scale   Time: 11:11   Medication Off   R Brain DBS: None   L Brain DBS: None   Dyskinesia (LID) No   Speech 1   Facial Expression 1   Rigidity Neck 2   Rigidity RUE 1   Rigidity LUE 0   Rigidity RLE 1   Rigidity LLE 0   Finger Taps R 1   Finger Taps L 0   Hand Mvt R 1   Hand Mvt L 0   Pron-/Supinate R 1   Pron-/Supinate L 0   Toe Tap R 1   Toe Tap L 0   Leg Agility R 1   Leg Agility L 0   Arise From Chair 0   Gait 1   Gait Freezing 0   Postural Stability 0   Posture 0   Global Spont Mvt 1   Postural Tremor RUE 0   Postural Tremor LUE 0   Kinetic Tremor RUE 0   Kinetic Tremor LUE 0   Rest Tremor RUE 0   Rest Tremor LUE 0   Rest Tremor RLE 0   Rest Tremor LLE 0   Rest Tremor Lip/Jaw 0   Rest Tremor Constancy 1   Total Right 7   Total Left 0   Axial Total 6   Total 14      Previously 19 on 8/28/23     ASSESSMENT:  Mr. Olivares is a 68 year old right-handed male with history significant for seizures being followed by Dr. Martin in epilepsy clinic who presents for follow-up of idiopathic Parkinson's disease. Exam today is stable. For now, as symptoms are not debilitating will hold on starting a medication. He has been focused on working with therapy and staying physically active and I encouraged him to continue to do so. No concerns today. I did recommend that he follow-up with his epileptologist about questions regarding decreasing lamotrigine dose.     PLAN:   - no medication changes   - encouraged " regular physical activity    RTC 6 months, 30 min    Michelle Carter MD    [facility]  Department of Neurology     30 minutes spent on the date of the encounter doing chart review, history and exam, documentation and further activities as noted above

## 2024-02-12 NOTE — PATIENT INSTRUCTIONS
Your exam looks stable which is good. This suggests that you do not have an aggressive form of the disease.     Keep staying active!    If you ever feel like your symptoms are affecting your quality of life (slowness, stiffness, coordination), please don't hesitate to reach out sooner to discuss medications.

## 2024-02-12 NOTE — LETTER
"    2024         RE: Chad Olivares  [address]  [facility] [address]        Dear Colleague,    Thank you for referring your patient, Chad Olivares, to the [facility]. Please see a copy of my visit note below.    Department of Neurology  Movement Disorders Division   Follow-up Note    Patient: Chad Olivares   MRN: [id number]   : 1955   Date of Visit: 2024    Mr. Olivares is a 68 year old right-handed male who presents for follow-up of Parkinson's disease. He was last seen on 23, at which time no changes were made. Today, he is unaccompanied.    INTERVAL EVENTS:  Since last visit, he reports overall he is doing very well. He regularly exercises, also participates in rock steady boxing. He got back recently from [place] and walked there daily on uneven ground and did well. No falls.     He has been \"foggy\" every now and then. Improved with taking magnesium. Otherwise no cognitive symptoms except sometimes friends tell him that he's slow to react.     Diagnosis with sleep apnea 6 years ago. Wears a mouth device for this. Did not tolerate CPAP in the past - developed rash on face. He notes that his watch only recognizes 20 min of good sleep some nights. He does endorse dream enactment behavior. He has tried melatonin in the past - did not help.    Appointment for [employer] [facility] program in 1 week.    Mood is good. No concerns.    Tremor maybe a bit worse in the right hand but tolerable.     MEDICATIONS reviewed & pertinent for:  Lamotrigine 200 mg BID    ROS:  All others negative except as listed above.    Past Medical History:   Diagnosis Date     Achilles bursitis or tendinitis 2008     Advanced directives, counseling/discussion 2015    Gave pt packet on 2015  Syeda Torres CMA       CARDIOVASCULAR SCREENING; LDL GOAL LESS THAN 160 10/12/2010     Cholesteatoma, unspecified      Colon polyps 2015    tubular villous and serrated adenoma     Complex sleep " apnea syndrome     does not use CPAP     Dupuytren's contracture      Dyslexia      Dyspnea and respiratory abnormality 09/03/2014     Problem list name updated by automated process. 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 Provider to review     Parkinson's disease      Plantar fascial fibromatosis 08/07/2008        Past Surgical History:   Procedure Laterality Date     cholesteatoma surgery Left      COLONOSCOPY  10/09/2002; 2015    polyps - needs f/u 5 yrs      COLONOSCOPY N/A 7/20/2021    Procedure: COLONOSCOPY, WITH POLYPECTOMY;  Surgeon: Asaf Guajardo MD;  Location: [facility] OR      REMOVAL OF TONSILS,<13 Y/O  1962     LASER HOLMIUM ENUCLEATION PROSTATE N/A 12/14/2023    Procedure: Holmium Laser Enucleation of the Prostate;  Surgeon: Roberto Altamirano MD;  Location: [facility] OR     RELEASE DUPUYTRENS CONTRACTURE Right 8/26/2021    Procedure: Right fifth finger and palm Dupuytren's contracture release;  Surgeon: Caron Farrell MD;  Location: [facility] OR        Current Outpatient Medications   Medication Sig Dispense Refill     lamoTRIgine (LAMICTAL) 100 MG tablet Take 2 tablets (200 mg) by mouth 2 times daily 360 tablet 3     ciprofloxacin (CIPRO) 500 MG tablet Take 1 tablet (500 mg) by mouth 2 times daily 6 tablet 0     ibuprofen (ADVIL/MOTRIN) 200 MG tablet Take 200-600 mg by mouth every 4 hours as needed for pain (Patient not taking: Reported on 2/9/2024)       medical cannabis oral liquid  (Patient's own supply.  Not a prescription) (This is NOT a prescription, and does not certify that the patient has a qualifying medical condition for medical cannabis.  The purpose of this order is  to document that the patient reports taking  medical cannabis.) (Patient not taking: Reported on 2024)       nitroFURantoin macrocrystal-monohydrate (MACROBID) 100 MG capsule Take 1 capsule (100 mg) by mouth 2 times daily 6 capsule 0     tamsulosin (FLOMAX) 0.4 MG capsule Take 1 capsule (0.4 mg) by mouth daily 60 capsule 1       Allergies   Allergen Reactions     Gluten Meal      Seasonal Allergies         Family History   Problem Relation Age of Onset     Diabetes Mother         diet controlled, Htn     Heart Murmur Mother         TAVR     Dementia Mother         age 89     Deep Vein Thrombosis (DVT) Mother      Coronary Artery Disease Brother      Diabetes Maternal Grandmother      ALS Maternal Cousin      Glaucoma No family hx of      Macular Degeneration No family hx of      Anesthesia Reaction No family hx of         Social History     Socioeconomic History     Marital status: Single     Spouse name: Not on file     Number of children: 2     Years of education: 14     Highest education level: Not on file   Occupational History     Occupation: real estate broker     Comment: Self Employed     Occupation: contractor     Employer: SELF   Tobacco Use     Smoking status: Former     Types: Cigarettes     Quit date: 2005     Years since quittin.4     Smokeless tobacco: Never   Vaping Use     Vaping Use: Never used   Substance and Sexual Activity     Alcohol use: Not Currently     Drug use: No     Comment: uses CBD      Sexual activity: Yes     Partners: Female   Other Topics Concern     Parent/sibling w/ CABG, MI or angioplasty before 65F 55M? No      Service Not Asked     Blood Transfusions Not Asked     Caffeine Concern Not Asked     Comment: 3 cups of coffee a day     Occupational Exposure Not Asked     Hobby Hazards Not Asked     Sleep Concern Not Asked     Stress Concern Not Asked     Weight Concern Not Asked     Special Diet Not Asked     Back Care Not Asked     Exercise Not Asked     Comment: Exercise 2 to 3 times a week     Bike  Helmet Not Asked     Seat Belt Not Asked     Self-Exams Not Asked   Social History Narrative    Balanced Diet - Yes    Osteoporosis Preventative measures-  Dairy servings per day: no servings daily takes soy milk and almond milk - 2 glasses/day     Regular Exercise -  Yes Describe dance 3 times weekly (salsa), bike ride, and paula    Dental Exam up - YES - Date: 2016    Eye Exam - YES - Date: 2007    Self Testicular Exam -  sometimes    Do you have any concerns about STD's -  Partner has hx of genital herpes    Abuse: Current or Past (Physical, Sexual or Emotional)- No    Do you feel safe in your environment - Yes    Guns stored in the home - Yes, locked away    Sunscreen used - No using coconut     Seatbelts used - Yes    Lipids - YES - Date: 4-28-06    Glucose -  NO    Colon Cancer Screening - Colonoscopy 2002(date completed)    Hemoccults - NO    PSA - YES - Date: 4-28-06    Digital Rectal Exam - YES - Date: 4-28-06    Immunizations reviewed and up to date - Yes, last TD was 11-5-2001 2008, Dania Miller MA             Social Determinants of Health     Financial Resource Strain: Low Risk  (11/6/2023)    Financial Resource Strain      Within the past 12 months, have you or your family members you live with been unable to get utilities (heat, electricity) when it was really needed?: No   Food Insecurity: Low Risk  (11/6/2023)    Food Insecurity      Within the past 12 months, did you worry that your food would run out before you got money to buy more?: No      Within the past 12 months, did the food you bought just not last and you didn t have money to get more?: No   Transportation Needs: Low Risk  (11/6/2023)    Transportation Needs      Within the past 12 months, has lack of transportation kept you from medical appointments, getting your medicines, non-medical meetings or appointments, work, or from getting things that you need?: No   Physical Activity: Not on file   Stress: Not on file   Social Connections:  "Not on file   Interpersonal Safety: Low Risk  (11/6/2023)    Interpersonal Safety      Do you feel physically and emotionally safe where you currently live?: Yes      Within the past 12 months, have you been hit, slapped, kicked or otherwise physically hurt by someone?: No      Within the past 12 months, have you been humiliated or emotionally abused in other ways by your partner or ex-partner?: No   Housing Stability: Low Risk  (11/6/2023)    Housing Stability      Do you have housing? : Yes      Are you worried about losing your housing?: No        PHYSICAL EXAM:  /86   Pulse 71   Ht 1.753 m (5' 9\")   Wt 85.3 kg (188 lb)   SpO2 96%   BMI 27.76 kg/m         Gait:  Narrow base, good stride length, good armswing bilaterally (practiced), quick swivel turn without difficulty or unsteadiness.         2/12/2024    11:00 AM   UPDRS Motor Scale   Time: 11:11   Medication Off   R Brain DBS: None   L Brain DBS: None   Dyskinesia (LID) No   Speech 1   Facial Expression 1   Rigidity Neck 2   Rigidity RUE 1   Rigidity LUE 0   Rigidity RLE 1   Rigidity LLE 0   Finger Taps R 1   Finger Taps L 0   Hand Mvt R 1   Hand Mvt L 0   Pron-/Supinate R 1   Pron-/Supinate L 0   Toe Tap R 1   Toe Tap L 0   Leg Agility R 1   Leg Agility L 0   Arise From Chair 0   Gait 1   Gait Freezing 0   Postural Stability 0   Posture 0   Global Spont Mvt 1   Postural Tremor RUE 0   Postural Tremor LUE 0   Kinetic Tremor RUE 0   Kinetic Tremor LUE 0   Rest Tremor RUE 0   Rest Tremor LUE 0   Rest Tremor RLE 0   Rest Tremor LLE 0   Rest Tremor Lip/Jaw 0   Rest Tremor Constancy 1   Total Right 7   Total Left 0   Axial Total 6   Total 14      Previously 19 on 8/28/23     ASSESSMENT:  Mr. Olivares is a 68 year old right-handed male with history significant for seizures being followed by Dr. Martin in epilepsy clinic who presents for follow-up of idiopathic Parkinson's disease. Exam today is stable. For now, as symptoms are not debilitating will hold on " starting a medication. He has been focused on working with therapy and staying physically active and I encouraged him to continue to do so. No concerns today. I did recommend that he follow-up with his epileptologist about questions regarding decreasing lamotrigine dose.     PLAN:   - no medication changes   - encouraged regular physical activity    RTC 6 months, 30 min    Michelle Carter MD    [facility]  Department of Neurology     30 minutes spent on the date of the encounter doing chart review, history and exam, documentation and further activities as noted above      Again, thank you for allowing me to participate in the care of your patient.        Sincerely,        Michelle Carter MD

## 2024-02-13 NOTE — TELEPHONE ENCOUNTER
TAMSULOSIN HCL 0.4 MG CAPSULE   Last Written Prescription Date:  11/13/2023  Last Fill Quantity: 60,   # refills: 1  Last Office Visit : 11/6/2023  Future Office visit:  None    Routing refill request to provider for review/approval because:  Only 120 days sent to pharm last order.   Would Provider like a 90 day supply with refills for Pt care.  Please review and send new updated order.     Radha Gagnon RN  Central Triage Red Flags/Med Refills

## 2024-02-15 RX ORDER — TAMSULOSIN HYDROCHLORIDE 0.4 MG/1
0.4 CAPSULE ORAL DAILY
Qty: 90 CAPSULE | Refills: 1 | Status: SHIPPED | OUTPATIENT
Start: 2024-02-15 | End: 2024-04-02

## 2024-02-22 DIAGNOSIS — Z00.6 EXAMINATION OF PARTICIPANT OR CONTROL IN CLINICAL RESEARCH: Primary | ICD-10-CM

## 2024-03-13 ENCOUNTER — LAB (OUTPATIENT)
Dept: LAB | Facility: CLINIC | Age: 69
End: 2024-03-13
Payer: COMMERCIAL

## 2024-03-13 DIAGNOSIS — Z00.6 EXAMINATION OF PARTICIPANT OR CONTROL IN CLINICAL RESEARCH: ICD-10-CM

## 2024-03-13 LAB
ALBUMIN SERPL BCG-MCNC: 4.5 G/DL (ref 3.5–5.2)
ALP SERPL-CCNC: 96 U/L (ref 40–150)
ALT SERPL W P-5'-P-CCNC: 33 U/L (ref 0–70)
ANION GAP SERPL CALCULATED.3IONS-SCNC: 11 MMOL/L (ref 7–15)
AST SERPL W P-5'-P-CCNC: 28 U/L (ref 0–45)
BASOPHILS # BLD AUTO: 0 10E3/UL (ref 0–0.2)
BASOPHILS NFR BLD AUTO: 1 %
BILIRUB SERPL-MCNC: 0.6 MG/DL
BUN SERPL-MCNC: 22.2 MG/DL (ref 8–23)
CALCIUM SERPL-MCNC: 9.1 MG/DL (ref 8.8–10.2)
CHLORIDE SERPL-SCNC: 103 MMOL/L (ref 98–107)
CREAT SERPL-MCNC: 1.24 MG/DL (ref 0.67–1.17)
DEPRECATED HCO3 PLAS-SCNC: 26 MMOL/L (ref 22–29)
EGFRCR SERPLBLD CKD-EPI 2021: 63 ML/MIN/1.73M2
EOSINOPHIL # BLD AUTO: 0.1 10E3/UL (ref 0–0.7)
EOSINOPHIL NFR BLD AUTO: 2 %
ERYTHROCYTE [DISTWIDTH] IN BLOOD BY AUTOMATED COUNT: 12.5 % (ref 10–15)
GLUCOSE SERPL-MCNC: 100 MG/DL (ref 70–99)
HCT VFR BLD AUTO: 47 % (ref 40–53)
HGB BLD-MCNC: 15.9 G/DL (ref 13.3–17.7)
IMM GRANULOCYTES # BLD: 0 10E3/UL
IMM GRANULOCYTES NFR BLD: 0 %
LYMPHOCYTES # BLD AUTO: 2 10E3/UL (ref 0.8–5.3)
LYMPHOCYTES NFR BLD AUTO: 35 %
MCH RBC QN AUTO: 30.9 PG (ref 26.5–33)
MCHC RBC AUTO-ENTMCNC: 33.8 G/DL (ref 31.5–36.5)
MCV RBC AUTO: 91 FL (ref 78–100)
MONOCYTES # BLD AUTO: 0.4 10E3/UL (ref 0–1.3)
MONOCYTES NFR BLD AUTO: 7 %
NEUTROPHILS # BLD AUTO: 3.1 10E3/UL (ref 1.6–8.3)
NEUTROPHILS NFR BLD AUTO: 55 %
NRBC # BLD AUTO: 0 10E3/UL
NRBC BLD AUTO-RTO: 0 /100
PLATELET # BLD AUTO: 248 10E3/UL (ref 150–450)
POTASSIUM SERPL-SCNC: 4.4 MMOL/L (ref 3.4–5.3)
PROT SERPL-MCNC: 7.3 G/DL (ref 6.4–8.3)
RBC # BLD AUTO: 5.14 10E6/UL (ref 4.4–5.9)
SODIUM SERPL-SCNC: 140 MMOL/L (ref 135–145)
TSH SERPL DL<=0.005 MIU/L-ACNC: 1.94 UIU/ML (ref 0.3–4.2)
WBC # BLD AUTO: 5.8 10E3/UL (ref 4–11)

## 2024-03-13 PROCEDURE — 80053 COMPREHEN METABOLIC PANEL: CPT | Performed by: PATHOLOGY

## 2024-03-13 PROCEDURE — 84443 ASSAY THYROID STIM HORMONE: CPT | Performed by: PATHOLOGY

## 2024-03-13 PROCEDURE — 36415 COLL VENOUS BLD VENIPUNCTURE: CPT | Performed by: PATHOLOGY

## 2024-03-13 PROCEDURE — 85025 COMPLETE CBC W/AUTO DIFF WBC: CPT | Performed by: PATHOLOGY

## 2024-03-27 ENCOUNTER — OFFICE VISIT (OUTPATIENT)
Dept: NEUROLOGY | Facility: CLINIC | Age: 69
End: 2024-03-27
Payer: COMMERCIAL

## 2024-03-27 ENCOUNTER — MYC REFILL (OUTPATIENT)
Dept: NEUROLOGY | Facility: CLINIC | Age: 69
End: 2024-03-27

## 2024-03-27 VITALS
SYSTOLIC BLOOD PRESSURE: 121 MMHG | DIASTOLIC BLOOD PRESSURE: 83 MMHG | WEIGHT: 196.2 LBS | HEART RATE: 63 BPM | BODY MASS INDEX: 28.97 KG/M2 | TEMPERATURE: 97 F | OXYGEN SATURATION: 97 %

## 2024-03-27 DIAGNOSIS — G40.309 GENERALIZED CONVULSIVE EPILEPSY (H): ICD-10-CM

## 2024-03-27 RX ORDER — LAMOTRIGINE 100 MG/1
200 TABLET ORAL 2 TIMES DAILY
Qty: 360 TABLET | Refills: 3 | Status: SHIPPED | OUTPATIENT
Start: 2024-03-27 | End: 2024-03-27

## 2024-03-27 ASSESSMENT — PAIN SCALES - GENERAL: PAINLEVEL: NO PAIN (0)

## 2024-03-27 NOTE — LETTER
3/27/2024       RE: Chad Olivares  : 1955   MRN: [id number]      Dear Colleague,    Thank you for referring your patient, Chad Olivares, to the [facility] EPILEPSY CARE at [facility]. Please see a copy of my visit note below.      [facility]:  RETURN VISIT           Service Date: 2023     HISTORY:  Mr. Chad Olivares is a 67-year-old right-handed man who returned for follow-up of grand mal seizures.                             The patient reported having no seizures since his last  visit to this clinic on 2023.     He denied having insomnia recently, but he has multiple arousal at night and is sleepy all day on most days.  Today he reported that for longer than a year, he has had frequent small-volume urination with urgency, which did not change after prostatectomy.  He urinates at least hourly while awake during the day (occasionally up to every 15 minutes), and gets up to urinate 4-8 times per night.       Ictal semiology-history:             The patient confirmed previously presented history in detail today. He again noted that he had the first seizure of his lifetime on 2014, and a second grand mal seizure on 2014.  He was asleep at home in bed at seizure onset; after going to bed the preceding evening, his next memory is of awakening in the [facility] Emergency Department.  He then was very tired and diffusely sore, with pain on the right side of his tongue which had been bitten during the seizure.  His significant other witnessed the seizure from onset.  He was lying stiffly in bed with legs and trunk extended with generalized jerking movements for what seemed to be several minutes.  Afterwards he was very lethargic with stertorous respirations and then he became agitated.  When paramedics arrived, they gave him 2 doses of midazolam which decreased the agitation.  Subsequently, he became gradually more responsive  over several hours.  He did not feel that he had baseline alertness and cognitive function for nearly another 2 days, however.  He did not have urinary incontinence with the event.        The patient and his significant other reported that he has not been known to have any sudden brief staring spells with unresponsiveness, sudden brief periods of confusion or global memory deficits or involuntary movements, except for hypnic jerks.      Epilepsy-seizure predispositions:   The patient has no family history of epilepsy or seizures.  He has no history of gestational or  injury, febrile convulsions, developmental delay, stroke, meningitis, encephalitis, significant head injury, or other epileptic predispositions.  He denied a history of physical or sexual abuse by an adult during his childhood or adulthood.       Laboratory evaluations:   In the Emergency Department on the morning after the first seizure, he had a head CT scan which was normal.    A brain MRI scan was ordered at that time and was completed on 2014 and this also was normal.    We reviewed the results of the brain MRI of 2022, which showed no specific lesions, but evidence of SVID.     He had a brief routine electroencephalogram on 2014 which showed normal waking and drowsy EEG activities, although sleep patterns were not recorded. His EEG on 2022 showed no abnormalities.     Epilepsy therapeutics:   The patient reported that he had never been treated with anti-seizure medications for any reason until the second seizure occurred, when levetiracetam was started.  He completed a crossover from levetiracetam monotherapy to lamotrigine monotherapy in .  He experienced complete resolution of chronic mild irritability and episodic imbalance after tapering off levetiracetam.  He later he added  OTC  cannabidiol oil to this, mainly for treating joint pains.      PAST MEDICAL HISTORY:    1.  History of two generalized  tonic-clonic seizures (2014) of uncertain etiology.   2.  Mild obstructive sleep apnea.   3.  History of dyslexia.      PERSONAL AND SOCIAL HISTORY:  The patient grew up in [place].  He had difficulty with reading in school and was told that testing showed dyslexia, but he ultimately graduated from high school in regular classes.  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  He lives with his significant other.  He does not use illicit recreational drugs.  He drinks, at most, 1-2 alcoholic beverages, perhaps 3 times per month.  He quit smoking in 2005.      REVIEW OF SYSTEMS:    Positive for occasional dizziness when standing after bending over. He denies headache, chest pain, shortness of breath, cough, changes to bowel movements, numbness or tingling in his limbs, and rash.     MEDICATIONS:  Lamotrigine 200 mg b.i.d., and other medications as per the electronic medical record.  He also uses cannabidiol oil 1-2 times per week to treat joint pains.      PHYSICAL EXAMINATION:    On physical examination the patient appeared to be in no acute distress.  Vital signs were as per the electronic medical record.  Skull was normocephalic and atraumatic.  Neck was supple, without signs of meningeal irritation.       On neurological examination the patient appeared alert and was fully oriented to person, place, time, and reason for visit.  Speech showed grossly normal articulation, fluency, repetitions, naming, syntax and comprehension.  No apraxias or atavistic signs were elicited.  Cranial nerves III through XII were normal.  Muscle masses, tones and strengths were normal throughout.  There was no pronator drift.    There was a low-ampltiude action termor of the outstretched arms, slightly greater on the right.  No myoclonus, or other abnormal movements were  observed.  Sensations of light touch, pin prick, vibration and proprioception were reportedly normal throughout.  The rapid alternating movements, and finger-nose-finger and heel-shin maneuvers were performed normally bilaterally.  Deep tendon reflexes were normal and symmetric throughout.  Toes were downgoing bilaterally.  Romberg maneuver was negative.  Regular, tandem and reverse tandem walking were normal.       IMPRESSION:    The patient continues to do very well with full control of seizures.       I told the patient that he may have overactive bladder, causing sleep deprivation and excessive daytime somnolence.  He denied having insomnia recently, but he has multiple arousal at night and is sleepy all day on most days.  Today he reported that for longer than a year, he has had frequent small-volume urination with urgency, which did not change after prostatectomy.  He urinates at least hourly while awake during the day (occasionally up to every 15 minutes), and gets up to urinate 4-8 times per night.       We reviewed the results of the brain MRI of 04/04/2022, which showed no specific lesions, but evidence of SVID.     We also reviewed the results of the neuropsychological evaluation of 08/24/2022, which showed no major new findings, but possibly mild progression in frontal lobe functions compared with testing in 2016.  He did not have evidence of global deficits of dementia, which was his major concern.       He was diagnosed with very mild parkinsonian features in the [facility], based in part on cervical rigidity and a right-sided action tremor.  He was advised not to start dopaminergic therapy, but to participate actively in structured exercise.  He noted some episodes of dream enactment, as well.     I again reviewed [place] regulations on seizures and driving with the patient.  He appeared to clearly understand that he would  prohibited from operating a motor vehicle within 3 months  following any future seizure or other episode with sudden unconsciousness or inability to sit up, and that he is required to report any future such seizure to the DMV within 30 days after the event.       PLAN:    1.  Continue lamotrigine at the current dose, and check lamotrigine level.    2.  Review symptoms of overactive bladder with PCP.  3.  Return visit in approximately 11 months.     I spent 44 minutes in this patient care, with 29 minutes in direct patient contact and 15 minutes in chart review and document preparation.            Again, thank you for allowing me to participate in the care of your patient.      Sincerely,    Dandre Martin MD

## 2024-03-28 DIAGNOSIS — Z00.6 RESEARCH SUBJECT: Primary | ICD-10-CM

## 2024-03-28 DIAGNOSIS — G20.A1 PARKINSON'S DISEASE WITHOUT DYSKINESIA OR FLUCTUATING MANIFESTATIONS (H): ICD-10-CM

## 2024-03-28 LAB — LAMOTRIGINE SERPL-MCNC: 5.1 UG/ML

## 2024-03-28 NOTE — PROGRESS NOTES
[facility]:  RETURN VISIT           Service Date: 04/26/2023     HISTORY:  Mr. Chad Olivares is a 67-year-old right-handed man who returned for follow-up of grand mal seizures.                             The patient reported having no seizures since his last  visit to this clinic on 04/26/2023.     He denied having insomnia recently, but he has multiple arousal at night and is sleepy all day on most days.  Today he reported that for longer than a year, he has had frequent small-volume urination with urgency, which did not change after prostatectomy.  He urinates at least hourly while awake during the day (occasionally up to every 15 minutes), and gets up to urinate 4-8 times per night.       Ictal semiology-history:             The patient confirmed previously presented history in detail today. He again noted that he had the first seizure of his lifetime on 07/31/2014, and a second grand mal seizure on August 20, 2014.  He was asleep at home in bed at seizure onset; after going to bed the preceding evening, his next memory is of awakening in the [facility] Emergency Department.  He then was very tired and diffusely sore, with pain on the right side of his tongue which had been bitten during the seizure.  His significant other witnessed the seizure from onset.  He was lying stiffly in bed with legs and trunk extended with generalized jerking movements for what seemed to be several minutes.  Afterwards he was very lethargic with stertorous respirations and then he became agitated.  When paramedics arrived, they gave him 2 doses of midazolam which decreased the agitation.  Subsequently, he became gradually more responsive over several hours.  He did not feel that he had baseline alertness and cognitive function for nearly another 2 days, however.  He did not have urinary incontinence with the event.        The patient and his significant other reported that he has not been known to have any sudden brief  staring spells with unresponsiveness, sudden brief periods of confusion or global memory deficits or involuntary movements, except for hypnic jerks.      Epilepsy-seizure predispositions:   The patient has no family history of epilepsy or seizures.  He has no history of gestational or  injury, febrile convulsions, developmental delay, stroke, meningitis, encephalitis, significant head injury, or other epileptic predispositions.  He denied a history of physical or sexual abuse by an adult during his childhood or adulthood.       Laboratory evaluations:   In the Emergency Department on the morning after the first seizure, he had a head CT scan which was normal.    A brain MRI scan was ordered at that time and was completed on 2014 and this also was normal.    We reviewed the results of the brain MRI of 2022, which showed no specific lesions, but evidence of SVID.     He had a brief routine electroencephalogram on 2014 which showed normal waking and drowsy EEG activities, although sleep patterns were not recorded. His EEG on 2022 showed no abnormalities.     Epilepsy therapeutics:   The patient reported that he had never been treated with anti-seizure medications for any reason until the second seizure occurred, when levetiracetam was started.  He completed a crossover from levetiracetam monotherapy to lamotrigine monotherapy in .  He experienced complete resolution of chronic mild irritability and episodic imbalance after tapering off levetiracetam.  He later he added  OTC  cannabidiol oil to this, mainly for treating joint pains.      PAST MEDICAL HISTORY:    1.  History of two generalized tonic-clonic seizures () of uncertain etiology.   2.  Mild obstructive sleep apnea.   3.  History of dyslexia.      PERSONAL AND SOCIAL HISTORY:  The patient grew up in [place].  He had difficulty with reading in school and was told that testing showed dyslexia, but he ultimately graduated  from high school in regular classes.  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  He lives with his significant other.  He does not use illicit recreational drugs.  He drinks, at most, 1-2 alcoholic beverages, perhaps 3 times per month.  He quit smoking in 2005.      REVIEW OF SYSTEMS:    Positive for occasional dizziness when standing after bending over. He denies headache, chest pain, shortness of breath, cough, changes to bowel movements, numbness or tingling in his limbs, and rash.     MEDICATIONS:  Lamotrigine 200 mg b.i.d., and other medications as per the electronic medical record.  He also uses cannabidiol oil 1-2 times per week to treat joint pains.      PHYSICAL EXAMINATION:    On physical examination the patient appeared to be in no acute distress.  Vital signs were as per the electronic medical record.  Skull was normocephalic and atraumatic.  Neck was supple, without signs of meningeal irritation.       On neurological examination the patient appeared alert and was fully oriented to person, place, time, and reason for visit.  Speech showed grossly normal articulation, fluency, repetitions, naming, syntax and comprehension.  No apraxias or atavistic signs were elicited.  Cranial nerves III through XII were normal.  Muscle masses, tones and strengths were normal throughout.  There was no pronator drift.    There was a low-ampltiude action termor of the outstretched arms, slightly greater on the right.  No myoclonus, or other abnormal movements were observed.  Sensations of light touch, pin prick, vibration and proprioception were reportedly normal throughout.  The rapid alternating movements, and finger-nose-finger and heel-shin maneuvers were performed normally bilaterally.  Deep tendon reflexes were normal and symmetric throughout.  Toes were  downgoing bilaterally.  Romberg maneuver was negative.  Regular, tandem and reverse tandem walking were normal.       IMPRESSION:    The patient continues to do very well with full control of seizures.       I told the patient that he may have overactive bladder, causing sleep deprivation and excessive daytime somnolence.  He denied having insomnia recently, but he has multiple arousal at night and is sleepy all day on most days.  Today he reported that for longer than a year, he has had frequent small-volume urination with urgency, which did not change after prostatectomy.  He urinates at least hourly while awake during the day (occasionally up to every 15 minutes), and gets up to urinate 4-8 times per night.       We reviewed the results of the brain MRI of 04/04/2022, which showed no specific lesions, but evidence of SVID.     We also reviewed the results of the neuropsychological evaluation of 08/24/2022, which showed no major new findings, but possibly mild progression in frontal lobe functions compared with testing in 2016.  He did not have evidence of global deficits of dementia, which was his major concern.       He was diagnosed with very mild parkinsonian features in the [facility], based in part on cervical rigidity and a right-sided action tremor.  He was advised not to start dopaminergic therapy, but to participate actively in structured exercise.  He noted some episodes of dream enactment, as well.     I again reviewed [place] regulations on seizures and driving with the patient.  He appeared to clearly understand that he would  prohibited from operating a motor vehicle within 3 months following any future seizure or other episode with sudden unconsciousness or inability to sit up, and that he is required to report any future such seizure to the [facility] within 30 days after the event.       PLAN:    1.  Continue lamotrigine at the current dose, and check lamotrigine level.    2.   Review symptoms of overactive bladder with PCP.  3.  Return visit in approximately 11 months.     I spent 44 minutes in this patient care, with 29 minutes in direct patient contact and 15 minutes in chart review and document preparation.          Dandre Martin M.D.  Professor of Neurology

## 2024-03-29 RX ORDER — LAMOTRIGINE 100 MG/1
200 TABLET ORAL 2 TIMES DAILY
Qty: 360 TABLET | Refills: 3 | Status: SHIPPED | OUTPATIENT
Start: 2024-03-29 | End: 2024-05-14

## 2024-04-03 ENCOUNTER — ANCILLARY PROCEDURE (OUTPATIENT)
Dept: NUCLEAR MEDICINE | Facility: CLINIC | Age: 69
End: 2024-04-03
Attending: PSYCHIATRY & NEUROLOGY
Payer: COMMERCIAL

## 2024-04-03 PROCEDURE — A9584 IODINE I-123 IOFLUPANE: HCPCS | Performed by: PSYCHIATRY & NEUROLOGY

## 2024-04-03 PROCEDURE — 78803 RP LOCLZJ TUM SPECT 1 AREA: CPT | Mod: TC | Performed by: PSYCHIATRY & NEUROLOGY

## 2024-04-03 RX ORDER — IOFLUPANE I-123 2 MCI/ML
4-6 INJECTION, SOLUTION INTRAVENOUS ONCE
Status: COMPLETED | OUTPATIENT
Start: 2024-04-03 | End: 2024-04-03

## 2024-04-03 RX ADMIN — Medication 130 MG: at 10:29

## 2024-04-03 RX ADMIN — IOFLUPANE I-123 4.77 MILLICURIE: 2 INJECTION, SOLUTION INTRAVENOUS at 11:17

## 2024-04-17 ENCOUNTER — PATIENT OUTREACH (OUTPATIENT)
Dept: UROLOGY | Facility: CLINIC | Age: 69
End: 2024-04-17
Payer: COMMERCIAL

## 2024-04-17 ENCOUNTER — LAB (OUTPATIENT)
Dept: LAB | Facility: CLINIC | Age: 69
End: 2024-04-17
Attending: NURSE PRACTITIONER
Payer: COMMERCIAL

## 2024-04-17 DIAGNOSIS — R33.9 URINARY RETENTION: Primary | ICD-10-CM

## 2024-04-17 DIAGNOSIS — R33.9 URINARY RETENTION: ICD-10-CM

## 2024-04-17 LAB
ALBUMIN UR-MCNC: NEGATIVE MG/DL
APPEARANCE UR: CLEAR
BILIRUB UR QL STRIP: NEGATIVE
COLOR UR AUTO: YELLOW
GLUCOSE UR STRIP-MCNC: NEGATIVE MG/DL
HGB UR QL STRIP: NEGATIVE
KETONES UR STRIP-MCNC: NEGATIVE MG/DL
LEUKOCYTE ESTERASE UR QL STRIP: NEGATIVE
MUCOUS THREADS #/AREA URNS LPF: PRESENT /LPF
NITRATE UR QL: NEGATIVE
PH UR STRIP: 6.5 [PH] (ref 5–7)
RBC URINE: 1 /HPF
SP GR UR STRIP: 1.02 (ref 1–1.03)
SQUAMOUS EPITHELIAL: <1 /HPF
UROBILINOGEN UR STRIP-MCNC: NORMAL MG/DL
WBC URINE: <1 /HPF

## 2024-04-17 PROCEDURE — 87086 URINE CULTURE/COLONY COUNT: CPT | Performed by: NURSE PRACTITIONER

## 2024-04-17 PROCEDURE — 99000 SPECIMEN HANDLING OFFICE-LAB: CPT | Performed by: PATHOLOGY

## 2024-04-17 PROCEDURE — 81001 URINALYSIS AUTO W/SCOPE: CPT | Performed by: PATHOLOGY

## 2024-04-17 NOTE — PROGRESS NOTES
Patient reports increased urination at night, daytime urgency and slight burning, increased incontinence, order placed for UA/UC and will rule out infection first, patient transferred to lab scheduling line     Thank you,  PATTI CumminsN RN-Triage-Urology

## 2024-04-18 LAB — BACTERIA UR CULT: NORMAL

## 2024-04-18 NOTE — PROGRESS NOTES
[facility] recheck. Patient reports that he got a new mask, the f30i and he is having less baggage under his eyes than he did with the F20. He does have some skin irritation. He denies any issues with the therapy itself. He used CPAP a few hours the past two nights after taking the break I recommended at our last call. (Patient called on 12/9 and wanted to return the machine due to discomfort on his face. I encouraged him to take a break for a few nights and try again.) He will continue to try getting used to CPAP. Recommended lanolin cream for facial irritation.   
No

## 2024-04-30 ENCOUNTER — HOSPITAL ENCOUNTER (OUTPATIENT)
Dept: RESEARCH | Facility: CLINIC | Age: 69
Discharge: HOME | End: 2024-04-30
Attending: PSYCHIATRY & NEUROLOGY
Payer: COMMERCIAL

## 2024-04-30 VITALS — BODY MASS INDEX: 28.9 KG/M2 | WEIGHT: 190.7 LBS | HEIGHT: 68 IN

## 2024-04-30 PROCEDURE — 300N000006 HC RESEARCH SPECIMEN PACKAGING

## 2024-04-30 PROCEDURE — 300N000003 HC RESEARCH SPECIMEN PROCESSING, SIMPLE

## 2024-04-30 PROCEDURE — 510N000017 HC CRU PATIENT CARE, PER 15 MIN

## 2024-04-30 PROCEDURE — 510N000009 HC RESEARCH FACILITY, PER 15 MIN

## 2024-04-30 NOTE — ADDENDUM NOTE
Encounter addended by: Killian Frazier on: 4/30/2024 2:49 PM   Actions taken: Charge Capture section accepted

## 2024-04-30 NOTE — ADDENDUM NOTE
Encounter addended by: Sara Camejo RN on: 4/30/2024 1:21 PM   Actions taken: Charge Capture section accepted

## 2024-05-09 ENCOUNTER — HOSPITAL ENCOUNTER (OUTPATIENT)
Dept: RESEARCH | Facility: CLINIC | Age: 69
Discharge: HOME | End: 2024-05-09
Attending: PSYCHIATRY & NEUROLOGY | Admitting: PSYCHIATRY & NEUROLOGY
Payer: COMMERCIAL

## 2024-05-09 PROCEDURE — 510N000009 HC RESEARCH FACILITY, PER 15 MIN

## 2024-05-13 DIAGNOSIS — G40.309 GENERALIZED CONVULSIVE EPILEPSY (H): ICD-10-CM

## 2024-05-14 ENCOUNTER — MYC MEDICAL ADVICE (OUTPATIENT)
Dept: NEUROLOGY | Facility: CLINIC | Age: 69
End: 2024-05-14

## 2024-05-14 DIAGNOSIS — G40.309 GENERALIZED CONVULSIVE EPILEPSY (H): ICD-10-CM

## 2024-05-14 RX ORDER — LAMOTRIGINE 100 MG/1
200 TABLET ORAL 2 TIMES DAILY
Qty: 360 TABLET | Refills: 3 | Status: SHIPPED | OUTPATIENT
Start: 2024-05-14

## 2024-05-23 RX ORDER — LAMOTRIGINE 100 MG/1
200 TABLET ORAL 2 TIMES DAILY
Qty: 360 TABLET | Refills: 3 | OUTPATIENT
Start: 2024-05-23

## 2024-06-30 ENCOUNTER — MYC MEDICAL ADVICE (OUTPATIENT)
Dept: INTERNAL MEDICINE | Facility: CLINIC | Age: 69
End: 2024-06-30
Payer: COMMERCIAL

## 2024-06-30 DIAGNOSIS — G20.A1 PARKINSON'S DISEASE WITHOUT DYSKINESIA OR FLUCTUATING MANIFESTATIONS (H): Primary | ICD-10-CM

## 2024-06-30 DIAGNOSIS — R73.02 IGT (IMPAIRED GLUCOSE TOLERANCE): ICD-10-CM

## 2024-06-30 DIAGNOSIS — N18.2 CHRONIC KIDNEY DISEASE, STAGE 2 (MILD): ICD-10-CM

## 2024-08-05 ENCOUNTER — OFFICE VISIT (OUTPATIENT)
Dept: INTERNAL MEDICINE | Facility: CLINIC | Age: 69
End: 2024-08-05
Payer: COMMERCIAL

## 2024-08-05 VITALS
OXYGEN SATURATION: 97 % | SYSTOLIC BLOOD PRESSURE: 115 MMHG | WEIGHT: 189.8 LBS | BODY MASS INDEX: 28.83 KG/M2 | DIASTOLIC BLOOD PRESSURE: 79 MMHG | HEART RATE: 66 BPM

## 2024-08-05 DIAGNOSIS — Z87.891 SMOKING HISTORY: Primary | ICD-10-CM

## 2024-08-05 DIAGNOSIS — H53.9 VISION CHANGES: ICD-10-CM

## 2024-08-05 DIAGNOSIS — N39.41 URGE INCONTINENCE OF URINE: ICD-10-CM

## 2024-08-05 DIAGNOSIS — N18.2 CHRONIC KIDNEY DISEASE, STAGE 2 (MILD): ICD-10-CM

## 2024-08-05 DIAGNOSIS — Z13.6 CARDIOVASCULAR SCREENING; LDL GOAL LESS THAN 160: ICD-10-CM

## 2024-08-05 DIAGNOSIS — R73.02 IGT (IMPAIRED GLUCOSE TOLERANCE): ICD-10-CM

## 2024-08-05 PROCEDURE — G0439 PPPS, SUBSEQ VISIT: HCPCS | Performed by: INTERNAL MEDICINE

## 2024-08-05 RX ORDER — TOLTERODINE 2 MG/1
2 CAPSULE, EXTENDED RELEASE ORAL DAILY
Qty: 60 CAPSULE | Refills: 5 | Status: SHIPPED | OUTPATIENT
Start: 2024-08-05

## 2024-08-05 RX ORDER — MAG HYDROX/ALUMINUM HYD/SIMETH 400-400-40
450 SUSPENSION, ORAL (FINAL DOSE FORM) ORAL DAILY
COMMUNITY

## 2024-08-05 RX ORDER — FAMOTIDINE 20 MG/1
20 TABLET, FILM COATED ORAL 2 TIMES DAILY
COMMUNITY

## 2024-08-05 NOTE — PROGRESS NOTES
Chad is a 69 year old that presents in clinic today for the following:     Chief Complaint   Patient presents with    Physical    Medicare Visit           8/5/2024     3:51 PM   Additional Questions   Roomed by shelbie emt     Screenings from encounters over the past 10 days    No data recorded       Gareth Clay at 3:56 PM on 8/5/2024

## 2024-08-05 NOTE — PROGRESS NOTES
HPI  69-year-old send today for Medicare wellness visit.  He has a list of several concerns.  He had a pain in the right side under the ribs and radiating around to the back has been present for several years.  This bothers him with certain movements of bothersome when he sits up and moves it also is associated with some pain and discomfort in the tailbone.  He has been seen by chiropractor who diagnosed a tailbone dislocation and he has had some improvement with manipulation. He is also had some fatigue.  This occurs most days she occasionally will get out of breath.  He is asking about coronary calcium scoring and we will arrange for this as he is not on a statin.  He does report however that he is up 4-6 times a night to pee and sometimes even more frequently.  He has not had resolution of this after his recent surgery.  He is interested in something that we will reduce his bladder urgency and has not been on anything for this at all recently.  Otherwise he has had occasional blurry and tunnel vision.  He has not had a recent eye exam.  Has had some indigestion at night and this is improved after taking famotidine famotidine has not done anything with his right side pain.  Past Medical History:   Diagnosis Date    Achilles bursitis or tendinitis 08/07/2008    Advanced directives, counseling/discussion 05/20/2015    Gave pt packet on 5/20/2015  Syeda Torres CMA      CARDIOVASCULAR SCREENING; LDL GOAL LESS THAN 160 10/12/2010    Cholesteatoma, unspecified     Colon polyps 04/2015    tubular villous and serrated adenoma    Complex sleep apnea syndrome     does not use CPAP    Dupuytren's contracture     Dyslexia     Dyspnea and respiratory abnormality 09/03/2014     Problem list name updated by automated process. 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 Provider to review    Parkinson's disease     Plantar fascial fibromatosis 08/07/2008     Past Surgical History:   Procedure Laterality Date    cholesteatoma surgery Left     COLONOSCOPY  10/09/2002; 2015    polyps - needs f/u 5 yrs     COLONOSCOPY N/A 7/20/2021    Procedure: COLONOSCOPY, WITH POLYPECTOMY;  Surgeon: Asaf Guajardo MD;  Location: [facility] OR    HC REMOVAL OF TONSILS,<13 Y/O  1962    LASER HOLMIUM ENUCLEATION PROSTATE N/A 12/14/2023    Procedure: Holmium Laser Enucleation of the Prostate;  Surgeon: Roberto Altamirano MD;  Location: [facility] OR    RELEASE DUPUYTRENS CONTRACTURE Right 8/26/2021    Procedure: Right fifth finger and palm Dupuytren's contracture release;  Surgeon: Caron Farrell MD;  Location: [facility] OR     Family History   Problem Relation Age of Onset    Diabetes Mother         diet controlled, Htn    Heart Murmur Mother         TAVR    Dementia Mother         age 89    Deep Vein Thrombosis (DVT) Mother     Coronary Artery Disease Brother     Diabetes Maternal Grandmother     ALS Maternal Cousin     Glaucoma No family hx of     Macular Degeneration No family hx of     Anesthesia Reaction No family hx of          Exam:  /79 (BP Location: Right arm, Patient Position: Sitting, Cuff Size: Adult Regular)   Pulse 66   Wt 86.1 kg (189 lb 12.8 oz)   SpO2 97%   BMI 28.83 kg/m    189 lbs 12.8 oz  Physical Exam   The patient is alert, oriented with a clear sensorium.  Akinetic masked facies with some rigidity.  Skin shows no lesions or rashes and good turgor.   Head is normocephalic and atraumatic.   Eyes show PERRLA with benign optic fundi.   Ears show normal TMs bilaterally.   Mouth shows clear oral mucosa.   Neck shows no nodes, thyromegaly or bruits.   Back is non tender.  Lungs are clear to percussion and auscultation.   Heart shows normal S1 and S2 without murmur  or gallop.   Abdomen is soft and nontender without masses or organomegaly.   Extremities show no edema and no evidence of active synovitis.   Neurologic examination shows cranial nerves II-XII intact. Motor shows 5/5 strength. Reflexes are symmetric. .      ASSESSMENT  1 BPH with urinary obstruction S/P HoLEP with urinary urgency  2 Parkinson disease stable   3 sleep apnea not on CPAP   4 hyperlipidemia   5 seizures in remission on lamotrigine  6 Colon polyps due for colonoscopy 2025  7 Abdominal wall/lumbar paraspinous pain  8 history of smoking quit 10 years ago    Plan  Will continue with the famotidine have him get CT lung cancer screening and we will order the coronary artery calcium score.  Will assess his fasting lipids and try him on Detrol.  1 follow-up for reassessment on this in a month or 2.  He is scheduled for neurology follow-up next week.  Also referred for an eye exam because of the vision changes.    This note was completed using Dragon voice recognition software.      Asaf Craig MD  General Internal Medicine  [facility]  [phone]

## 2024-08-05 NOTE — PROGRESS NOTES
Medicare Annual Wellness Questionnaire:  This 69 year old year old male presents for a Medicare Wellness Exam.    Patient Care Team         Relationship Specialty Notifications Start End    Asaf Craig MD PCP - General Internal Medicine  12/14/23     Phone: [phone] Fax: [phone]         [address] [place] [address]    Dandre aMrtin MD MD Neurology  8/12/15     Phone: [phone] Fax: [phone]         [address] [place] [address]    Kevin Odell OD MD Optometry  4/27/18     Phone: [phone] Fax: [phone]         [address] [place] [address]    Asaf Craig MD Assigned PCP   8/1/21     Phone: [phone] Fax: [phone]         [address] [place] [address]    Dandre Garzon APRN CNP Assigned Sleep Provider   8/22/21     Phone: [phone] Fax: [phone]         [address] [address]    Jasper Brand MD Physician Ophthalmology  7/26/22     Phone: [phone] Fax: [phone]         [address] [place] [address]    Michelle Carter MD Physician Neurology  6/29/23     Phone: [phone] Fax: [phone]         [address] [place] [address]    Pato Lundy MD MD Cardiovascular Disease  8/23/23     Phone: [phone] Pager: [phone] Fax: [phone] [address] [place] [address]    Marco A Resendiz MD Assigned Heart and Vascular Provider   9/16/23     Phone: [phone] Fax: [phone] [address] [place] [address]    Tere Fonseca CNP Assigned Surgical Provider   2/23/24     Phone: [phone] Fax: [phone]         [address] [place] [address]    Dandre Martin MD Assigned Neuroscience Provider   4/23/24     Phone: [phone] Fax: [phone]         [address] [place] [address]            Fall Risk Assessment:  Have you fallen 2 or more times in the last year?  No    Were  you injured due to a fall in the last year?  No    PHQ-2:  Over the last 2 weeks, how often have you had little interest or pleasure in doing things?  Several days (1)    Over the last 2 weeks, how often have you been bothered by feeling down, depressed, or hopeless?  Several days (1)    Social History:  What is your marital status?  Cohabitating    Who lives in your household?  [place]    Does your home have loose rugs in the hallway:     Yes     Does your home have grab bars in the bathroom:    Yes     Does your home have handrails on the stairs?  No    Does your home have poorly lit areas?    No    Do you feel threatened or controlled by a partner, ex-partner or anyone in your life?   No    Has anyone hurt you physically, for example by pushing, hitting, slapping or kicking you or forcing you to have sex?   No    Do you need help with the phone, transportation, shopping, preparing meals, housework, laundry, medications or managing money?   No    Sexual Health:  Are you sexually active?    Yes     If yes, with men, women, or both?     Women    If yes, how many partners?  1    If yes, are you using condoms?    No    Have you had any sexually transmitted infections in the last year?   No    Do you have any sexual concerns?    No    Women Only:  Women: What year did you stop having periods (approximate age)?  N/A    Women: Any vaginal bleeding in the last year?    N/A    Women: Have you ever had an abnormal Pap smear?    N/A    General Health Assessment:  Have you noticed any hearing difficulties?   Yes     Do you wear hearing aids?   Yes     Have you seen a hearing professional such as an audiologist in the last 1 year?   No    Do you have vision difficulty?    Yes     Do you wear glasses or contacts?   Yes     Have you seen an eye doctor in the last 1 year?   No    How many servings of fruits and vegetables do you eat a day?  Fruit: 1  Vegetables: 2    How often do you exercise in a week?  4    How long and what  kind of exercise do you do?  Walk + yoga    Tobacco and Alcohol History:  Do you use tobacco/nicotine products?    No    If yes, please list the method of use and average weekly consumption?  N/A    Do you use any other drugs?   No         Do you drink alcohol?   No    If you drink alcohol, how many drinks per week?  N/A    Advance Directive:  Have you completed an Advance Directives document?  Yes     If yes, have you given a copy to the clinic?   No    Do you need information on Advance Directives?   No    Gareth Clay, EMT at 4:52 PM on 8/5/2024

## 2024-08-08 ENCOUNTER — LAB (OUTPATIENT)
Dept: LAB | Facility: CLINIC | Age: 69
End: 2024-08-08
Payer: COMMERCIAL

## 2024-08-08 DIAGNOSIS — Z13.6 CARDIOVASCULAR SCREENING; LDL GOAL LESS THAN 160: ICD-10-CM

## 2024-08-08 DIAGNOSIS — R73.02 IGT (IMPAIRED GLUCOSE TOLERANCE): ICD-10-CM

## 2024-08-08 LAB
ANION GAP SERPL CALCULATED.3IONS-SCNC: 11 MMOL/L (ref 7–15)
BUN SERPL-MCNC: 16.6 MG/DL (ref 8–23)
CALCIUM SERPL-MCNC: 9.4 MG/DL (ref 8.8–10.4)
CHLORIDE SERPL-SCNC: 104 MMOL/L (ref 98–107)
CHOLEST SERPL-MCNC: 227 MG/DL
CREAT SERPL-MCNC: 1.16 MG/DL (ref 0.67–1.17)
EGFRCR SERPLBLD CKD-EPI 2021: 68 ML/MIN/1.73M2
FASTING STATUS PATIENT QL REPORTED: YES
FASTING STATUS PATIENT QL REPORTED: YES
GLUCOSE SERPL-MCNC: 101 MG/DL (ref 70–99)
HBA1C MFR BLD: 5.8 %
HCO3 SERPL-SCNC: 25 MMOL/L (ref 22–29)
HDLC SERPL-MCNC: 56 MG/DL
LDLC SERPL CALC-MCNC: 152 MG/DL
NONHDLC SERPL-MCNC: 171 MG/DL
POTASSIUM SERPL-SCNC: 4.4 MMOL/L (ref 3.4–5.3)
SODIUM SERPL-SCNC: 140 MMOL/L (ref 135–145)
TRIGL SERPL-MCNC: 94 MG/DL

## 2024-08-08 PROCEDURE — 80048 BASIC METABOLIC PNL TOTAL CA: CPT | Performed by: PATHOLOGY

## 2024-08-08 PROCEDURE — 99000 SPECIMEN HANDLING OFFICE-LAB: CPT | Performed by: PATHOLOGY

## 2024-08-08 PROCEDURE — 83036 HEMOGLOBIN GLYCOSYLATED A1C: CPT | Performed by: INTERNAL MEDICINE

## 2024-08-08 PROCEDURE — 80061 LIPID PANEL: CPT | Performed by: PATHOLOGY

## 2024-08-08 PROCEDURE — 36415 COLL VENOUS BLD VENIPUNCTURE: CPT | Performed by: PATHOLOGY

## 2024-08-25 NOTE — PROGRESS NOTES
Department of Neurology  Movement Disorders Division   Follow-up Note    Patient: Chad Olivares   MRN: [id number]   : 1955   Date of Visit: 2024    Mr. Oilvares is a 68 year old right-handed male who presents for follow-up of Parkinson's disease. He was last seen on 24, at which time no changes were made. Today, he is presents with his wife.     INTERVAL EVENTS:  He and his wife have a lit of questions today.     They are interested in working with a nutritionist who specializes in Parkinson's disease - wondering about specific dietary recommendations for patients with Parkinson's disease.     He is also wondering what he can do about muscle fatigue. He gets muscle aches and feels foggy. Describes the muscle aches as feeling like he can't move - maybe some tightness and feeling more shaky. It's harder to do things mechanically. He is not currently on medication for Parkinson's disease.     They were wondering if he should get a blood sugar patch - his doctor told him he was borderline diabetic. When he eats a lot of sugar (like eating pie), he tends to feel sicker. They were told he has five of the six symptoms of diabetes.     They are also wondering when it would be time to start levodopa. He has a number of concerns about starting it including potential side effects and having to take it a number of times a day.     He is also wondering about red light therapy, methylene blue and citicoline.     He is also wondering if PD can affect vision. He is going to be seeing an eye doctor. He is getting used to wearing glasses. He has been told that he has the start of cataracts.     Voice is getting softer - he saw SLP after being initially diagnosed - he is not keeping up with the exercises.      He asked about DBS and other alternatives to medications.     He has also been having pain in his side when he eats food - especially fattier foods. Wondering if that's his gallbladder.     He is taking tolterodine  for nocturia which has been helpful. He has also been taking famotidine for heart burn. He is wondering if either of these are an issue with his PD.     He sees a chiropractor for back and tailbone issues. Wondering if this could be related to the PD. He has been told this is muscle related.     No falls or dysphagia. Constipation is well managed with fiber supplements.     He does yoga once a week. Also participating in the Sparx3 trial. He does weight training twice a week.     He has not worked with PT.     He sees Dr. Martin for seizure disorder. Seizure free on lamotrigine for 8 years.       MEDICATIONS reviewed & pertinent for:  Lamotrigine 200 mg BID    ROS:  All others negative except as listed above.    Past Medical History:   Diagnosis Date    Achilles bursitis or tendinitis 08/07/2008    Advanced directives, counseling/discussion 05/20/2015    Gave pt packet on 5/20/2015  Syeda Torres CMA      CARDIOVASCULAR SCREENING; LDL GOAL LESS THAN 160 10/12/2010    Cholesteatoma, unspecified     Colon polyps 04/2015    tubular villous and serrated adenoma    Complex sleep apnea syndrome     does not use CPAP    Dupuytren's contracture     Dyslexia     Dyspnea and respiratory abnormality 09/03/2014     Problem list name updated by automated process. 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 Provider to review    Parkinson's disease (H)     Plantar fascial fibromatosis 08/07/2008        Past Surgical History:   Procedure Laterality Date    cholesteatoma surgery Left     COLONOSCOPY  10/09/2002; 2015    polyps - needs f/u 5 yrs     COLONOSCOPY N/A 7/20/2021    Procedure: COLONOSCOPY, WITH POLYPECTOMY;  Surgeon: Asaf Guajardo MD;  Location:  [facility] OR    HC REMOVAL OF TONSILS,<13 Y/O      LASER HOLMIUM ENUCLEATION PROSTATE N/A 2023    Procedure: Holmium Laser Enucleation of the Prostate;  Surgeon: Roberto Altamirano MD;  Location: [facility] OR    RELEASE DUPUYTRENS CONTRACTURE Right 2021    Procedure: Right fifth finger and palm Dupuytren's contracture release;  Surgeon: Caron Farrell MD;  Location: [facility] OR        Current Outpatient Medications   Medication Sig Dispense Refill    famotidine (PEPCID) 20 MG tablet Take 20 mg by mouth 2 times daily      lamoTRIgine (LAMICTAL) 100 MG tablet Take 2 tablets (200 mg) by mouth 2 times daily 360 tablet 3    saw palmetto 450 MG CAPS capsule Take 450 mg by mouth daily      tolterodine ER (DETROL LA) 2 MG 24 hr capsule Take 1 capsule (2 mg) by mouth daily 60 capsule 5       Allergies   Allergen Reactions    Gluten Meal     Seasonal Allergies         Family History   Problem Relation Age of Onset    Diabetes Mother         diet controlled, Htn    Heart Murmur Mother         TAVR    Dementia Mother         age 89    Deep Vein Thrombosis (DVT) Mother     Coronary Artery Disease Brother     Diabetes Maternal Grandmother     ALS Maternal Cousin     Glaucoma No family hx of     Macular Degeneration No family hx of     Anesthesia Reaction No family hx of         Social History     Socioeconomic History    Marital status: Single     Spouse name: Not on file    Number of children: 2    Years of education: 14    Highest education level: Not on file   Occupational History    Occupation:      Comment: Self Employed    Occupation: contractor     Employer: [employer]   Tobacco Use    Smoking status: Former     Current packs/day: 0.00     Types: Cigarettes     Quit date: 2005     Years since quittin.0    Smokeless tobacco: Never   Vaping Use    Vaping status: Never Used   Substance and Sexual Activity    Alcohol use: Not Currently    Drug use: No     Comment: uses CBD     Sexual activity:  Yes     Partners: Female   Other Topics Concern    Parent/sibling w/ CABG, MI or angioplasty before 65F 55M? No     Service Not Asked    Blood Transfusions Not Asked    Caffeine Concern Not Asked     Comment: 3 cups of coffee a day    Occupational Exposure Not Asked    Hobby Hazards Not Asked    Sleep Concern Not Asked    Stress Concern Not Asked    Weight Concern Not Asked    Special Diet Not Asked    Back Care Not Asked    Exercise Not Asked     Comment: Exercise 2 to 3 times a week    Bike Helmet Not Asked    Seat Belt Not Asked    Self-Exams Not Asked   Social History Narrative    Balanced Diet - Yes    Osteoporosis Preventative measures-  Dairy servings per day: no servings daily takes soy milk and almond milk - 2 glasses/day     Regular Exercise -  Yes Describe dance 3 times weekly (salsa), bike ride, and paula    Dental Exam up - YES - Date: 2016    Eye Exam - YES - Date: 2007    Self Testicular Exam -  sometimes    Do you have any concerns about STD's -  Partner has hx of genital herpes    Abuse: Current or Past (Physical, Sexual or Emotional)- No    Do you feel safe in your environment - Yes    Guns stored in the home - Yes, locked away    Sunscreen used - No using coconut     Seatbelts used - Yes    Lipids - YES - Date: 4-28-06    Glucose -  NO    Colon Cancer Screening - Colonoscopy 2002(date completed)    Hemoccults - NO    PSA - YES - Date: 4-28-06    Digital Rectal Exam - YES - Date: 4-28-06    Immunizations reviewed and up to date - Yes, last TD was 11-5-2001 2008, Dania Miller MA             Social Determinants of Health     Financial Resource Strain: Low Risk  (11/6/2023)    Financial Resource Strain     Within the past 12 months, have you or your family members you live with been unable to get utilities (heat, electricity) when it was really needed?: No   Food Insecurity: Low Risk  (11/6/2023)    Food Insecurity     Within the past 12 months, did you worry that your food would run out  before you got money to buy more?: No     Within the past 12 months, did the food you bought just not last and you didn t have money to get more?: No   Transportation Needs: Low Risk  (11/6/2023)    Transportation Needs     Within the past 12 months, has lack of transportation kept you from medical appointments, getting your medicines, non-medical meetings or appointments, work, or from getting things that you need?: No   Physical Activity: Not on file   Stress: Not on file   Social Connections: Not on file   Interpersonal Safety: Low Risk  (11/6/2023)    Interpersonal Safety     Do you feel physically and emotionally safe where you currently live?: Yes     Within the past 12 months, have you been hit, slapped, kicked or otherwise physically hurt by someone?: No     Within the past 12 months, have you been humiliated or emotionally abused in other ways by your partner or ex-partner?: No   Housing Stability: Low Risk  (11/6/2023)    Housing Stability     Do you have housing? : Yes     Are you worried about losing your housing?: No        PHYSICAL EXAM:  /84 (BP Location: Right arm, Patient Position: Supine, Cuff Size: Adult Regular)   Pulse 65   SpO2 98%        Gait:  Narrow base, good stride length, good armswing bilaterally (practiced), quick swivel turn without difficulty or unsteadiness.         2/12/2024    11:00 AM   UPDRS Motor Scale   Time: 11:11   Medication Off   R Brain DBS: None   L Brain DBS: None   Dyskinesia (LID) No   Speech 1   Facial Expression 1   Rigidity Neck 2   Rigidity RUE 1   Rigidity LUE 0   Rigidity RLE 1   Rigidity LLE 0   Finger Taps R 1   Finger Taps L 0   Hand Mvt R 1   Hand Mvt L 0   Pron-/Supinate R 1   Pron-/Supinate L 0   Toe Tap R 1   Toe Tap L 0   Leg Agility R 1   Leg Agility L 0   Arise From Chair 0   Gait 1   Gait Freezing 0   Postural Stability 0   Posture 0   Global Spont Mvt 1   Postural Tremor RUE 0   Postural Tremor LUE 0   Kinetic Tremor RUE 0   Kinetic Tremor LUE  0   Rest Tremor RUE 0   Rest Tremor LUE 0   Rest Tremor RLE 0   Rest Tremor LLE 0   Rest Tremor Lip/Jaw 0   Rest Tremor Constancy 1   Total Right 7   Total Left 0   Axial Total 6   Total 14      Previously 14 on 2/12/24    ASSESSMENT:  Mr. Olivares is a 68 year old right-handed male with history significant for seizures being followed by Dr. Martin in epilepsy clinic who presents for follow-up of idiopathic Parkinson's disease. He is interested in starting medication for Parkinson's disease symptoms. We prescribed Carbidopa/Levodopa - titration schedule provided and advised that Mr. Olivares stop at lowest effective dose. He is currently participating in the Sparx3 trial reached out to the  to inform them that he will be starting medication. He is also planning to reach out.     We addressed a number of different questions he had today regarding diet for Parkinson's disease, supplements and alternative therapies, blood sugar monitoring and vision changes. All questions and concerns addressed.     PLAN:   - Start Carbidopa/Levodopa  mg, titration below  Sinemet (CD/LD) 25/100 mg IR AM Noon PM   Week 1                         0.5 tab 0.5 tab 0.5 tab   Week 2             1 1 1   Week 3                     1.5 1.5 1.5   Week 4 and on 2 2 2    - Reviewed potential side effects   - Advised stopping at lowest effective dose   - Messaged Jacinda Rodrigez, Sparx3 coordinator  - Recommended discussing abdominal pain and blood sugar patch with PCP  - Encouraged continued exercise, working with the Sparx3 trial  - Discussed MIND diet, provided information in AVS   - Advised speaking with PCP about nutritionist referral  - Advised that there is no research on red light therapy, methylene blue, citicoline in Parkinson's disease, advised caution particularly with regards to cost  - Advised that there is no commercially available stem cell transplants that have been shown to treat PD effectively  - Encouraged Mr. Olivares  to speak with eye doctor about cataracts, vision changes  - Encouraged adequate hydration    RTC 3-5 months, 30 min    Mr. Olivares was seen and discussed with movement disorder attending, Dr. Carter.     Michelle Orosco MD  Movement Disorders Fellow    Time Spent: 40 minutes spent on the date of the encounter doing chart review, history and exam, documentation and further activities as noted above

## 2024-08-26 ENCOUNTER — OFFICE VISIT (OUTPATIENT)
Dept: NEUROLOGY | Facility: CLINIC | Age: 69
End: 2024-08-26
Payer: COMMERCIAL

## 2024-08-26 VITALS — OXYGEN SATURATION: 98 % | SYSTOLIC BLOOD PRESSURE: 120 MMHG | HEART RATE: 65 BPM | DIASTOLIC BLOOD PRESSURE: 84 MMHG

## 2024-08-26 DIAGNOSIS — G20.A1 PARKINSON'S DISEASE WITHOUT DYSKINESIA OR FLUCTUATING MANIFESTATIONS (H): Primary | ICD-10-CM

## 2024-08-26 PROCEDURE — 99215 OFFICE O/P EST HI 40 MIN: CPT | Mod: GC | Performed by: PSYCHIATRY & NEUROLOGY

## 2024-08-26 PROCEDURE — G2211 COMPLEX E/M VISIT ADD ON: HCPCS | Performed by: PSYCHIATRY & NEUROLOGY

## 2024-08-26 RX ORDER — CARBIDOPA AND LEVODOPA 25; 100 MG/1; MG/1
TABLET ORAL
Qty: 180 TABLET | Refills: 11 | Status: SHIPPED | OUTPATIENT
Start: 2024-08-26

## 2024-08-26 ASSESSMENT — UNIFIED PARKINSONS DISEASE RATING SCALE (UPDRS)
SPEECH: (1) SLIGHT: LOSS OF MODULATION, DICTION OR VOLUME, BUT STILL ALL WORDS EASY TO UNDERSTAND.
SPONTANEITY_OF_MOVEMENT: (2) MILD: MILD GLOBAL SLOWNESS AND POVERTY OF SPONTANEOUS MOVEMENTS.
FACIAL_EXPRESSION: (1) SLIGHT: MINIMAL MASKED FACIES MANIFESTED ONLY BY DECREASED FREQUENCY OF BLINKING.
DYSKINESIAS_PRESENT: NO
PARKINSONS_MEDS: OFF

## 2024-08-26 NOTE — NURSING NOTE
"Chad Olivares is a 69 year old male who presents for:  Chief Complaint   Patient presents with    Parkinson     6 month follow up        Initial Vitals:  /84 (BP Location: Right arm, Patient Position: Supine, Cuff Size: Adult Regular)   Pulse 65   SpO2 98%  Estimated body mass index is 28.83 kg/m  as calculated from the following:    Height as of 4/30/24: 1.728 m (5' 8.03\").    Weight as of 8/5/24: 86.1 kg (189 lb 12.8 oz).. There is no height or weight on file to calculate BSA. BP completed using cuff size: regular    Rosemarie Lara MA   "

## 2024-08-26 NOTE — LETTER
2024      Chad Olivares  [address]  [facility] [address]      Dear Colleague,    Thank you for referring your patient, Chad Olivares, to the [facility]. Please see a copy of my visit note below.    Department of Neurology  Movement Disorders Division   Follow-up Note    Patient: Chad Olivares   MRN: [id number]   : 1955   Date of Visit: 2024    Mr. Olivares is a 68 year old right-handed male who presents for follow-up of Parkinson's disease. He was last seen on 24, at which time no changes were made. Today, he is presents with his wife.     INTERVAL EVENTS:  He and his wife have a lit of questions today.     They are interested in working with a nutritionist who specializes in Parkinson's disease - wondering about specific dietary recommendations for patients with Parkinson's disease.     He is also wondering what he can do about muscle fatigue. He gets muscle aches and feels foggy. Describes the muscle aches as feeling like he can't move - maybe some tightness and feeling more shaky. It's harder to do things mechanically. He is not currently on medication for Parkinson's disease.     They were wondering if he should get a blood sugar patch - his doctor told him he was borderline diabetic. When he eats a lot of sugar (like eating pie), he tends to feel sicker. They were told he has five of the six symptoms of diabetes.     They are also wondering when it would be time to start levodopa. He has a number of concerns about starting it including potential side effects and having to take it a number of times a day.     He is also wondering about red light therapy, methylene blue and citicoline.     He is also wondering if PD can affect vision. He is going to be seeing an eye doctor. He is getting used to wearing glasses. He has been told that he has the start of cataracts.     Voice is getting softer - he saw SLP after being initially diagnosed - he is not keeping up  with the exercises.      He asked about DBS and other alternatives to medications.     He has also been having pain in his side when he eats food - especially fattier foods. Wondering if that's his gallbladder.     He is taking tolterodine for nocturia which has been helpful. He has also been taking famotidine for heart burn. He is wondering if either of these are an issue with his PD.     He sees a chiropractor for back and tailbone issues. Wondering if this could be related to the PD. He has been told this is muscle related.     No falls or dysphagia. Constipation is well managed with fiber supplements.     He does yoga once a week. Also participating in the Sparx3 trial. He does weight training twice a week.     He has not worked with PT.     He sees Dr. Martin for seizure disorder. Seizure free on lamotrigine for 8 years.       MEDICATIONS reviewed & pertinent for:  Lamotrigine 200 mg BID    ROS:  All others negative except as listed above.    Past Medical History:   Diagnosis Date     Achilles bursitis or tendinitis 08/07/2008     Advanced directives, counseling/discussion 05/20/2015    Gave pt packet on 5/20/2015  Syeda Torres CMA       CARDIOVASCULAR SCREENING; LDL GOAL LESS THAN 160 10/12/2010     Cholesteatoma, unspecified      Colon polyps 04/2015    tubular villous and serrated adenoma     Complex sleep apnea syndrome     does not use CPAP     Dupuytren's contracture      Dyslexia      Dyspnea and respiratory abnormality 09/03/2014     Problem list name updated by automated process. 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 Provider to review     Parkinson's disease (H)      Plantar fascial fibromatosis 08/07/2008        Past  Surgical History:   Procedure Laterality Date     cholesteatoma surgery Left      COLONOSCOPY  10/09/2002; 2015    polyps - needs f/u 5 yrs      COLONOSCOPY N/A 7/20/2021    Procedure: COLONOSCOPY, WITH POLYPECTOMY;  Surgeon: Asaf Guajardo MD;  Location: [facility] OR     HC REMOVAL OF TONSILS,<11 Y/O  1962     LASER HOLMIUM ENUCLEATION PROSTATE N/A 12/14/2023    Procedure: Holmium Laser Enucleation of the Prostate;  Surgeon: Roberto Altamirano MD;  Location: [facility] OR     RELEASE DUPUYTRENS CONTRACTURE Right 8/26/2021    Procedure: Right fifth finger and palm Dupuytren's contracture release;  Surgeon: Caron Farrell MD;  Location: [facility] OR        Current Outpatient Medications   Medication Sig Dispense Refill     famotidine (PEPCID) 20 MG tablet Take 20 mg by mouth 2 times daily       lamoTRIgine (LAMICTAL) 100 MG tablet Take 2 tablets (200 mg) by mouth 2 times daily 360 tablet 3     saw palmetto 450 MG CAPS capsule Take 450 mg by mouth daily       tolterodine ER (DETROL LA) 2 MG 24 hr capsule Take 1 capsule (2 mg) by mouth daily 60 capsule 5       Allergies   Allergen Reactions     Gluten Meal      Seasonal Allergies         Family History   Problem Relation Age of Onset     Diabetes Mother         diet controlled, Htn     Heart Murmur Mother         TAVR     Dementia Mother         age 89     Deep Vein Thrombosis (DVT) Mother      Coronary Artery Disease Brother      Diabetes Maternal Grandmother      ALS Maternal Cousin      Glaucoma No family hx of      Macular Degeneration No family hx of      Anesthesia Reaction No family hx of         Social History     Socioeconomic History     Marital status: Single     Spouse name: Not on file     Number of children: 2     Years of education: 14     Highest education level: Not on file   Occupational History     Occupation: real estate broker     Comment: Self Employed     Occupation: contractor     Employer: S[employer]F   Tobacco Use     Smoking status: Former     Current  packs/day: 0.00     Types: Cigarettes     Quit date: 2005     Years since quittin.0     Smokeless tobacco: Never   Vaping Use     Vaping status: Never Used   Substance and Sexual Activity     Alcohol use: Not Currently     Drug use: No     Comment: uses CBD      Sexual activity: Yes     Partners: Female   Other Topics Concern     Parent/sibling w/ CABG, MI or angioplasty before 65F 55M? No      Service Not Asked     Blood Transfusions Not Asked     Caffeine Concern Not Asked     Comment: 3 cups of coffee a day     Occupational Exposure Not Asked     Hobby Hazards Not Asked     Sleep Concern Not Asked     Stress Concern Not Asked     Weight Concern Not Asked     Special Diet Not Asked     Back Care Not Asked     Exercise Not Asked     Comment: Exercise 2 to 3 times a week     Bike Helmet Not Asked     Seat Belt Not Asked     Self-Exams Not Asked   Social History Narrative    Balanced Diet - Yes    Osteoporosis Preventative measures-  Dairy servings per day: no servings daily takes soy milk and almond milk - 2 glasses/day     Regular Exercise -  Yes Describe dance 3 times weekly (salsa), bike ride, and paula    Dental Exam up - YES - Date:     Eye Exam - YES - Date:     Self Testicular Exam -  sometimes    Do you have any concerns about STD's -  Partner has hx of genital herpes    Abuse: Current or Past (Physical, Sexual or Emotional)- No    Do you feel safe in your environment - Yes    Guns stored in the home - Yes, locked away    Sunscreen used - No using coconut     Seatbelts used - Yes    Lipids - YES - Date: 06    Glucose -  NO    Colon Cancer Screening - Colonoscopy (date completed)    Hemoccults - NO    PSA - YES - Date: 06    Digital Rectal Exam - YES - Date: 06    Immunizations reviewed and up to date - Yes, last TD was 2001, Dania Miller MA             Social Determinants of Health     Financial Resource Strain: Low Risk  (2023)    Financial  Resource Strain      Within the past 12 months, have you or your family members you live with been unable to get utilities (heat, electricity) when it was really needed?: No   Food Insecurity: Low Risk  (11/6/2023)    Food Insecurity      Within the past 12 months, did you worry that your food would run out before you got money to buy more?: No      Within the past 12 months, did the food you bought just not last and you didn t have money to get more?: No   Transportation Needs: Low Risk  (11/6/2023)    Transportation Needs      Within the past 12 months, has lack of transportation kept you from medical appointments, getting your medicines, non-medical meetings or appointments, work, or from getting things that you need?: No   Physical Activity: Not on file   Stress: Not on file   Social Connections: Not on file   Interpersonal Safety: Low Risk  (11/6/2023)    Interpersonal Safety      Do you feel physically and emotionally safe where you currently live?: Yes      Within the past 12 months, have you been hit, slapped, kicked or otherwise physically hurt by someone?: No      Within the past 12 months, have you been humiliated or emotionally abused in other ways by your partner or ex-partner?: No   Housing Stability: Low Risk  (11/6/2023)    Housing Stability      Do you have housing? : Yes      Are you worried about losing your housing?: No        PHYSICAL EXAM:  /84 (BP Location: Right arm, Patient Position: Supine, Cuff Size: Adult Regular)   Pulse 65   SpO2 98%        Gait:  Narrow base, good stride length, good armswing bilaterally (practiced), quick swivel turn without difficulty or unsteadiness.         2/12/2024    11:00 AM   UPDRS Motor Scale   Time: 11:11   Medication Off   R Brain DBS: None   L Brain DBS: None   Dyskinesia (LID) No   Speech 1   Facial Expression 1   Rigidity Neck 2   Rigidity RUE 1   Rigidity LUE 0   Rigidity RLE 1   Rigidity LLE 0   Finger Taps R 1   Finger Taps L 0   Hand Mvt R 1    Hand Mvt L 0   Pron-/Supinate R 1   Pron-/Supinate L 0   Toe Tap R 1   Toe Tap L 0   Leg Agility R 1   Leg Agility L 0   Arise From Chair 0   Gait 1   Gait Freezing 0   Postural Stability 0   Posture 0   Global Spont Mvt 1   Postural Tremor RUE 0   Postural Tremor LUE 0   Kinetic Tremor RUE 0   Kinetic Tremor LUE 0   Rest Tremor RUE 0   Rest Tremor LUE 0   Rest Tremor RLE 0   Rest Tremor LLE 0   Rest Tremor Lip/Jaw 0   Rest Tremor Constancy 1   Total Right 7   Total Left 0   Axial Total 6   Total 14      Previously 14 on 2/12/24    ASSESSMENT:  Mr. Olivares is a 68 year old right-handed male with history significant for seizures being followed by Dr. Martin in epilepsy clinic who presents for follow-up of idiopathic Parkinson's disease. He is interested in starting medication for Parkinson's disease symptoms. We prescribed Carbidopa/Levodopa - titration schedule provided and advised that Mr. Olivares stop at lowest effective dose. He is currently participating in the Sparx3 trial reached out to the  to inform them that he will be starting medication. He is also planning to reach out.     We addressed a number of different questions he had today regarding diet for Parkinson's disease, supplements and alternative therapies, blood sugar monitoring and vision changes. All questions and concerns addressed.     PLAN:   - Start Carbidopa/Levodopa  mg, titration below  Sinemet (CD/LD) 25/100 mg IR AM Noon PM   Week 1                         0.5 tab 0.5 tab 0.5 tab   Week 2             1 1 1   Week 3                     1.5 1.5 1.5   Week 4 and on 2 2 2    - Reviewed potential side effects   - Advised stopping at lowest effective dose   - Messaged Jacinda Rodrigez, Sparx3 coordinator  - Recommended discussing abdominal pain and blood sugar patch with PCP  - Encouraged continued exercise, working with the Sparx3 trial  - Discussed MIND diet, provided information in AVS   - Advised speaking with PCP about  nutritionist referral  - Advised that there is no research on red light therapy, methylene blue, citicoline in Parkinson's disease, advised caution particularly with regards to cost  - Advised that there is no commercially available stem cell transplants that have been shown to treat PD effectively  - Encouraged Mr. Olivares to speak with eye doctor about cataracts, vision changes  - Encouraged adequate hydration    RTC 3-5 months, 30 min    Mr. Olivares was seen and discussed with movement disorder attending, Dr. Carter.     Michelle Orosco MD  Movement Disorders Fellow    Time Spent: 40 minutes spent on the date of the encounter doing chart review, history and exam, documentation and further activities as noted above      Again, thank you for allowing me to participate in the care of your patient.        Sincerely,        Michelle Carter MD

## 2024-08-26 NOTE — PATIENT INSTRUCTIONS
Please talk to your primary care doctor about a blood sugar patch and the stomach pain you have been having.     We usually recommend the MIND diet. You can learn more about the MIND diet below. It is difficult to get appointments with a nutritionist covered. You could talk to your doctor about seeing a nutritionist given your pre-diabetes.     [email or web address]    There has been no evidence that a ketogenic diet helps with Parkinson's disease.     There has been no research showing that red light therapy, methylene blue and citicoline provide benefit in Parkinson's disease. These should not be dangerous but can get very expensive. We generally don't recommend supplements given the cost without clear benefit.     There is ongoing research into stem cell therapy but there is currently no reliable, commercially available stem cell therapy available.     Carbidopa/Levodopa can help improve a number of symptoms in Parkinson's disease. Medication adjustments often need to be made later in the disease course but this is because of disease progression and not tolerance to the medication.     Start Sinemet (carbidopa/levodopa) 25/100 mg IR as treatment for Parkinson disease. Use the following schedule to start Sinemet:  Sinemet (CD/LD) 25/100 mg IR AM Noon PM   Week 1                         0.5 tab 0.5 tab 0.5 tab   Week 2             1 1 1   Week 3                     1.5 1.5 1.5   Week 4 and on 2 2 2   - Try to take the carbidopa/levodopa at least 45 minutes apart from high-protein meals; however, it is okay if you can't always make that timing work. It is better to be consistent about taking the medication on time and may not be a big deal if it's close to a meal time. Some people have significant impact of protein on effectiveness of carbidopa/levodopa, other people have minimal impact. This medication is best taken on an empty stomach.    - May stop at dose  that improves symptoms.   - Common Side Effects discussed including: dizziness, nausea, constipation. If nausea or vomiting should occur, do not discontinue taking your medication and try taking with crackers or a non-protein snack (cracker, toast, fruit). Also consider taking with ginger-rosa (real ginger) to help with nausea. Rare side effects include hallucinations and impulse control problems. Please reach out if you notice any concerns about this.   - For intolerable symptoms, return to a previous dose that was not associated with side effects.    - Iron may interfere with the effectiveness of this medication so do not take iron supplements at the same time you are taking Sinemet.        Parkinson's disease can affect your vision. People with Parkinson's disease do not blink as frequently which can cause dry eyes. Parkinson's disease can also make it more difficult to focus on things closer to your face. Cataracts and other eye problems can also still affect your vision. Please continue to work with your eye doctor.     If you decide to try fasting, make sure that you are staying well hydrated. Low blood pressure and dehydration can contribute to lightheadedness.

## 2024-08-28 ENCOUNTER — HOSPITAL ENCOUNTER (OUTPATIENT)
Dept: CT IMAGING | Facility: CLINIC | Age: 69
Discharge: HOME | End: 2024-08-28
Attending: INTERNAL MEDICINE
Payer: COMMERCIAL

## 2024-08-28 DIAGNOSIS — Z87.891 SMOKING HISTORY: ICD-10-CM

## 2024-08-28 DIAGNOSIS — Z13.6 CARDIOVASCULAR SCREENING; LDL GOAL LESS THAN 160: ICD-10-CM

## 2024-08-28 DIAGNOSIS — E78.5 HYPERLIPIDEMIA, UNSPECIFIED HYPERLIPIDEMIA TYPE: Primary | ICD-10-CM

## 2024-08-28 PROCEDURE — 71271 CT THORAX LUNG CANCER SCR C-: CPT | Mod: 26 | Performed by: RADIOLOGY

## 2024-08-28 PROCEDURE — 75571 CT HRT W/O DYE W/CA TEST: CPT | Mod: 26 | Performed by: INTERNAL MEDICINE

## 2024-08-28 PROCEDURE — 71271 CT THORAX LUNG CANCER SCR C-: CPT

## 2024-08-28 PROCEDURE — 75571 CT HRT W/O DYE W/CA TEST: CPT | Mod: XU

## 2024-08-28 RX ORDER — ROSUVASTATIN CALCIUM 10 MG/1
10 TABLET, COATED ORAL DAILY
Qty: 90 TABLET | Refills: 3 | Status: SHIPPED | OUTPATIENT
Start: 2024-08-28

## 2024-10-15 ENCOUNTER — OFFICE VISIT (OUTPATIENT)
Dept: OPHTHALMOLOGY | Facility: CLINIC | Age: 69
End: 2024-10-15
Attending: INTERNAL MEDICINE
Payer: COMMERCIAL

## 2024-10-15 DIAGNOSIS — G20.A1 PARKINSON'S DISEASE WITHOUT DYSKINESIA OR FLUCTUATING MANIFESTATIONS (H): ICD-10-CM

## 2024-10-15 DIAGNOSIS — H25.13 NUCLEAR SENILE CATARACT OF BOTH EYES: Primary | ICD-10-CM

## 2024-10-15 DIAGNOSIS — G47.30 OBSERVED SLEEP APNEA: ICD-10-CM

## 2024-10-15 DIAGNOSIS — H02.59 FLOPPY EYELID SYNDROME OF BOTH EYES: ICD-10-CM

## 2024-10-15 PROCEDURE — 92015 DETERMINE REFRACTIVE STATE: CPT

## 2024-10-15 PROCEDURE — G0463 HOSPITAL OUTPT CLINIC VISIT: HCPCS | Performed by: OPHTHALMOLOGY

## 2024-10-15 PROCEDURE — 92014 COMPRE OPH EXAM EST PT 1/>: CPT | Performed by: OPHTHALMOLOGY

## 2024-10-15 ASSESSMENT — VISUAL ACUITY
OS_CC+: -1
OS_CC: 20/20
OD_CC+: -2
CORRECTION_TYPE: GLASSES
OD_CC: 20/20
METHOD: SNELLEN - LINEAR

## 2024-10-15 ASSESSMENT — CONF VISUAL FIELD
OD_INFERIOR_TEMPORAL_RESTRICTION: 0
METHOD: COUNTING FINGERS
OD_SUPERIOR_NASAL_RESTRICTION: 0
OD_INFERIOR_NASAL_RESTRICTION: 0
OS_SUPERIOR_TEMPORAL_RESTRICTION: 0
OS_INFERIOR_TEMPORAL_RESTRICTION: 0
OS_SUPERIOR_NASAL_RESTRICTION: 0
OD_NORMAL: 1
OD_SUPERIOR_TEMPORAL_RESTRICTION: 0
OS_INFERIOR_NASAL_RESTRICTION: 0
OS_NORMAL: 1

## 2024-10-15 ASSESSMENT — REFRACTION_WEARINGRX
OS_ADD: +2.50
OS_SPHERE: +0.75
OS_CYLINDER: SPHERE
OD_CYLINDER: +0.50
SPECS_TYPE: PAL
OD_SPHERE: +1.25
OD_AXIS: 178
OD_ADD: +2.50

## 2024-10-15 ASSESSMENT — CUP TO DISC RATIO
OS_RATIO: 0.2
OD_RATIO: 0.2

## 2024-10-15 ASSESSMENT — REFRACTION_MANIFEST
OD_SPHERE: +1.25
OS_SPHERE: +0.75
OD_AXIS: 175
OD_ADD: +2.50
OS_ADD: +2.50
OD_CYLINDER: +0.75
OS_CYLINDER: SPHERE

## 2024-10-15 ASSESSMENT — EXTERNAL EXAM - RIGHT EYE: OD_EXAM: NORMAL

## 2024-10-15 ASSESSMENT — TONOMETRY
OS_IOP_MMHG: 13
OD_IOP_MMHG: 16
IOP_METHOD: TONOPEN

## 2024-10-15 ASSESSMENT — EXTERNAL EXAM - LEFT EYE: OS_EXAM: NORMAL

## 2024-10-15 NOTE — PROGRESS NOTES
HPI       Vision Changes Ou    In both eyes.  Associated symptoms include tearing.  Negative for eye pain, flashes and floaters.  Treatments tried include glasses.  Pain was noted as 0/10. Additional comments: Vision changes each eye              Comments    Patient reports change to vision, with the peripheral being more blurry, worse in the evening.  Patient notes some tearing and some discharge in the morning but denies pain in the eyes.  No gtts per pt.   PreDM. Lab Results       Component                Value               Date                       A1C                      5.8                 08/08/2024                 A1C                      5.6                 07/27/2022              Sonia Mota OA 9:25 AM October 15, 2024           Last edited by Sonia Mota on 10/15/2024  9:25 AM.          Review of systems for the eyes was negative other than the pertinent positives/negatives listed in the HPI.      Assessment & Plan      Chad Olivares is a 69 year old male with the following diagnoses:   1. 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 Parkinson's disease without dyskinesia or fluctuating manifestations (H)    4. Observed sleep apnea         New patient to me, last seen with Sunday 2023  Cataracts not visually significant  Mild refractive change  Symptoms most related to dry eye     Discussed contributing factors  Recommend lid hygiene and warm compresses  Recommend artifical tears 2-4x daily   Refractive options reviewed  Refraction given       Patient disposition:   Return in about 1 year (around 10/15/2025) for DFE.           Attending Physician Attestation:  Complete documentation of historical and exam elements from today's encounter can be found in the full encounter summary report (not reduplicated in this progress note).  I personally obtained the chief complaint(s) and history of present illness.  I confirmed and edited as necessary the review of  systems, past medical/surgical history, family history, social history, and examination findings as documented by others; and I examined the patient myself.  I personally reviewed the relevant tests, images, and reports as documented above.  I formulated and edited as necessary the assessment and plan and discussed the findings and management plan with the patient and family. . - Shai Melo MD

## 2024-10-15 NOTE — PATIENT INSTRUCTIONS
Blepharitis    What is blepharitis?  Blepharitis is a common problem and although it does not result in blindness, it can contribute to red, irritated eyes. In the most common form of blepharitis, the eyelids are reddened and somewhat swollen and scaly appearing at the base of the lashes. As the scales become more coarse, the surface of the eye becomes irritated, forming crusts, which may cause the eyelids to stick together upon waking up in the morning. If the debris falls into your eye, you may feel like you have  something in your eye  or experience a gritty sensation.     Treating blepharitis  Ophthalmologists at the [facility] recommend controlling the problem through eyelid hygiene.   Eyelid hygiene is cleansing of the lid to promote a normal ocular surface. It is important to cleanse so that it does not develop into a more serious condition. Blepharitis cannot be cured, but with eyelid hygiene done on a daily basis, the symptoms may be controlled.   Cleansing the eye  Hot Compresses: perform 2 times daily, or as ordered by your doctor      A.  Soak clean washcloth in hot water  OR      B.  Commercially available hot pack  OR      C.  Dry rice in a clean sock (dress sock or other thin sock is best), microwave until hot (20-30 seconds)   Cleansing the eye  Hot Compresses: perform 2 times daily, or as ordered by your doctor  Soak clean washcloth in hot water  OR  Commercially available hot pack  OR  Dry rice in a clean sock (dress sock or other thin sock is best), microwave until hot (20-30 seconds)     - Place hot compress on closed eyelid for 3-5 minutes (make sure not too hot)   - Gently massage eyelids for 30 seconds    Eyelid Scrubs:   Make solution with   water +   baby shampoo OR  In shower: place baby shampoo on fingertips OR  Steri-Lid cleansing solution    -  Use solution of choice on fingertips to  brush  your eyelids (like brushing teeth) for 30 seconds  -  Rinse eyelids and eyelashes with  clean water  - Wipe dry with clean, dry cloth (destinee cloth is best)      Additional care for blepharitis  Keep your hands and face clean. Be careful not to touch or rub your eyes with soiled handkerchiefs, dirty fingers, etc. Women should avoid the use of eye makeup during the early stages of treatment. When cosmetic use is resumed, replace liquid products because your old products may be contaminated. Remove all eye makeup before bedtime.   Occasionally, medication may be prescribed to treat blepharitis, but only your doctor can decide if you are a candidate for this treatment.Dry Eye    What is dry eye?  The eye depends on the flow of tears to provide constant moisture and lubrication to maintain vision and comfort. Tears are a combination of water, for moisture; oils, for lubrication; mucus, for even spreading; and antibodies and special proteins, for resistance to infection. These components are secreted by special glands located around the eye. When there is an imbalance in this tear system, a person may experience dry eye.  What are the symptoms?  When tears do not adequately lubricate the eye, a person may experience pain, a burning sensation, a gritty sensation, a feeling of a foreign body or sand in the eye, itching, redness, and blurring of vision. Sometimes, a person with dry eye will have excess tears running down the cheeks, which may seem confusing. This happens when the eye isn t getting enough lubrication. The eye sends a distress signal through the nervous system for more lubrication. In response, the eye is flooded with emergency tears. However, these tears are mostly water and do not have the lubricating qualities or the rich composition of normal tears. They will wash debris away, but they will not coat the eye surface properly.   What causes dry eye?  In addition to an imbalance in the tear-flow system of the eye, dry eye can be caused by the drying out of the tear film. This can be due to dry  air created by air conditioning, heat, or other environmental conditions.  In addition, certain medications, illnesses and hormonal changes can cause dry eyes.    How is dry eye treated?  There are a number of steps that can be taken to treat dry eye. They include:  ? Artificial tear drops and ointments. The use of artificial tear drops is the primary treatment for dry eye. Artificial tear drops are available over the counter. No one drop works for everyone, so you might have to experiment to find the drop that works best for you. If you have chronic dry eye, it is important to use the drops even when your eyes feel fine, to keep them lubricated. If your eyes dry out while you sleep, you can use a thicker lubricant, such as an ointment, at night.   ? Temporary punctal occlusion. Sometimes it is necessary to close the ducts that drain tears out of the eye. This is done via a painless procedure where a plug (which will dissolve within a few days) is inserted into the tear drain of the lower eyelid. This is a temporary procedure, done to determine whether permanent plugs can provide an adequate supply of tears.  ? Permanent punctal occlusion. If temporary plugging of the tear drains works well, then silicone plugs (punctal occlusion) may be used. The plugs will hold tears around the eyes as long as they are in place. They can be removed. Rarely, the plugs may come out spontaneously or migrate down the tear drain. Many patients find that the plugs improve comfort and reduce the need for artificial tears.  ? Medications.  Eye drops that decrease inflammation can sometimes be used to treat dry eye.  If prescribed, these drops need to be used under the careful supervision of your doctor.     ? Surgery. If needed, the ducts that drain tears into the nose can be permanently closed to allow more tears to remain around the eye. This is done with local anesthetic on an outpatient basis. There are no limitations in activity  after having this surgery.  While dry eye cannot be cured, the symptoms can be greatly improved by these treatment options.

## 2024-10-15 NOTE — NURSING NOTE
Chief Complaints and History of Present Illnesses   Patient presents with    Vision Changes Ou     Vision changes each eye      Chief Complaint(s) and History of Present Illness(es)       Vision Changes Ou              Laterality: both eyes    Associated symptoms: tearing.  Negative for eye pain, flashes and floaters    Treatments tried: glasses    Pain scale: 0/10    Comments: Vision changes each eye               Comments    Patient reports change to vision, with the peripheral being more blurry, worse in the evening.  Patient notes some tearing and some discharge in the morning but denies pain in the eyes.  No gtts per pt.   PreDM. Lab Results       Component                Value               Date                       A1C                      5.8                 08/08/2024                 A1C                      5.6                 07/27/2022              Sonia BILLINGS 9:25 AM October 15, 2024

## 2024-11-13 ENCOUNTER — HOSPITAL ENCOUNTER (OUTPATIENT)
Dept: RESEARCH | Facility: CLINIC | Age: 69
Discharge: HOME | End: 2024-11-13
Attending: PSYCHIATRY & NEUROLOGY | Admitting: PSYCHIATRY & NEUROLOGY
Payer: COMMERCIAL

## 2024-11-13 DIAGNOSIS — G20.A1 PARKINSON'S DISEASE WITHOUT DYSKINESIA OR FLUCTUATING MANIFESTATIONS (H): Primary | ICD-10-CM

## 2024-11-13 DIAGNOSIS — G40.309 GENERALIZED CONVULSIVE EPILEPSY (H): ICD-10-CM

## 2024-11-13 DIAGNOSIS — R10.31 BILATERAL GROIN PAIN: ICD-10-CM

## 2024-11-13 DIAGNOSIS — R49.8 HYPOPHONIA: ICD-10-CM

## 2024-11-13 DIAGNOSIS — R10.32 BILATERAL GROIN PAIN: ICD-10-CM

## 2024-11-13 DIAGNOSIS — K76.0 FATTY LIVER: ICD-10-CM

## 2024-11-13 DIAGNOSIS — F80.0 ARTICULATION DISORDER: ICD-10-CM

## 2024-11-13 DIAGNOSIS — R63.2 BINGE EATING: ICD-10-CM

## 2024-11-13 DIAGNOSIS — R41.3 MEMORY LOSS: ICD-10-CM

## 2024-11-13 PROCEDURE — 510N000009 HC RESEARCH FACILITY, PER 15 MIN

## 2024-11-13 PROCEDURE — 36000 PLACE NEEDLE IN VEIN: CPT

## 2024-11-13 PROCEDURE — 300N000006 HC RESEARCH SPECIMEN PACKAGING

## 2024-11-13 PROCEDURE — 510N000017 HC CRU PATIENT CARE, PER 15 MIN

## 2024-11-13 PROCEDURE — 999N000129 HC STATISTIC PICC/MID LINE PROC CANCELLED SUBQ WORKUP

## 2024-11-13 PROCEDURE — 300N000003 HC RESEARCH SPECIMEN PROCESSING, SIMPLE

## 2024-11-13 NOTE — CONFIDENTIAL NOTE
Bilateral groin pain - ? Hernia vs hip flexor issue - see pcp or/andn sports medicine vs surgeon.  RUQ issue - ? Has history of fatty liver  Voice issues - speech therapy  Medication questions - MTM referral and parkinson 101 class - he his not taken his parkinson medication regularly and needs some encouragement to establish a regimen and has an assortment of questions about how to adapt to each day's variable schedule.   He has not done a parkinson 101 class.   Memory/cognitive changes - re-evaluation  Binge eating - in [place] he a large amount of food in 4 hours and has felt the need to consume sugary foods where he has not done in the past.

## 2024-11-14 ENCOUNTER — TELEPHONE (OUTPATIENT)
Dept: INTERNAL MEDICINE | Facility: CLINIC | Age: 69
End: 2024-11-14
Payer: COMMERCIAL

## 2024-11-14 ENCOUNTER — PATIENT OUTREACH (OUTPATIENT)
Dept: CARE COORDINATION | Facility: CLINIC | Age: 69
End: 2024-11-14
Payer: COMMERCIAL

## 2024-11-14 NOTE — ADDENDUM NOTE
Encounter addended by: Sara Camejo RN on: 11/14/2024 6:24 AM   Actions taken: Charge Capture section accepted

## 2024-11-15 ENCOUNTER — TELEPHONE (OUTPATIENT)
Dept: INTERNAL MEDICINE | Facility: CLINIC | Age: 69
End: 2024-11-15
Payer: COMMERCIAL

## 2024-11-15 NOTE — TELEPHONE ENCOUNTER
Patient confirmed scheduled appointment:  Date: 12/4/2024  Time: 7:30am  Visit type: UMP RETURN  Provider: PCP  Location: -  Testing/imaging: -  Additional notes:     Chad @ research visitmay want to talk with Dr. Craig, sports medicine vs general surgery about bilateral groin pain -   1. Bilateral groin pain - ? Hernia vs hip flexor issue - see pcp or/andn sports medicine vs surgeon.   2. RUQ issue - ? Has history of fatty liver   3. Voice issues - speech therapy   4. Medication questions - MTM referral and parkinson 101 class - he his not taken his parkinson medication regularly and needs some encouragement to establish a regimen and has an assortment of questions about how to adapt to each day's variable schedule.   5. He has not done a parkinson 101 class.   6. Memory/cognitive changes - re-evaluation   7. Binge eating - in [place] he a large amount of food in 4 hours and has felt the need to consume sugary foods where he has not done in the past.     PER Daniel Leroy MD

## 2024-11-18 ENCOUNTER — PATIENT OUTREACH (OUTPATIENT)
Dept: CARE COORDINATION | Facility: CLINIC | Age: 69
End: 2024-11-18
Payer: COMMERCIAL

## 2024-12-04 ENCOUNTER — OFFICE VISIT (OUTPATIENT)
Dept: INTERNAL MEDICINE | Facility: CLINIC | Age: 69
End: 2024-12-04
Payer: COMMERCIAL

## 2024-12-04 VITALS
HEIGHT: 68 IN | TEMPERATURE: 97.6 F | HEART RATE: 62 BPM | WEIGHT: 193.7 LBS | DIASTOLIC BLOOD PRESSURE: 83 MMHG | RESPIRATION RATE: 16 BRPM | OXYGEN SATURATION: 94 % | BODY MASS INDEX: 29.36 KG/M2 | SYSTOLIC BLOOD PRESSURE: 127 MMHG

## 2024-12-04 DIAGNOSIS — Z86.0100 HISTORY OF COLONIC POLYPS: ICD-10-CM

## 2024-12-04 DIAGNOSIS — R10.11 RUQ ABDOMINAL PAIN: Primary | ICD-10-CM

## 2024-12-04 DIAGNOSIS — E78.5 HYPERLIPIDEMIA, UNSPECIFIED HYPERLIPIDEMIA TYPE: ICD-10-CM

## 2024-12-04 PROCEDURE — 99214 OFFICE O/P EST MOD 30 MIN: CPT | Mod: 25 | Performed by: INTERNAL MEDICINE

## 2024-12-04 PROCEDURE — 90480 ADMN SARSCOV2 VAC 1/ONLY CMP: CPT | Performed by: INTERNAL MEDICINE

## 2024-12-04 PROCEDURE — 91320 SARSCV2 VAC 30MCG TRS-SUC IM: CPT | Performed by: INTERNAL MEDICINE

## 2024-12-04 RX ORDER — ROSUVASTATIN CALCIUM 10 MG/1
10 TABLET, COATED ORAL DAILY
Qty: 90 TABLET | Refills: 3 | Status: SHIPPED | OUTPATIENT
Start: 2024-12-04

## 2024-12-04 ASSESSMENT — PATIENT HEALTH QUESTIONNAIRE - PHQ9
SUM OF ALL RESPONSES TO PHQ QUESTIONS 1-9: 3
SUM OF ALL RESPONSES TO PHQ QUESTIONS 1-9: 3

## 2024-12-04 NOTE — PROGRESS NOTES
"  {PROVIDER CHARTING PREFERENCE:794341}    Subjective   Chad is a 69 year old, presenting for the following health issues:  Follow Up (Pt here to discuss statin)      12/4/2024     7:15 AM   Additional Questions   Roomed by Jeannette FERGUSON   Accompanied by N/A     History of Present Illness       Reason for visit:  Talk to dr about statin   He is taking medications regularly.       {MA/LPN/RN Pre-Provider Visit Orders- hCG/UA/Strep (Optional):298101}  {SUPERLIST (Optional):017828}  {additonal problems for provider to add (Optional):357197}    {ROS Picklists (Optional):416927}      Objective    /83 (BP Location: Right arm, Patient Position: Sitting, Cuff Size: Adult Regular)   Pulse 62   Temp 97.6  F (36.4  C)   Resp 16   Ht 1.728 m (5' 8.03\")   Wt 87.9 kg (193 lb 11.2 oz)   SpO2 94%   BMI 29.43 kg/m    Body mass index is 29.43 kg/m .  Physical Exam   {Exam List (Optional):229808}    {Diagnostic Test Results (Optional):102145}        Signed Electronically by: Asaf Craig MD  {Email feedback regarding this note to [email or web address]   :321685}  "

## 2024-12-04 NOTE — PROGRESS NOTES
Chad is a 69 year old that presents in clinic today for the following:     Chief Complaint   Patient presents with    Follow Up     Pt here to discuss statin           12/4/2024     7:15 AM   Additional Questions   Roomed by Jeannette FERGUSON   Accompanied by N/A     Screenings as of today     PHQ-9 Total Score  3        Jeannette Roberson at 7:54 AM on 12/4/2024

## 2024-12-04 NOTE — PROGRESS NOTES
HPI  69-year-old here today for discussion on statin use.  We had advised that he start taking rosuvastatin and he has had a CTA which shows a 45% LAD lesion.  He has been reluctant to start a statin based on what he is read on the Internet regarding effect on blood sugar cognition and muscles.  We had a discussion regarding this and he will start a statin.  He is participating in a Parkinson's study that has him walking on treadmill's and he is doing this well he is working with a  in addition to this twice a week doing strength training.  He has got significantly less fatigue and more energy in relation to this.  He continues to experience the right upper quadrant abdominal discomfort.  This been present for a couple years he had a normal right upper quadrant ultrasound 2 years ago last year we did a CT scan and it was normal.  The pain is however precipitated by meals.  Tends to come on worse in the evening and worse after he eats and occasionally will have to just sit in a lounge chair and relax.  No nausea or vomiting in association with this.  He has been doing extremely well status post his hoLEEP laser surgery and has currently no significant urinary symptoms.  Past Medical History:   Diagnosis Date    Achilles bursitis or tendinitis 08/07/2008    Advanced directives, counseling/discussion 05/20/2015    Gave pt packet on 5/20/2015  Syeda Torres, GENNARO      Bilateral groin pain 11/13/2024    Binge eating 11/13/2024    CARDIOVASCULAR SCREENING; LDL GOAL LESS THAN 160 10/12/2010    Cholesteatoma, unspecified     Colon polyps 04/2015    tubular villous and serrated adenoma    Complex sleep apnea syndrome     does not use CPAP    Dupuytren's contracture     Dyslexia     Dyspnea and respiratory abnormality 09/03/2014     Problem list name updated by automated process. 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 Provider to review    Parkinson's disease (H)     Plantar fascial fibromatosis 08/07/2008     Past Surgical History:   Procedure Laterality Date    cholesteatoma surgery Left     COLONOSCOPY  10/09/2002; 2015    polyps - needs f/u 5 yrs     COLONOSCOPY N/A 7/20/2021    Procedure: COLONOSCOPY, WITH POLYPECTOMY;  Surgeon: Asaf Guajardo MD;  Location: [facility] OR    HC REMOVAL OF TONSILS,<13 Y/O  1962    LASER HOLMIUM ENUCLEATION PROSTATE N/A 12/14/2023    Procedure: Holmium Laser Enucleation of the Prostate;  Surgeon: Roberto Altamirano MD;  Location: [facility]    RELEASE DUPUYTRENS CONTRACTURE Right 8/26/2021    Procedure: Right fifth finger and palm Dupuytren's contracture release;  Surgeon: Caron Farrell MD;  Location: [facility] OR     Family History   Problem Relation Age of Onset    Diabetes Mother         diet controlled, Htn    Heart Murmur Mother         TAVR    Dementia Mother         age 89    Deep Vein Thrombosis (DVT) Mother     Coronary Artery Disease Brother     Diabetes Maternal Grandmother     ALS Maternal Cousin     Glaucoma No family hx of     Macular Degeneration No family hx of     Anesthesia Reaction No family hx of          Exam:  /83 (BP Location: Right arm, Patient Position: Sitting, Cuff Size: Adult Regular)   Pulse 62   Temp 97.6  F (36.4  C)   Resp 16   Ht 1.728 m (5' 8.03\")   Wt 87.9 kg (193 lb 11.2 oz)   SpO2 94%   BMI 29.43 kg/m    193 lbs 11.2 oz  The patient is alert, oriented with a clear sensorium.   Skin shows no lesions or rashes and good turgor.   Head is normocephalic and atraumatic.    Lungs are clear.   Heart shows normal S1 and S2 without murmur or gallop.    Abdomen shows tenderness in the right upper quadrant no guarding or rebound    ASSESSMENT  1 " hyperlipidemia we will start rosuvastatin  2 Parkinson disease stable   3 postprandial right upper quadrant abdominal pain  4 sleep apnea not on CPAP   5 seizures in remission on lamotrigine  6 Colon polyps due for colonoscopy 2025  7 BPH S/P HoLEP doing well  8 history of smoking quit 10 years ago    Plan  We reordered the rosuvastatin we will start this 10 mg a day we will plan to recheck his lipids on this in 2 to 3 months.  Will update his immunizations with the COVID booster.  In light of his ongoing right upper quadrant pain and negative previous studies and the fact that it is postprandial we will get a HIDA scan      Over 30 minutes spent on the day of service in chart review, patient contact, record completion and review and notification of lab reports    This note was completed using Dragon voice recognition software.      Asaf Craig MD  General Internal Medicine  [facility]  [phone]

## 2024-12-11 ENCOUNTER — HOSPITAL ENCOUNTER (OUTPATIENT)
Dept: NUCLEAR MEDICINE | Facility: CLINIC | Age: 69
Setting detail: NUCLEAR MEDICINE
Discharge: HOME | End: 2024-12-11
Attending: INTERNAL MEDICINE
Payer: COMMERCIAL

## 2024-12-11 DIAGNOSIS — R10.11 RUQ ABDOMINAL PAIN: ICD-10-CM

## 2024-12-11 PROCEDURE — 78227 HEPATOBIL SYST IMAGE W/DRUG: CPT

## 2024-12-11 PROCEDURE — A9537 TC99M MEBROFENIN: HCPCS | Performed by: INTERNAL MEDICINE

## 2024-12-11 PROCEDURE — 343N000001 HC RX 343 MED OP 636: Performed by: INTERNAL MEDICINE

## 2024-12-11 RX ORDER — KIT FOR THE PREPARATION OF TECHNETIUM TC 99M MEBROFENIN 45 MG/10ML
4.8-7.2 INJECTION, POWDER, LYOPHILIZED, FOR SOLUTION INTRAVENOUS ONCE
Status: COMPLETED | OUTPATIENT
Start: 2024-12-11 | End: 2024-12-11

## 2024-12-11 RX ADMIN — MEBROFENIN 6.6 MILLICURIE: 45 INJECTION, POWDER, LYOPHILIZED, FOR SOLUTION INTRAVENOUS at 09:10

## 2025-01-24 PROBLEM — G89.29 CHRONIC RIGHT SHOULDER PAIN: Status: ACTIVE | Noted: 2025-01-24

## 2025-01-24 PROBLEM — M25.511 CHRONIC RIGHT SHOULDER PAIN: Status: ACTIVE | Noted: 2025-01-24

## 2025-02-20 ENCOUNTER — TELEPHONE (OUTPATIENT)
Dept: GASTROENTEROLOGY | Facility: CLINIC | Age: 70
End: 2025-02-20
Payer: COMMERCIAL

## 2025-02-20 NOTE — TELEPHONE ENCOUNTER
Pre assessment completed for upcoming procedure.   (Please see previous telephone encounter notes for complete details)    Procedure details:    Arrival time and facility location reviewed.    Pre op exam needed? No.    Designated  policy reviewed. Instructed to have someone stay 6  hours post procedure.       Medication review:    Medications reviewed. Please see supporting documentation below. Holding recommendations discussed (if applicable).       Prep for procedure:     Procedure prep instructions reviewed.        Any additional information needed:  N/A      Patient verbalized understanding and had no questions or concerns at this time.      Migdalia Jules LPN  Endoscopy Procedure Pre Assessment   [phone] option 3

## 2025-02-20 NOTE — TELEPHONE ENCOUNTER
Pre visit planning completed.      Procedure details:    Patient scheduled for Colonoscopy on 3/3/25.     Arrival time: 1300. Procedure time 1400    Facility location: [facility]; [address] [place] [address], [address] Floor, [place] [address]. Check in location: 5th Floor.    Sedation type: Conscious sedation     Pre op exam needed? No.    Indication for procedure: history of polyps      Chart review:     Electronic implanted devices? No    Recent diagnosis of diverticulitis within the last 6 weeks? No      Medication review:    Diabetic? No    Anticoagulants? No    Weight loss medication/injectable? No GLP-1 medication per patient's medication list. Nursing to verify with pre-assessment call.    Other medication HOLDING recommendations:  N/A      Prep for procedure:     Bowel prep recommendation: Standard Miralax.   Due to: standard bowel prep    Procedure information and instructions sent via [email or web address]         Serena Longoria RN  Endoscopy Procedure Pre Assessment   [phone] option 3

## 2025-02-20 NOTE — TELEPHONE ENCOUNTER
"Endoscopy Scheduling Screen    Have you had any respiratory illness or flu-like symptoms in the last 10 days?  No    What is your communication preference for Instructions and/or Bowel Prep?   MyChart    What insurance is in the chart?  Other:  [facility] MEDICARE     Ordering/Referring Provider: HAN ESCOBAR   (If ordering provider performs procedure, schedule with ordering provider unless otherwise instructed. )    BMI: Estimated body mass index is 29.43 kg/m  as calculated from the following:    Height as of 12/4/24: 1.728 m (5' 8.03\").    Weight as of 12/4/24: 87.9 kg (193 lb 11.2 oz).     Sedation Ordered  moderate sedation.   If patient BMI > 50 do not schedule in [facility].    If patient BMI > 45 do not schedule at [facility].    Are you taking methadone or Suboxone?  NO, No RN review required.    Have you been diagnosed and are being treated for severe PTSD or severe anxiety?  NO, No RN review required.    Are you taking any prescription medications for pain 3 or more times per week?   NO, No RN review required.      Do you have a history of malignant hyperthermia?  No      (Females) Are you currently pregnant?        Have you been diagnosed or told you have pulmonary hypertension?   No      Do you have an LVAD?  No      Have you been told you have moderate to severe sleep apnea?  Yes. Do you use a CPAP? No. (RN Review required for scheduling unless scheduling in Hospital.)   MOUTH    Have you been told you have COPD, asthma, or any other lung disease?  No      Do you  have a history of any heart conditions or any upcoming cardiac exams like an echo, angiogram, stress test, or ablation?  No       Have you ever had or are you waiting for an organ transplant?  No. Continue scheduling, no site restrictions.      Have you had a stroke or transient ischemic attack (TIA aka \"mini stroke\") in the last 2 years?   No.      Have you been diagnosed with or been told you have cirrhosis of the liver?   No.      Are you currently on " "dialysis?   No      Do you need assistance transferring?   No    BMI: Estimated body mass index is 29.43 kg/m  as calculated from the following:    Height as of 12/4/24: 1.728 m (5' 8.03\").    Weight as of 12/4/24: 87.9 kg (193 lb 11.2 oz).     Is patients BMI > 40 and scheduling location UP?  No      Do you take an injectable or oral medication for weight loss or diabetes (excluding insulin)?  No      Do you take the medication Naltrexone?  No      Do you take blood thinners?  No       Prep   Are you currently on dialysis or do you have chronic kidney disease?  No      Do you have a diagnosis of diabetes?  No      Do you have a diagnosis of cystic fibrosis (CF)?  No      On a regular basis do you go 3 -5 days between bowel movements?  No      BMI > 40?  No    Preferred Pharmacy:    [facility] [id number] [place] [address]  [address] [place] [id number]  Phone: [phone] Fax: [phone]    Final Scheduling Details     Procedure scheduled  Colonoscopy    Surgeon:  MICHAEL     Date of procedure:  3/3/25     Pre-OP / PAC:   No - Not required for this site.    Location  [facility] - [facility] - Patient preference.    Sedation   Moderate Sedation - Per order.      Patient Reminders:   You will receive a call from a Nurse to review instructions and health history.  This assessment must be completed prior to your procedure.  Failure to complete the Nurse assessment may result in the procedure being cancelled.      On the day of your procedure, please designate an adult(s) who can drive you home stay with you for the next 24 hours. The medicines used in the exam will make you sleepy. You will not be able to drive.      You cannot take public transportation, ride share services, or non-medical taxi service without a responsible caregiver.  Medical transport services are allowed with the requirement that a responsible caregiver will receive you at your destination.  We require that drivers and caregivers " are confirmed prior to your procedure.

## 2025-03-03 ENCOUNTER — HOSPITAL ENCOUNTER (OUTPATIENT)
Facility: AMBULATORY SURGERY CENTER | Age: 70
Discharge: HOME | End: 2025-03-03
Attending: INTERNAL MEDICINE | Admitting: INTERNAL MEDICINE
Payer: COMMERCIAL

## 2025-03-03 VITALS
SYSTOLIC BLOOD PRESSURE: 127 MMHG | HEART RATE: 70 BPM | HEIGHT: 69 IN | OXYGEN SATURATION: 97 % | DIASTOLIC BLOOD PRESSURE: 96 MMHG | BODY MASS INDEX: 28.73 KG/M2 | WEIGHT: 194 LBS | RESPIRATION RATE: 16 BRPM | TEMPERATURE: 97.5 F

## 2025-03-03 LAB — COLONOSCOPY: NORMAL

## 2025-03-03 PROCEDURE — 88305 TISSUE EXAM BY PATHOLOGIST: CPT | Mod: TC | Performed by: INTERNAL MEDICINE

## 2025-03-03 PROCEDURE — 45385 COLONOSCOPY W/LESION REMOVAL: CPT | Mod: PT | Performed by: INTERNAL MEDICINE

## 2025-03-03 PROCEDURE — 88305 TISSUE EXAM BY PATHOLOGIST: CPT | Mod: 26 | Performed by: PATHOLOGY

## 2025-03-03 RX ORDER — FENTANYL CITRATE 50 UG/ML
INJECTION, SOLUTION INTRAMUSCULAR; INTRAVENOUS DAILY PRN
Status: DISCONTINUED | OUTPATIENT
Start: 2025-03-03 | End: 2025-03-03 | Stop reason: HOSPADM

## 2025-03-03 RX ORDER — ONDANSETRON 2 MG/ML
4 INJECTION INTRAMUSCULAR; INTRAVENOUS EVERY 6 HOURS PRN
Status: DISCONTINUED | OUTPATIENT
Start: 2025-03-03 | End: 2025-03-04 | Stop reason: HOSPADM

## 2025-03-03 RX ORDER — ONDANSETRON 2 MG/ML
4 INJECTION INTRAMUSCULAR; INTRAVENOUS
Status: DISCONTINUED | OUTPATIENT
Start: 2025-03-03 | End: 2025-03-03 | Stop reason: HOSPADM

## 2025-03-03 RX ORDER — NALOXONE HYDROCHLORIDE 0.4 MG/ML
0.2 INJECTION, SOLUTION INTRAMUSCULAR; INTRAVENOUS; SUBCUTANEOUS
Status: DISCONTINUED | OUTPATIENT
Start: 2025-03-03 | End: 2025-03-04 | Stop reason: HOSPADM

## 2025-03-03 RX ORDER — FLUMAZENIL 0.1 MG/ML
0.2 INJECTION, SOLUTION INTRAVENOUS
Status: DISCONTINUED | OUTPATIENT
Start: 2025-03-03 | End: 2025-03-04 | Stop reason: HOSPADM

## 2025-03-03 RX ORDER — LIDOCAINE 40 MG/G
CREAM TOPICAL
Status: DISCONTINUED | OUTPATIENT
Start: 2025-03-03 | End: 2025-03-03 | Stop reason: HOSPADM

## 2025-03-03 RX ORDER — NALOXONE HYDROCHLORIDE 0.4 MG/ML
0.4 INJECTION, SOLUTION INTRAMUSCULAR; INTRAVENOUS; SUBCUTANEOUS
Status: DISCONTINUED | OUTPATIENT
Start: 2025-03-03 | End: 2025-03-04 | Stop reason: HOSPADM

## 2025-03-03 RX ORDER — PROCHLORPERAZINE MALEATE 5 MG/1
5 TABLET ORAL EVERY 6 HOURS PRN
Status: DISCONTINUED | OUTPATIENT
Start: 2025-03-03 | End: 2025-03-04 | Stop reason: HOSPADM

## 2025-03-03 RX ORDER — ONDANSETRON 4 MG/1
4 TABLET, ORALLY DISINTEGRATING ORAL EVERY 6 HOURS PRN
Status: DISCONTINUED | OUTPATIENT
Start: 2025-03-03 | End: 2025-03-04 | Stop reason: HOSPADM

## 2025-03-03 NOTE — H&P
Chad Olivares  [id number]  male  69 year old      Reason for procedure/surgery: history of colon polyps    Patient Active Problem List   Diagnosis    CARDIOVASCULAR SCREENING; LDL GOAL LESS THAN 160    Generalized convulsive epilepsy (H)    Dyspnea and respiratory abnormality    Organic parasomnia    Memory loss    Colon polyps    Nuclear senile cataract of both eyes    RUQ abdominal pain    Dysarthria    BPH (benign prostatic hyperplasia)    Parkinson's disease without dyskinesia or fluctuating manifestations (H)    Bilateral groin pain    Binge eating    Fatty liver    Chronic right shoulder pain       Past Surgical History:    Past Surgical History:   Procedure Laterality Date    cholesteatoma surgery Left     COLONOSCOPY  10/09/2002; 2015    polyps - needs f/u 5 yrs     COLONOSCOPY N/A 7/20/2021    Procedure: COLONOSCOPY, WITH POLYPECTOMY;  Surgeon: Asaf Guajardo MD;  Location: [facility] [place] OR     REMOVAL OF TONSILS,<13 Y/O  1962    LASER HOLMIUM ENUCLEATION PROSTATE N/A 12/14/2023    Procedure: Holmium Laser Enucleation of the Prostate;  Surgeon: Roberto Altamirano MD;  Location: [facility] OR    RELEASE DUPUYTRENS CONTRACTURE Right 8/26/2021    Procedure: Right fifth finger and palm Dupuytren's contracture release;  Surgeon: Caron Farrell MD;  Location: [facility] [place] OR       Past Medical History:   Past Medical History:   Diagnosis Date    Achilles bursitis or tendinitis 08/07/2008    Advanced directives, counseling/discussion 05/20/2015    Gave pt packet on 5/20/2015  Syeda Phomphakdy, CMA      Bilateral groin pain 11/13/2024    Binge eating 11/13/2024    CARDIOVASCULAR SCREENING; LDL GOAL LESS THAN 160 10/12/2010    Cholesteatoma, unspecified     Colon polyps 04/2015    tubular villous and serrated adenoma    Complex sleep apnea syndrome     does not use CPAP    Dupuytren's contracture     Dyslexia     Dyspnea and respiratory abnormality 09/03/2014     Problem list name updated by automated process. Provider to  review    Fatty liver 2024    Generalized convulsive epilepsy (H) 2014     Problem list name updated by automated process. Provider to review    Generalized tonic-clonic seizure (H)     uncertain etiology    Memory loss 2016    Organic parasomnia 2014     Problem list name updated by automated process. Provider to review    Parkinson's disease (H)     Plantar fascial fibromatosis 2008       Social History:   Social History     Tobacco Use    Smoking status: Former     Current packs/day: 0.00     Types: Cigarettes     Quit date: 2005     Years since quittin.5    Smokeless tobacco: Never   Substance Use Topics    Alcohol use: Not Currently       Family History:   Family History   Problem Relation Age of Onset    Diabetes Mother         diet controlled, Htn    Heart Murmur Mother         TAVR    Dementia Mother         age 89    Deep Vein Thrombosis (DVT) Mother     Coronary Artery Disease Brother     Diabetes Maternal Grandmother     ALS Maternal Cousin     Glaucoma No family hx of     Macular Degeneration No family hx of     Anesthesia Reaction No family hx of        Allergies:   Allergies   Allergen Reactions    Gluten Meal     Seasonal Allergies        Active Medications:   Current Outpatient Medications   Medication Sig Dispense Refill    B Complex Vitamins (VITAMIN B COMPLEX PO) Take by mouth.      carbidopa-levodopa (SINEMET)  MG tablet Take 1/2 tab three times a day for one week, then increase to 1 tab three times a day for one week, then increase to 1.5 tabs three times a day for one week, then increase to 2 tablets three times a day 180 tablet 11    Cholecalciferol (VITAMIN D-3 PO) Take by mouth.      famotidine (PEPCID) 20 MG tablet Take 20 mg by mouth 2 times daily      lamoTRIgine (LAMICTAL) 100 MG tablet Take 2 tablets (200 mg) by mouth 2 times daily 360 tablet 3    MAGNESIUM PO Take by mouth.      Menaquinone-7 (VITAMIN K2 PO) Take by mouth.       "rosuvastatin (CRESTOR) 10 MG tablet Take 1 tablet (10 mg) by mouth daily. 90 tablet 3    tolterodine ER (DETROL LA) 2 MG 24 hr capsule Take 1 capsule (2 mg) by mouth daily 60 capsule 5    saw palmetto 450 MG CAPS capsule Take 450 mg by mouth daily (Patient not taking: Reported on 12/23/2024)         Systemic Review:   CONSTITUTIONAL: NEGATIVE for fever, chills, change in weight  ENT/MOUTH: NEGATIVE for ear, mouth and throat problems  RESP: NEGATIVE for significant cough or SOB  CV: NEGATIVE for chest pain, palpitations or peripheral edema    Physical Examination:   Vital Signs: BP (!) 143/94 (BP Location: Right arm)   Pulse 70   Temp 97  F (36.1  C) (Temporal)   Resp 18   Ht 1.74 m (5' 8.5\")   Wt 88 kg (194 lb)   SpO2 97%   BMI 29.07 kg/m    GENERAL: healthy, alert and no distress  NECK: no adenopathy, no asymmetry, masses, or scars  RESP: lungs clear to auscultation - no rales, rhonchi or wheezes  CV: regular rate and rhythm, normal S1 S2, no S3 or S4, no murmur, click or rub, no peripheral edema and peripheral pulses strong  ABDOMEN: soft, nontender, no hepatosplenomegaly, no masses and bowel sounds normal  MS: no gross musculoskeletal defects noted, no edema    ASA Classification: (II)  Mild systemic disease  Airway Exam: Mallampati Score: Class III (Visualization of only the base of uvula)    Plan: Appropriate to proceed as scheduled.      Butch Spencer MD  3/3/2025    PCP:  Asaf Craig    "

## 2025-03-04 ENCOUNTER — TELEPHONE (OUTPATIENT)
Dept: NEUROLOGY | Facility: CLINIC | Age: 70
End: 2025-03-04

## 2025-03-04 NOTE — TELEPHONE ENCOUNTER
[facility] Call Center    Phone Message    May a detailed message be left on voicemail: yes     Reason for Call: Patient requests a call back with status of when the DMV form was sent     Action Taken: MINCEP    Travel Screening: Not Applicable     Date of Service:

## 2025-03-06 NOTE — TELEPHONE ENCOUNTER
DMV form has not been added into patients chart yet. Writer will reach out to staff to see if form has been received.

## 2025-03-17 ENCOUNTER — PATIENT OUTREACH (OUTPATIENT)
Dept: GASTROENTEROLOGY | Facility: CLINIC | Age: 70
End: 2025-03-17
Payer: COMMERCIAL

## 2025-03-17 PROBLEM — D12.6 ADENOMATOUS COLON POLYP: Status: ACTIVE | Noted: 2025-03-17

## 2025-04-03 DIAGNOSIS — Z00.6 RESEARCH SUBJECT: Primary | ICD-10-CM

## 2025-04-10 ENCOUNTER — OFFICE VISIT (OUTPATIENT)
Dept: NEUROLOGY | Facility: CLINIC | Age: 70
End: 2025-04-10
Payer: COMMERCIAL

## 2025-04-10 VITALS
DIASTOLIC BLOOD PRESSURE: 89 MMHG | WEIGHT: 198 LBS | SYSTOLIC BLOOD PRESSURE: 138 MMHG | HEART RATE: 73 BPM | HEIGHT: 69 IN | TEMPERATURE: 97.7 F | BODY MASS INDEX: 29.33 KG/M2

## 2025-04-10 DIAGNOSIS — G40.309 GENERALIZED CONVULSIVE EPILEPSY (H): ICD-10-CM

## 2025-04-10 RX ORDER — LAMOTRIGINE 100 MG/1
200 TABLET ORAL 2 TIMES DAILY
Qty: 360 TABLET | Refills: 3 | Status: SHIPPED | OUTPATIENT
Start: 2025-04-10

## 2025-04-10 NOTE — PROGRESS NOTES
[facility] Epilepsy [facility]:  RETURN VISIT           Service Date: 04/10/2025     HISTORY:  Mr. Chad Olivares is a 69-year-old right-handed man who returned for follow-up of grand mal seizures.                             The patient reported having no seizures since his last  visit to this clinic on 03/27/2024.     He recently started on Sinemet for mild parkinsonian findings.  He thinks this improved his mental clarity, and that participation in the [employer] program improved his motor performance.     Ictal semiology-history:             The patient confirmed previously presented history in detail today. He again noted that he had the first seizure of his lifetime on 07/31/2014, and a second grand mal seizure on August 20, 2014.  He was asleep at home in bed at seizure onset; after going to bed the preceding evening, his next memory is of awakening in the [facility] Emergency Department.  He then was very tired and diffusely sore, with pain on the right side of his tongue which had been bitten during the seizure.  His significant other witnessed the seizure from onset.  He was lying stiffly in bed with legs and trunk extended with generalized jerking movements for what seemed to be several minutes.  Afterwards he was very lethargic with stertorous respirations and then he became agitated.  When paramedics arrived, they gave him 2 doses of midazolam which decreased the agitation.  Subsequently, he became gradually more responsive over several hours.  He did not feel that he had baseline alertness and cognitive function for nearly another 2 days, however.  He did not have urinary incontinence with the event.        The patient and his significant other reported that he has not been known to have any sudden brief staring spells with unresponsiveness, sudden brief periods of confusion or global memory deficits or involuntary movements, except for hypnic jerks.      Epilepsy-seizure predispositions:   The patient has no family  history of epilepsy or seizures.  He has no history of gestational or  injury, febrile convulsions, developmental delay, stroke, meningitis, encephalitis, significant head injury, or other epileptic predispositions.  He denied a history of physical or sexual abuse by an adult during his childhood or adulthood.       Laboratory evaluations:   In the Emergency Department on the morning after the first seizure, he had a head CT scan which was normal.    A brain MRI scan was ordered at that time and was completed on 2014 and this also was normal.    We reviewed the results of the brain MRI of 2022, which showed no specific lesions, but evidence of SVID.     He had a brief routine electroencephalogram on 2014 which showed normal waking and drowsy EEG activities, although sleep patterns were not recorded. His EEG on 2022 showed no abnormalities.     Epilepsy therapeutics:   The patient reported that he had never been treated with anti-seizure medications for any reason until the second seizure occurred, when levetiracetam was started.  He completed a crossover from levetiracetam monotherapy to lamotrigine monotherapy in .  He experienced complete resolution of chronic mild irritability and episodic imbalance after tapering off levetiracetam.  He later he added  OTC  cannabidiol oil to this, mainly for treating joint pains.      PAST MEDICAL HISTORY:    1.  History of two generalized tonic-clonic seizures () of uncertain etiology.   2.  Parkinsonism.  3.  Mild obstructive sleep apnea.   4.  History of dyslexia.      PERSONAL AND SOCIAL HISTORY:  The patient grew up in [place].  He had difficulty with reading in school and was told that testing showed dyslexia, but he ultimately graduated from high school in regular classes.  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  He lives with his significant other.  He does not use illicit recreational drugs.  He drinks, at most, 1-2 alcoholic beverages, perhaps 3 times per month.  He quit smoking in 2005.      REVIEW OF SYSTEMS:    Positive for occasional dizziness when standing after bending over. He denies headache, chest pain, shortness of breath, cough, changes to bowel movements, numbness or tingling in his limbs, and rash.     MEDICATIONS:  Lamotrigine 200 mg b.i.d., Sinemet, and other medications as per the electronic medical record.  He also uses cannabidiol oil 1-2 times per week to treat joint pains.      PHYSICAL EXAMINATION:    On physical examination the patient appeared to be in no acute distress.  Vital signs were as per the electronic medical record.  Skull was normocephalic and atraumatic.  Neck was supple, without signs of meningeal irritation.       On neurological examination the patient appeared alert and was fully oriented to person, place, time, and reason for visit.  Speech showed grossly normal articulation, fluency, repetitions, naming, syntax and comprehension.  No apraxias or atavistic signs were elicited.  Cranial nerves III through XII were normal.  Muscle masses, tones and strengths were normal throughout.  There was no pronator drift.    There was a low-ampltiude action termor of the outstretched arms, slightly greater on the right.  No myoclonus, or other abnormal movements were observed.  Sensations of light touch, pin prick, vibration and proprioception were reportedly normal throughout.  The rapid alternating movements, and finger-nose-finger and heel-shin maneuvers were performed normally bilaterally.  Deep tendon reflexes were normal and symmetric throughout.  Toes were downgoing bilaterally.  Romberg maneuver was negative.  Regular, tandem and reverse tandem walking were normal.       IMPRESSION:    The patient continues to do very well with full control  of seizures and no AED adverse effects..       He recently started on Sinemet for mild parkinsonian findings.  He thinks this improved his mental clarity, and that participation in the [employer] program improved his motor performance.  He was diagnosed with very mild parkinsonian features in the [facility], based in part on cervical rigidity and a right-sided action tremor.  He initially was advised not to start dopaminergic therapy, but to participate actively in structured exercise (Sparx).  He noted some episodes of dream enactment, as well.     We reviewed the results of the brain MRI of 04/04/2022, which showed no specific lesions, but evidence of SVID.     We also reviewed the results of the neuropsychological evaluation of 08/24/2022, which showed no major new findings, but possibly mild progression in frontal lobe functions compared with testing in 2016.  He did not have evidence of global deficits of dementia, which was his major concern.       I again reviewed [place] regulations on seizures and driving with the patient.  He appeared to clearly understand that he would  prohibited from operating a motor vehicle within 3 months following any future seizure or other episode with sudden unconsciousness or inability to sit up, and that he is required to report any future such seizure to the DMV within 30 days after the event.       PLAN:    1.  Continue lamotrigine at the current dose.    2.  Return visit in approximately 11 months.      I spent 46 minutes in this patient care, with 32 minutes in direct patient contact and 14 minutes in chart review and document preparation.          Dandre Martin M.D.  Professor of Neurology

## 2025-04-10 NOTE — LETTER
4/10/2025       RE: Chad Olivares  : 1955   MRN: [id number]      Dear Colleague,    Thank you for referring your patient, Chad Olivares, to the [facility] EPILEPSY CARE at [facility]. Please see a copy of my visit note below.      [facility] Epilepsy [facility]:  RETURN VISIT           Service Date: 04/10/2025     HISTORY:  Mr. Chad Olivares is a 69-year-old right-handed man who returned for follow-up of grand mal seizures.                             The patient reported having no seizures since his last  visit to this clinic on 2024.     He recently started on Sinemet for mild parkinsonian findings.  He thinks this improved his mental clarity, and that participation in the [employer] program improved his motor performance.     Ictal semiology-history:             The patient confirmed previously presented history in detail today. He again noted that he had the first seizure of his lifetime on 2014, and a second grand mal seizure on 2014.  He was asleep at home in bed at seizure onset; after going to bed the preceding evening, his next memory is of awakening in the [facility] Emergency Department.  He then was very tired and diffusely sore, with pain on the right side of his tongue which had been bitten during the seizure.  His significant other witnessed the seizure from onset.  He was lying stiffly in bed with legs and trunk extended with generalized jerking movements for what seemed to be several minutes.  Afterwards he was very lethargic with stertorous respirations and then he became agitated.  When paramedics arrived, they gave him 2 doses of midazolam which decreased the agitation.  Subsequently, he became gradually more responsive over several hours.  He did not feel that he had baseline alertness and cognitive function for nearly another 2 days, however.  He did not have urinary incontinence with the event.        The patient and his  significant other reported that he has not been known to have any sudden brief staring spells with unresponsiveness, sudden brief periods of confusion or global memory deficits or involuntary movements, except for hypnic jerks.      Epilepsy-seizure predispositions:   The patient has no family history of epilepsy or seizures.  He has no history of gestational or  injury, febrile convulsions, developmental delay, stroke, meningitis, encephalitis, significant head injury, or other epileptic predispositions.  He denied a history of physical or sexual abuse by an adult during his childhood or adulthood.       Laboratory evaluations:   In the Emergency Department on the morning after the first seizure, he had a head CT scan which was normal.    A brain MRI scan was ordered at that time and was completed on 2014 and this also was normal.    We reviewed the results of the brain MRI of 2022, which showed no specific lesions, but evidence of SVID.     He had a brief routine electroencephalogram on 2014 which showed normal waking and drowsy EEG activities, although sleep patterns were not recorded. His EEG on 2022 showed no abnormalities.     Epilepsy therapeutics:   The patient reported that he had never been treated with anti-seizure medications for any reason until the second seizure occurred, when levetiracetam was started.  He completed a crossover from levetiracetam monotherapy to lamotrigine monotherapy in .  He experienced complete resolution of chronic mild irritability and episodic imbalance after tapering off levetiracetam.  He later he added  OTC  cannabidiol oil to this, mainly for treating joint pains.      PAST MEDICAL HISTORY:    1.  History of two generalized tonic-clonic seizures () of uncertain etiology.   2.  Parkinsonism.  3.  Mild obstructive sleep apnea.   4.  History of dyslexia.      PERSONAL AND SOCIAL HISTORY:  The patient grew up in [place].  He had  difficulty with reading in school and was told that testing showed dyslexia, but he ultimately graduated from high school in regular classes.  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  He lives with his significant other.  He does not use illicit recreational drugs.  He drinks, at most, 1-2 alcoholic beverages, perhaps 3 times per month.  He quit smoking in 2005.      REVIEW OF SYSTEMS:    Positive for occasional dizziness when standing after bending over. He denies headache, chest pain, shortness of breath, cough, changes to bowel movements, numbness or tingling in his limbs, and rash.     MEDICATIONS:  Lamotrigine 200 mg b.i.d., Sinemet, and other medications as per the electronic medical record.  He also uses cannabidiol oil 1-2 times per week to treat joint pains.      PHYSICAL EXAMINATION:    On physical examination the patient appeared to be in no acute distress.  Vital signs were as per the electronic medical record.  Skull was normocephalic and atraumatic.  Neck was supple, without signs of meningeal irritation.       On neurological examination the patient appeared alert and was fully oriented to person, place, time, and reason for visit.  Speech showed grossly normal articulation, fluency, repetitions, naming, syntax and comprehension.  No apraxias or atavistic signs were elicited.  Cranial nerves III through XII were normal.  Muscle masses, tones and strengths were normal throughout.  There was no pronator drift.    There was a low-ampltiude action termor of the outstretched arms, slightly greater on the right.  No myoclonus, or other abnormal movements were observed.  Sensations of light touch, pin prick, vibration and proprioception were reportedly normal throughout.  The rapid alternating movements, and finger-nose-finger and heel-shin  maneuvers were performed normally bilaterally.  Deep tendon reflexes were normal and symmetric throughout.  Toes were downgoing bilaterally.  Romberg maneuver was negative.  Regular, tandem and reverse tandem walking were normal.       IMPRESSION:    The patient continues to do very well with full control of seizures and no AED adverse effects..       He recently started on Sinemet for mild parkinsonian findings.  He thinks this improved his mental clarity, and that participation in the [employer] program improved his motor performance.  He was diagnosed with very mild parkinsonian features in the [facility], based in part on cervical rigidity and a right-sided action tremor.  He initially was advised not to start dopaminergic therapy, but to participate actively in structured exercise (Sparx).  He noted some episodes of dream enactment, as well.     We reviewed the results of the brain MRI of 04/04/2022, which showed no specific lesions, but evidence of SVID.     We also reviewed the results of the neuropsychological evaluation of 08/24/2022, which showed no major new findings, but possibly mild progression in frontal lobe functions compared with testing in 2016.  He did not have evidence of global deficits of dementia, which was his major concern.       I again reviewed [place] regulations on seizures and driving with the patient.  He appeared to clearly understand that he would  prohibited from operating a motor vehicle within 3 months following any future seizure or other episode with sudden unconsciousness or inability to sit up, and that he is required to report any future such seizure to the [facility] within 30 days after the event.       PLAN:    1.  Continue lamotrigine at the current dose.    2.  Return visit in approximately 11 months.      I spent 46 minutes in this patient care, with 32 minutes in direct patient contact and 14 minutes in chart review and document preparation.          Dandre  CHARLOTTE Martin M.D.  Professor of Neurology       Again, thank you for allowing me to participate in the care of your patient.      Sincerely,    Dandre Martin MD

## 2025-04-21 ENCOUNTER — OFFICE VISIT (OUTPATIENT)
Dept: NEUROLOGY | Facility: CLINIC | Age: 70
End: 2025-04-21
Payer: COMMERCIAL

## 2025-04-21 VITALS
HEIGHT: 69 IN | DIASTOLIC BLOOD PRESSURE: 83 MMHG | SYSTOLIC BLOOD PRESSURE: 124 MMHG | HEART RATE: 74 BPM | BODY MASS INDEX: 29.25 KG/M2 | WEIGHT: 197.5 LBS | OXYGEN SATURATION: 96 %

## 2025-04-21 DIAGNOSIS — G20.A1 PARKINSON'S DISEASE WITHOUT DYSKINESIA OR FLUCTUATING MANIFESTATIONS (H): Primary | ICD-10-CM

## 2025-04-21 ASSESSMENT — UNIFIED PARKINSONS DISEASE RATING SCALE (UPDRS)
PRONATION_SUPINATION_RIGHT: (1) SLIGHT: ANY OF THE FOLLOWING: A) THE REGULAR RHYTHM IS BROKEN WITH ONE WITH ONE OR TWO INTERRUPTIONS OR HESITATIONS OF THE MOVEMENT  B) SLIGHT SLOWING  C) THE AMPLITUDE DECREMENTS NEAR THE END OF THE 10 MOVEMENTS.
FACIAL_EXPRESSION: (1) SLIGHT: MINIMAL MASKED FACIES MANIFESTED ONLY BY DECREASED FREQUENCY OF BLINKING.
HANDMOVEMENTS_RIGHT: (1) SLIGHT: ANY OF THE FOLLOWING: A) THE REGULAR RHYTHM IS BROKEN WITH ONE WITH ONE OR TWO INTERRUPTIONS OR HESITATIONS OF THE MOVEMENT  B) SLIGHT SLOWING  C) THE AMPLITUDE DECREMENTS NEAR THE END OF THE 10 MOVEMENTS.
AMPLITUDE_LIP_JAW: (0) NORMAL: NO TREMOR.
HANDMOVEMENTS_LEFT: (1) SLIGHT: ANY OF THE FOLLOWING: A) THE REGULAR RHYTHM IS BROKEN WITH ONE WITH ONE OR TWO INTERRUPTIONS OR HESITATIONS OF THE MOVEMENT  B) SLIGHT SLOWING  C) THE AMPLITUDE DECREMENTS NEAR THE END OF THE 10 MOVEMENTS.
AMPLITUDE_LLE: (0) NORMAL: NO TREMOR.
AMPLITUDE_RLE: (0) NORMAL: NO TREMOR.
TOETAPPING_RIGHT: (0) NORMAL: NO PROBLEMS.
TOETAPPING_LEFT: (0) NORMAL: NO PROBLEMS.
MOVEMENTS_INTERFERE_WITH_RATINGS: NO
PRONATION_SUPINATION_LEFT: (0) NORMAL: NO PROBLEMS.
ARISING_CHAIR: (0) NORMAL: NO PROBLEMS. ABLE TO ARISE QUICKLY WITHOUT HESITATION.
AMPLITUDE_LUE: (0) NORMAL: NO TREMOR.
FINGER_TAPPING_RIGHT: (2) MILD: ANY OF THE FOLLOWING: A) 3 TO 5 INTERRUPTIONS DURING TAPPING  B) MILD SLOWING  C) THE AMPLITUDE DECREMENTS MIDWAY IN THE 10-MOVEMENT SEQUENCE.
PARKINSONS_MEDS: OFF
CONSTANCY_TREMOR_ATREST: (1) SLIGHT: TREMOR AT REST IS PRESENT 25% OF THE ENTIRE EXAMINATION PERIOD.
DYSKINESIAS_PRESENT: NO
SPEECH: (1) SLIGHT: LOSS OF MODULATION, DICTION OR VOLUME, BUT STILL ALL WORDS EASY TO UNDERSTAND.
LEG_AGILITY_LEFT: (0) NORMAL: NO PROBLEMS.
FINGER_TAPPING_LEFT: (1) SLIGHT: ANY OF THE FOLLOWING: A) THE REGULAR RHYTHM IS BROKEN WITH ONE WITH ONE OR TWO INTERRUPTIONS OR HESITATIONS OF THE MOVEMENT  B) SLIGHT SLOWING  C) THE AMPLITUDE DECREMENTS NEAR THE END OF THE 10 MOVEMENTS.
GAIT: (0) NORMAL: NO PROBLEMS.
LEG_AGILITY_RIGHT: (0) NORMAL: NO PROBLEMS.
AMPLITUDE_RUE: (1) SLIGHT: < 1 CM IN MAXIMAL AMPLITUDE.

## 2025-04-21 NOTE — NURSING NOTE
"Chad Olivares is a 69 year old male who presents for:  Chief Complaint   Patient presents with    Parkinson     Parkinson follow up        Initial Vitals:  /83   Pulse 74   Ht 1.74 m (5' 8.5\")   Wt 89.6 kg (197 lb 8 oz)   SpO2 96%   BMI 29.59 kg/m   Estimated body mass index is 29.59 kg/m  as calculated from the following:    Height as of this encounter: 1.74 m (5' 8.5\").    Weight as of this encounter: 89.6 kg (197 lb 8 oz).. Body surface area is 2.08 meters squared. BP completed using cuff size: regular    Yary Rathai   "

## 2025-04-21 NOTE — LETTER
2025      Chad Olivares  [address]  [facility] [address]      Dear Colleague,    Thank you for referring your patient, Chad Olivares, to the [facility]UROLOG[facility]. Please see a copy of my visit note below.    Department of Neurology  Movement Disorders Division   Follow-up Note    Patient: Chad Olivares   MRN: [id number]   : 1955   Date of Visit: 2025    Mr. Olivares is a 68 year old right-handed male who presents for follow-up of Parkinson's disease. He was last seen on 24, at which time he was started on Carbidopa/Levodopa. Today, he is presents with his wife.     INTERVAL EVENTS:  Mr. Olivares reports he is having more constipation since starting a statin for cholesterol. He has been using Miralax in the morning and is currently having a bowel movement every two days. Stool is generally hard and difficult to pass.     He is noticing more tremor. Hands are not working as well as before.     He was told he should try taking methylene blue by a friend.     Sleep is not great. He falls asleep easily but struggles to stay asleep. Marijuana helps him sleep which he uses occasionally. He planning CBD/THC gummies. CBD liquid doesn't help much. He is generally stiff in bed. He tosses and turns overnight.     He has been seen in sleep clinic and told he has apnea. He did not tolerate CPAP. He wears a dental appliance. He also has a new bed which can be raised. Sleeping at an incline with the mouth piece helps.     He takes 800 mg ibuprofen before bed 2-3 times per week. This helps him feel much better in the morning.     He is in [employer]. This seems to help a lot.     He does weight lifting 2-3 times per day, yoga once a week. He walks 4 times per week with Sparx[employer]. He walks at least 3000 steps/day outside this.     No hallucinations.     Rare dream enactment.     Movement Disorder-related Medications                   7am Evening PRN   Carbidopa/Levodopa  mg                                                 1/2 1/4   Lamotrigine 100 mg                         2 2      Previously he got very nauseated with one full tablet.     MEDICATIONS reviewed & pertinent for:  Lamotrigine 200 mg BID    ROS:  All others negative except as listed above.    Past Medical History:   Diagnosis Date     Achilles bursitis or tendinitis 08/07/2008     Advanced directives, counseling/discussion 05/20/2015    Gave pt packet on 5/20/2015  Syeda Phomphakdy, CMA       Bilateral groin pain 11/13/2024     Binge eating 11/13/2024     CARDIOVASCULAR SCREENING; LDL GOAL LESS THAN 160 10/12/2010     Cholesteatoma, unspecified      Colon polyps 04/2015    tubular villous and serrated adenoma     Complex sleep apnea syndrome     does not use CPAP     Dupuytren's contracture      Dyslexia      Dyspnea and respiratory abnormality 09/03/2014     Problem list name updated by automated process. 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 Provider to review     Parkinson's disease (H)      Plantar fascial fibromatosis 08/07/2008        Past Surgical History:   Procedure Laterality Date     cholesteatoma surgery Left      COLONOSCOPY  10/09/2002; 2015    polyps - needs f/u 5 yrs      COLONOSCOPY N/A 7/20/2021    Procedure: COLONOSCOPY, WITH POLYPECTOMY;  Surgeon: Asaf Guajardo MD;  Location: [facility] OR     COLONOSCOPY N/A 3/3/2025    Procedure: COLONOSCOPY, WITH POLYPECTOMY;  Surgeon: Butch Spencer MD;  Location: [facility] OR      REMOVAL OF TONSILS,<11 Y/O  1962     LASER HOLMIUM ENUCLEATION PROSTATE N/A 12/14/2023    Procedure: Holmium Laser Enucleation of the Prostate;  Surgeon: Roberto Altamirano MD;  Location:  OR      RELEASE DUPUYTRENS CONTRACTURE Right 8/26/2021    Procedure: Right fifth finger and palm Dupuytren's contracture release;  Surgeon: Caron Farerll MD;  Location: [facility] OR        Current Outpatient Medications   Medication Sig Dispense Refill     B Complex Vitamins (VITAMIN B COMPLEX PO) Take by mouth.       carbidopa-levodopa (SINEMET)  MG tablet Take 1/2 tab three times a day for one week, then increase to 1 tab three times a day for one week, then increase to 1.5 tabs three times a day for one week, then increase to 2 tablets three times a day 180 tablet 11     Cholecalciferol (VITAMIN D-3 PO) Take by mouth.       famotidine (PEPCID) 20 MG tablet Take 20 mg by mouth 2 times daily       lamoTRIgine (LAMICTAL) 100 MG tablet Take 2 tablets (200 mg) by mouth 2 times daily. 360 tablet 3     MAGNESIUM PO Take by mouth.       Menaquinone-7 (VITAMIN K2 PO) Take by mouth.       rosuvastatin (CRESTOR) 10 MG tablet Take 1 tablet (10 mg) by mouth daily. 90 tablet 3     tolterodine ER (DETROL LA) 2 MG 24 hr capsule Take 1 capsule (2 mg) by mouth daily 60 capsule 5     saw palmetto 450 MG CAPS capsule Take 450 mg by mouth daily (Patient not taking: Reported on 4/21/2025)         Allergies   Allergen Reactions     Gluten Meal      Seasonal Allergies         Family History   Problem Relation Age of Onset     Diabetes Mother         diet controlled, Htn     Heart Murmur Mother         TAVR     Dementia Mother         age 89     Deep Vein Thrombosis (DVT) Mother      Coronary Artery Disease Brother      Diabetes Maternal Grandmother      ALS Maternal Cousin      Glaucoma No family hx of      Macular Degeneration No family hx of      Anesthesia Reaction No family hx of         Social History     Socioeconomic History     Marital status: Single     Spouse name: Not on file     Number of children: 2     Years of education: 14     Highest education level: Not on file   Occupational History     Occupation: real estate  [employer]     Comment: Self Employed     Occupation: contractor     Employer: S[employer]   Tobacco Use     Smoking status: Former     Current packs/day: 0.00     Types: Cigarettes     Quit date: 2005     Years since quittin.6     Smokeless tobacco: Never   Vaping Use     Vaping status: Never Used   Substance and Sexual Activity     Alcohol use: Not Currently     Drug use: No     Comment: uses CBD      Sexual activity: Yes     Partners: Female   Other Topics Concern     Parent/sibling w/ CABG, MI or angioplasty before 65F 55M? No      Service Not Asked     Blood Transfusions Not Asked     Caffeine Concern Not Asked     Comment: 3 cups of coffee a day     Occupational Exposure Not Asked     Hobby Hazards Not Asked     Sleep Concern Not Asked     Stress Concern Not Asked     Weight Concern Not Asked     Special Diet Not Asked     Back Care Not Asked     Exercise Not Asked     Comment: Exercise 2 to 3 times a week     Bike Helmet Not Asked     Seat Belt Not Asked     Self-Exams Not Asked   Social History Narrative    Balanced Diet - Yes    Osteoporosis Preventative measures-  Dairy servings per day: no servings daily takes soy milk and almond milk - 2 glasses/day     Regular Exercise -  Yes Describe dance 3 times weekly (salsa), bike ride, and paula    Dental Exam up - YES - Date:     Eye Exam - YES - Date:     Self Testicular Exam -  sometimes    Do you have any concerns about STD's -  Partner has hx of genital herpes    Abuse: Current or Past (Physical, Sexual or Emotional)- No    Do you feel safe in your environment - Yes    Guns stored in the home - Yes, locked away    Sunscreen used - No using coconut     Seatbelts used - Yes    Lipids - YES - Date: 06    Glucose -  NO    Colon Cancer Screening - Colonoscopy (date completed)    Hemoccults - NO    PSA - YES - Date: 06    Digital Rectal Exam - YES - Date: 06    Immunizations reviewed and up to date - Yes, last TD was 2001     "2008, Dania Miller MA             Social Drivers of Health     Financial Resource Strain: Low Risk  (11/6/2023)    Financial Resource Strain      Within the past 12 months, have you or your family members you live with been unable to get utilities (heat, electricity) when it was really needed?: No   Food Insecurity: Low Risk  (11/6/2023)    Food Insecurity      Within the past 12 months, did you worry that your food would run out before you got money to buy more?: No      Within the past 12 months, did the food you bought just not last and you didn t have money to get more?: No   Transportation Needs: Low Risk  (11/6/2023)    Transportation Needs      Within the past 12 months, has lack of transportation kept you from medical appointments, getting your medicines, non-medical meetings or appointments, work, or from getting things that you need?: No   Physical Activity: Not on file   Stress: Not on file   Social Connections: Not on file   Interpersonal Safety: Low Risk  (3/3/2025)    Interpersonal Safety      Do you feel physically and emotionally safe where you currently live?: Yes      Within the past 12 months, have you been hit, slapped, kicked or otherwise physically hurt by someone?: No      Within the past 12 months, have you been humiliated or emotionally abused in other ways by your partner or ex-partner?: No   Housing Stability: Low Risk  (11/6/2023)    Housing Stability      Do you have housing? : Yes      Are you worried about losing your housing?: No        PHYSICAL EXAM:  /83   Pulse 74   Ht 1.74 m (5' 8.5\")   Wt 89.6 kg (197 lb 8 oz)   SpO2 96%   BMI 29.59 kg/m         Gait:  Narrow base, good stride length, reduced arm swing on the right, quick swivel turn without difficulty or unsteadiness.         4/21/2025    11:00 AM   UPDRS Motor Scale   Time: 11:14   Medication Off   R Brain DBS: None   L Brain DBS: None   Dyskinesia (LID) No   Did LID interfere No   Speech 1   Facial Expression 1 "   Finger Taps R 2   Finger Taps L 1   Hand Mvt R 1   Hand Mvt L 1   Pron-/Supinate R 1   Pron-/Supinate L 0   Toe Tap R 0   Toe Tap L 0   Leg Agility R 0   Leg Agility L 0   Arise From Chair 0   Gait 0   Postural Tremor RUE 1   Postural Tremor LUE 0   Kinetic Tremor RUE 0   Kinetic Tremor LUE 0   Rest Tremor RUE 1   Rest Tremor LUE 0   Rest Tremor RLE 0   Rest Tremor LLE 0   Rest Tremor Lip/Jaw 0   Rest Tremor Constancy 1      Previously 14 on 2/12/24    ASSESSMENT:  Mr. Olivares is a 68 year old right-handed male with history significant for seizures being followed by Dr. Martin in epilepsy clinic who presents for follow-up of idiopathic Parkinson's disease. Today he reports increased constipation, tremor and loss of dexterity. We discussed trying to increase dose of CD/LD again - previously he developed nausea with 1 tab dose. Advised that this may be less bothersome as he has gotten used to the medication. He is doing a great job of staying active.     Advised that there is no strong evidence that suggests methylene blue is helpful in Parkinson's disease.     PLAN:     - Increase Carbidopa/Levodopa  mg tablets to 1 TID over the course of two weeks, details provided in AVS   - Discussed taking with crackers, ginger ale, etc if nausea   - Consider prescribing additional Carbidopa (Lodosyn) if nausea remains bothersome   - Consider CD/LD CR at bedtime if wearing off overnight  - Encouraged continuing to stay active  - Information about constipation management provided in AVS  - Recommended melatonin to improve sleep and target dream enactment    RTC 3-5 months, 30 min    Michelle Orosco MD  Movement Disorders Fellow    Time Spent: 30 minutes spent on the date of the encounter doing chart review, history and exam, documentation and further activities as noted above              Again, thank you for allowing me to participate in the care of your patient.        Sincerely,        Michelle Orosco MD    Electronically  signed

## 2025-04-21 NOTE — PATIENT INSTRUCTIONS
Increasing Carbidopa/Levodopa could improve your tremor and stiffness. Stiffness from Parkinson's disease could be making it more difficult to stay asleep overnight. There is a longer acting version of this medication that we could try at bedtime if needed.     Sinemet (CD/LD) 25/100 mg IR AM Noon PM   Week 1                         0.5 tab 0.5 tab 0.5 tab   Week 2             1 1 1     If you get nauseated when you take this medication, please try take it with a cracker or some ginger ale. If this is still bothersome, we can try prescribing an extra carbidopa pill to settle down the nausea.     Keep up the great job staying active!    You can try taking melatonin. It generally works best when taking 1-2 hours before bed. This can also decrease the frequency of dream enactment.     CONSTIPATION TREATMENT     Constipation in Parkinson Disease (PD) is very common.  The exact cause of this problem is unknown.  However, medications used to treat PD can make the problem worse.  Constipation is defined as having fewer than THREE bowel movements a week.    Treatment for constipation starts with changing some behaviors and eating foods high in fiber. A laxative or stool softener may be recommended if needed.    You can try these treatments at home, before seeing a healthcare provider. However, if you do not have a bowel movement within a few days, you should call your healthcare provider for further assistance.    Behavior changes   The bowels are most active following meals, and this is often the time when stools will pass most readily. If you ignore your body's signals to have a bowel movement, the signals become weaker and weaker over time.  Increase your activity level by walking and use the local mall in the winter time  Increase your fluid intake by taking more water, soups, and juices  Increase fiber -- Increasing fiber in your diet may reduce or eliminate constipation. The recommended amount of dietary fiber is 20  "to 35 grams of fiber per day.   Many fruits and vegetables can be particularly helpful in preventing and treating constipation. This is especially true of citrus fruits, apricots, prunes, and prune juice. Some breakfast cereals are also an excellent source of dietary fiber.  Other fiber rich foods include beans, lentils, and whole wheat products  Fiber side effects -- Consuming large amounts of fiber can cause abdominal bloating or gas; this can be minimized by starting with a small amount and slowly increasing until stools become softer and more frequent.  You may need to try the  \"Power Pudding\" recipe:  half cup of applesauce  2 Tablespoons of bran (wheat or millers bran)  4 to 6 ounces of  prune juice  Mix the above together to form a paste and use on toast or crackers.  Be sure to refrigerate the remainder.  This recipe has sugar content so be sure and check with your primary doctor if you have diabetes or watch your blood sugars.    LAXATIVES -- If behavior changes and increasing fiber does not relieve your constipation, you may try taking a laxative or fiber supplement. A variety of laxatives are available for treating constipation. The choice between them is based upon how they work, how safe the treatment is, and your healthcare provider's preferences.    You should increase the dose of fiber supplements slowly to prevent gas and cramping, and you should always take the supplement with plenty of fluid.    Polyethylene glycol (miralax) is generally preferred since it does not cause gas or bloating and is available over the counter. Per Yoly Weinberg, Melyssa Miralax tends to be more predictable than milk of magnesia for daily use.  We recommend starting with one capful (17 g) per day until you have a good evacuation. Then if the consistency is too runny you may decrease the amount to 1/2 capful daily or take 1 capful every other day if needed on a regular basis. You can take an additional dose in the " evening or double your morning dose.     Stimulant laxatives -- Stimulant laxatives include senna (eg, Black Draught, Ex-Lax, Mendoza's, Castoria, Senokot) and bisacodyl (eg, Correctol, Doxidan, Dulcolax).  Some people overuse stimulant laxatives. Taking stimulant laxatives regularly or in large amounts can cause side effects, including low potassium levels. Thus, you should take these drugs carefully if you must use them regularly.    HOWARD Pappas, 2020. [employer]. [online] Patient education: Constipation in adults (Beyond the Basics). Available at: <https://www.[employer]/contents/constipation-in-adults-beyond-the-basics/print#!>    https://www.movementdisorders.org/MDS-Files1/Education/Patient-Education/[email or web address]-GI-Issues/pat-Handouts-Diet-GI-v3.pdf

## 2025-04-21 NOTE — PROGRESS NOTES
Department of Neurology  Movement Disorders Division   Follow-up Note    Patient: Chad Olivares   MRN: [id number]   : 1955   Date of Visit: 2025    Mr. Olivares is a 68 year old right-handed male who presents for follow-up of Parkinson's disease. He was last seen on 24, at which time he was started on Carbidopa/Levodopa. Today, he is presents with his wife.     INTERVAL EVENTS:  Mr. Olivares reports he is having more constipation since starting a statin for cholesterol. He has been using Miralax in the morning and is currently having a bowel movement every two days. Stool is generally hard and difficult to pass.     He is noticing more tremor. Hands are not working as well as before.     He was told he should try taking methylene blue by a friend.     Sleep is not great. He falls asleep easily but struggles to stay asleep. Marijuana helps him sleep which he uses occasionally. He planning CBD/THC gummies. CBD liquid doesn't help much. He is generally stiff in bed. He tosses and turns overnight.     He has been seen in sleep clinic and told he has apnea. He did not tolerate CPAP. He wears a dental appliance. He also has a new bed which can be raised. Sleeping at an incline with the mouth piece helps.     He takes 800 mg ibuprofen before bed 2-3 times per week. This helps him feel much better in the morning.     He is in [employer]. This seems to help a lot.     He does weight lifting 2-3 times per day, yoga once a week. He walks 4 times per week with [employer]. He walks at least 3000 steps/day outside this.     No hallucinations.     Rare dream enactment.     Movement Disorder-related Medications                   7am Evening PRN   Carbidopa/Levodopa  mg                                                1/2  1/4   Lamotrigine 100 mg                         2 2      Previously he got very nauseated with one full tablet.     MEDICATIONS reviewed & pertinent for:  Lamotrigine 200 mg BID    ROS:  All others negative  except as listed above.    Past Medical History:   Diagnosis Date    Achilles bursitis or tendinitis 08/07/2008    Advanced directives, counseling/discussion 05/20/2015    Gave pt packet on 5/20/2015  Syeda Phomphajean-paul, CMA      Bilateral groin pain 11/13/2024    Binge eating 11/13/2024    CARDIOVASCULAR SCREENING; LDL GOAL LESS THAN 160 10/12/2010    Cholesteatoma, unspecified     Colon polyps 04/2015    tubular villous and serrated adenoma    Complex sleep apnea syndrome     does not use CPAP    Dupuytren's contracture     Dyslexia     Dyspnea and respiratory abnormality 09/03/2014     Problem list name updated by automated process. 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 Provider to review    Parkinson's disease (H)     Plantar fascial fibromatosis 08/07/2008        Past Surgical History:   Procedure Laterality Date    cholesteatoma surgery Left     COLONOSCOPY  10/09/2002; 2015    polyps - needs f/u 5 yrs     COLONOSCOPY N/A 7/20/2021    Procedure: COLONOSCOPY, WITH POLYPECTOMY;  Surgeon: Asaf Guajardo MD;  [place] [facility] [place]    COLONOSCOPY N/A 3/3/2025    Procedure: COLONOSCOPY, WITH POLYPECTOMY;  Surgeon: Butch Spencer MD;  Location: [facility] [place]     REMOVAL OF TONSILS,<13 Y/O  1962    LASER HOLMIUM ENUCLEATION PROSTATE N/A 12/14/2023    Procedure: Holmium Laser Enucleation of the Prostate;  Surgeon: Roberto Altamirano MD;  Location: [facility] OR    RELEASE DUPUYTRENS CONTRACTURE Right 8/26/2021    Procedure: Right fifth finger and palm Dupuytren's contracture release;  Surgeon: Caron Farrell MD;  Location: [facility] [place]        Current Outpatient Medications   Medication Sig Dispense Refill    B Complex  Vitamins (VITAMIN B COMPLEX PO) Take by mouth.      carbidopa-levodopa (SINEMET)  MG tablet Take 1/2 tab three times a day for one week, then increase to 1 tab three times a day for one week, then increase to 1.5 tabs three times a day for one week, then increase to 2 tablets three times a day 180 tablet 11    Cholecalciferol (VITAMIN D-3 PO) Take by mouth.      famotidine (PEPCID) 20 MG tablet Take 20 mg by mouth 2 times daily      lamoTRIgine (LAMICTAL) 100 MG tablet Take 2 tablets (200 mg) by mouth 2 times daily. 360 tablet 3    MAGNESIUM PO Take by mouth.      Menaquinone-7 (VITAMIN K2 PO) Take by mouth.      rosuvastatin (CRESTOR) 10 MG tablet Take 1 tablet (10 mg) by mouth daily. 90 tablet 3    tolterodine ER (DETROL LA) 2 MG 24 hr capsule Take 1 capsule (2 mg) by mouth daily 60 capsule 5    saw palmetto 450 MG CAPS capsule Take 450 mg by mouth daily (Patient not taking: Reported on 2025)         Allergies   Allergen Reactions    Gluten Meal     Seasonal Allergies         Family History   Problem Relation Age of Onset    Diabetes Mother         diet controlled, Htn    Heart Murmur Mother         TAVR    Dementia Mother         age 89    Deep Vein Thrombosis (DVT) Mother     Coronary Artery Disease Brother     Diabetes Maternal Grandmother     ALS Maternal Cousin     Glaucoma No family hx of     Macular Degeneration No family hx of     Anesthesia Reaction No family hx of         Social History     Socioeconomic History    Marital status: Single     Spouse name: Not on file    Number of children: 2    Years of education: 14    Highest education level: Not on file   Occupational History    Occupation: real estate broker     Comment: Self Employed    Occupation: contractor     Employer: S[employer]   Tobacco Use    Smoking status: Former     Current packs/day: 0.00     Types: Cigarettes     Quit date: 2005     Years since quittin.6    Smokeless tobacco: Never   Vaping Use    Vaping status: Never Used    Substance and Sexual Activity    Alcohol use: Not Currently    Drug use: No     Comment: uses CBD     Sexual activity: Yes     Partners: Female   Other Topics Concern    Parent/sibling w/ CABG, MI or angioplasty before 65F 55M? No     Service Not Asked    Blood Transfusions Not Asked    Caffeine Concern Not Asked     Comment: 3 cups of coffee a day    Occupational Exposure Not Asked    Hobby Hazards Not Asked    Sleep Concern Not Asked    Stress Concern Not Asked    Weight Concern Not Asked    Special Diet Not Asked    Back Care Not Asked    Exercise Not Asked     Comment: Exercise 2 to 3 times a week    Bike Helmet Not Asked    Seat Belt Not Asked    Self-Exams Not Asked   Social History Narrative    Balanced Diet - Yes    Osteoporosis Preventative measures-  Dairy servings per day: no servings daily takes soy milk and almond milk - 2 glasses/day     Regular Exercise -  Yes Describe dance 3 times weekly (salsa), bike ride, and paula    Dental Exam up - YES - Date: 2016    Eye Exam - YES - Date: 2007    Self Testicular Exam -  sometimes    Do you have any concerns about STD's -  Partner has hx of genital herpes    Abuse: Current or Past (Physical, Sexual or Emotional)- No    Do you feel safe in your environment - Yes    Guns stored in the home - Yes, locked away    Sunscreen used - No using coconut     Seatbelts used - Yes    Lipids - YES - Date: 4-28-06    Glucose -  NO    Colon Cancer Screening - Colonoscopy 2002(date completed)    Hemoccults - NO    PSA - YES - Date: 4-28-06    Digital Rectal Exam - YES - Date: 4-28-06    Immunizations reviewed and up to date - Yes, last TD was 11-5-2001 2008, Dania Miller MA             Social Drivers of Health     Financial Resource Strain: Low Risk  (11/6/2023)    Financial Resource Strain     Within the past 12 months, have you or your family members you live with been unable to get utilities (heat, electricity) when it was really needed?: No   Food Insecurity:  "Low Risk  (11/6/2023)    Food Insecurity     Within the past 12 months, did you worry that your food would run out before you got money to buy more?: No     Within the past 12 months, did the food you bought just not last and you didn t have money to get more?: No   Transportation Needs: Low Risk  (11/6/2023)    Transportation Needs     Within the past 12 months, has lack of transportation kept you from medical appointments, getting your medicines, non-medical meetings or appointments, work, or from getting things that you need?: No   Physical Activity: Not on file   Stress: Not on file   Social Connections: Not on file   Interpersonal Safety: Low Risk  (3/3/2025)    Interpersonal Safety     Do you feel physically and emotionally safe where you currently live?: Yes     Within the past 12 months, have you been hit, slapped, kicked or otherwise physically hurt by someone?: No     Within the past 12 months, have you been humiliated or emotionally abused in other ways by your partner or ex-partner?: No   Housing Stability: Low Risk  (11/6/2023)    Housing Stability     Do you have housing? : Yes     Are you worried about losing your housing?: No        PHYSICAL EXAM:  /83   Pulse 74   Ht 1.74 m (5' 8.5\")   Wt 89.6 kg (197 lb 8 oz)   SpO2 96%   BMI 29.59 kg/m         Gait:  Narrow base, good stride length, reduced arm swing on the right, quick swivel turn without difficulty or unsteadiness.         4/21/2025    11:00 AM   UPDRS Motor Scale   Time: 11:14   Medication Off   R Brain DBS: None   L Brain DBS: None   Dyskinesia (LID) No   Did LID interfere No   Speech 1   Facial Expression 1   Finger Taps R 2   Finger Taps L 1   Hand Mvt R 1   Hand Mvt L 1   Pron-/Supinate R 1   Pron-/Supinate L 0   Toe Tap R 0   Toe Tap L 0   Leg Agility R 0   Leg Agility L 0   Arise From Chair 0   Gait 0   Postural Tremor RUE 1   Postural Tremor LUE 0   Kinetic Tremor RUE 0   Kinetic Tremor LUE 0   Rest Tremor RUE 1   Rest Tremor " LUE 0   Rest Tremor RLE 0   Rest Tremor LLE 0   Rest Tremor Lip/Jaw 0   Rest Tremor Constancy 1      Previously 14 on 2/12/24    ASSESSMENT:  Mr. Olivares is a 68 year old right-handed male with history significant for seizures being followed by Dr. Martin in epilepsy clinic who presents for follow-up of idiopathic Parkinson's disease. Today he reports increased constipation, tremor and loss of dexterity. We discussed trying to increase dose of CD/LD again - previously he developed nausea with 1 tab dose. Advised that this may be less bothersome as he has gotten used to the medication. He is doing a great job of staying active.     Advised that there is no strong evidence that suggests methylene blue is helpful in Parkinson's disease.     PLAN:     - Increase Carbidopa/Levodopa  mg tablets to 1 TID over the course of two weeks, details provided in AVS   - Discussed taking with crackers, ginger ale, etc if nausea   - Consider prescribing additional Carbidopa (Lodosyn) if nausea remains bothersome   - Consider CD/LD CR at bedtime if wearing off overnight  - Encouraged continuing to stay active  - Information about constipation management provided in AVS  - Recommended melatonin to improve sleep and target dream enactment    RTC 3-5 months, 30 min    Michelle Orosco MD  Movement Disorders Fellow    Time Spent: 30 minutes spent on the date of the encounter doing chart review, history and exam, documentation and further activities as noted above

## 2025-04-30 ENCOUNTER — ANCILLARY PROCEDURE (OUTPATIENT)
Dept: NUCLEAR MEDICINE | Facility: CLINIC | Age: 70
End: 2025-04-30
Attending: PSYCHIATRY & NEUROLOGY
Payer: COMMERCIAL

## 2025-04-30 PROCEDURE — 78803 RP LOCLZJ TUM SPECT 1 AREA: CPT | Mod: TC | Performed by: PSYCHIATRY & NEUROLOGY

## 2025-04-30 PROCEDURE — A9584 IODINE I-123 IOFLUPANE: HCPCS | Mod: JZ | Performed by: PSYCHIATRY & NEUROLOGY

## 2025-04-30 RX ORDER — IOFLUPANE I-123 2 MCI/ML
4-6 INJECTION, SOLUTION INTRAVENOUS ONCE
Status: COMPLETED | OUTPATIENT
Start: 2025-04-30 | End: 2025-04-30

## 2025-04-30 RX ADMIN — IOFLUPANE I-123 4.84 MILLICURIE: 2 INJECTION, SOLUTION INTRAVENOUS at 10:37

## 2025-05-14 ENCOUNTER — HOSPITAL ENCOUNTER (OUTPATIENT)
Dept: RESEARCH | Facility: CLINIC | Age: 70
Discharge: HOME | End: 2025-05-14
Attending: PSYCHIATRY & NEUROLOGY | Admitting: PSYCHIATRY & NEUROLOGY
Payer: COMMERCIAL

## 2025-05-14 PROCEDURE — 510N000009 HC RESEARCH FACILITY, PER 15 MIN

## 2025-05-14 PROCEDURE — 999N000129 HC STATISTIC PICC/MID LINE PROC CANCELLED SUBQ WORKUP

## 2025-05-14 PROCEDURE — 510N000017 HC CRU PATIENT CARE, PER 15 MIN

## 2025-05-21 ENCOUNTER — HOSPITAL ENCOUNTER (OUTPATIENT)
Dept: RESEARCH | Facility: CLINIC | Age: 70
Discharge: HOME | End: 2025-05-21
Attending: PSYCHIATRY & NEUROLOGY | Admitting: PSYCHIATRY & NEUROLOGY
Payer: COMMERCIAL

## 2025-05-21 PROCEDURE — 300N000003 HC RESEARCH SPECIMEN PROCESSING, SIMPLE

## 2025-05-21 PROCEDURE — 510N000009 HC RESEARCH FACILITY, PER 15 MIN

## 2025-05-21 PROCEDURE — 510N000017 HC CRU PATIENT CARE, PER 15 MIN

## 2025-05-21 PROCEDURE — 300N000006 HC RESEARCH SPECIMEN PACKAGING

## 2025-05-21 PROCEDURE — 999N000129 HC STATISTIC PICC/MID LINE PROC CANCELLED SUBQ WORKUP

## 2025-05-21 PROCEDURE — 36000 PLACE NEEDLE IN VEIN: CPT

## 2025-05-21 NOTE — ADDENDUM NOTE
Encounter addended by: Killian Frazier on: 5/21/2025 12:38 PM   Actions taken: Charge Capture section accepted

## 2025-05-31 ENCOUNTER — HEALTH MAINTENANCE LETTER (OUTPATIENT)
Age: 70
End: 2025-05-31

## 2025-06-03 ENCOUNTER — RESULTS FOLLOW-UP (OUTPATIENT)
Dept: INTERNAL MEDICINE | Facility: CLINIC | Age: 70
End: 2025-06-03

## 2025-06-03 ENCOUNTER — LAB (OUTPATIENT)
Dept: LAB | Facility: CLINIC | Age: 70
End: 2025-06-03
Payer: COMMERCIAL

## 2025-06-03 DIAGNOSIS — E78.5 HYPERLIPIDEMIA, UNSPECIFIED HYPERLIPIDEMIA TYPE: ICD-10-CM

## 2025-06-03 LAB
CHOLEST SERPL-MCNC: 146 MG/DL
FASTING STATUS PATIENT QL REPORTED: YES
HDLC SERPL-MCNC: 60 MG/DL
LDLC SERPL CALC-MCNC: 68 MG/DL
NONHDLC SERPL-MCNC: 86 MG/DL
TRIGL SERPL-MCNC: 91 MG/DL

## 2025-06-03 PROCEDURE — 80061 LIPID PANEL: CPT | Performed by: PATHOLOGY

## 2025-06-03 PROCEDURE — 36415 COLL VENOUS BLD VENIPUNCTURE: CPT | Performed by: PATHOLOGY

## 2025-06-04 ENCOUNTER — OFFICE VISIT (OUTPATIENT)
Dept: INTERNAL MEDICINE | Facility: CLINIC | Age: 70
End: 2025-06-04
Payer: COMMERCIAL

## 2025-06-04 VITALS
DIASTOLIC BLOOD PRESSURE: 75 MMHG | TEMPERATURE: 97.4 F | RESPIRATION RATE: 16 BRPM | OXYGEN SATURATION: 99 % | HEIGHT: 69 IN | BODY MASS INDEX: 29.09 KG/M2 | WEIGHT: 196.4 LBS | HEART RATE: 68 BPM | SYSTOLIC BLOOD PRESSURE: 116 MMHG

## 2025-06-04 DIAGNOSIS — M54.50 CHRONIC LOW BACK PAIN WITHOUT SCIATICA, UNSPECIFIED BACK PAIN LATERALITY: Primary | ICD-10-CM

## 2025-06-04 DIAGNOSIS — G89.29 CHRONIC LOW BACK PAIN WITHOUT SCIATICA, UNSPECIFIED BACK PAIN LATERALITY: Primary | ICD-10-CM

## 2025-06-04 PROCEDURE — 3074F SYST BP LT 130 MM HG: CPT | Performed by: INTERNAL MEDICINE

## 2025-06-04 PROCEDURE — 90480 ADMN SARSCOV2 VAC 1/ONLY CMP: CPT | Performed by: INTERNAL MEDICINE

## 2025-06-04 PROCEDURE — 3078F DIAST BP <80 MM HG: CPT | Performed by: INTERNAL MEDICINE

## 2025-06-04 PROCEDURE — 91320 SARSCV2 VAC 30MCG TRS-SUC IM: CPT | Performed by: INTERNAL MEDICINE

## 2025-06-04 PROCEDURE — 99214 OFFICE O/P EST MOD 30 MIN: CPT | Mod: 25 | Performed by: INTERNAL MEDICINE

## 2025-06-04 RX ORDER — MELOXICAM 7.5 MG/1
7.5 TABLET ORAL DAILY
Qty: 60 TABLET | Refills: 0 | Status: SHIPPED | OUTPATIENT
Start: 2025-06-04

## 2025-06-04 ASSESSMENT — PATIENT HEALTH QUESTIONNAIRE - PHQ9
10. IF YOU CHECKED OFF ANY PROBLEMS, HOW DIFFICULT HAVE THESE PROBLEMS MADE IT FOR YOU TO DO YOUR WORK, TAKE CARE OF THINGS AT HOME, OR GET ALONG WITH OTHER PEOPLE: SOMEWHAT DIFFICULT
SUM OF ALL RESPONSES TO PHQ QUESTIONS 1-9: 14
SUM OF ALL RESPONSES TO PHQ QUESTIONS 1-9: 14

## 2025-06-04 NOTE — PROGRESS NOTES
HPI  69-year-old turns today for reevaluation.  He has been doing very well he is tolerating the rosuvastatin well he said no side effects or ill effects related to this.  He is participating in a study of exercise and Parkinson's disease he is working on the treadmill regularly for this and doing well.  He is also doing some strength training independently.  He has noted some occasional pain and discomfort in the right lower back.  This is more in the paraspinous muscles area.  He has had no radicular symptoms at all its intermittent it has been better recently.  He has had no associated bowel or bladder disturbances.  He occasionally gets some shooting pain in his left thigh this is down the anterior thigh and no definitive precipitating or alleviating factors in this regard.  He is sleeping well 8 to 9 hours a night waking up well rested no symptoms or problems in this regard.  Past Medical History:   Diagnosis Date    Achilles bursitis or tendinitis 08/07/2008    Advanced directives, counseling/discussion 05/20/2015    Gave pt packet on 5/20/2015  Syeda Torres CMA      Bilateral groin pain 11/13/2024    Binge eating 11/13/2024    CARDIOVASCULAR SCREENING; LDL GOAL LESS THAN 160 10/12/2010    Cholesteatoma, unspecified     Colon polyps 04/2015    tubular villous and serrated adenoma    Complex sleep apnea syndrome     does not use CPAP    Dupuytren's contracture     Dyslexia     Dyspnea and respiratory abnormality 09/03/2014     Problem list name updated by automated process. 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 Provider to review    Parkinson's disease (H)     Plantar fascial fibromatosis 08/07/2008  "    Past Surgical History:   Procedure Laterality Date    cholesteatoma surgery Left     COLONOSCOPY  10/09/2002; 2015    polyps - needs f/u 5 yrs     COLONOSCOPY N/A 7/20/2021    Procedure: COLONOSCOPY, WITH POLYPECTOMY;  Surgeon: Asaf Guajardo MD;  Location: [facility] OR    COLONOSCOPY N/A 3/3/2025    Procedure: COLONOSCOPY, WITH POLYPECTOMY;  Surgeon: Butch Spencer MD;  Location: [facility] OR    HC REMOVAL OF TONSILS,<11 Y/O  1962    LASER HOLMIUM ENUCLEATION PROSTATE N/A 12/14/2023    Procedure: Holmium Laser Enucleation of the Prostate;  Surgeon: Roberto Altamirano MD;  Location: [facility] OR    RELEASE DUPUYTRENS CONTRACTURE Right 8/26/2021    Procedure: Right fifth finger and palm Dupuytren's contracture release;  Surgeon: Caron Farrell MD;  Location: [facility] OR     Family History   Problem Relation Age of Onset    Diabetes Mother         diet controlled, Htn    Heart Murmur Mother         TAVR    Dementia Mother         age 89    Deep Vein Thrombosis (DVT) Mother     Coronary Artery Disease Brother     Diabetes Maternal Grandmother     ALS Maternal Cousin     Glaucoma No family hx of     Macular Degeneration No family hx of     Anesthesia Reaction No family hx of          Exam:  /75 (BP Location: Right arm, Patient Position: Sitting, Cuff Size: Adult Regular)   Pulse 68   Temp 97.4  F (36.3  C) (Skin)   Resp 16   Ht 1.74 m (5' 8.5\")   Wt 89.1 kg (196 lb 6.4 oz)   SpO2 99%   BMI 29.42 kg/m    196 lbs 6.4 oz  The patient is alert, oriented with a clear sensorium.   Skin shows no lesions or rashes and good turgor.   Head is normocephalic and atraumatic.     Lungs are clear.   Heart shows normal S1 and S2 without murmur or gallop.    Extremities show no edema.    Recent lipids were reviewed showing substantial improvement from before.    ASSESSMENT  1 hyperlipidemia improved on rosuvastatin  2 Parkinson disease stable   3 History postprandial right upper quadrant abdominal pain   4 sleep " apnea not on CPAP   5 seizures in remission on lamotrigine  6 Colon polyps due for colonoscopy 2029  7 BPH S/P HoLEP doing well  8 history of smoking quit 10 years ago    Plan  Will have him continue the present medications in the same dose of rosuvastatin.  Will have him follow-up in 6 months.  Update his immunizations today with a COVID booster encouraged him to get the Tdap through the pharmacy.     Over 30 minutes spent on the day of service in chart review, patient contact, record completion and review and notification of lab reports   Follow-up sooner immediately if any increase symptoms or problems    This note was completed using Dragon voice recognition software.      Asaf Craig MD  General Internal Medicine  [facility]  [phone]

## 2025-08-01 DIAGNOSIS — M54.50 CHRONIC LOW BACK PAIN WITHOUT SCIATICA, UNSPECIFIED BACK PAIN LATERALITY: ICD-10-CM

## 2025-08-01 DIAGNOSIS — G89.29 CHRONIC LOW BACK PAIN WITHOUT SCIATICA, UNSPECIFIED BACK PAIN LATERALITY: ICD-10-CM

## 2025-08-04 RX ORDER — MELOXICAM 7.5 MG/1
7.5 TABLET ORAL DAILY
Qty: 60 TABLET | Refills: 1 | Status: SHIPPED | OUTPATIENT
Start: 2025-08-04

## (undated) DEVICE — SPECIMEN TRAP TISSUE CONTAINER PIRANHA 2208120

## (undated) DEVICE — COVER CAMERA IN-LIGHT DISP LT-C02

## (undated) DEVICE — SOL WATER IRRIG 500ML BOTTLE 2F7113

## (undated) DEVICE — SUCTION MANIFOLD NEPTUNE 2 SYS 1 PORT 702-025-000

## (undated) DEVICE — DRAPE MAYO STAND 23X54 8337

## (undated) DEVICE — BLADE MORCELLATOR WOLF PIRANHA 4.75X385MM 49700103

## (undated) DEVICE — TAPE DURAPORE 3" SILK 1538-3

## (undated) DEVICE — RX BACITRACIN OINTMENT 0.9G 1/32OZ CUR001109

## (undated) DEVICE — DRAPE STERI TOWEL LG 1010

## (undated) DEVICE — SUCTION MANIFOLD NEPTUNE 2 SYS 4 PORT 0702-020-000

## (undated) DEVICE — CATH LASER URETERAL 7.1FRX40CM G17797  022403-7.1-40

## (undated) DEVICE — ESU HOLSTER PLASTIC DISP E2400

## (undated) DEVICE — SPECIMEN CONTAINER 3OZ W/FORMALIN 59901

## (undated) DEVICE — PREP POVIDONE-IODINE 10% SOLUTION 4OZ BOTTLE MDS093944

## (undated) DEVICE — PACK HAND CUSTOM ASC

## (undated) DEVICE — Device

## (undated) DEVICE — GOWN IMPERVIOUS 2XL BLUE

## (undated) DEVICE — TUBING SUCTION MEDI-VAC 1/4"X20' N620A

## (undated) DEVICE — LINEN TOWEL PACK X5 5464

## (undated) DEVICE — TUBING SET THERMEDX UROLOGY SGL USE LL0006

## (undated) DEVICE — SOL NACL 0.9% IRRIG 500ML BOTTLE 2F7123

## (undated) DEVICE — SNARE CAPIVATOR ROUND COLD SNR BX10 M00561101

## (undated) DEVICE — SOL WATER IRRIG 1000ML BOTTLE 2F7114

## (undated) DEVICE — GLOVE PROTEXIS POWDER FREE 6.5 ORTHOPEDIC 2D73ET65

## (undated) DEVICE — GLOVE BIOGEL PI MICRO SZ 7.5 48575

## (undated) DEVICE — KIT ENDO FIRST STEP DISINFECTANT 200ML W/POUCH EP-4

## (undated) DEVICE — BAG URINARY DRAIN 4000ML LF 153509

## (undated) DEVICE — ENDO TRAP POLYP E-TRAP 00711099

## (undated) DEVICE — LASER FIBER HOLMIUM MOSES 550 D/F/L AC-10030120

## (undated) DEVICE — PAD CHUX UNDERPAD 30X36" P3036C

## (undated) DEVICE — BNDG KLING 2" 2231

## (undated) DEVICE — SLING ARM LG 79-99157

## (undated) DEVICE — LINEN GOWN X4 5410

## (undated) DEVICE — DRAPE IOBAN INCISE 13X13" 6640EZ

## (undated) DEVICE — PAD FLOOR SURGSAFE 30X40" 83030

## (undated) DEVICE — LINEN ORTHO PACK 5446

## (undated) DEVICE — DRSG STERI STRIP 1/2X4" R1547

## (undated) DEVICE — DEVICE CATH STABILIZATION STATLOCK FOLEY 3-WAY FOL0105

## (undated) DEVICE — TUBING SUCTION 12"X1/4" N612

## (undated) DEVICE — KIT ENDO TURNOVER/PROCEDURE CARRY-ON 101822

## (undated) DEVICE — TUBING SET PIRANHA 41702208

## (undated) DEVICE — PREP POVIDONE-IODINE 7.5% SCRUB 4OZ BOTTLE MDS093945

## (undated) DEVICE — STRAP KNEE/BODY 31143004

## (undated) DEVICE — FILTER PIRANHA DISP 2228.901

## (undated) DEVICE — SOL NACL 0.9% IRRIG 3000ML BAG 2B7477

## (undated) DEVICE — SOL NACL 0.9% IRRIG 1000ML BOTTLE 2F7124

## (undated) DEVICE — DRSG ABDOMINAL 07 1/2X8" 7197D

## (undated) DEVICE — LINEN GOWN XLG 5407

## (undated) DEVICE — DRSG GAUZE 4X8" NON21842

## (undated) DEVICE — SYR PISTON IRRIGATION 60 ML DYND20325

## (undated) DEVICE — SYR 50ML LL W/O NDL 309653

## (undated) DEVICE — CATH FOLEY 3WAY 22FR 30ML LATEX 0167SI22

## (undated) RX ORDER — FENTANYL CITRATE-0.9 % NACL/PF 10 MCG/ML
PLASTIC BAG, INJECTION (ML) INTRAVENOUS
Status: DISPENSED
Start: 2023-12-14

## (undated) RX ORDER — DIPHENHYDRAMINE HYDROCHLORIDE 50 MG/ML
INJECTION INTRAMUSCULAR; INTRAVENOUS
Status: DISPENSED
Start: 2021-07-20

## (undated) RX ORDER — ACETAMINOPHEN 325 MG/1
TABLET ORAL
Status: DISPENSED
Start: 2021-08-26

## (undated) RX ORDER — SULFAMETHOXAZOLE/TRIMETHOPRIM 800-160 MG
TABLET ORAL
Status: DISPENSED
Start: 2023-11-14

## (undated) RX ORDER — EPHEDRINE SULFATE 50 MG/ML
INJECTION, SOLUTION INTRAMUSCULAR; INTRAVENOUS; SUBCUTANEOUS
Status: DISPENSED
Start: 2023-12-14

## (undated) RX ORDER — CEFAZOLIN SODIUM 2 G/50ML
SOLUTION INTRAVENOUS
Status: DISPENSED
Start: 2021-08-26

## (undated) RX ORDER — FENTANYL CITRATE 50 UG/ML
INJECTION, SOLUTION INTRAMUSCULAR; INTRAVENOUS
Status: DISPENSED
Start: 2023-12-14

## (undated) RX ORDER — LIDOCAINE HYDROCHLORIDE 20 MG/ML
INJECTION, SOLUTION EPIDURAL; INFILTRATION; INTRACAUDAL; PERINEURAL
Status: DISPENSED
Start: 2021-08-26

## (undated) RX ORDER — ONDANSETRON 2 MG/ML
INJECTION INTRAMUSCULAR; INTRAVENOUS
Status: DISPENSED
Start: 2021-07-20

## (undated) RX ORDER — ACETAMINOPHEN 325 MG/1
TABLET ORAL
Status: DISPENSED
Start: 2023-12-14

## (undated) RX ORDER — KETOROLAC TROMETHAMINE 30 MG/ML
INJECTION, SOLUTION INTRAMUSCULAR; INTRAVENOUS
Status: DISPENSED
Start: 2021-08-26

## (undated) RX ORDER — FENTANYL CITRATE 50 UG/ML
INJECTION, SOLUTION INTRAMUSCULAR; INTRAVENOUS
Status: DISPENSED
Start: 2025-03-03

## (undated) RX ORDER — ONDANSETRON 2 MG/ML
INJECTION INTRAMUSCULAR; INTRAVENOUS
Status: DISPENSED
Start: 2021-08-26

## (undated) RX ORDER — AMPICILLIN 2 G/1
INJECTION, POWDER, FOR SOLUTION INTRAVENOUS
Status: DISPENSED
Start: 2023-12-14

## (undated) RX ORDER — DEXAMETHASONE SODIUM PHOSPHATE 4 MG/ML
INJECTION, SOLUTION INTRA-ARTICULAR; INTRALESIONAL; INTRAMUSCULAR; INTRAVENOUS; SOFT TISSUE
Status: DISPENSED
Start: 2021-08-26

## (undated) RX ORDER — DIPHENHYDRAMINE HYDROCHLORIDE 50 MG/ML
INJECTION, SOLUTION INTRAMUSCULAR; INTRAVENOUS
Status: DISPENSED
Start: 2025-03-03

## (undated) RX ORDER — FENTANYL CITRATE 50 UG/ML
INJECTION, SOLUTION INTRAMUSCULAR; INTRAVENOUS
Status: DISPENSED
Start: 2021-07-20

## (undated) RX ORDER — ONDANSETRON 2 MG/ML
INJECTION INTRAMUSCULAR; INTRAVENOUS
Status: DISPENSED
Start: 2023-12-14

## (undated) RX ORDER — PROPOFOL 10 MG/ML
INJECTION, EMULSION INTRAVENOUS
Status: DISPENSED
Start: 2021-08-26